# Patient Record
Sex: FEMALE | Race: WHITE | NOT HISPANIC OR LATINO | Employment: OTHER | URBAN - METROPOLITAN AREA
[De-identification: names, ages, dates, MRNs, and addresses within clinical notes are randomized per-mention and may not be internally consistent; named-entity substitution may affect disease eponyms.]

---

## 2017-01-03 ENCOUNTER — ANTI-COAG VISIT (OUTPATIENT)
Dept: CARDIOLOGY | Facility: CLINIC | Age: 82
End: 2017-01-03

## 2017-01-03 ENCOUNTER — LAB VISIT (OUTPATIENT)
Dept: LAB | Facility: HOSPITAL | Age: 82
End: 2017-01-03
Attending: FAMILY MEDICINE
Payer: MEDICARE

## 2017-01-03 DIAGNOSIS — Z79.01 LONG TERM CURRENT USE OF ANTICOAGULANT THERAPY: ICD-10-CM

## 2017-01-03 DIAGNOSIS — I48.91 ATRIAL FIBRILLATION, UNSPECIFIED TYPE: ICD-10-CM

## 2017-01-03 LAB
INR PPP: 1.7
PROTHROMBIN TIME: 17.2 SEC

## 2017-01-03 PROCEDURE — 36415 COLL VENOUS BLD VENIPUNCTURE: CPT | Mod: PO

## 2017-01-03 PROCEDURE — 85610 PROTHROMBIN TIME: CPT | Mod: PO

## 2017-01-17 ENCOUNTER — ANTI-COAG VISIT (OUTPATIENT)
Dept: CARDIOLOGY | Facility: CLINIC | Age: 82
End: 2017-01-17
Payer: MEDICARE

## 2017-01-17 DIAGNOSIS — Z79.01 LONG TERM CURRENT USE OF ANTICOAGULANT THERAPY: Primary | ICD-10-CM

## 2017-01-17 DIAGNOSIS — I48.91 ATRIAL FIBRILLATION, UNSPECIFIED TYPE: ICD-10-CM

## 2017-01-17 LAB
CTP QC/QA: ABNORMAL
INR PPP: 4.1 (ref 2–3)

## 2017-01-17 PROCEDURE — 85610 PROTHROMBIN TIME: CPT | Mod: QW,S$GLB,,

## 2017-01-17 PROCEDURE — 99211 OFF/OP EST MAY X REQ PHY/QHP: CPT | Mod: 25,S$GLB,,

## 2017-01-17 NOTE — MR AVS SNAPSHOT
[facility]  [address] [facility] [address]  [place] [address]  Phone: [phone]                  Miesha Obando   2017 10:30 AM   Anti-coag visit    Description:  Female : 1935   Provider:  Danica Thao PharmD   Department:  [facility]           Diagnoses this Visit        Comments    Long term current use of anticoagulant therapy    -  Primary     Atrial fibrillation, unspecified type                To Do List           Future Appointments        Provider Department Dept Phone    2017 10:30 AM Danica Thao PharmD [facility] [phone]      Goals (5 Years of Data)     None      [facility]     [facility] On Call Nurse Care Line -  Assistance  Registered nurses in the [facility] On Call Center provide clinical advisement, health education, appointment booking, and other advisory services.  Call for this free service at [phone].             Medications           Message regarding Medications     Verify the changes and/or additions to your medication regime listed below are the same as discussed with your clinician today.  If any of these changes or additions are incorrect, please notify your healthcare provider.             Verify that the below list of medications is an accurate representation of the medications you are currently taking.  If none reported, the list may be blank. If incorrect, please contact your healthcare provider. Carry this list with you in case of emergency.           Current Medications     ACCU-CHEK MARLON PLUS TEST STRP Strp USE AS DIRECTED TWICE DAILY    acetaminophen (TYLENOL) 325 MG tablet Take 325 mg by mouth as needed for Pain or Temperature greater than.     albuterol (PROAIR HFA) 90 mcg/actuation inhaler Inhale 2 puffs into the lungs every 4 (four) hours as needed.    albuterol (PROVENTIL) 2.5 mg /3 mL (0.083 %) nebulizer solution Take 3 mLs (2.5 mg total) by nebulization every 6 (six) hours as needed for Wheezing.     ascorbic acid (VITAMIN C) 500 MG tablet Take 500 mg by mouth once daily.      aspirin (ECOTRIN) 81 MG EC tablet Take 81 mg by mouth once daily.    b complex vitamins tablet Take 1 tablet by mouth once daily.      fish oil-omega-3 fatty acids 300-1,000 mg capsule Take 2 g by mouth once daily.    fluticasone-vilanterol (BREO ELLIPTA) 200-25 mcg/dose DsDv diskus inhaler Inhale 1 puff into the lungs once daily.    furosemide (LASIX) 20 MG tablet Take 1 tablet (20 mg total) by mouth daily as needed (Weight gain, Swelling, or shortness of breath).    hydrocodone-acetaminophen 5-325mg (NORCO) 5-325 mg per tablet Take 1 tablet by mouth every 6 (six) hours as needed for Pain.    levothyroxine (SYNTHROID) 100 MCG tablet TAKE 1 TABLET EVERY DAY    losartan (COZAAR) 25 MG tablet TAKE 1 TABLET ONE TIME DAILY    MECOBAL/LEVOMEFOLAT CA/B6 PHOS (METANX ORAL) Take 1 tablet by mouth every morning.    metoprolol succinate (TOPROL-XL) 50 MG 24 hr tablet Take 1 tablet (50 mg total) by mouth once daily.    nitroGLYCERIN (NITROSTAT) 0.4 MG SL tablet Place 1 tablet (0.4 mg total) under the tongue every 5 (five) minutes as needed for Chest pain.    spironolactone (ALDACTONE) 25 MG tablet Take 1 tablet (25 mg total) by mouth once daily.    warfarin (COUMADIN) 5 MG tablet TAKE 1/2 TO 1 TABLET DAILY AS DIRECTED BY COUMADIN CLINIC           Clinical Reference Information           Vital Signs - Last Recorded     LMP                   (LMP Unknown)           Allergies as of 1/17/2017     Amoxicillin-pot Clavula (Bulk)    Sulfa (Sulfonamide Antibiotics)    Adhesive      Immunizations Administered on Date of Encounter - 1/17/2017     None      Orders Placed During Today's Visit      Normal Orders This Visit    ISTAT INR          1/17/2017 10:54 AM - Aby ValdezD      Component Results     Component Value Flag Ref Range Units Status    INR 4.1 (A) 2.0 - 3.0  Final     Acceptable           December 2016 Details    Sun Mon Tue  Wed Thu Fri Sat         1               2               3                 4               5               6               7               8               9               10                 11               12               13               14               15               16               17                 18      2.5 mg         19      5 mg         20      2.5 mg         21      2.5 mg         22      2.5 mg         23      2.5 mg         24      2.5 mg           25      2.5 mg         26      5 mg         27      2.5 mg         28      2.5 mg         29      2.5 mg         30      2.5 mg         31      2.5 mg          Date Details   No additional details              January 2017 Details    Sun Mon Tue Wed Thu Fri Sat     1      2.5 mg         2      5 mg         3   1.7   5 mg   See details      4      2.5 mg         5      2.5 mg         6      5 mg         7      2.5 mg           8      2.5 mg         9      5 mg         10      2.5 mg         11      2.5 mg         12      2.5 mg         13      5 mg         14      2.5 mg           15      2.5 mg         16      5 mg         17   4.1   Hold   See details      18      2.5 mg         19      2.5 mg         20      2.5 mg         21      2.5 mg           22      2.5 mg         23      5 mg         24      2.5 mg         25      2.5 mg         26      2.5 mg         27      2.5 mg         28      2.5 mg           29      2.5 mg         30            31                    Date Details   01/03 Last INR check   INR: 1.7   Coumadin Therapy: 2.0 - 3.0 for INR for all indicators except mechanical heart valves and antiphospholipid syndromes which should use 2.5 - 3.5.       01/17 This INR check   INR: 4.1       Date of next INR:  1/30/2017               How to take your warfarin dose     To take:  2.5 mg Take 0.5 of a 5 mg tablet.    To take:  5 mg Take 1 of the 5 mg tablets.    Hold Do not take your warfarin dose. See the Details table to the right for additional  instructions.                Anticoagulation Summary as of 1/17/2017     Maintenance plan 5 mg (5 mg x 1) on Mon; 2.5 mg (5 mg x 0.5) all other days    Full instructions 1/17: Hold; Otherwise 5 mg on Mon; 2.5 mg all other days    Next INR check 1/30/2017      Anticoagulation Episode Summary     Comments [facility] 3x/wk

## 2017-01-17 NOTE — PROGRESS NOTES
Patient confirms dose and compliance. She does report that she had a cold and was taking OTC medication as well as tessalon for cough. Will hold today and reduce dose slightly. Recheck in 2 weeks.

## 2017-01-23 RX ORDER — WARFARIN SODIUM 5 MG/1
TABLET ORAL
Qty: 90 TABLET | Refills: 3 | Status: SHIPPED | OUTPATIENT
Start: 2017-01-23 | End: 2018-01-03

## 2017-01-24 ENCOUNTER — CLINICAL SUPPORT (OUTPATIENT)
Dept: CARDIOLOGY | Facility: CLINIC | Age: 82
End: 2017-01-24
Payer: MEDICARE

## 2017-01-24 DIAGNOSIS — I45.5 SINUS PAUSE: ICD-10-CM

## 2017-01-24 DIAGNOSIS — R00.1 BRADYCARDIA: ICD-10-CM

## 2017-01-24 DIAGNOSIS — Z95.0 PACEMAKER: ICD-10-CM

## 2017-01-24 PROCEDURE — 93294 REM INTERROG EVL PM/LDLS PM: CPT | Mod: S$GLB,,, | Performed by: INTERNAL MEDICINE

## 2017-01-24 PROCEDURE — 93296 REM INTERROG EVL PM/IDS: CPT | Mod: S$GLB,,, | Performed by: INTERNAL MEDICINE

## 2017-01-26 DIAGNOSIS — I48.91 ATRIAL FIBRILLATION, UNSPECIFIED TYPE: ICD-10-CM

## 2017-01-26 DIAGNOSIS — Z95.0 PACEMAKER: Primary | ICD-10-CM

## 2017-01-26 DIAGNOSIS — I50.32 CHRONIC DIASTOLIC HEART FAILURE: ICD-10-CM

## 2017-01-30 ENCOUNTER — ANTI-COAG VISIT (OUTPATIENT)
Dept: CARDIOLOGY | Facility: CLINIC | Age: 82
End: 2017-01-30
Payer: MEDICARE

## 2017-01-30 DIAGNOSIS — Z79.01 LONG TERM CURRENT USE OF ANTICOAGULANT THERAPY: Primary | ICD-10-CM

## 2017-01-30 DIAGNOSIS — I48.91 ATRIAL FIBRILLATION, UNSPECIFIED TYPE: ICD-10-CM

## 2017-01-30 LAB
CTP QC/QA: YES
INR PPP: 2.3 (ref 2–3)

## 2017-01-30 PROCEDURE — 99211 OFF/OP EST MAY X REQ PHY/QHP: CPT | Mod: 25,S$GLB,,

## 2017-01-30 PROCEDURE — 85610 PROTHROMBIN TIME: CPT | Mod: QW,S$GLB,,

## 2017-01-30 NOTE — MR AVS SNAPSHOT
[facility]  [address] [facility]  [facility] [address]  Phone: [phone]                  Miesha Obando   2017 10:30 AM   Anti-coag visit    Description:  Female : 1935   Provider:  Danica Thao PharmD   Department:  [place] [facility]           Diagnoses this Visit        Comments    Long term current use of anticoagulant therapy    -  Primary     Atrial fibrillation, unspecified type                To Do List           Future Appointments        Provider Department Dept Phone    2017 11:15 AM Danica Thao PharmD [place] [facility] [phone]    2017 9:00 AM PACEMAKER [facility] [phone]    2017 9:40 AM Eddie Gonzales MD [facility] [phone]      Goals (5 Years of Data)     None      [facility]     [facility] On Call Nurse Care Line -  Assistance  Registered nurses in the [facility] On Call Center provide clinical advisement, health education, appointment booking, and other advisory services.  Call for this free service at [phone].             Medications           Message regarding Medications     Verify the changes and/or additions to your medication regime listed below are the same as discussed with your clinician today.  If any of these changes or additions are incorrect, please notify your healthcare provider.             Verify that the below list of medications is an accurate representation of the medications you are currently taking.  If none reported, the list may be blank. If incorrect, please contact your healthcare provider. Carry this list with you in case of emergency.           Current Medications     ACCU-CHEK MARLON PLUS TEST STRP Strp USE AS DIRECTED TWICE DAILY    acetaminophen (TYLENOL) 325 MG tablet Take 325 mg by mouth as needed for Pain or Temperature greater than.     albuterol (PROAIR HFA) 90 mcg/actuation inhaler Inhale 2 puffs into the lungs every 4 (four) hours as needed.    albuterol  (PROVENTIL) 2.5 mg /3 mL (0.083 %) nebulizer solution Take 3 mLs (2.5 mg total) by nebulization every 6 (six) hours as needed for Wheezing.    ascorbic acid (VITAMIN C) 500 MG tablet Take 500 mg by mouth once daily.      aspirin (ECOTRIN) 81 MG EC tablet Take 81 mg by mouth once daily.    b complex vitamins tablet Take 1 tablet by mouth once daily.      fish oil-omega-3 fatty acids 300-1,000 mg capsule Take 2 g by mouth once daily.    fluticasone-vilanterol (BREO ELLIPTA) 200-25 mcg/dose DsDv diskus inhaler Inhale 1 puff into the lungs once daily.    furosemide (LASIX) 20 MG tablet Take 1 tablet (20 mg total) by mouth daily as needed (Weight gain, Swelling, or shortness of breath).    hydrocodone-acetaminophen 5-325mg (NORCO) 5-325 mg per tablet Take 1 tablet by mouth every 6 (six) hours as needed for Pain.    levothyroxine (SYNTHROID) 100 MCG tablet TAKE 1 TABLET EVERY DAY    losartan (COZAAR) 25 MG tablet TAKE 1 TABLET ONE TIME DAILY    MECOBAL/LEVOMEFOLAT CA/B6 PHOS (METANX ORAL) Take 1 tablet by mouth every morning.    metoprolol succinate (TOPROL-XL) 50 MG 24 hr tablet Take 1 tablet (50 mg total) by mouth once daily.    nitroGLYCERIN (NITROSTAT) 0.4 MG SL tablet Place 1 tablet (0.4 mg total) under the tongue every 5 (five) minutes as needed for Chest pain.    spironolactone (ALDACTONE) 25 MG tablet Take 1 tablet (25 mg total) by mouth once daily.    warfarin (COUMADIN) 5 MG tablet TAKE 1/2 TO 1 TABLET DAILY AS DIRECTED BY COUMADIN CLINIC           Clinical Reference Information           Vital Signs - Last Recorded     LMP                   (LMP Unknown)           Allergies as of 1/30/2017     Amoxicillin-pot Clavula (Bulk)    Sulfa (Sulfonamide Antibiotics)    Adhesive      Immunizations Administered on Date of Encounter - 1/30/2017     None      Orders Placed During Today's Visit      Normal Orders This Visit    POCT INR          1/30/2017 11:02 AM - Danica Thao PharmD      Component Results     Component  Value Flag Ref Range Units Status    INR 2.3  2.0 - 3.0  Final     Acceptable Yes    Final      December 2016 Details    Sun Mon Tue Wed Thu Fri Sat         1               2               3                 4               5               6               7               8               9               10                 11               12               13               14               15               16               17                 18               19               20               21               22               23               24                 25               26               27               28               29               30               31      2.5 mg          Date Details   No additional details              January 2017 Details    Sun Mon Tue Wed Thu Fri Sat     1      2.5 mg         2      5 mg         3   1.7   5 mg   See details      4      2.5 mg         5      2.5 mg         6      5 mg         7      2.5 mg           8      2.5 mg         9      5 mg         10      2.5 mg         11      2.5 mg         12      2.5 mg         13      5 mg         14      2.5 mg           15      2.5 mg         16      5 mg         17   4.1   Hold   See details      18      2.5 mg         19      2.5 mg         20      2.5 mg         21      2.5 mg           22      2.5 mg         23      5 mg         24      2.5 mg         25      2.5 mg         26      2.5 mg         27      2.5 mg         28      2.5 mg           29      2.5 mg         30   2.3   5 mg   See details      31      2.5 mg              Date Details   01/03 INR: 1.7   Coumadin Therapy: 2.0 - 3.0 for INR for all indicators except mechanical heart valves and antiphospholipid syndromes which should use 2.5 - 3.5.       01/17 Last INR check   INR: 4.1      01/30 This INR check   INR: 2.3                     How to take your warfarin dose     To take:  2.5 mg Take 0.5 of a 5 mg tablet.    To take:  5 mg Take 1 of the 5 mg tablets.            February 2017 Details    Sun Mon Tue Wed Thu Fri Sat        1      2.5 mg         2      2.5 mg         3      2.5 mg         4      2.5 mg           5      2.5 mg         6      5 mg         7      2.5 mg         8      2.5 mg         9      2.5 mg         10      2.5 mg         11      2.5 mg           12      2.5 mg         13      5 mg         14      2.5 mg         15      2.5 mg         16      2.5 mg         17      2.5 mg         18      2.5 mg           19      2.5 mg         20            21               22               23               24               25                 26               27               28                    Date Details   No additional details    Date of next INR:  2/20/2017         How to take your warfarin dose     To take:  2.5 mg Take 0.5 of a 5 mg tablet.    To take:  5 mg Take 1 of the 5 mg tablets.           Anticoagulation Summary as of 1/30/2017     Maintenance plan 5 mg (5 mg x 1) on Mon; 2.5 mg (5 mg x 0.5) all other days    Full instructions 5 mg on Mon; 2.5 mg all other days    Next INR check 2/20/2017      Anticoagulation Episode Summary     Comments [facility] 3x/wk

## 2017-01-30 NOTE — PROGRESS NOTES
Patient confirms dose and compliance. Patient reports that she found a lump in her breast that is painful, due to her history of breast cancer she is concerned. She will follow up with GYN. No other changes/problems reported. INR is in range and stable, recheck INR in 3 weeks.

## 2017-02-01 ENCOUNTER — TELEPHONE (OUTPATIENT)
Dept: FAMILY MEDICINE | Facility: CLINIC | Age: 82
End: 2017-02-01

## 2017-02-01 NOTE — TELEPHONE ENCOUNTER
"Spoke to patient this afternoon. Patient states that she was concerned about the wording of the mammogram results. She states that she was concerned about the work "thickness" - she is wanting to know if that means the results were not conclusive. Explained to her that the results were normal - no abnormal findings.  Patient is also concerned about some pain that she is having in her breast and would like to know if she should get it checked out. She has a lot of pain in her joints but this is different.    "

## 2017-02-01 NOTE — TELEPHONE ENCOUNTER
----- Message from Deven Nicole sent at 2/1/2017 11:19 AM CST -----  Contact: self  Good morning,     Ms. Obando would like to speak with someone concerning her recent mammogram. She also states she is having breast pain.     Thanks,     Deven Vigil

## 2017-02-02 NOTE — TELEPHONE ENCOUNTER
Spoke to patient and stated to him what Dr. Forte noted. Scheduled patient an appointment to see Susi Garcia NP 2/6/17. Pt verbalized understanding.

## 2017-02-02 NOTE — TELEPHONE ENCOUNTER
"I reviewed the results and do not see the word "thickness".  This is completely normal.  However, if she is having discomfort, should have an appointment to evaluate.  Can see NP  "

## 2017-02-03 ENCOUNTER — PATIENT OUTREACH (OUTPATIENT)
Dept: ADMINISTRATIVE | Facility: HOSPITAL | Age: 82
End: 2017-02-03

## 2017-02-03 NOTE — LETTER
February 13, 2017    Miesha HOU Topher  [address] [id number] [facility]  [address]  [facility] [address]  Phone: [phone] Dear Mrs. Obando:    We have tried to reach you by mychart unsuccessfully.        [facility] is committed to your overall health.  To help you get the most out of each of your visits, we will review your information to make sure you are up to date on all of your recommended tests and/or procedures.       Ms. Susi Garcia has found that you may be due for annual dilated eye exam, diabetic foot exam.     If you have had any of the above done at another facility, please bring the records or information with you so that your record at [facility] will be complete.     If you are currently taking medication, please bring it with you to your appointment for review.     Also, if you have any type of Advanced Directives, please bring them with you to your office visit so we may scan them into your chart.     Miguel Johansen LPN Clinical Care Coordinator   [facility] [address] [facility]   [place] [address]   [phone] (p)   [phone] (f)

## 2017-02-13 ENCOUNTER — OFFICE VISIT (OUTPATIENT)
Dept: FAMILY MEDICINE | Facility: CLINIC | Age: 82
End: 2017-02-13
Payer: MEDICARE

## 2017-02-13 VITALS
WEIGHT: 232.38 LBS | RESPIRATION RATE: 14 BRPM | TEMPERATURE: 98 F | DIASTOLIC BLOOD PRESSURE: 88 MMHG | HEIGHT: 61 IN | HEART RATE: 64 BPM | SYSTOLIC BLOOD PRESSURE: 130 MMHG | BODY MASS INDEX: 43.88 KG/M2

## 2017-02-13 DIAGNOSIS — H61.21 IMPACTED CERUMEN OF RIGHT EAR: Primary | ICD-10-CM

## 2017-02-13 DIAGNOSIS — R19.7 DIARRHEA, UNSPECIFIED TYPE: ICD-10-CM

## 2017-02-13 DIAGNOSIS — J30.9 ALLERGIC RHINITIS, UNSPECIFIED ALLERGIC RHINITIS TRIGGER, UNSPECIFIED RHINITIS SEASONALITY: ICD-10-CM

## 2017-02-13 DIAGNOSIS — R10.32 LLQ ABDOMINAL PAIN: ICD-10-CM

## 2017-02-13 PROCEDURE — 99213 OFFICE O/P EST LOW 20 MIN: CPT | Mod: S$GLB,,, | Performed by: NURSE PRACTITIONER

## 2017-02-13 PROCEDURE — 1159F MED LIST DOCD IN RCRD: CPT | Mod: S$GLB,,, | Performed by: NURSE PRACTITIONER

## 2017-02-13 PROCEDURE — 87046 STOOL CULTR AEROBIC BACT EA: CPT | Mod: 59

## 2017-02-13 PROCEDURE — 87427 SHIGA-LIKE TOXIN AG IA: CPT | Mod: 59

## 2017-02-13 PROCEDURE — 1157F ADVNC CARE PLAN IN RCRD: CPT | Mod: S$GLB,,, | Performed by: NURSE PRACTITIONER

## 2017-02-13 PROCEDURE — 82272 OCCULT BLD FECES 1-3 TESTS: CPT

## 2017-02-13 PROCEDURE — 87425 ROTAVIRUS AG IA: CPT

## 2017-02-13 PROCEDURE — 1160F RVW MEDS BY RX/DR IN RCRD: CPT | Mod: S$GLB,,, | Performed by: NURSE PRACTITIONER

## 2017-02-13 PROCEDURE — 87209 SMEAR COMPLEX STAIN: CPT

## 2017-02-13 PROCEDURE — 3079F DIAST BP 80-89 MM HG: CPT | Mod: S$GLB,,, | Performed by: NURSE PRACTITIONER

## 2017-02-13 PROCEDURE — 3075F SYST BP GE 130 - 139MM HG: CPT | Mod: S$GLB,,, | Performed by: NURSE PRACTITIONER

## 2017-02-13 PROCEDURE — 99999 PR PBB SHADOW E&M-EST. PATIENT-LVL IV: CPT | Mod: PBBFAC,,, | Performed by: NURSE PRACTITIONER

## 2017-02-13 PROCEDURE — 87045 FECES CULTURE AEROBIC BACT: CPT

## 2017-02-13 PROCEDURE — 89055 LEUKOCYTE ASSESSMENT FECAL: CPT

## 2017-02-13 PROCEDURE — 87329 GIARDIA AG IA: CPT

## 2017-02-13 PROCEDURE — 87449 NOS EACH ORGANISM AG IA: CPT

## 2017-02-13 NOTE — PROGRESS NOTES
Subjective:       Patient ID: Miesha Obando is a 81 y.o. female.    Chief Complaint: Diarrhea and Nasal Congestion    Diarrhea    This is a new problem. Episode onset: over a week. The problem occurs 5 to 10 times per day. The patient states that diarrhea awakens her from sleep. Associated symptoms include abdominal pain and increased flatus. Pertinent negatives include no arthralgias, bloating, chills, coughing, fever, headaches, myalgias, sweats, URI (nasal congestion and runny nose), vomiting or weight loss. There are no known risk factors. Treatments tried: imodium. The treatment provided moderate relief.     Review of Systems   Constitutional: Positive for appetite change. Negative for activity change, chills, fever and weight loss.   HENT: Positive for congestion, postnasal drip and rhinorrhea. Negative for ear discharge, ear pain, sinus pressure and sore throat.    Respiratory: Negative for cough, chest tightness, shortness of breath and wheezing.    Gastrointestinal: Positive for abdominal pain, diarrhea and flatus. Negative for bloating and vomiting.   Musculoskeletal: Negative for arthralgias and myalgias.   Neurological: Negative for headaches.       Objective:      Physical Exam   Constitutional: She is oriented to person, place, and time. She appears well-nourished.   HENT:   Head: Normocephalic and atraumatic.   Right Ear: External ear normal.   Left Ear: Hearing, tympanic membrane, external ear and ear canal normal.   Nose: Nose normal.   Mouth/Throat: Oropharynx is clear and moist. No oropharyngeal exudate.   Right ear cerumen impaction   Eyes: Conjunctivae and EOM are normal. Pupils are equal, round, and reactive to light.   Neck: Normal range of motion. Neck supple.   Cardiovascular: Normal rate, regular rhythm, normal heart sounds and intact distal pulses.    Pulmonary/Chest: Effort normal and breath sounds normal. She has no wheezes. She has no rales.   Abdominal: Soft. Bowel sounds are normal.  There is tenderness in the left upper quadrant and left lower quadrant. There is no rigidity, no rebound, no guarding and no CVA tenderness.   Lymphadenopathy:     She has no cervical adenopathy.   Neurological: She is alert and oriented to person, place, and time.   Skin: Skin is warm and dry. No rash noted.   Vitals reviewed.      Assessment:       1. Impacted cerumen of right ear    2. Allergic rhinitis, unspecified allergic rhinitis trigger, unspecified rhinitis seasonality    3. LLQ abdominal pain    4. Diarrhea, unspecified type        Plan:       Miesha was seen today for diarrhea and nasal congestion.    Diagnoses and all orders for this visit:    Impacted cerumen of right ear  -     Ambulatory referral to ENT    Allergic rhinitis, unspecified allergic rhinitis trigger, unspecified rhinitis seasonality  flonase and zyrtec nightly    LLQ abdominal pain  -     CT Abdomen Pelvis W Wo Contrast; Future  -     Creatinine, serum; Future  -     Occult blood x 1, stool; Future  -     WBC, Stool; Future  -     Stool culture; Future  -     Giardia / Cryptosporidum, EIA; Future  -     Stool Exam-Ova,Cysts,Parasites; Future  -     Rotavirus antigen, stool; Future  -     Clostridium difficile EIA; Future    Diarrhea, unspecified type  -     CT Abdomen Pelvis W Wo Contrast; Future  -     Creatinine, serum; Future  -     Occult blood x 1, stool; Future  -     WBC, Stool; Future  -     Stool culture; Future  -     Giardia / Cryptosporidum, EIA; Future  -     Stool Exam-Ova,Cysts,Parasites; Future  -     Rotavirus antigen, stool; Future  -     Clostridium difficile EIA; Future    [place] diet  Small frequent meals  ER for increasing or worsening symptoms

## 2017-02-13 NOTE — MR AVS SNAPSHOT
[facility]  [address]  [employer] [address]  Phone: [phone]  Fax: [phone]                  Miesha Obando   2017 12:00 PM   Office Visit    Description:  Female : 1935   Provider:  Susi Garcia NP   Department:  [facility]           Reason for Visit     Diarrhea     Nasal Congestion           Diagnoses this Visit        Comments    Impacted cerumen of right ear    -  Primary     Allergic rhinitis, unspecified allergic rhinitis trigger, unspecified rhinitis seasonality         LLQ abdominal pain         Diarrhea, unspecified type                To Do List           Future Appointments        Provider Department Dept Phone    2/15/2017 8:15 AM LAB, [place] [facility] [phone]    2/15/2017 9:30 AM [facility] CT1 LIMIT [address] [facility] [phone]    2017 10:00 AM [place], [place] [phone]    2017 11:15 AM Toña An, PharmD [facility] Coumadin [phone]    3/1/2017 8:30 AM AUDIO, [facility] Audiology [phone]      Goals (5 Years of Data)     None      [facility]     [facility] Nurse [facility] Emergency Department Line - 24/7 Assistance  Registered nurses in the [facility] provide clinical advisement, health education, appointment booking, and other advisory services.  Call for this free service at [phone].             Medications           Message regarding Medications     Verify the changes and/or additions to your medication regime listed below are the same as discussed with your clinician today.  If any of these changes or additions are incorrect, please notify your healthcare provider.             Verify that the below list of medications is an accurate representation of the medications you are currently taking.  If none reported, the list may be blank. If incorrect, please contact your healthcare provider. Carry this list with  "you in case of emergency.           Current Medications     ACCU-CHEK MARLON PLUS TEST STRP Strp USE AS DIRECTED TWICE DAILY    acetaminophen (TYLENOL) 325 MG tablet Take 325 mg by mouth as needed for Pain or Temperature greater than.     albuterol (PROAIR HFA) 90 mcg/actuation inhaler Inhale 2 puffs into the lungs every 4 (four) hours as needed.    ascorbic acid (VITAMIN C) 500 MG tablet Take 500 mg by mouth once daily.      aspirin (ECOTRIN) 81 MG EC tablet Take 81 mg by mouth once daily.    b complex vitamins tablet Take 1 tablet by mouth once daily.      fish oil-omega-3 fatty acids 300-1,000 mg capsule Take 2 g by mouth once daily.    fluticasone-vilanterol (BREO ELLIPTA) 200-25 mcg/dose DsDv diskus inhaler Inhale 1 puff into the lungs once daily.    hydrocodone-acetaminophen 5-325mg (NORCO) 5-325 mg per tablet Take 1 tablet by mouth every 6 (six) hours as needed for Pain.    levothyroxine (SYNTHROID) 100 MCG tablet TAKE 1 TABLET EVERY DAY    losartan (COZAAR) 25 MG tablet TAKE 1 TABLET ONE TIME DAILY    MECOBAL/LEVOMEFOLAT CA/B6 PHOS (METANX ORAL) Take 1 tablet by mouth every morning.    metoprolol succinate (TOPROL-XL) 50 MG 24 hr tablet Take 1 tablet (50 mg total) by mouth once daily.    spironolactone (ALDACTONE) 25 MG tablet Take 1 tablet (25 mg total) by mouth once daily.    warfarin (COUMADIN) 5 MG tablet TAKE 1/2 TO 1 TABLET DAILY AS DIRECTED BY COUMADIN CLINIC    furosemide (LASIX) 20 MG tablet Take 1 tablet (20 mg total) by mouth daily as needed (Weight gain, Swelling, or shortness of breath).    nitroGLYCERIN (NITROSTAT) 0.4 MG SL tablet Place 1 tablet (0.4 mg total) under the tongue every 5 (five) minutes as needed for Chest pain.           Clinical Reference Information           Your Vitals Were     BP Pulse Temp Resp Height Weight    130/88 (BP Location: Right arm, Patient Position: Sitting) 64 98 °F (36.7 °C) (Oral) 14 5' 1" (1.549 m) 105.4 kg (232 lb 5.8 oz)    Last Period BMI             (LMP " Unknown) 43.9 kg/m2         Blood Pressure          Most Recent Value    BP  130/88      Allergies as of 2/13/2017     Amoxicillin-pot Clavula (Bulk)    Sulfa (Sulfonamide Antibiotics)    Adhesive      Immunizations Administered on Date of Encounter - 2/13/2017     None      Orders Placed During Today's Visit      Normal Orders This Visit    Ambulatory referral to ENT     Future Labs/Procedures Expected by Expires    Clostridium difficile EIA  2/13/2017 4/14/2018    Creatinine, serum  2/13/2017 4/14/2018    CT Abdomen Pelvis W Wo Contrast  2/13/2017 5/14/2017    Giardia / Cryptosporidum, EIA  2/13/2017 4/14/2018    Occult blood x 1, stool  2/13/2017 2/13/2018    Rotavirus antigen, stool  2/13/2017 2/13/2018    Stool culture  2/13/2017 2/13/2018    Stool Exam-Ova,Cysts,Parasites  2/13/2017 2/13/2018    WBC, Stool  2/13/2017 2/13/2018      Language Assistance Services     ATTENTION: Language assistance services are available, free of charge. Please call [phone].      ATENCIÓN: Si habla español, tiene a granado disposición servicios gratuitos de asistencia lingüística. Llame al [phone].     CHÚ Ý: N?u b?n nói Ti?ng Vi?t, có các d?ch v? h? tr? ngôn ng? mi?n phí earlh cho b?n. G?i s? [phone].         [facility] complies with applicable Federal civil rights laws and does not discriminate on the basis of race, color, national origin, age, disability, or sex.

## 2017-02-15 ENCOUNTER — HOSPITAL ENCOUNTER (OUTPATIENT)
Dept: RADIOLOGY | Facility: HOSPITAL | Age: 82
Discharge: HOME | End: 2017-02-15
Attending: NURSE PRACTITIONER
Payer: MEDICARE

## 2017-02-15 DIAGNOSIS — R19.7 DIARRHEA, UNSPECIFIED TYPE: ICD-10-CM

## 2017-02-15 DIAGNOSIS — R10.32 LLQ ABDOMINAL PAIN: ICD-10-CM

## 2017-02-15 LAB
C DIFF GDH STL QL: NEGATIVE
C DIFF TOX A+B STL QL IA: NEGATIVE
WBC #/AREA STL HPF: NORMAL /[HPF]

## 2017-02-15 PROCEDURE — 25500020 PHARM REV CODE 255: Mod: PO | Performed by: NURSE PRACTITIONER

## 2017-02-15 PROCEDURE — 74178 CT ABD&PLV WO CNTR FLWD CNTR: CPT | Mod: TC,PO

## 2017-02-15 PROCEDURE — 74178 CT ABD&PLV WO CNTR FLWD CNTR: CPT | Mod: 26,,, | Performed by: RADIOLOGY

## 2017-02-15 RX ADMIN — IOHEXOL 30 ML: 350 INJECTION, SOLUTION INTRAVENOUS at 10:02

## 2017-02-15 RX ADMIN — IOHEXOL 100 ML: 350 INJECTION, SOLUTION INTRAVENOUS at 10:02

## 2017-02-16 ENCOUNTER — TELEPHONE (OUTPATIENT)
Dept: FAMILY MEDICINE | Facility: CLINIC | Age: 82
End: 2017-02-16

## 2017-02-16 DIAGNOSIS — K52.9 COLITIS: Primary | ICD-10-CM

## 2017-02-16 LAB
E COLI SXT1 STL QL IA: NEGATIVE
E COLI SXT2 STL QL IA: NEGATIVE
O+P STL TRI STN: NORMAL
OB PNL STL: NEGATIVE
RV AG STL QL IA.RAPID: NEGATIVE

## 2017-02-16 NOTE — TELEPHONE ENCOUNTER
Spoke to patient and stated to her what Susi Garcia NP noted. Pt verbalized understanding. Appointment scheduled with GI. Mailing appointment to patient.

## 2017-02-18 LAB
BACTERIA STL CULT: NORMAL
CRYPTOSP AG STL QL IA: NEGATIVE
G LAMBLIA AG STL QL IA: NEGATIVE

## 2017-02-20 ENCOUNTER — ANTI-COAG VISIT (OUTPATIENT)
Dept: CARDIOLOGY | Facility: CLINIC | Age: 82
End: 2017-02-20
Payer: MEDICARE

## 2017-02-20 ENCOUNTER — OFFICE VISIT (OUTPATIENT)
Dept: FAMILY MEDICINE | Facility: CLINIC | Age: 82
End: 2017-02-20
Payer: MEDICARE

## 2017-02-20 VITALS
SYSTOLIC BLOOD PRESSURE: 133 MMHG | WEIGHT: 231.5 LBS | HEART RATE: 59 BPM | DIASTOLIC BLOOD PRESSURE: 66 MMHG | BODY MASS INDEX: 43.71 KG/M2 | HEIGHT: 61 IN

## 2017-02-20 DIAGNOSIS — Z00.00 ENCOUNTER FOR PREVENTIVE HEALTH EXAMINATION: Primary | ICD-10-CM

## 2017-02-20 DIAGNOSIS — E11.42 TYPE 2 DIABETES MELLITUS WITH DIABETIC POLYNEUROPATHY, WITHOUT LONG-TERM CURRENT USE OF INSULIN: ICD-10-CM

## 2017-02-20 DIAGNOSIS — Z79.01 LONG TERM CURRENT USE OF ANTICOAGULANT THERAPY: ICD-10-CM

## 2017-02-20 DIAGNOSIS — I42.9 CARDIOMYOPATHY, UNSPECIFIED TYPE: ICD-10-CM

## 2017-02-20 DIAGNOSIS — I35.0 NONRHEUMATIC AORTIC VALVE STENOSIS: ICD-10-CM

## 2017-02-20 DIAGNOSIS — I35.0 AORTIC VALVE STENOSIS, UNSPECIFIED ETIOLOGY: ICD-10-CM

## 2017-02-20 DIAGNOSIS — J42 CHRONIC BRONCHITIS, UNSPECIFIED CHRONIC BRONCHITIS TYPE: ICD-10-CM

## 2017-02-20 DIAGNOSIS — I27.9 PULMONARY HEART DISEASE: ICD-10-CM

## 2017-02-20 DIAGNOSIS — Z95.2 S/P TAVR (TRANSCATHETER AORTIC VALVE REPLACEMENT): ICD-10-CM

## 2017-02-20 DIAGNOSIS — K21.9 GASTROESOPHAGEAL REFLUX DISEASE, ESOPHAGITIS PRESENCE NOT SPECIFIED: ICD-10-CM

## 2017-02-20 DIAGNOSIS — I25.10 CORONARY ARTERY DISEASE INVOLVING NATIVE CORONARY ARTERY OF NATIVE HEART WITHOUT ANGINA PECTORIS: ICD-10-CM

## 2017-02-20 DIAGNOSIS — I10 ESSENTIAL HYPERTENSION: ICD-10-CM

## 2017-02-20 DIAGNOSIS — R32 URINARY INCONTINENCE, UNSPECIFIED TYPE: ICD-10-CM

## 2017-02-20 DIAGNOSIS — Z95.0 PACEMAKER: ICD-10-CM

## 2017-02-20 DIAGNOSIS — G47.00 INSOMNIA, UNSPECIFIED TYPE: ICD-10-CM

## 2017-02-20 DIAGNOSIS — I70.209 ATHEROSCLEROSIS OF ARTERIES OF EXTREMITIES: ICD-10-CM

## 2017-02-20 DIAGNOSIS — Z92.29 HX: LONG TERM ANTICOAGULANT USE: Primary | ICD-10-CM

## 2017-02-20 DIAGNOSIS — G47.30 SLEEP APNEA, UNSPECIFIED TYPE: ICD-10-CM

## 2017-02-20 DIAGNOSIS — E66.01 MORBID OBESITY DUE TO EXCESS CALORIES: ICD-10-CM

## 2017-02-20 DIAGNOSIS — M85.80 OSTEOPENIA: ICD-10-CM

## 2017-02-20 DIAGNOSIS — E03.9 HYPOTHYROIDISM, UNSPECIFIED TYPE: ICD-10-CM

## 2017-02-20 DIAGNOSIS — Z85.3 HISTORY OF BREAST CANCER: ICD-10-CM

## 2017-02-20 DIAGNOSIS — Z95.810 AICD (AUTOMATIC CARDIOVERTER/DEFIBRILLATOR) PRESENT: ICD-10-CM

## 2017-02-20 DIAGNOSIS — M51.36 DDD (DEGENERATIVE DISC DISEASE), LUMBAR: ICD-10-CM

## 2017-02-20 DIAGNOSIS — I70.0 AORTIC ATHEROSCLEROSIS: ICD-10-CM

## 2017-02-20 DIAGNOSIS — I50.20 SYSTOLIC CONGESTIVE HEART FAILURE, NYHA CLASS 3: ICD-10-CM

## 2017-02-20 DIAGNOSIS — M17.0 PRIMARY OSTEOARTHRITIS OF BOTH KNEES: ICD-10-CM

## 2017-02-20 DIAGNOSIS — I48.91 ATRIAL FIBRILLATION, UNSPECIFIED TYPE: ICD-10-CM

## 2017-02-20 DIAGNOSIS — I50.40 COMBINED SYSTOLIC AND DIASTOLIC CONGESTIVE HEART FAILURE, NYHA CLASS 3: ICD-10-CM

## 2017-02-20 DIAGNOSIS — I48.0 PAROXYSMAL ATRIAL FIBRILLATION: ICD-10-CM

## 2017-02-20 DIAGNOSIS — Z95.5 S/P CORONARY ARTERY STENT PLACEMENT: ICD-10-CM

## 2017-02-20 LAB — INR PPP: 2.7 (ref 2–3)

## 2017-02-20 PROCEDURE — 3075F SYST BP GE 130 - 139MM HG: CPT | Mod: S$GLB,,, | Performed by: NURSE PRACTITIONER

## 2017-02-20 PROCEDURE — 3078F DIAST BP <80 MM HG: CPT | Mod: S$GLB,,, | Performed by: NURSE PRACTITIONER

## 2017-02-20 PROCEDURE — 85610 PROTHROMBIN TIME: CPT | Mod: QW,S$GLB,, | Performed by: PHARMACIST

## 2017-02-20 PROCEDURE — 99211 OFF/OP EST MAY X REQ PHY/QHP: CPT | Mod: 25,S$GLB,, | Performed by: PHARMACIST

## 2017-02-20 PROCEDURE — 99999 PR PBB SHADOW E&M-EST. PATIENT-LVL IV: CPT | Mod: PBBFAC,,, | Performed by: NURSE PRACTITIONER

## 2017-02-20 PROCEDURE — G0439 PPPS, SUBSEQ VISIT: HCPCS | Mod: S$GLB,,, | Performed by: NURSE PRACTITIONER

## 2017-02-20 PROCEDURE — 99499 UNLISTED E&M SERVICE: CPT | Mod: S$GLB,,, | Performed by: NURSE PRACTITIONER

## 2017-02-20 RX ORDER — TRAZODONE HYDROCHLORIDE 50 MG/1
TABLET ORAL
COMMUNITY
Start: 2017-01-27 | End: 2017-05-24 | Stop reason: SDUPTHER

## 2017-02-20 NOTE — MR AVS SNAPSHOT
[facility]  [address]  [place] [address]  Phone: [phone]  Fax: [phone]                  Miesha Obando   2017 10:00 AM   Office Visit    Description:  Female : 1935   Provider:  AVIS Santos   Department:  [facility]           Reason for Visit     Health Risk Assessment           Diagnoses this Visit        Comments    Type 2 diabetes mellitus with diabetic polyneuropathy, without long-term current use of insulin         Urinary complication         Encounter for preventive health examination                To Do List           Future Appointments        Provider Department Dept Phone    2017 11:15 AM Toña An, PharmD [place] - Coumadin [phone]    3/1/2017 8:30 AM AUDIO, [facility] - Audiology [phone]    3/1/2017 10:00 AM Sheila Galvez NP [place] - [phone]    3/22/2017 3:00 PM Silvestre Olivas MD [facility] Gastroenterology [phone]    3/29/2017 10:40 AM ZOEY España MD [facility] [phone]      Goals (5 Years of Data)     None      Follow-Up and Disposition     Return for Regular visit with Dr. Forte.      [facility]     [facility] Care Line - 24/7 Assistance  Registered nurses in the [facility] provide clinical advisement, health education, appointment booking, and other advisory services.  Call for this free service at [phone].             Medications           Message regarding Medications     Verify the changes and/or additions to your medication regime listed below are the same as discussed with your clinician today.  If any of these changes or additions are incorrect, please notify your healthcare provider.        STOP taking these medications     MECOBAL/LEVOMEFOLAT CA/B6 PHOS (METANX ORAL) Take 1 tablet by mouth every morning.           Verify that the below list of medications is an accurate representation of  the medications you are currently taking.  If none reported, the list may be blank. If incorrect, please contact your healthcare provider. Carry this list with you in case of emergency.           Current Medications     ACCU-CHEK MARLON PLUS TEST STRP Strp USE AS DIRECTED TWICE DAILY    acetaminophen (TYLENOL) 325 MG tablet Take 325 mg by mouth as needed for Pain or Temperature greater than.     albuterol (PROAIR HFA) 90 mcg/actuation inhaler Inhale 2 puffs into the lungs every 4 (four) hours as needed.    ascorbic acid (VITAMIN C) 500 MG tablet Take 500 mg by mouth once daily.      aspirin (ECOTRIN) 81 MG EC tablet Take 81 mg by mouth once daily.    b complex vitamins tablet Take 1 tablet by mouth once daily.      fish oil-omega-3 fatty acids 300-1,000 mg capsule Take 2 g by mouth once daily.    fluticasone-vilanterol (BREO ELLIPTA) 200-25 mcg/dose DsDv diskus inhaler Inhale 1 puff into the lungs once daily.    furosemide (LASIX) 20 MG tablet Take 1 tablet (20 mg total) by mouth daily as needed (Weight gain, Swelling, or shortness of breath).    hydrocodone-acetaminophen 5-325mg (NORCO) 5-325 mg per tablet Take 1 tablet by mouth every 6 (six) hours as needed for Pain.    levothyroxine (SYNTHROID) 100 MCG tablet TAKE 1 TABLET EVERY DAY    losartan (COZAAR) 25 MG tablet TAKE 1 TABLET ONE TIME DAILY    metoprolol succinate (TOPROL-XL) 50 MG 24 hr tablet Take 1 tablet (50 mg total) by mouth once daily.    nitroGLYCERIN (NITROSTAT) 0.4 MG SL tablet Place 1 tablet (0.4 mg total) under the tongue every 5 (five) minutes as needed for Chest pain.    spironolactone (ALDACTONE) 25 MG tablet Take 1 tablet (25 mg total) by mouth once daily.    trazodone (DESYREL) 50 MG tablet     warfarin (COUMADIN) 5 MG tablet TAKE 1/2 TO 1 TABLET DAILY AS DIRECTED BY COUMADIN CLINIC           Clinical Reference Information           Your Vitals Were     BP Pulse Height Weight Last Period BMI    133/66 (BP Location: Right arm, Patient Position:  "Sitting, BP Method: Automatic) 59 5' 1" (1.549 m) 105 kg (231 lb 7.7 oz) (LMP Unknown) 43.74 kg/m2      Blood Pressure          Most Recent Value    BP  133/66      Allergies as of 2/20/2017     Amoxicillin-pot Clavula (Bulk)    Sulfa (Sulfonamide Antibiotics)    Adhesive      Immunizations Administered on Date of Encounter - 2/20/2017     None      Instructions      Counseling and Referral of Other Preventative  (Italic type indicates deductible and co-insurance are waived)    Patient Name: Miesha Obando  Today's Date: 2/20/2017      SERVICE LIMITATIONS RECOMMENDATION    Vaccines    · Pneumococcal (once after 65)    · Influenza (annually)    · Hepatitis B (if medium/high risk)    · Prevnar 13      Hepatitis B medium/high risk factors:       - End-stage renal disease       - Hemophiliacs who received Factor VII or         IX concentrates       - Clients of institutions for the mentally             retarded       - Persons who live in the same house as          a HepB carrier       - Homosexual men       - Illicit injectable drug abusers     Pneumococcal:Done     Influenza:Done   Hepatitis B: N/A     Prevnar 13:Done    Mammogram (biennial age 50-74)  Annually (age 40 or over) N/A    Pap (up to age 70 and after 70 if unknown history or abnormal study last 10 years)   N/A    Colorectal cancer screening (to age 75)    · Fecal occult blood test (annual)  · Flexible sigmoidoscopy (5y)  · Screening colonoscopy (10y)  · Barium enema  N/A    Diabetes self-management training (no USPSTF recommendations)  Requires referral by treating physician for patient with diabetes or renal disease. 10 hours of initial DSMT sessions of no less than 30 minutes each in a continuous 12-month period. 2 hours of follow-up DSMT in subsequent years. Done    Bone mass measurements (age 65 & older, biennial)  Requires diagnosis related to osteoporosis or estrogen deficiency. Biennial benefit unless patient has history of long-term glucocorticoid " Done    Glaucoma screening (no USPSTF recommendation)  Diabetes mellitus, family history   , age 50 or over    American, age 65 or over Done    Medical nutrition therapy for diabetes or renal disease (no recommended schedule)  Requires referral by treating physician for patient with diabetes or renal disease or kidney transplant within the past 3 years.  Can be provided in same year as diabetes self-management training (DSMT), and CMS recommends medical nutrition therapy take place after DSMT. Up to 3 hours for initial year and 2 hours in subsequent years. Done    Cardiovascular screening blood tests (every 5 years)  · Fasting lipid panel  Order as a panel if possible Done    Diabetes screening tests (at least every 3 years, Medicare covers annually or at 6-month intervals for prediabetic patients)  · Fasting blood sugar (FBS) or glucose tolerance test (GTT)  Patient must be diagnosed with one of the following:       - Hypertension       - Dyslipidemia       - Obesity (BMI 30kg/m2)       - Previous elevated impaired FBS or GTT       ... or any two of the following:       - Overweight (BMI 25 but <30)       - Family history of diabetes       - Age 65 or older       - History of gestational diabetes or birth of baby weighing more than 9 pounds Done    Abdominal aortic aneurysm screening (once)  · Sonogram   Limited to patients who meet one of the following criteria:       - Men who are 65-75 years old and have smoked more than 100 cigarette in their lifetime       - Anyone with a family history of abdominal aortic aneurysm       - Anyone recommended for screening by the USPSTF N/A    HIV screening (annually for increased risk patients)  · HIV-1 and HIV-2 by EIA, or BONIFACIO, rapid antibody test or oral mucosa transudate  Patients must be at increased risk for HIV infection per USPSTF guidelines or pregnant. Tests covered annually for patient at increased risk or as requested by the patient.  Pregnant patients may receive up to 3 tests during pregnancy.  Risks discussed, screening is not recommended    Smoking cessation counseling (up to 8 sessions per year)  Patients must be asymptomatic of tobacco-related conditions to receive as a preventative service.  N/A    Subsequent annual wellness visit  At least 12 months since last AWV  Return in one year     The following information is provided to all patients.  This information is to help you find resources for any of the problems found today that may be affecting your health:                Living healthy guide: www.[facility].[place].[facility]      Understanding Diabetes: www.diabetes.org      Eating healthy: www.cdc.gov/healthyweight      CDC home safety checklist: www.cdc.gov/steadi/patient.html      Agency on Aging: www.goea.[place].[facility]      Alcoholics anonymous (AA): www.aa.org      Physical Activity: www.jack.nih.gov/vk6pdpf      Tobacco use: www.quitwithusla.org          Language Assistance Services     ATTENTION: Language assistance services are available, free of charge. Please call [phone].      ATENCIÓN: Si habla español, tiene a granado disposición servicios gratuitos de asistencia lingüística. Llame al [phone].     CHÚ Ý: N?u b?n nói Ti?ng Vi?t, có các d?ch v? h? tr? ngôn ng? mi?n phí dành cho b?n. G?i s? [phone].         [facility] complies with applicable Federal civil rights laws and does not discriminate on the basis of race, color, national origin, age, disability, or sex.

## 2017-02-20 NOTE — PROGRESS NOTES
"INr in range, Pt reports having multiple loose stools, had some test run and found to have diverticulitis.  She is also having pain.increasng probiotics.  She does confirm dose and vitk intake.  No changes.  She was not placed on abx, but will call should this change.  Have recommended she stay hydrated and discussed drinking gatorage or pedialyte during this time of diarrhea.  Maintain and recheck in 1month  "This note will not be shared with the patient."  "

## 2017-02-20 NOTE — MR AVS SNAPSHOT
[place] [facility]  [address]  [place] [address]  Phone: [phone]                  Miesha Obando   2017 11:15 AM   Anti-coag visit    Description:  Female : 1935   Provider:  Toña An, Chintan   Department:  [place] - Coumadin           Diagnoses this Visit        Comments    HX: long term anticoagulant use    -  Primary     Atrial fibrillation, unspecified type                To Do List           Future Appointments        Provider Department Dept Phone    3/1/2017 8:30 AM AUDIO, [place] [facility] Audiology [phone]    3/1/2017 10:00 AM Sheila Galvez NP [facility] [phone]    3/20/2017 11:15 AM Toña An, PharmD [facility] Coumadin [phone]    3/22/2017 3:00 PM Silvestre Olivas MD [facility] Gastroenterology [phone]    3/29/2017 10:40 AM ZOEY España MD [facility] Family Medicine [phone]      Goals (5 Years of Data)     None      [facility]     [facility] Care [facility] -  Assistance  Registered nurses in the [facility] provide clinical advisement, health education, appointment booking, and other advisory services.  Call for this free service at [phone].             Medications           Message regarding Medications     Verify the changes and/or additions to your medication regime listed below are the same as discussed with your clinician today.  If any of these changes or additions are incorrect, please notify your healthcare provider.             Verify that the below list of medications is an accurate representation of the medications you are currently taking.  If none reported, the list may be blank. If incorrect, please contact your healthcare provider. Carry this list with you in case of emergency.           Current Medications     ACCU-CHEK MARLON PLUS TEST STRP Strp USE AS DIRECTED TWICE DAILY    acetaminophen (TYLENOL) 325 MG tablet Take 325 mg by mouth as needed for Pain  or Temperature greater than.     albuterol (PROAIR HFA) 90 mcg/actuation inhaler Inhale 2 puffs into the lungs every 4 (four) hours as needed.    ascorbic acid (VITAMIN C) 500 MG tablet Take 500 mg by mouth once daily.      aspirin (ECOTRIN) 81 MG EC tablet Take 81 mg by mouth once daily.    b complex vitamins tablet Take 1 tablet by mouth once daily.      fish oil-omega-3 fatty acids 300-1,000 mg capsule Take 2 g by mouth once daily.    fluticasone-vilanterol (BREO ELLIPTA) 200-25 mcg/dose DsDv diskus inhaler Inhale 1 puff into the lungs once daily.    furosemide (LASIX) 20 MG tablet Take 1 tablet (20 mg total) by mouth daily as needed (Weight gain, Swelling, or shortness of breath).    hydrocodone-acetaminophen 5-325mg (NORCO) 5-325 mg per tablet Take 1 tablet by mouth every 6 (six) hours as needed for Pain.    levothyroxine (SYNTHROID) 100 MCG tablet TAKE 1 TABLET EVERY DAY    losartan (COZAAR) 25 MG tablet TAKE 1 TABLET ONE TIME DAILY    metoprolol succinate (TOPROL-XL) 50 MG 24 hr tablet Take 1 tablet (50 mg total) by mouth once daily.    nitroGLYCERIN (NITROSTAT) 0.4 MG SL tablet Place 1 tablet (0.4 mg total) under the tongue every 5 (five) minutes as needed for Chest pain.    spironolactone (ALDACTONE) 25 MG tablet Take 1 tablet (25 mg total) by mouth once daily.    trazodone (DESYREL) 50 MG tablet     warfarin (COUMADIN) 5 MG tablet TAKE 1/2 TO 1 TABLET DAILY AS DIRECTED BY COUMADIN CLINIC           Clinical Reference Information           Your Vitals Were     Last Period                   (LMP Unknown)           Allergies as of 2/20/2017     Amoxicillin-pot Clavula (Bulk)    Sulfa (Sulfonamide Antibiotics)    Adhesive      Immunizations Administered on Date of Encounter - 2/20/2017     None      Orders Placed During Today's Visit      Normal Orders This Visit    POCT INR          2/20/2017 11:23 AM - Toña An, PharmD      Component Results     Component    INR    2.7      January 2017 Details    Sun  Mon Tue Wed u Fri Sat     1               2               3               4               5               6               7                 8               9               10               11               12               13               14                 15               16               17               18               19               20               21      2.5 mg           22      2.5 mg         23      5 mg         24      2.5 mg         25      2.5 mg         26      2.5 mg         27      2.5 mg         28      2.5 mg           29      2.5 mg         30   2.3   5 mg   See details      31      2.5 mg              Date Details   01/30 Last INR check   INR: 2.3                 February 2017 Details    Sun Mon Tue Wed Thu Fri Sat        1      2.5 mg         2      2.5 mg         3      2.5 mg         4      2.5 mg           5      2.5 mg         6      5 mg         7      2.5 mg         8      2.5 mg         9      2.5 mg         10      2.5 mg         11      2.5 mg           12      2.5 mg         13      5 mg         14      2.5 mg         15      2.5 mg         16      2.5 mg         17      2.5 mg         18      2.5 mg           19      2.5 mg         20   2.7   5 mg   See details      21      2.5 mg         22      2.5 mg         23      2.5 mg         24      2.5 mg         25      2.5 mg           26      2.5 mg         27      5 mg         28      2.5 mg              Date Details   02/20 This INR check   INR: 2.7                     How to take your warfarin dose     To take:  2.5 mg Take 0.5 of a 5 mg tablet.    To take:  5 mg Take 1 of the 5 mg tablets.           March 2017 Details    Sun Mon Tue Wed u Fri Sat        1      2.5 mg         2      2.5 mg         3      2.5 mg         4      2.5 mg           5      2.5 mg         6      5 mg         7      2.5 mg         8      2.5 mg         9      2.5 mg         10      2.5 mg         11      2.5 mg           12      2.5 mg         13      5  mg         14      2.5 mg         15      2.5 mg         16      2.5 mg         17      2.5 mg         18      2.5 mg           19      2.5 mg         20            21               22               23               24               25                 26               27               28               29               30               31                 Date Details   No additional details    Date of next INR:  3/20/2017         How to take your warfarin dose     To take:  2.5 mg Take 0.5 of a 5 mg tablet.    To take:  5 mg Take 1 of the 5 mg tablets.           Anticoagulation Summary as of 2/20/2017     Maintenance plan 5 mg (5 mg x 1) on Mon; 2.5 mg (5 mg x 0.5) all other days    Full instructions 5 mg on Mon; 2.5 mg all other days    Next INR check 3/20/2017      Anticoagulation Episode Summary     Comments [facility]in [facility] 3x[facility]wk      Patient Findings     Positives Other complaints    Negatives Signs/symptoms of thrombosis, Signs/symptoms of bleeding, Laboratory test error suspected, Change in health, Change in alcohol use, Change in activity, Upcoming invasive procedure, Emergency department visit, Upcoming dental procedure, Missed doses, Extra doses, Change in medications, Change in diet/appetite, Hospital admission, Bruising    Comments Pt reports having multiple loose stools, had some test run and found to have diverticulitis.  She is also having pain.  increasng probiotics      Language Assistance Services     ATTENTION: Language assistance services are available, free of charge. Please call [phone].      ATENCIÓN: Si habla juan f, tiene a granado disposición servicios gratuitos de asistencia lingüística. Llame al [phone].     CHÚ Ý: N?u b?n nói Ti?ng Vi?t, có các d?ch v? h? tr? ngôn ng? mi?n phí dành cho b?n. G?i s? [phone].         [place] [facility] complies with applicable Federal civil rights laws and does not discriminate on the basis of race, color, national origin,  age, disability, or sex.

## 2017-02-20 NOTE — PATIENT INSTRUCTIONS
Counseling and Referral of Other Preventative  (Italic type indicates deductible and co-insurance are waived)    Patient Name: Miesha Obando  Today's Date: 2/20/2017      SERVICE LIMITATIONS RECOMMENDATION    Vaccines    · Pneumococcal (once after 65)    · Influenza (annually)    · Hepatitis B (if medium/high risk)    · Prevnar 13      Hepatitis B medium/high risk factors:       - End-stage renal disease       - Hemophiliacs who received Factor VII or         IX concentrates       - Clients of institutions for the mentally             retarded       - Persons who live in the same house as          a HepB carrier       - Homosexual men       - Illicit injectable drug abusers     Pneumococcal:Done     Influenza:Done   Hepatitis B: N/A     Prevnar 13:Done    Mammogram (biennial age 50-74)  Annually (age 40 or over) N/A    Pap (up to age 70 and after 70 if unknown history or abnormal study last 10 years)   N/A    Colorectal cancer screening (to age 75)    · Fecal occult blood test (annual)  · Flexible sigmoidoscopy (5y)  · Screening colonoscopy (10y)  · Barium enema  N/A    Diabetes self-management training (no USPSTF recommendations)  Requires referral by treating physician for patient with diabetes or renal disease. 10 hours of initial DSMT sessions of no less than 30 minutes each in a continuous 12-month period. 2 hours of follow-up DSMT in subsequent years. Done    Bone mass measurements (age 65 & older, biennial)  Requires diagnosis related to osteoporosis or estrogen deficiency. Biennial benefit unless patient has history of long-term glucocorticoid Done    Glaucoma screening (no USPSTF recommendation)  Diabetes mellitus, family history   , age 50 or over    American, age 65 or over Done    Medical nutrition therapy for diabetes or renal disease (no recommended schedule)  Requires referral by treating physician for patient with diabetes or renal disease or kidney transplant within the  past 3 years.  Can be provided in same year as diabetes self-management training (DSMT), and CMS recommends medical nutrition therapy take place after DSMT. Up to 3 hours for initial year and 2 hours in subsequent years. Done    Cardiovascular screening blood tests (every 5 years)  · Fasting lipid panel  Order as a panel if possible Done    Diabetes screening tests (at least every 3 years, Medicare covers annually or at 6-month intervals for prediabetic patients)  · Fasting blood sugar (FBS) or glucose tolerance test (GTT)  Patient must be diagnosed with one of the following:       - Hypertension       - Dyslipidemia       - Obesity (BMI 30kg/m2)       - Previous elevated impaired FBS or GTT       ... or any two of the following:       - Overweight (BMI 25 but <30)       - Family history of diabetes       - Age 65 or older       - History of gestational diabetes or birth of baby weighing more than 9 pounds Done    Abdominal aortic aneurysm screening (once)  · Sonogram   Limited to patients who meet one of the following criteria:       - Men who are 65-75 years old and have smoked more than 100 cigarette in their lifetime       - Anyone with a family history of abdominal aortic aneurysm       - Anyone recommended for screening by the USPSTF N/A    HIV screening (annually for increased risk patients)  · HIV-1 and HIV-2 by EIA, or BONIFACIO, rapid antibody test or oral mucosa transudate  Patients must be at increased risk for HIV infection per USPSTF guidelines or pregnant. Tests covered annually for patient at increased risk or as requested by the patient. Pregnant patients may receive up to 3 tests during pregnancy.  Risks discussed, screening is not recommended    Smoking cessation counseling (up to 8 sessions per year)  Patients must be asymptomatic of tobacco-related conditions to receive as a preventative service.  N/A    Subsequent annual wellness visit  At least 12 months since last AWV  Return in one year     The  following information is provided to all patients.  This information is to help you find resources for any of the problems found today that may be affecting your health:                Living healthy guide: www.[facility].[place].[facility]      Understanding Diabetes: www.diabetes.org      Eating healthy: www.cdc.gov/healthyweight      CDC home safety checklist: www.cdc.gov/steadi/patient.html      Agency on Aging: www.goea.[place].[facility]      Alcoholics anonymous (AA): www.aa.org      Physical Activity: www.jack.nih.gov/kp9rdhw      Tobacco use: www.quitwithusla.org

## 2017-02-20 NOTE — PROGRESS NOTES
"Miesha Obando presented for a  Medicare AWV and comprehensive Health Risk Assessment today. The following components were reviewed and updated:    · Medical history  · Family History  · Social history  · Allergies and Current Medications  · Health Risk Assessment  · Health Maintenance  · Care Team     ** See Completed Assessments for Annual Wellness Visit within the encounter summary.**  Patient offers no new complaints. She is seen regularly by her primary care physician, Dr. Forte.   She is also followed by Dr. Gonzales, cardiology, Dr. Lombardo, pulmonology, Dr. Wright, podiatry, and Dr. Grimm for orthopedics. She has regular dental and eye exams.  She is living at home, alone. She ambulates with a walker or cane at home. She is not currently exercising and does not always eat a heart healthy, diabetic diet. I've encouraged her to eat a heart healthy, diabetic diet, and exercise for weight loss. She is morbidly obese with a BMI of 43.74.  She is up to date with all immunizations.     The following assessments were completed:  · Living Situation  · CAGE  · Depression Screening  · Timed Get Up and Go  · Whisper Test  · Cognitive Function Screening  · Nutrition Screening  · ADL Screening  · PAQ Screening    Vitals:    02/20/17 1009   BP: 133/66   BP Location: Right arm   Patient Position: Sitting   BP Method: Automatic   Pulse: (!) 59   Weight: 105 kg (231 lb 7.7 oz)   Height: 5' 1" (1.549 m)     Body mass index is 43.74 kg/(m^2).  Physical Exam   Constitutional: She is oriented to person, place, and time. She appears well-developed and well-nourished. No distress.   HENT:   Head: Normocephalic and atraumatic.   Right Ear: External ear normal.   Left Ear: External ear normal.   Nose: Nose normal.   Mouth/Throat: Oropharynx is clear and moist. No oropharyngeal exudate.   Eyes: Conjunctivae and EOM are normal. Pupils are equal, round, and reactive to light. No scleral icterus.   Neck: Normal range of motion. Neck " supple. No thyromegaly present.   Cardiovascular: Normal rate, regular rhythm, normal heart sounds and intact distal pulses.    Pulmonary/Chest: Effort normal and breath sounds normal. No respiratory distress. She has no wheezes. She exhibits no tenderness.   Abdominal: Soft. Bowel sounds are normal. She exhibits no distension and no mass. There is no tenderness. There is no guarding.   Musculoskeletal: Normal range of motion. She exhibits no edema or deformity.   Lymphadenopathy:     She has no cervical adenopathy.   Neurological: She is alert and oriented to person, place, and time.   Skin: Skin is warm and dry. She is not diaphoretic. No erythema.   Psychiatric: She has a normal mood and affect. Her behavior is normal. Judgment and thought content normal.   Nursing note and vitals reviewed.        Diagnoses and health risks identified today and associated recommendations/orders:    1. Encounter for preventive health examination    2. Type 2 diabetes mellitus with diabetic polyneuropathy, without long-term current use of insulin  Stable and controlled. Continue current treatment plan as previously prescribed with your PCP.     3. Urinary complication  Stable and controlled. Continue current treatment plan as previously prescribed with your PCP.     4. Urinary incontinence, unspecified type  Stable and controlled. Continue current treatment plan as previously prescribed with your PCP.     5. Systolic congestive heart failure, NYHA class 3  Stable and controlled. Continue current treatment plan as previously prescribed with Dr. Gonzales.     6. Paroxysmal atrial fibrillation  Stable and controlled. Continue current treatment plan as previously prescribed with Dr. Gonzales.     7. Pacemaker  Stable and controlled. Continue current treatment plan as previously prescribed with Dr. Gonzales.     8. Long term current use of anticoagulant therapy  Stable and controlled. Continue current treatment plan as previously prescribed with  Dr. Gonzales.     9. Essential hypertension  Stable and controlled. Continue current treatment plan as previously prescribed with Dr. Gonzales.     10. Coronary artery disease involving native coronary artery of native heart without angina pectoris  Stable and controlled. Continue current treatment plan as previously prescribed with Dr. Gonzales.     11. S/P coronary artery stent placement - LILLIAM to RCA 08/19/2015  Stable and controlled. Continue current treatment plan as previously prescribed with Dr. Gonzales.     12. 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 Continue current treatment plan as previously prescribed with Dr. Gonzales.     13. Nonrheumatic aortic valve stenosis  Stable and controlled. Continue current treatment plan as previously prescribed with Dr. Gonzales.     14. Aortic valve stenosis, unspecified etiology  Stable and controlled. Continue current treatment plan as previously prescribed with Dr. Gonzales.     15. Pulmonary heart disease  Stable and controlled. Continue current treatment plan as previously prescribed with Dr. Gonzales.     16. Cardiomyopathy, unspecified type  Stable and controlled. Continue current treatment plan as previously prescribed with Dr. Gonzales.     17. AICD (automatic cardioverter/defibrillator) present - Bi-V  Stable and controlled. Continue current treatment plan as previously prescribed with Dr. Gonzales.     18. Combined systolic and diastolic congestive heart failure, NYHA class 3  Stable and controlled. Continue current treatment plan as previously prescribed with Dr. Gonzales.     19. Aortic atherosclerosis  Stable and controlled. Continue current treatment plan as previously prescribed with Dr. Gonzales.     20. Atherosclerosis of arteries of extremities  Stable and controlled. Continue current treatment plan as previously prescribed with Dr. Gonzales.     21. Morbid obesity due to excess calories  Recommended diet and exercise.    22. Sleep  apnea, unspecified type  Stable and controlled. Continue current treatment plan as previously prescribed with your PCP.     23. Insomnia, unspecified type  Stable and controlled. Continue current treatment plan as previously prescribed with your PCP.     24. Hypothyroidism, unspecified type  Stable and controlled. Continue current treatment plan as previously prescribed with your PCP.     25. DDD (degenerative disc disease), lumbar  Stable and controlled. Continue current treatment plan as previously prescribed with Dr. Grimm.     26. Primary osteoarthritis of both knees  Stable and controlled. Continue current treatment plan as previously prescribed with Dr. Grimm.     27. Osteopenia  Stable and controlled. Continue current treatment plan as previously prescribed with your PCP.     28. Chronic bronchitis, unspecified chronic bronchitis type  Stable and controlled. Continue current treatment plan as previously prescribed with Dr. Lombardo.     29. History of breast cancer  Stable and controlled. Continue current treatment plan as previously prescribed with your PCP.     30. Gastroesophageal reflux disease, esophagitis presence not specified  Stable and controlled. Continue current treatment plan as previously prescribed with your PCP.       Provided Miesha with a 5-10 year written screening schedule and personal prevention plan. Recommendations were developed using the USPSTF age appropriate recommendations. Education, counseling, and referrals were provided as needed. After Visit Summary printed and given to patient which includes a list of additional screenings\tests needed.    Return for Regular visit with Dr. Forte.    AVIS Miller

## 2017-02-23 ENCOUNTER — OFFICE VISIT (OUTPATIENT)
Dept: FAMILY MEDICINE | Facility: CLINIC | Age: 82
End: 2017-02-23
Payer: MEDICARE

## 2017-02-23 ENCOUNTER — HOSPITAL ENCOUNTER (OUTPATIENT)
Dept: RADIOLOGY | Facility: HOSPITAL | Age: 82
Discharge: HOME | End: 2017-02-23
Attending: NURSE PRACTITIONER
Payer: MEDICARE

## 2017-02-23 VITALS
HEART RATE: 60 BPM | HEIGHT: 61 IN | DIASTOLIC BLOOD PRESSURE: 70 MMHG | WEIGHT: 231.69 LBS | OXYGEN SATURATION: 95 % | BODY MASS INDEX: 43.74 KG/M2 | SYSTOLIC BLOOD PRESSURE: 144 MMHG

## 2017-02-23 DIAGNOSIS — M25.561 ACUTE PAIN OF RIGHT KNEE: ICD-10-CM

## 2017-02-23 DIAGNOSIS — M25.521 RIGHT ELBOW PAIN: ICD-10-CM
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
## 2017-02-23 DIAGNOSIS — R07.81 RIB PAIN ON LEFT SIDE: Primary | ICD-10-CM

## 2017-02-23 DIAGNOSIS — R07.81 RIB PAIN ON LEFT SIDE: ICD-10-CM

## 2017-02-23 PROCEDURE — 73562 X-RAY EXAM OF KNEE 3: CPT | Mod: 26,RT,, | Performed by: RADIOLOGY

## 2017-02-23 PROCEDURE — 71100 X-RAY EXAM RIBS UNI 2 VIEWS: CPT | Mod: TC,PO,RT,LT

## 2017-02-23 PROCEDURE — 1159F MED LIST DOCD IN RCRD: CPT | Mod: S$GLB,,, | Performed by: NURSE PRACTITIONER

## 2017-02-23 PROCEDURE — 73080 X-RAY EXAM OF ELBOW: CPT | Mod: TC,PO,RT,LT

## 2017-02-23 PROCEDURE — 1125F AMNT PAIN NOTED PAIN PRSNT: CPT | Mod: S$GLB,,, | Performed by: NURSE PRACTITIONER

## 2017-02-23 PROCEDURE — 99999 PR PBB SHADOW E&M-EST. PATIENT-LVL V: CPT | Mod: PBBFAC,,, | Performed by: NURSE PRACTITIONER

## 2017-02-23 PROCEDURE — 73560 X-RAY EXAM OF KNEE 1 OR 2: CPT | Mod: 26,59,LT, | Performed by: RADIOLOGY

## 2017-02-23 PROCEDURE — 1157F ADVNC CARE PLAN IN RCRD: CPT | Mod: S$GLB,,, | Performed by: NURSE PRACTITIONER

## 2017-02-23 PROCEDURE — 1160F RVW MEDS BY RX/DR IN RCRD: CPT | Mod: S$GLB,,, | Performed by: NURSE PRACTITIONER

## 2017-02-23 PROCEDURE — 3077F SYST BP >= 140 MM HG: CPT | Mod: S$GLB,,, | Performed by: NURSE PRACTITIONER

## 2017-02-23 PROCEDURE — 71100 X-RAY EXAM RIBS UNI 2 VIEWS: CPT | Mod: 26,LT,, | Performed by: RADIOLOGY

## 2017-02-23 PROCEDURE — 73560 X-RAY EXAM OF KNEE 1 OR 2: CPT | Mod: TC,59,PO,LT,RT

## 2017-02-23 PROCEDURE — 3078F DIAST BP <80 MM HG: CPT | Mod: S$GLB,,, | Performed by: NURSE PRACTITIONER

## 2017-02-23 PROCEDURE — 99213 OFFICE O/P EST LOW 20 MIN: CPT | Mod: S$GLB,,, | Performed by: NURSE PRACTITIONER

## 2017-02-23 PROCEDURE — 73080 X-RAY EXAM OF ELBOW: CPT | Mod: 26,RT,, | Performed by: RADIOLOGY

## 2017-02-23 RX ORDER — TRAMADOL HYDROCHLORIDE 50 MG/1
50 TABLET ORAL EVERY 8 HOURS PRN
Qty: 30 TABLET | Refills: 0 | Status: SHIPPED | OUTPATIENT
Start: 2017-02-23 | End: 2017-03-05

## 2017-02-23 NOTE — PROGRESS NOTES
Per message from Marychuy INX Pt had fall and is being started on tramadol.  Per note: Patient was stooping over in her bedroom last night and fell and landed on her left side. She is having pain in her left ribs, worse with deep breaths and cough. She is also having right elbow and right knee pain from where she caught herself during the fall. She took 1 Norco that she had left over last night with mild relief, also took tylenol pm. Currently about 9/10 pain.       NO CHANGES NEEDED FOR THE TRAMADOL, BUT PLEASE INQUIRE OF FALL AND ENSURE SHE DID NOT HIT HER HEAD.

## 2017-02-23 NOTE — MR AVS SNAPSHOT
[facility]  [address] [facility] [address]  [facility] [address]  Phone: [phone]  Fax: [phone]                  Miesha Obando   2017 11:00 AM   Office Visit    Description:  Female : 1935   Provider:  Marychuy Orellana NP   Department:  [facility]           Reason for Visit     Fall           Diagnoses this Visit        Comments    Rib pain on left side    -  Primary     Right elbow pain         Acute pain of right knee         Fall, initial encounter                To Do List           Future Appointments        Provider Department Dept Phone    2017 3:15 PM [facility] XR2 [facility] [phone]    3/1/2017 10:00 AM Sheila Galvez NP [facility] [phone]    3/1/2017 10:30 AM AUDIO, [facility] - Audiology [phone]    3/20/2017 11:15 AM Toña An, PharmD [facility] Coumadin [phone]    3/22/2017 3:00 PM Silvestre Olivas MD [facility] Gastroenterology [phone]      Goals (5 Years of Data)     None       These Medications        Disp Refills Start End    tramadol (ULTRAM) 50 mg tablet 30 tablet 0 2017 3/5/2017    Take 1 tablet (50 mg total) by mouth every 8 (eight) hours as needed for Pain. - Oral    Pharmacy: [facility] [address] [place] [address] AT [facility] Ph #: [phone]         [facility]     [facility] Care [facility] -  Assistance  Registered nurses in the [facility] provide clinical advisement, health education, appointment booking, and other advisory services.  Call for this free service at [phone].             Medications           Message regarding Medications     Verify the changes and/or additions to your medication regime listed below are the same as discussed with your clinician today.  If any of these changes or additions are incorrect, please notify your healthcare provider.        START taking these  NEW medications        Refills    tramadol (ULTRAM) 50 mg tablet 0    Sig: Take 1 tablet (50 mg total) by mouth every 8 (eight) hours as needed for Pain.    Class: Print    Route: Oral           Verify that the below list of medications is an accurate representation of the medications you are currently taking.  If none reported, the list may be blank. If incorrect, please contact your healthcare provider. Carry this list with you in case of emergency.           Current Medications     ACCU-CHEK MARLON PLUS TEST STRP Strp USE AS DIRECTED TWICE DAILY    acetaminophen (TYLENOL) 325 MG tablet Take 325 mg by mouth as needed for Pain or Temperature greater than.     albuterol (PROAIR HFA) 90 mcg/actuation inhaler Inhale 2 puffs into the lungs every 4 (four) hours as needed.    ascorbic acid (VITAMIN C) 500 MG tablet Take 500 mg by mouth once daily.      aspirin (ECOTRIN) 81 MG EC tablet Take 81 mg by mouth once daily.    b complex vitamins tablet Take 1 tablet by mouth once daily.      fish oil-omega-3 fatty acids 300-1,000 mg capsule Take 2 g by mouth once daily.    fluticasone-vilanterol (BREO ELLIPTA) 200-25 mcg/dose DsDv diskus inhaler Inhale 1 puff into the lungs once daily.    hydrocodone-acetaminophen 5-325mg (NORCO) 5-325 mg per tablet Take 1 tablet by mouth every 6 (six) hours as needed for Pain.    levothyroxine (SYNTHROID) 100 MCG tablet TAKE 1 TABLET EVERY DAY    losartan (COZAAR) 25 MG tablet TAKE 1 TABLET ONE TIME DAILY    metoprolol succinate (TOPROL-XL) 50 MG 24 hr tablet Take 1 tablet (50 mg total) by mouth once daily.    spironolactone (ALDACTONE) 25 MG tablet Take 1 tablet (25 mg total) by mouth once daily.    trazodone (DESYREL) 50 MG tablet     warfarin (COUMADIN) 5 MG tablet TAKE 1/2 TO 1 TABLET DAILY AS DIRECTED BY COUMADIN CLINIC    furosemide (LASIX) 20 MG tablet Take 1 tablet (20 mg total) by mouth daily as needed (Weight gain, Swelling, or shortness of breath).    nitroGLYCERIN (NITROSTAT) 0.4 MG  "SL tablet Place 1 tablet (0.4 mg total) under the tongue every 5 (five) minutes as needed for Chest pain.    tramadol (ULTRAM) 50 mg tablet Take 1 tablet (50 mg total) by mouth every 8 (eight) hours as needed for Pain.           Clinical Reference Information           Your Vitals Were     BP Pulse Height Weight Last Period SpO2    144/70 60 5' 1" (1.549 m) 105.1 kg (231 lb 11.3 oz) (LMP Unknown) 95%    BMI                43.78 kg/m2          Blood Pressure          Most Recent Value    BP  (!)  144/70      Allergies as of 2/23/2017     Amoxicillin-pot Clavula (Bulk)    Sulfa (Sulfonamide Antibiotics)    Adhesive      Immunizations Administered on Date of Encounter - 2/23/2017     None      Orders Placed During Today's Visit     Future Labs/Procedures Expected by Expires    X-Ray Elbow Complete 3 views Right  2/23/2017 2/24/2018    X-ray Knee Ortho Right  2/23/2017 2/23/2018    X-Ray Ribs 2 View Left  2/23/2017 2/23/2018      Language Assistance Services     ATTENTION: Language assistance services are available, free of charge. Please call [phone].      ATENCIÓN: Si habla español, tiene a granado disposición servicios gratuitos de asistencia lingüística. Llame al [phone].     LA NENA Ý: N?u b?n nói Ti?ng Vi?t, có các d?ch v? h? tr? ngôn ng? mi?n phí dành cho b?n. G?i s? [phone].         [facility] complies with applicable Federal civil rights laws and does not discriminate on the basis of race, color, national origin, age, disability, or sex.        "

## 2017-02-23 NOTE — PROGRESS NOTES
Pt states she did hit her head. They did xray's when she was here but did not scan her head. Pt informed tramadol ok to take with warfarin.

## 2017-02-23 NOTE — PROGRESS NOTES
Subjective:       Patient ID: Miesha Obando is a 81 y.o. female.    Chief Complaint: Fall    HPI Comments: Patient was stooping over in her bedroom last night and fell and landed on her left side. She is having pain in her left ribs, worse with deep breaths and cough. She is also having right elbow and right knee pain from where she caught herself during the fall. She took 1 Norco that she had left over last night with mild relief, also took tylenol pm. Currently about 9/10 pain.    There are no preventive care reminders to display for this patient.    Past Medical History:  Past Medical History:    Anticoagulant long-term use                                   Arthritis                                                     Atrial fibrillation                                           Breast cancer                                   1994            Comment:breast     Cardiomyopathy - EF 35-40%                      5/11/2016     CHF (congestive heart failure)                                Chronic bronchitis                                            COPD (chronic obstructive pulmonary disease)                  Coronary artery disease without angina pectoris 8/26/2015     Depression                                                    Diabetes with neurologic complications                        Diverticulitis                                                Diverticulosis                                                Encounter for blood transfusion                                 Comment:after mastectomy x 20 years ago    Former smoker                                                 GERD (gastroesophageal reflux disease)                        H/O aortic valve replacement                                  Hiatal hernia                                                 History of colonic diverticulitis                             Hypertension                                                  Hypothyroid                                                    Irritable bowel syndrome                                      Obesity                                                       Pacemaker                                       08/2014       Schatzki's ring                                                 Comment:mild    Sleep apnea                                                   Syncope and collapse                                          Trouble in sleeping                                           Type II or unspecified type diabetes mellitus *                 Comment:No medications at present.     Urinary incontinence                                        Past Surgical History:    TOE SURGERY                                     Right               TONSILLECTOMY                                                  APPENDECTOMY                                                   CHOLECYSTECTOMY                                                ADENOIDECTOMY                                                  EYE SURGERY                                                      Comment:bilateral PHACO and IOL    BREAST SURGERY                                   1994            Comment:left side , mastectomy    internal recording device (loop)                                 Comment:Loop, removed    COLONOSCOPY                                      2/2014          Comment:repeat in 10 years for screening    UPPER GASTROINTESTINAL ENDOSCOPY                 2012          BACK SURGERY                                                     Comment:Discectomy, lumbar    HYSTERECTOMY                                                     Comment:ovaries spared    CARDIAC VALVE SURGERY                                            Comment:aortic valve replacement    CARDIAC PACEMAKER PLACEMENT                                    PM                                                           Review of patient's allergies indicates:   -- Amoxicillin-pot clavula (bulk) -- Nausea  And Vomiting   -- Sulfa (sulfonamide antibiotics) -- Hives   -- Adhesive -- Rash    --  Use paper tape  Current Outpatient Prescriptions on File Prior to Visit:  ACCU-CHEK MARLON PLUS TEST STRP Strp, USE AS DIRECTED TWICE DAILY, Disp: 50 strip, Rfl: 6  acetaminophen (TYLENOL) 325 MG tablet, Take 325 mg by mouth as needed for Pain or Temperature greater than. , Disp: , Rfl:   albuterol (PROAIR HFA) 90 mcg/actuation inhaler, Inhale 2 puffs into the lungs every 4 (four) hours as needed., Disp: 18 g, Rfl: 3  ascorbic acid (VITAMIN C) 500 MG tablet, Take 500 mg by mouth once daily.  , Disp: , Rfl:   aspirin (ECOTRIN) 81 MG EC tablet, Take 81 mg by mouth once daily., Disp: , Rfl:   b complex vitamins tablet, Take 1 tablet by mouth once daily.  , Disp: , Rfl:   fish oil-omega-3 fatty acids 300-1,000 mg capsule, Take 2 g by mouth once daily., Disp: , Rfl:   fluticasone-vilanterol (BREO ELLIPTA) 200-25 mcg/dose DsDv diskus inhaler, Inhale 1 puff into the lungs once daily., Disp: 3 each, Rfl: 3  hydrocodone-acetaminophen 5-325mg (NORCO) 5-325 mg per tablet, Take 1 tablet by mouth every 6 (six) hours as needed for Pain., Disp: 22 tablet, Rfl: 0  levothyroxine (SYNTHROID) 100 MCG tablet, TAKE 1 TABLET EVERY DAY, Disp: 90 tablet, Rfl: 2  losartan (COZAAR) 25 MG tablet, TAKE 1 TABLET ONE TIME DAILY, Disp: 90 tablet, Rfl: 1  metoprolol succinate (TOPROL-XL) 50 MG 24 hr tablet, Take 1 tablet (50 mg total) by mouth once daily., Disp: 30 tablet, Rfl: 11  spironolactone (ALDACTONE) 25 MG tablet, Take 1 tablet (25 mg total) by mouth once daily., Disp: 90 tablet, Rfl: 3  trazodone (DESYREL) 50 MG tablet, , Disp: , Rfl:   warfarin (COUMADIN) 5 MG tablet, TAKE 1/2 TO 1 TABLET DAILY AS DIRECTED BY COUMADIN CLINIC, Disp: 90 tablet, Rfl: 3  furosemide (LASIX) 20 MG tablet, Take 1 tablet (20 mg total) by mouth daily as needed (Weight gain, Swelling, or shortness of breath)., Disp: 90 tablet, Rfl: 3  nitroGLYCERIN (NITROSTAT) 0.4 MG SL tablet,  Place 1 tablet (0.4 mg total) under the tongue every 5 (five) minutes as needed for Chest pain., Disp: 25 tablet, Rfl: 5    No current facility-administered medications on file prior to visit.     Social History    Marital status:              Spouse name:                       Years of education:                 Number of children:               Occupational History    None on file    Social History Main Topics    Smoking status: Former Smoker                                                                Packs/day: 1.00      Years: 30.00          Start date: 2/23/1958       Quit date: 1/13/1988    Smokeless status: Never Used                        Alcohol use: Yes           1.8 oz/week       2 Glasses of wine, 1 Shots of liquor per week       Comment: occasional    Drug use: No              Sexual activity: Not on file          Other Topics            Concern    None on file    Social History Narrative    None on file      Review of patient's family history indicates:    Parkinsonism                   Mother                    Emphysema                      Father                    Heart disease                  Brother                   Heart attack                   Brother                   Heart failure                  Brother                   Heart disease                  Brother                   Heart failure                  Brother                   Heart disease                  Brother                   Heart failure                  Brother                   Arrhythmia                     Brother                   Anesthesia problems            Neg Hx                    Clotting disorder              Neg Hx                          Review of Systems   Constitutional: Negative.  Negative for chills and fever.   Eyes: Negative for visual disturbance.   Respiratory: Negative.    Cardiovascular: Positive for chest pain (left rib pain).   Gastrointestinal: Negative for nausea and vomiting.    Musculoskeletal: Positive for arthralgias.   Neurological: Negative.  Negative for dizziness and light-headedness.       Objective:      Physical Exam   Constitutional: She is oriented to person, place, and time. No distress.   HENT:   Head: Normocephalic and atraumatic.   Eyes: Pupils are equal, round, and reactive to light.   Cardiovascular: Normal rate.    Pulmonary/Chest: Effort normal. She has no wheezes. She has no rales. Chest wall is not dull to percussion. She exhibits tenderness and bony tenderness. She exhibits no crepitus.       Abdominal: Soft. Bowel sounds are normal. She exhibits no distension. There is no tenderness.   Musculoskeletal:        Right elbow: She exhibits normal range of motion, no swelling, no effusion and no deformity. Tenderness found. Radial head tenderness noted. No medial epicondyle tenderness noted.        Right knee: She exhibits normal range of motion and no swelling. Tenderness found. Patellar tendon tenderness noted.   Neurological: She is alert and oriented to person, place, and time.   Skin: She is not diaphoretic. No erythema.   Vitals reviewed.      Assessment:       1. Rib pain on left side    2. Right elbow pain    3. Acute pain of right knee    4. Fall, initial encounter        Plan:     Fall precautions discussed, deep breathing discussed. Xrays to rule out fractures, patient requests refill of Norco, advised unable to fill this medication, she has not yet establish with new PCP, will give her tramadol prn pain, discussed medication safety, side effects, and dependency.  1. Rib pain on left side    - X-Ray Ribs 2 View Left; Future  - X-Ray Elbow Complete 3 views Right; Future  - X-ray Knee Ortho Right; Future  - tramadol (ULTRAM) 50 mg tablet; Take 1 tablet (50 mg total) by mouth every 8 (eight) hours as needed for Pain.  Dispense: 30 tablet; Refill: 0    2. Right elbow pain    - X-Ray Ribs 2 View Left; Future  - X-Ray Elbow Complete 3 views Right; Future  - X-ray  Knee Ortho Right; Future  - tramadol (ULTRAM) 50 mg tablet; Take 1 tablet (50 mg total) by mouth every 8 (eight) hours as needed for Pain.  Dispense: 30 tablet; Refill: 0    3. Acute pain of right knee    - X-Ray Ribs 2 View Left; Future  - X-Ray Elbow Complete 3 views Right; Future  - X-ray Knee Ortho Right; Future  - tramadol (ULTRAM) 50 mg tablet; Take 1 tablet (50 mg total) by mouth every 8 (eight) hours as needed for Pain.  Dispense: 30 tablet; Refill: 0    4. Fall, initial encounter    - X-Ray Ribs 2 View Left; Future  - X-Ray Elbow Complete 3 views Right; Future  - X-ray Knee Ortho Right; Future  - tramadol (ULTRAM) 50 mg tablet; Take 1 tablet (50 mg total) by mouth every 8 (eight) hours as needed for Pain.  Dispense: 30 tablet; Refill: 0

## 2017-02-24 ENCOUNTER — TELEPHONE (OUTPATIENT)
Dept: FAMILY MEDICINE | Facility: CLINIC | Age: 82
End: 2017-02-24

## 2017-02-24 RX ORDER — METOPROLOL SUCCINATE 50 MG/1
50 TABLET, EXTENDED RELEASE ORAL DAILY
Qty: 30 TABLET | Refills: 6 | Status: SHIPPED | OUTPATIENT
Start: 2017-02-24 | End: 2017-07-07 | Stop reason: SDUPTHER

## 2017-02-24 NOTE — TELEPHONE ENCOUNTER
----- Message from Mundo Garcia sent at 2/24/2017  9:56 AM CST -----  Contact: same  Patient called in and stated she cannot find her Rx for Metoprolol Succinate 50 mgs.  She does not know where she placed it.  Patient wanted to see if another Rx could be called in for her.      [facility] [employer] [address] [place] [address] AT SEC of [facility] & [address] [address]  Phone: [phone] Fax: [phone]    Patient call back number is [phone]

## 2017-02-24 NOTE — TELEPHONE ENCOUNTER
----- Message from Mundo Garcia sent at 2/24/2017  9:55 AM CST -----  Contact: same  Patient called in to see if her results from her X-ray that was done yesterday PM 2/23/17 were in yet?  Patient call back number is [phone]

## 2017-03-01 ENCOUNTER — CLINICAL SUPPORT (OUTPATIENT)
Dept: AUDIOLOGY | Facility: CLINIC | Age: 82
End: 2017-03-01
Payer: MEDICARE

## 2017-03-01 ENCOUNTER — INITIAL CONSULT (OUTPATIENT)
Dept: OTOLARYNGOLOGY | Facility: CLINIC | Age: 82
End: 2017-03-01
Payer: MEDICARE

## 2017-03-01 VITALS
DIASTOLIC BLOOD PRESSURE: 74 MMHG | HEIGHT: 62 IN | WEIGHT: 231.69 LBS | BODY MASS INDEX: 42.63 KG/M2 | SYSTOLIC BLOOD PRESSURE: 158 MMHG | HEART RATE: 62 BPM

## 2017-03-01 DIAGNOSIS — H90.3 BILATERAL HIGH FREQUENCY SENSORINEURAL HEARING LOSS: ICD-10-CM

## 2017-03-01 DIAGNOSIS — H61.23 BILATERAL IMPACTED CERUMEN: Primary | ICD-10-CM

## 2017-03-01 PROCEDURE — 1160F RVW MEDS BY RX/DR IN RCRD: CPT | Mod: S$GLB,,, | Performed by: NURSE PRACTITIONER

## 2017-03-01 PROCEDURE — 1159F MED LIST DOCD IN RCRD: CPT | Mod: S$GLB,,, | Performed by: NURSE PRACTITIONER

## 2017-03-01 PROCEDURE — 99999 PR PBB SHADOW E&M-EST. PATIENT-LVL III: CPT | Mod: PBBFAC,,, | Performed by: NURSE PRACTITIONER

## 2017-03-01 PROCEDURE — G0268 REMOVAL OF IMPACTED WAX MD: HCPCS | Mod: S$GLB,,, | Performed by: NURSE PRACTITIONER

## 2017-03-01 PROCEDURE — 99213 OFFICE O/P EST LOW 20 MIN: CPT | Mod: 25,S$GLB,, | Performed by: NURSE PRACTITIONER

## 2017-03-01 PROCEDURE — 3078F DIAST BP <80 MM HG: CPT | Mod: S$GLB,,, | Performed by: NURSE PRACTITIONER

## 2017-03-01 PROCEDURE — 3077F SYST BP >= 140 MM HG: CPT | Mod: S$GLB,,, | Performed by: NURSE PRACTITIONER

## 2017-03-01 PROCEDURE — 99499 UNLISTED E&M SERVICE: CPT | Mod: S$GLB,,, | Performed by: NURSE PRACTITIONER

## 2017-03-01 PROCEDURE — 1126F AMNT PAIN NOTED NONE PRSNT: CPT | Mod: S$GLB,,, | Performed by: NURSE PRACTITIONER

## 2017-03-01 PROCEDURE — 1157F ADVNC CARE PLAN IN RCRD: CPT | Mod: S$GLB,,, | Performed by: NURSE PRACTITIONER

## 2017-03-01 NOTE — LETTER
March 1, 2017      Susi Garcia NP  [address] [facility] [address] [place] [address]           [facility]  [address] [facility] [address] [place] [address]  Phone: [phone]  Fax: [phone]          Patient: Miesha Obando   MR Number: [id number]   YOB: 1935   Date of Visit: 3/1/2017       Dear Susi Garcia:    Thank you for referring Miesha Obando to me for evaluation. Attached you will find relevant portions of my assessment and plan of care.    If you have questions, please do not hesitate to call me. I look forward to following Miesha Obando along with you.    Sincerely,    Sheila Galvez NP    Enclosure  CC:  No Recipients    If you would like to receive this communication electronically, please contact [email or web address] or [phone] to request more information on [employer] [facility] access.    For providers and/or their staff who would like to refer a patient to [facility], please contact us through our one-stop-shop provider referral line, [facility] , at [phone].    If you feel you have received this communication in error or would no longer like to receive these types of communications, please e-mail [email or web address]

## 2017-03-01 NOTE — PROGRESS NOTES
Subjective:       Patient ID: Miesha Obando is a 81 y.o. female.    Chief Complaint: Cerumen Impaction and Otalgia    HPI Comments: Patient was recently seen by [employer] nurse and told she has cerumen impactions and needs an audiogram.   Review of Systems   Constitutional: Negative.  Negative for fatigue.   HENT: Positive for hearing loss.    Eyes: Negative.    Respiratory: Negative.    Cardiovascular: Negative.    Gastrointestinal: Negative.    Musculoskeletal: Negative.  Negative for gait problem.   Skin: Negative.  Negative for color change and pallor.   Neurological: Negative.    Hematological: Negative.  Negative for adenopathy.   Psychiatric/Behavioral: Negative.  Negative for agitation.       Objective:      Physical Exam   Constitutional: She is oriented to person, place, and time. Vital signs are normal. She appears well-developed and well-nourished. She is cooperative. She does not appear ill. No distress.   Morbidly obese   HENT:   Head: Normocephalic and atraumatic.   Right Ear: Hearing, external ear and ear canal normal. Tympanic membrane is not erythematous. No middle ear effusion.   Left Ear: Hearing, tympanic membrane, external ear and ear canal normal. Tympanic membrane is not erythematous.  No middle ear effusion.   Nose: Nose normal. No mucosal edema, rhinorrhea or septal deviation. Right sinus exhibits no maxillary sinus tenderness and no frontal sinus tenderness. Left sinus exhibits no maxillary sinus tenderness and no frontal sinus tenderness.   Mouth/Throat: Uvula is midline, oropharynx is clear and moist and mucous membranes are normal. Mucous membranes are not pale, not dry and not cyanotic. No oral lesions. No oropharyngeal exudate, posterior oropharyngeal edema or posterior oropharyngeal erythema.     SEPARATE PROCEDURE IN OFFICE:   Procedure: Removal of impacted cerumen, bilateral   Pre Procedure Diagnosis: Cerumen Impaction   Post Procedure Diagnosis: Cerumen Impaction   Verbal informed  consent in regards to risk of trauma to ear canal, ear drum or hearing, discomfort during procedure and/or inability to remove cerumen impaction in one session or unforeseen events or complications.   No anesthesia.     Procedure in detail:   Ear canal visualized bilateral with appropriate size ear speculum utilizing Operating Head Binocular Otomicroscope   Utilizing the following: Ring curet AU. The impacted cerumen of the ear canals was removed atraumatically. The TM and EAC were then inspected and found to be clear of wax. See description of TMs/EACs in PE above.   Complications: No   Condition: Improved/Good       Eyes: Conjunctivae, EOM and lids are normal. Pupils are equal, round, and reactive to light. Right eye exhibits no discharge. Left eye exhibits no discharge. No scleral icterus.   Neck: Trachea normal and normal range of motion. Neck supple. No tracheal deviation present. No thyroid mass and no thyromegaly present.   Cardiovascular: Normal rate.    Pulmonary/Chest: Effort normal. No stridor.   Musculoskeletal: Normal range of motion.   Lymphadenopathy:        Head (right side): No submental, no submandibular, no tonsillar, no preauricular and no posterior auricular adenopathy present.        Head (left side): No submental, no submandibular, no tonsillar, no preauricular and no posterior auricular adenopathy present.     She has no cervical adenopathy.        Right cervical: No superficial cervical and no posterior cervical adenopathy present.       Left cervical: No superficial cervical and no posterior cervical adenopathy present.   Neurological: She is alert and oriented to person, place, and time. She has normal strength. Coordination and gait normal.   Skin: Skin is warm, dry and intact. No lesion and no rash noted. She is not diaphoretic. No cyanosis. No pallor.   Psychiatric: She has a normal mood and affect. Her speech is normal and behavior is normal. Judgment and thought content normal.  Cognition and memory are normal.   Nursing note and vitals reviewed.      Assessment:     Cerumen impactions removed AU    Symmetric mild-moderate high-frequency SNHL AU  Plan:          Recommend annual audiogram

## 2017-03-03 ENCOUNTER — TELEPHONE (OUTPATIENT)
Dept: FAMILY MEDICINE | Facility: CLINIC | Age: 82
End: 2017-03-03

## 2017-03-03 NOTE — TELEPHONE ENCOUNTER
Pt states that she is  Having pain on her L side in the ribcage area. Pt fell a couple of weeks ago. Pt states she had x-rays and a few days after that she states that it hurts to take a deep breath. Attempted to schedule patient an appointment for today. Unable to do this and even tried in [employer]. Advised pt to go to Urgent Care. Address given.

## 2017-03-03 NOTE — TELEPHONE ENCOUNTER
----- Message from Liz Zacarias sent at 3/3/2017  7:51 AM CST -----  Please call patient in regards to a appt today for pain in her side, patient states she fell two weeks ago & it still hurts, [phone] (home)

## 2017-03-15 ENCOUNTER — PATIENT OUTREACH (OUTPATIENT)
Dept: ADMINISTRATIVE | Facility: HOSPITAL | Age: 82
End: 2017-03-15

## 2017-03-20 ENCOUNTER — ANTI-COAG VISIT (OUTPATIENT)
Dept: CARDIOLOGY | Facility: CLINIC | Age: 82
End: 2017-03-20
Payer: MEDICARE

## 2017-03-20 DIAGNOSIS — I48.0 PAROXYSMAL ATRIAL FIBRILLATION: ICD-10-CM

## 2017-03-20 DIAGNOSIS — Z79.01 LONG TERM CURRENT USE OF ANTICOAGULANT THERAPY: Primary | ICD-10-CM

## 2017-03-20 DIAGNOSIS — I48.91 ATRIAL FIBRILLATION, UNSPECIFIED TYPE: ICD-10-CM

## 2017-03-20 LAB — INR PPP: 2.4 (ref 2–3)

## 2017-03-20 PROCEDURE — 85610 PROTHROMBIN TIME: CPT | Mod: QW,S$GLB,, | Performed by: PHARMACIST

## 2017-03-20 PROCEDURE — 99211 OFF/OP EST MAY X REQ PHY/QHP: CPT | Mod: 25,S$GLB,, | Performed by: PHARMACIST

## 2017-03-20 NOTE — MR AVS SNAPSHOT
[facility]  [address]  [facility] [address]  Phone: [phone]                  Miesha Obando   3/20/2017 11:15 AM   Anti-coag visit    Description:  Female : 1935   Provider:  Toña An, Chintan   Department:  [facility] Coumadin           Diagnoses this Visit        Comments    Long term current use of anticoagulant therapy    -  Primary     Atrial fibrillation, unspecified type         Paroxysmal atrial fibrillation                To Do List           Future Appointments        Provider Department Dept Phone    3/22/2017 3:00 PM Silvestre Olivas MD [facility] Gastroenterology [phone]    3/29/2017 10:40 AM ZOEY España MD [facility] Family Medicine [phone]    2017 9:00 AM PACEMAKER [facility] Cardiology [phone]    2017 9:40 AM Eddie Gonzales MD [facility] Cardiology [phone]    2017 10:15 AM Toña An, PharmD [facility] [phone]      Goals (5 Years of Data)     None      [facility]     [facility] On [facility] Care Line -  Assistance  Registered nurses in the [facility] provide clinical advisement, health education, appointment booking, and other advisory services.  Call for this free service at [phone].             Medications           Message regarding Medications     Verify the changes and/or additions to your medication regime listed below are the same as discussed with your clinician today.  If any of these changes or additions are incorrect, please notify your healthcare provider.             Verify that the below list of medications is an accurate representation of the medications you are currently taking.  If none reported, the list may be blank. If incorrect, please contact your healthcare provider. Carry this list with you in case of emergency.           Current Medications     ACCU-CHEK MARLON PLUS TEST STRP Strp USE AS DIRECTED TWICE DAILY    acetaminophen  (TYLENOL) 325 MG tablet Take 325 mg by mouth as needed for Pain or Temperature greater than.     albuterol (PROAIR HFA) 90 mcg/actuation inhaler Inhale 2 puffs into the lungs every 4 (four) hours as needed.    ascorbic acid (VITAMIN C) 500 MG tablet Take 500 mg by mouth once daily.      aspirin (ECOTRIN) 81 MG EC tablet Take 81 mg by mouth once daily.    b complex vitamins tablet Take 1 tablet by mouth once daily.      fish oil-omega-3 fatty acids 300-1,000 mg capsule Take 2 g by mouth once daily.    fluticasone-vilanterol (BREO ELLIPTA) 200-25 mcg/dose DsDv diskus inhaler Inhale 1 puff into the lungs once daily.    furosemide (LASIX) 20 MG tablet Take 1 tablet (20 mg total) by mouth daily as needed (Weight gain, Swelling, or shortness of breath).    hydrocodone-acetaminophen 5-325mg (NORCO) 5-325 mg per tablet Take 1 tablet by mouth every 6 (six) hours as needed for Pain.    levothyroxine (SYNTHROID) 100 MCG tablet TAKE 1 TABLET EVERY DAY    losartan (COZAAR) 25 MG tablet TAKE 1 TABLET ONE TIME DAILY    metoprolol succinate (TOPROL-XL) 50 MG 24 hr tablet Take 1 tablet (50 mg total) by mouth once daily.    nitroGLYCERIN (NITROSTAT) 0.4 MG SL tablet Place 1 tablet (0.4 mg total) under the tongue every 5 (five) minutes as needed for Chest pain.    spironolactone (ALDACTONE) 25 MG tablet Take 1 tablet (25 mg total) by mouth once daily.    trazodone (DESYREL) 50 MG tablet     warfarin (COUMADIN) 5 MG tablet TAKE 1/2 TO 1 TABLET DAILY AS DIRECTED BY COUMADIN CLINIC           Clinical Reference Information           Your Vitals Were     Last Period                   (LMP Unknown)           Allergies as of 3/20/2017     Amoxicillin-pot Clavula (Bulk)    Sulfa (Sulfonamide Antibiotics)    Adhesive      Immunizations Administered on Date of Encounter - 3/20/2017     None      Orders Placed During Today's Visit      Normal Orders This Visit    POCT INR          3/20/2017 11:50 AM - Toña An, PharmD      Component  Results     Component    INR    2.4      February 2017 Details    Sun Mon Tue Wed Thu Fri Sat        1               2               3               4                 5               6               7               8               9               10               11                 12               13               14               15               16               17               18      2.5 mg           19      2.5 mg         20   2.7   5 mg   See details      21      2.5 mg         22      2.5 mg         23      2.5 mg         24      2.5 mg         25      2.5 mg           26      2.5 mg         27      5 mg         28      2.5 mg              Date Details   02/20 Last INR check   INR: 2.7                 March 2017 Details    Sun Mon Tue Wed Thu Fri Sat        1      2.5 mg         2      2.5 mg         3      2.5 mg         4      2.5 mg           5      2.5 mg         6      5 mg         7      2.5 mg         8      2.5 mg         9      2.5 mg         10      2.5 mg         11      2.5 mg           12      2.5 mg         13      5 mg         14      2.5 mg         15      2.5 mg         16      2.5 mg         17      2.5 mg         18      2.5 mg           19      2.5 mg         20   2.4   5 mg   See details      21      2.5 mg         22      2.5 mg         23      2.5 mg         24      2.5 mg         25      2.5 mg           26      2.5 mg         27      5 mg         28      2.5 mg         29      2.5 mg         30      2.5 mg         31      2.5 mg           Date Details   03/20 This INR check   INR: 2.4                     How to take your warfarin dose     To take:  2.5 mg Take 0.5 of a 5 mg tablet.    To take:  5 mg Take 1 of the 5 mg tablets.           April 2017 Details    Sun Mon Tue Wed Thu Fri Sat           1      2.5 mg           2      2.5 mg         3      5 mg         4      2.5 mg         5      2.5 mg         6      2.5 mg         7      2.5 mg         8      2.5 mg           9      2.5  mg         10      5 mg         11      2.5 mg         12      2.5 mg         13      2.5 mg         14      2.5 mg         15      2.5 mg           16      2.5 mg         17      5 mg         18      2.5 mg         19      2.5 mg         20      2.5 mg         21      2.5 mg         22      2.5 mg           23      2.5 mg         24      5 mg         25      2.5 mg         26      2.5 mg         27            28               29                 30                      Date Details   No additional details    Date of next INR:  4/27/2017         How to take your warfarin dose     To take:  2.5 mg Take 0.5 of a 5 mg tablet.    To take:  5 mg Take 1 of the 5 mg tablets.           Anticoagulation Summary as of 3/20/2017     Maintenance plan 5 mg (5 mg x 1) on Mon; 2.5 mg (5 mg x 0.5) all other days    Full instructions 5 mg on Mon; 2.5 mg all other days    Next INR check 4/27/2017      Anticoagulation Episode Summary     Comments [facility] 3x/wk      Language Assistance Services     ATTENTION: Language assistance services are available, free of charge. Please call [phone].      ATENCIÓN: Si habla español, tiene a granado disposición servicios gratuitos de asistencia lingüística. Llame al [phone].     LA NENA Ý: N?u b?n nói Ti?ng Vi?t, có các d?ch v? h? tr? ngôn ng? mi?n phí dành cho b?n. G?i s? [phone].         [facility] complies with applicable Federal civil rights laws and does not discriminate on the basis of race, color, national origin, age, disability, or sex.

## 2017-03-20 NOTE — PROGRESS NOTES
"INR in range, pt reports she has had difficulty swallowing again.  She will see Deisy this week and will discuss this, feels she may need to have dilatation again.  She will call if this is scheduled.   Will maintain and recheck INR in 5 weeks when pt sees Janet "This note will not be shared with the patient."  "

## 2017-03-22 ENCOUNTER — INITIAL CONSULT (OUTPATIENT)
Dept: GASTROENTEROLOGY | Facility: CLINIC | Age: 82
End: 2017-03-22
Payer: MEDICARE

## 2017-03-22 VITALS — BODY MASS INDEX: 42.6 KG/M2 | WEIGHT: 231.5 LBS | HEIGHT: 62 IN

## 2017-03-22 DIAGNOSIS — K21.9 GASTROESOPHAGEAL REFLUX DISEASE, ESOPHAGITIS PRESENCE NOT SPECIFIED: ICD-10-CM

## 2017-03-22 DIAGNOSIS — K58.9 IRRITABLE BOWEL SYNDROME, UNSPECIFIED TYPE: ICD-10-CM

## 2017-03-22 DIAGNOSIS — K22.70 BARRETT'S ESOPHAGUS WITHOUT DYSPLASIA: ICD-10-CM

## 2017-03-22 DIAGNOSIS — R13.10 DYSPHAGIA, UNSPECIFIED TYPE: Primary | ICD-10-CM

## 2017-03-22 PROCEDURE — 1160F RVW MEDS BY RX/DR IN RCRD: CPT | Mod: S$GLB,,, | Performed by: INTERNAL MEDICINE

## 2017-03-22 PROCEDURE — 99214 OFFICE O/P EST MOD 30 MIN: CPT | Mod: S$GLB,,, | Performed by: INTERNAL MEDICINE

## 2017-03-22 PROCEDURE — 1157F ADVNC CARE PLAN IN RCRD: CPT | Mod: S$GLB,,, | Performed by: INTERNAL MEDICINE

## 2017-03-22 PROCEDURE — 99999 PR PBB SHADOW E&M-EST. PATIENT-LVL II: CPT | Mod: PBBFAC,,, | Performed by: INTERNAL MEDICINE

## 2017-03-22 PROCEDURE — 1159F MED LIST DOCD IN RCRD: CPT | Mod: S$GLB,,, | Performed by: INTERNAL MEDICINE

## 2017-03-22 PROCEDURE — 99499 UNLISTED E&M SERVICE: CPT | Mod: S$GLB,,, | Performed by: INTERNAL MEDICINE

## 2017-03-22 PROCEDURE — 1125F AMNT PAIN NOTED PAIN PRSNT: CPT | Mod: S$GLB,,, | Performed by: INTERNAL MEDICINE

## 2017-03-22 NOTE — LETTER
March 22, 2017      Susi Garcia NP  [address] [place] [address]           [facility]  [address] [place] [address]  Phone: [phone]          Patient: Miesha Obando   MR Number: [id number]   YOB: 1935   Date of Visit: 3/22/2017       Dear Susi Garcia:    Thank you for referring Miesha Obando to me for evaluation. Attached you will find relevant portions of my assessment and plan of care.    If you have questions, please do not hesitate to call me. I look forward to following Miesha Obando along with you.    Sincerely,    Silvestre Olivas MD    Enclosure  CC:  No Recipients    If you would like to receive this communication electronically, please contact [email or web address] or [phone] to request more information on [facility] [facility] access.    For providers and/or their staff who would like to refer a patient to [facility], please contact us through our one-stop-shop provider referral line, [facility] , at [phone].    If you feel you have received this communication in error or would no longer like to receive these types of communications, please e-mail [email or web address]

## 2017-03-22 NOTE — PROGRESS NOTES
Pt presents for evaluation of dysphagia. Pt describes intermittent solid food dysphagia, mainly meats. Positive regurgitation. No abd pain or fever or bleeding. Appetite and weight stable. No fever or jaundice. Pt had dilation of esophagus in 2011 that was beneficial. Prior hx Herrera's noted. Pt takes zantac as needed for reflux symptoms. Pt also describes intermittent increased stool frequency and urgency, no true diarrhea, however this is chronic and stable dating back years. C-scope 2014 unremarkable.    REVIEW OF SYSTEMS:   Constitutional: Negative for fever, appetite change and unexpected weight change.  HENT: Negative for sore throat and trouble swallowing.  Eyes: Negative for visual disturbance.  Respiratory: Negative for chest tightness, shortness of breath and wheezing.  Cardiovascular: Negative for chest pain.  Gastrointestinal:  as per HPI    PHYSICAL EXAMINATION:                                                        GENERAL:  Comfortable, in no acute distress.      SKIN: Non-jaundiced.                             HEENT EXAM:  Nonicteric.  No adenopathy.  Oropharynx is clear.               NECK:  Supple.                                                               LUNGS:  Clear.                                                               CARDIAC:  Regular rate and rhythm.  S1, S2.  No murmur.                      ABDOMEN:  Soft, positive bowel sounds, nontender.  No hepatosplenomegaly or masses.  No rebound or guarding.                                             EXTREMITIES:  No edema.       IMP: (1) Dysphagia, GERD, Hx Herrera's.          (2) IBS.    PLAN: (1) EGD/dilation (at [facility], in view of significant medical co-morbidities)             (2) Daily probiotic.

## 2017-03-23 ENCOUNTER — TELEPHONE (OUTPATIENT)
Dept: GASTROENTEROLOGY | Facility: CLINIC | Age: 82
End: 2017-03-23

## 2017-03-23 ENCOUNTER — PATIENT OUTREACH (OUTPATIENT)
Dept: ADMINISTRATIVE | Facility: HOSPITAL | Age: 82
End: 2017-03-23

## 2017-03-23 NOTE — PROGRESS NOTES
Patient to have an RGD on 4/20, please have patient come in on 4/13 so we can check INR prior to procedure and give holding instructions. Will hold 3 days prior without lovenox.

## 2017-03-23 NOTE — LETTER
March 23, 2017    Miesha Obando  [address]  [place] [address]             [facility]  [address]  [facility] [address]  Phone: [phone] Dear Mrs. Obando:    We have tried to reach you by mychart unsuccessfully.    [facility] is committed to your overall health.  To help you get the most out of each of your visits, we will review your information to make sure you are up to date on all of your recommended tests and/or procedures.       Dr. España        has found that you may be due for:     Hemoglobin A1C     If you have a home blood pressure monitor, please bring it with you to your appointment so that we may test for accuracy.     If you have had any of the above done at another facility, please bring the records or information with you so that your record at [facility] will be complete.      If you are currently taking medication, please bring it with you to your appointment for review.         If you have any questions or concerns, please don't hesitate to call.    Sincerely,    Oliva Desouza  Clinical Care Coordinator  [facility] [facility]  [address]  [place] [address]  Phone: [phone]   Fax: [phone]

## 2017-03-23 NOTE — TELEPHONE ENCOUNTER
Hi, Just wanted to let ya'll know pt will be having an EGD on 4/20 with DR. Olivas. Pt will need to hold coumadin 3 days prior. Thank you.

## 2017-04-10 RX ORDER — LOSARTAN POTASSIUM 25 MG/1
TABLET ORAL
Qty: 90 TABLET | Refills: 1 | Status: SHIPPED | OUTPATIENT
Start: 2017-04-10 | End: 2017-11-13 | Stop reason: SDUPTHER

## 2017-04-13 ENCOUNTER — OFFICE VISIT (OUTPATIENT)
Dept: FAMILY MEDICINE | Facility: CLINIC | Age: 82
End: 2017-04-13
Payer: MEDICARE

## 2017-04-13 ENCOUNTER — ANTI-COAG VISIT (OUTPATIENT)
Dept: CARDIOLOGY | Facility: CLINIC | Age: 82
End: 2017-04-13
Payer: MEDICARE

## 2017-04-13 VITALS
BODY MASS INDEX: 42.84 KG/M2 | HEIGHT: 62 IN | OXYGEN SATURATION: 96 % | DIASTOLIC BLOOD PRESSURE: 82 MMHG | WEIGHT: 232.81 LBS | SYSTOLIC BLOOD PRESSURE: 130 MMHG | HEART RATE: 82 BPM | RESPIRATION RATE: 14 BRPM

## 2017-04-13 DIAGNOSIS — R29.898 WEAKNESS OF BOTH LOWER EXTREMITIES: ICD-10-CM

## 2017-04-13 DIAGNOSIS — E11.42 TYPE 2 DIABETES MELLITUS WITH DIABETIC POLYNEUROPATHY, WITHOUT LONG-TERM CURRENT USE OF INSULIN: Primary | ICD-10-CM

## 2017-04-13 DIAGNOSIS — E03.9 HYPOTHYROIDISM, UNSPECIFIED TYPE: ICD-10-CM

## 2017-04-13 DIAGNOSIS — E66.01 MORBID OBESITY DUE TO EXCESS CALORIES: ICD-10-CM

## 2017-04-13 DIAGNOSIS — I48.0 PAROXYSMAL ATRIAL FIBRILLATION: ICD-10-CM

## 2017-04-13 DIAGNOSIS — I10 ESSENTIAL HYPERTENSION: ICD-10-CM

## 2017-04-13 DIAGNOSIS — Z79.01 LONG TERM CURRENT USE OF ANTICOAGULANT THERAPY: Primary | ICD-10-CM

## 2017-04-13 LAB — INR PPP: 4 (ref 2–3)

## 2017-04-13 PROCEDURE — 99211 OFF/OP EST MAY X REQ PHY/QHP: CPT | Mod: 25,S$GLB,, | Performed by: PHARMACIST

## 2017-04-13 PROCEDURE — 1125F AMNT PAIN NOTED PAIN PRSNT: CPT | Mod: S$GLB,,, | Performed by: FAMILY MEDICINE

## 2017-04-13 PROCEDURE — 99214 OFFICE O/P EST MOD 30 MIN: CPT | Mod: S$GLB,,, | Performed by: FAMILY MEDICINE

## 2017-04-13 PROCEDURE — 99999 PR PBB SHADOW E&M-EST. PATIENT-LVL III: CPT | Mod: PBBFAC,,, | Performed by: FAMILY MEDICINE

## 2017-04-13 PROCEDURE — 1157F ADVNC CARE PLAN IN RCRD: CPT | Mod: S$GLB,,, | Performed by: FAMILY MEDICINE

## 2017-04-13 PROCEDURE — 1159F MED LIST DOCD IN RCRD: CPT | Mod: S$GLB,,, | Performed by: FAMILY MEDICINE

## 2017-04-13 PROCEDURE — 1160F RVW MEDS BY RX/DR IN RCRD: CPT | Mod: S$GLB,,, | Performed by: FAMILY MEDICINE

## 2017-04-13 PROCEDURE — 3075F SYST BP GE 130 - 139MM HG: CPT | Mod: S$GLB,,, | Performed by: FAMILY MEDICINE

## 2017-04-13 PROCEDURE — 3079F DIAST BP 80-89 MM HG: CPT | Mod: S$GLB,,, | Performed by: FAMILY MEDICINE

## 2017-04-13 PROCEDURE — 85610 PROTHROMBIN TIME: CPT | Mod: QW,S$GLB,, | Performed by: PHARMACIST

## 2017-04-13 NOTE — PROGRESS NOTES
Subjective:       Patient ID: Miesha Obando is a 81 y.o. female.    Chief Complaint: Establish Care    HPI Comments: Here to establish care.  Changing pcp.  States doing well overall.  Having some leg weakness over last few months and concerned about falling.    Hypertension   This is a chronic problem. The current episode started more than 1 year ago. The problem is controlled. Pertinent negatives include no chest pain, palpitations or shortness of breath. Past treatments include beta blockers and diuretics. The current treatment provides moderate improvement.   Hyperlipidemia   This is a chronic problem. The current episode started more than 1 year ago. The problem is controlled. Recent lipid tests were reviewed and are normal. Pertinent negatives include no chest pain or shortness of breath.   Diabetes   She presents for her follow-up diabetic visit. She has type 2 diabetes mellitus. Her disease course has been stable. Pertinent negatives for hypoglycemia include no nervousness/anxiousness. Pertinent negatives for diabetes include no chest pain and no fatigue.     Review of Systems   Constitutional: Negative for chills, fatigue and fever.   Respiratory: Negative for cough, chest tightness and shortness of breath.    Cardiovascular: Negative for chest pain, palpitations and leg swelling.   Gastrointestinal: Negative for abdominal distention and abdominal pain.   Endocrine: Negative for cold intolerance and heat intolerance.   Skin: Negative for rash and wound.   Psychiatric/Behavioral: Negative for dysphoric mood and suicidal ideas. The patient is not nervous/anxious.        Objective:      Physical Exam   Constitutional: She appears well-developed and well-nourished.   HENT:   Head: Normocephalic and atraumatic.   Cardiovascular: Normal rate, regular rhythm and normal heart sounds.    Pulmonary/Chest: Effort normal and breath sounds normal.   Psychiatric: She has a normal mood and affect.   Nursing note and  vitals reviewed.      Assessment:       1. Type 2 diabetes mellitus with diabetic polyneuropathy, without long-term current use of insulin    2. Essential hypertension    3. Hypothyroidism, unspecified type    4. Morbid obesity due to excess calories    5. Weakness of both lower extremities        Plan:       Type 2 diabetes mellitus with diabetic polyneuropathy, without long-term current use of insulin  -     CBC auto differential; Future; Expected date: 4/13/17  -     Comprehensive metabolic panel; Future; Expected date: 4/13/17  -     Lipid panel; Future; Expected date: 4/13/17  -     Hemoglobin A1c; Future; Expected date: 4/13/17  -     TSH; Future; Expected date: 4/13/17    Essential hypertension  -     CBC auto differential; Future; Expected date: 4/13/17  -     Comprehensive metabolic panel; Future; Expected date: 4/13/17  -     Lipid panel; Future; Expected date: 4/13/17  -     Hemoglobin A1c; Future; Expected date: 4/13/17  -     TSH; Future; Expected date: 4/13/17    Hypothyroidism, unspecified type  -     CBC auto differential; Future; Expected date: 4/13/17  -     Comprehensive metabolic panel; Future; Expected date: 4/13/17  -     Lipid panel; Future; Expected date: 4/13/17  -     Hemoglobin A1c; Future; Expected date: 4/13/17  -     TSH; Future; Expected date: 4/13/17    Morbid obesity due to excess calories  -     CBC auto differential; Future; Expected date: 4/13/17  -     Comprehensive metabolic panel; Future; Expected date: 4/13/17  -     Lipid panel; Future; Expected date: 4/13/17  -     Hemoglobin A1c; Future; Expected date: 4/13/17  -     TSH; Future; Expected date: 4/13/17    Weakness of both lower extremities  -     Ambulatory Referral to Physical/Occupational Therapy      Update labs and health maintenance.  Set up therapy and monitor.  Will monitor chronic medical issues and continue current plan of care.    Return in about 3 months (around 7/13/2017), or if symptoms worsen or fail to  improve.

## 2017-04-13 NOTE — PROGRESS NOTES
"INr supratherapeutic.  Pt has been having coughing, sneezing, mucous.  She is taking claritin.  Pt reports she has not eaten as much vitk as usual. She reports she is no longer having issues with swallowing and plans to cancel the EGD.  Instructed her anyway today for holding instructions in event she decides to go forward with it.    She will resume her vitk intake.  Recheck in 3 weeks "This note will not be shared with the patient."  "

## 2017-04-13 NOTE — MR AVS SNAPSHOT
[facility]  [address]  [facility] [address]  Phone: [phone]  Fax: [phone]                  Miesha Obando   2017 10:00 AM   Office Visit    Description:  Female : 1935   Provider:  ZOEY España MD   Department:  [facility]           Reason for Visit     Establish Care           Diagnoses this Visit        Comments    Type 2 diabetes mellitus with diabetic polyneuropathy, without long-term current use of insulin    -  Primary     Essential hypertension         Hypothyroidism, unspecified type         Morbid obesity due to excess calories         Weakness of both lower extremities                To Do List           Future Appointments        Provider Department Dept Phone    2017 11:30 AM Toña An, PharmD [facility] [phone]    2017 11:45 AM LAB, [place] [facility] [phone]    2017 9:00 AM PACEMAKER [facility] Cardiology [phone]    2017 9:40 AM Eddie Gonzales MD [facility] Cardiology [phone]    2017 11:20 AM ZOEY España MD [facility] [phone]      Your Future Surgeries/Procedures     2017   Surgery with Silvestre Olivas MD   [facility] (--)    [facility] [address]   [phone]              Goals (5 Years of Data)     None      Follow-Up and Disposition     Return in about 3 months (around 2017), or if symptoms worsen or fail to improve.    Follow-up and Disposition History      [facility]     [facility] Nurse Care Line -  Assistance  Unless otherwise directed by your provider, please contact [facility], our nurse care line that is available for  assistance.     Registered nurses in the [facility] provide: appointment scheduling, clinical advisement, health education, and other advisory services.  Call: [phone] (toll free)                Medications           Message regarding Medications     Verify the changes and/or additions to your medication regime listed below are the same as discussed with your clinician today.  If any of these changes or additions are incorrect, please notify your healthcare provider.        STOP taking these medications     hydrocodone-acetaminophen 5-325mg (NORCO) 5-325 mg per tablet Take 1 tablet by mouth every 6 (six) hours as needed for Pain.           Verify that the below list of medications is an accurate representation of the medications you are currently taking.  If none reported, the list may be blank. If incorrect, please contact your healthcare provider. Carry this list with you in case of emergency.           Current Medications     ACCU-CHEK MARLON PLUS TEST STRP Strp USE AS DIRECTED TWICE DAILY    acetaminophen (TYLENOL) 325 MG tablet Take 325 mg by mouth as needed for Pain or Temperature greater than.     albuterol (PROAIR HFA) 90 mcg/actuation inhaler Inhale 2 puffs into the lungs every 4 (four) hours as needed.    ascorbic acid (VITAMIN C) 500 MG tablet Take 500 mg by mouth once daily.      aspirin (ECOTRIN) 81 MG EC tablet Take 81 mg by mouth once daily.    b complex vitamins tablet Take 1 tablet by mouth once daily.      fish oil-omega-3 fatty acids 300-1,000 mg capsule Take 2 g by mouth once daily.    fluticasone-vilanterol (BREO ELLIPTA) 200-25 mcg/dose DsDv diskus inhaler Inhale 1 puff into the lungs once daily.    furosemide (LASIX) 20 MG tablet Take 1 tablet (20 mg total) by mouth daily as needed (Weight gain, Swelling, or shortness of breath).    levothyroxine (SYNTHROID) 100 MCG tablet TAKE 1 TABLET EVERY DAY    losartan (COZAAR) 25 MG tablet TAKE 1 TABLET ONE TIME DAILY    metoprolol succinate (TOPROL-XL) 50 MG 24 hr tablet Take 1 tablet (50 mg total) by mouth once daily.    nitroGLYCERIN (NITROSTAT) 0.4 MG SL tablet Place 1 tablet (0.4 mg total) under the tongue every 5 (five) minutes as  "needed for Chest pain.    ranitidine (ZANTAC) 75 MG tablet Take 75 mg by mouth 2 (two) times daily. Take 1 tablet by mouth once daily    spironolactone (ALDACTONE) 25 MG tablet Take 1 tablet (25 mg total) by mouth once daily.    trazodone (DESYREL) 50 MG tablet     warfarin (COUMADIN) 5 MG tablet TAKE 1/2 TO 1 TABLET DAILY AS DIRECTED BY COUMADIN CLINIC           Clinical Reference Information           Your Vitals Were     BP Pulse Resp Height Weight Last Period    130/82 (BP Location: Right arm, Patient Position: Sitting, BP Method: Manual) 82 14 5' 2" (1.575 m) 105.6 kg (232 lb 12.9 oz) (LMP Unknown)    SpO2 BMI             96% 42.58 kg/m2         Blood Pressure          Most Recent Value    BP  130/82      Allergies as of 4/13/2017     Amoxicillin-pot Clavula (Bulk)    Sulfa (Sulfonamide Antibiotics)    Adhesive      Immunizations Administered on Date of Encounter - 4/13/2017     None      Orders Placed During Today's Visit      Normal Orders This Visit    Ambulatory Referral to Physical/Occupational Therapy     Future Labs/Procedures Expected by Expires    CBC auto differential  4/13/2017 10/10/2017    Comprehensive metabolic panel  4/13/2017 10/10/2017    Hemoglobin A1c  4/13/2017 10/10/2017    Lipid panel  4/13/2017 10/10/2017    TSH  4/13/2017 10/10/2017      Language Assistance Services     ATTENTION: Language assistance services are available, free of charge. Please call [phone].      ATENCIÓN: Si habla juan f, tiene a granado disposición servicios gratuitos de asistencia lingüística. Llame al [phone].     CHÚ Ý: N?u b?n nói Ti?ng Vi?t, có các d?ch v? h? tr? ngôn ng? mi?n phí dành cho b?n. G?i s? [phone].         [facility] complies with applicable Federal civil rights laws and does not discriminate on the basis of race, color, national origin, age, disability, or sex.        "

## 2017-04-13 NOTE — MR AVS SNAPSHOT
[facility]  [address] [facility] [address] [place] [address]  Phone: [phone]                  Miesha Obando   2017 11:30 AM   Anti-coag visit    Description:  Female : 1935   Provider:  Toña An, Chintan   Department:  [facility]           Diagnoses this Visit        Comments    Long term current use of anticoagulant therapy    -  Primary     Paroxysmal atrial fibrillation                To Do List           Future Appointments        Provider Department Dept Phone    2017 11:45 AM LAB, [place] [facility] [phone]    2017 9:00 AM PACEMAKER [facility] Cardiology [phone]    2017 9:40 AM Eddie Gonzales MD [facility] Cardiology [phone]    2017 2:30 PM Toña An, PharmD [facility] [phone]    2017 11:20 AM ZOEY España MD [facility] Family Medicine [phone]      Your Future Surgeries/Procedures     2017   Surgery with Silvestre Olivas MD   [facility] (--)    [address] [facility] [address]   [phone]              Goals (5 Years of Data)     None      [facility]     [facility] Nurse Care Line - 24/ Assistance  Unless otherwise directed by your provider, please contact [facility], our nurse care line that is available for 24/ assistance.     Registered nurses in the [facility] provide: appointment scheduling, clinical advisement, health education, and other advisory services.  Call: [phone] (toll free)               Medications           Message regarding Medications     Verify the changes and/or additions to your medication regime listed below are the same as discussed with your clinician today.  If any of these changes or additions are incorrect, please notify your healthcare provider.             Verify that the below list of medications is an accurate representation of the medications you are currently  taking.  If none reported, the list may be blank. If incorrect, please contact your healthcare provider. Carry this list with you in case of emergency.           Current Medications     ACCU-CHEK MARLON PLUS TEST STRP Strp USE AS DIRECTED TWICE DAILY    acetaminophen (TYLENOL) 325 MG tablet Take 325 mg by mouth as needed for Pain or Temperature greater than.     albuterol (PROAIR HFA) 90 mcg/actuation inhaler Inhale 2 puffs into the lungs every 4 (four) hours as needed.    ascorbic acid (VITAMIN C) 500 MG tablet Take 500 mg by mouth once daily.      aspirin (ECOTRIN) 81 MG EC tablet Take 81 mg by mouth once daily.    b complex vitamins tablet Take 1 tablet by mouth once daily.      fish oil-omega-3 fatty acids 300-1,000 mg capsule Take 2 g by mouth once daily.    fluticasone-vilanterol (BREO ELLIPTA) 200-25 mcg/dose DsDv diskus inhaler Inhale 1 puff into the lungs once daily.    furosemide (LASIX) 20 MG tablet Take 1 tablet (20 mg total) by mouth daily as needed (Weight gain, Swelling, or shortness of breath).    levothyroxine (SYNTHROID) 100 MCG tablet TAKE 1 TABLET EVERY DAY    losartan (COZAAR) 25 MG tablet TAKE 1 TABLET ONE TIME DAILY    metoprolol succinate (TOPROL-XL) 50 MG 24 hr tablet Take 1 tablet (50 mg total) by mouth once daily.    nitroGLYCERIN (NITROSTAT) 0.4 MG SL tablet Place 1 tablet (0.4 mg total) under the tongue every 5 (five) minutes as needed for Chest pain.    ranitidine (ZANTAC) 75 MG tablet Take 75 mg by mouth 2 (two) times daily. Take 1 tablet by mouth once daily    spironolactone (ALDACTONE) 25 MG tablet Take 1 tablet (25 mg total) by mouth once daily.    trazodone (DESYREL) 50 MG tablet     warfarin (COUMADIN) 5 MG tablet TAKE 1/2 TO 1 TABLET DAILY AS DIRECTED BY COUMADIN CLINIC           Clinical Reference Information           Your Vitals Were     Last Period                   (LMP Unknown)           Allergies as of 4/13/2017     Amoxicillin-pot Clavula (Bulk)    Sulfa (Sulfonamide  Antibiotics)    Adhesive      Immunizations Administered on Date of Encounter - 4/13/2017     None      Orders Placed During Today's Visit      Normal Orders This Visit    POCT INR          4/13/2017 11:31 AM - Toña An, PharmD      Component Results     Component    INR    4.0      March 2017 Details    Sun Mon Tue Wed Thu Fri Sat        1               2               3               4                 5               6               7               8               9               10               11                 12               13               14      2.5 mg         15      2.5 mg         16      2.5 mg         17      2.5 mg         18      2.5 mg           19      2.5 mg         20   2.4   5 mg   See details      21      2.5 mg         22      2.5 mg         23      2.5 mg         24      2.5 mg         25      2.5 mg           26      2.5 mg         27      5 mg         28      2.5 mg         29      2.5 mg         30      2.5 mg         31      2.5 mg           Date Details   03/20 Last INR check   INR: 2.4                 April 2017 Details    Sun Mon Tue Wed Thu Fri Sat           1      2.5 mg           2      2.5 mg         3      5 mg         4      2.5 mg         5      2.5 mg         6      2.5 mg         7      2.5 mg         8      2.5 mg           9      2.5 mg         10      5 mg         11      2.5 mg         12      2.5 mg         13   4.0   Hold   See details      14      2.5 mg         15      2.5 mg           16      2.5 mg         17      Hold         18      Hold         19      Hold         20      5 mg         21      2.5 mg         22      2.5 mg           23      2.5 mg         24      5 mg         25      2.5 mg         26      2.5 mg         27      2.5 mg         28      2.5 mg         29      2.5 mg           30      2.5 mg                Date Details   04/13 This INR check   INR: 4.0                     How to take your warfarin dose     To take:  2.5 mg Take 0.5 of a 5  mg tablet.    To take:  5 mg Take 1 of the 5 mg tablets.    Hold Do not take your warfarin dose. See the Details table to the right for additional instructions.                May 2017 Details    Sun Mon Tue Wed Thu Fri Sat      1      5 mg         2      2.5 mg         3      2.5 mg         4            5               6                 7               8               9               10               11               12               13                 14               15               16               17               18               19               20                 21               22               23               24               25               26               27                 28               29               30               31                   Date Details   No additional details    Date of next INR:  5/4/2017         How to take your warfarin dose     To take:  2.5 mg Take 0.5 of a 5 mg tablet.    To take:  5 mg Take 1 of the 5 mg tablets.           Anticoagulation Summary as of 4/13/2017     Maintenance plan 5 mg (5 mg x 1) on Mon; 2.5 mg (5 mg x 0.5) all other days    Full instructions 4/13: Hold; 4/17: Hold; 4/18: Hold; 4/19: Hold; 4/20: 5 mg; Otherwise 5 mg on Mon; 2.5 mg all other days    Next INR check 5/4/2017      Anticoagulation Episode Summary     Comments [facility] 3x[facility]wk      Patient Findings     Positives Change in medications, Change in diet/appetite    Negatives Signs/symptoms of thrombosis, Signs/symptoms of bleeding, Laboratory test error suspected, Change in health, Change in alcohol use, Change in activity, Upcoming invasive procedure, Emergency department visit, Upcoming dental procedure, Missed doses, Extra doses, Hospital admission, Bruising, Other complaints    Comments Pt has been having coughing, sneezing, mucous.  She is taking claritin.  Pt reports she has not eaten as much vitk as usual.      Language Assistance Services     ATTENTION: Language  assistance services are available, free of charge. Please call [phone].      ATENCIÓN: Si habla juan f, tiene a granado disposición servicios gratuitos de asistencia lingüística. Llame al [phone].     CHÚ Ý: N?u b?n nói Ti?ng Vi?t, có các d?ch v? h? tr? ngôn ng? mi?n phí dành cho b?n. G?i s? [phone].         [facility] complies with applicable Federal civil rights laws and does not discriminate on the basis of race, color, national origin, age, disability, or sex.

## 2017-04-18 ENCOUNTER — TELEPHONE (OUTPATIENT)
Dept: GASTROENTEROLOGY | Facility: CLINIC | Age: 82
End: 2017-04-18

## 2017-04-18 ENCOUNTER — LAB VISIT (OUTPATIENT)
Dept: LAB | Facility: HOSPITAL | Age: 82
End: 2017-04-18
Attending: FAMILY MEDICINE
Payer: MEDICARE

## 2017-04-18 DIAGNOSIS — I10 ESSENTIAL HYPERTENSION: ICD-10-CM

## 2017-04-18 DIAGNOSIS — E03.9 HYPOTHYROIDISM, UNSPECIFIED TYPE: ICD-10-CM

## 2017-04-18 DIAGNOSIS — E11.42 TYPE 2 DIABETES MELLITUS WITH DIABETIC POLYNEUROPATHY, WITHOUT LONG-TERM CURRENT USE OF INSULIN: ICD-10-CM

## 2017-04-18 DIAGNOSIS — E66.01 MORBID OBESITY DUE TO EXCESS CALORIES: ICD-10-CM

## 2017-04-18 LAB
ALBUMIN SERPL BCP-MCNC: 3.5 G/DL
ALP SERPL-CCNC: 80 U/L
ALT SERPL W/O P-5'-P-CCNC: 13 U/L
ANION GAP SERPL CALC-SCNC: 9 MMOL/L
AST SERPL-CCNC: 22 U/L
BASOPHILS # BLD AUTO: 0.02 K/UL
BASOPHILS NFR BLD: 0.3 %
BILIRUB SERPL-MCNC: 0.6 MG/DL
BUN SERPL-MCNC: 27 MG/DL
CALCIUM SERPL-MCNC: 9.8 MG/DL
CHLORIDE SERPL-SCNC: 108 MMOL/L
CHOLEST/HDLC SERPL: 3.5 {RATIO}
CO2 SERPL-SCNC: 24 MMOL/L
CREAT SERPL-MCNC: 1 MG/DL
DIFFERENTIAL METHOD: NORMAL
EOSINOPHIL # BLD AUTO: 0.1 K/UL
EOSINOPHIL NFR BLD: 1.9 %
ERYTHROCYTE [DISTWIDTH] IN BLOOD BY AUTOMATED COUNT: 14.5 %
EST. GFR  (AFRICAN AMERICAN): >60 ML/MIN/1.73 M^2
EST. GFR  (NON AFRICAN AMERICAN): 53.7 ML/MIN/1.73 M^2
GLUCOSE SERPL-MCNC: 110 MG/DL
HCT VFR BLD AUTO: 39.4 %
HDL/CHOLESTEROL RATIO: 28.7 %
HDLC SERPL-MCNC: 167 MG/DL
HDLC SERPL-MCNC: 48 MG/DL
HGB BLD-MCNC: 13.1 G/DL
LDLC SERPL CALC-MCNC: 98 MG/DL
LYMPHOCYTES # BLD AUTO: 1.9 K/UL
LYMPHOCYTES NFR BLD: 27.6 %
MCH RBC QN AUTO: 30.7 PG
MCHC RBC AUTO-ENTMCNC: 33.2 %
MCV RBC AUTO: 92 FL
MONOCYTES # BLD AUTO: 1 K/UL
MONOCYTES NFR BLD: 13.8 %
NEUTROPHILS # BLD AUTO: 3.9 K/UL
NEUTROPHILS NFR BLD: 56.1 %
NONHDLC SERPL-MCNC: 119 MG/DL
PLATELET # BLD AUTO: 228 K/UL
PMV BLD AUTO: 11.1 FL
POTASSIUM SERPL-SCNC: 4.9 MMOL/L
PROT SERPL-MCNC: 7.4 G/DL
RBC # BLD AUTO: 4.27 M/UL
SODIUM SERPL-SCNC: 141 MMOL/L
TRIGL SERPL-MCNC: 105 MG/DL
TSH SERPL DL<=0.005 MIU/L-ACNC: 3.01 UIU/ML
WBC # BLD AUTO: 7.02 K/UL

## 2017-04-18 PROCEDURE — 83036 HEMOGLOBIN GLYCOSYLATED A1C: CPT

## 2017-04-18 PROCEDURE — 84443 ASSAY THYROID STIM HORMONE: CPT

## 2017-04-18 PROCEDURE — 85025 COMPLETE CBC W/AUTO DIFF WBC: CPT

## 2017-04-18 PROCEDURE — 80061 LIPID PANEL: CPT

## 2017-04-18 PROCEDURE — 36415 COLL VENOUS BLD VENIPUNCTURE: CPT | Mod: PO

## 2017-04-18 PROCEDURE — 80053 COMPREHEN METABOLIC PANEL: CPT

## 2017-04-18 NOTE — TELEPHONE ENCOUNTER
----- Message from Sebastián Granda sent at 4/18/2017  1:28 PM CDT -----  Contact: Miesha  Patient is returning phone call from earlier. Please call back , she will be available until f:30 pm [phone] (home)   Thanks!

## 2017-04-18 NOTE — TELEPHONE ENCOUNTER
----- Message from Elma Marcos sent at 4/18/2017  9:24 AM CDT -----  Contact: SELF  Patient would like to cancel her procedure on 4/20/2017-please call patient to advise on this. Thanks!

## 2017-04-19 LAB
ESTIMATED AVG GLUCOSE: 143 MG/DL
HBA1C MFR BLD HPLC: 6.6 %

## 2017-04-25 ENCOUNTER — CLINICAL SUPPORT (OUTPATIENT)
Dept: REHABILITATION | Facility: HOSPITAL | Age: 82
End: 2017-04-25
Attending: FAMILY MEDICINE
Payer: MEDICARE

## 2017-04-25 DIAGNOSIS — R29.898 LEG WEAKNESS, BILATERAL: ICD-10-CM

## 2017-04-25 PROCEDURE — 97163 PT EVAL HIGH COMPLEX 45 MIN: CPT | Mod: PN | Performed by: PHYSICAL THERAPIST

## 2017-04-25 PROCEDURE — G8979 MOBILITY GOAL STATUS: HCPCS | Mod: CK,PN | Performed by: PHYSICAL THERAPIST

## 2017-04-25 PROCEDURE — G8978 MOBILITY CURRENT STATUS: HCPCS | Mod: CL,PN | Performed by: PHYSICAL THERAPIST

## 2017-04-25 NOTE — PLAN OF CARE
OUTPATIENT PHYSICAL THERAPY  PHYSICAL THERAPY EVALUATION    Name: Miesha Obando  Clinic Number: [id number]    Diagnosis:   Encounter Diagnosis   Name Primary?    Leg weakness, bilateral      Physician: ZOEY España MD  Treatment Orders: PT Eval and Treat  PT treatment diagnosis: Impaired functional mobility with gait abnormality  Past Medical History:   Diagnosis Date    Anticoagulant long-term use     Arthritis     Atrial fibrillation     Breast cancer 1994    breast     Cardiomyopathy - EF 35-40% 5/11/2016    CHF (congestive heart failure)     Chronic bronchitis     COPD (chronic obstructive pulmonary disease)     Coronary artery disease without angina pectoris 8/26/2015    Depression     Diabetes with neurologic complications     Diverticulitis     Diverticulosis     Encounter for blood transfusion     after mastectomy x 20 years ago    Former smoker     GERD (gastroesophageal reflux disease)     H/O aortic valve replacement     Hiatal hernia     History of colonic diverticulitis     Hypertension     Hypothyroid     Irritable bowel syndrome     Obesity     Pacemaker 08/2014    Schatzki's ring     mild    Sleep apnea     Syncope and collapse     Trouble in sleeping     Type II or unspecified type diabetes mellitus without mention of complication, not stated as uncontrolled     No medications at present.     Urinary incontinence      Current Outpatient Prescriptions   Medication Sig    ACCU-CHEK MARLON PLUS TEST STRP Strp USE AS DIRECTED TWICE DAILY    acetaminophen (TYLENOL) 325 MG tablet Take 325 mg by mouth as needed for Pain or Temperature greater than.     albuterol (PROAIR HFA) 90 mcg/actuation inhaler Inhale 2 puffs into the lungs every 4 (four) hours as needed.    ascorbic acid (VITAMIN C) 500 MG tablet Take 500 mg by mouth once daily.      aspirin (ECOTRIN) 81 MG EC tablet Take 81 mg by mouth once daily.    b complex vitamins tablet Take 1 tablet by mouth once  daily.      fish oil-omega-3 fatty acids 300-1,000 mg capsule Take 2 g by mouth once daily.    fluticasone-vilanterol (BREO ELLIPTA) 200-25 mcg/dose DsDv diskus inhaler Inhale 1 puff into the lungs once daily.    furosemide (LASIX) 20 MG tablet Take 1 tablet (20 mg total) by mouth daily as needed (Weight gain, Swelling, or shortness of breath).    levothyroxine (SYNTHROID) 100 MCG tablet TAKE 1 TABLET EVERY DAY    losartan (COZAAR) 25 MG tablet TAKE 1 TABLET ONE TIME DAILY    metoprolol succinate (TOPROL-XL) 50 MG 24 hr tablet Take 1 tablet (50 mg total) by mouth once daily.    nitroGLYCERIN (NITROSTAT) 0.4 MG SL tablet Place 1 tablet (0.4 mg total) under the tongue every 5 (five) minutes as needed for Chest pain.    ranitidine (ZANTAC) 75 MG tablet Take 75 mg by mouth 2 (two) times daily. Take 1 tablet by mouth once daily    spironolactone (ALDACTONE) 25 MG tablet Take 1 tablet (25 mg total) by mouth once daily.    trazodone (DESYREL) 50 MG tablet     warfarin (COUMADIN) 5 MG tablet TAKE 1/2 TO 1 TABLET DAILY AS DIRECTED BY COUMADIN CLINIC     No current facility-administered medications for this visit.      Review of patient's allergies indicates:   Allergen Reactions    Amoxicillin-pot clavula (bulk) Nausea And Vomiting    Sulfa (sulfonamide antibiotics) Hives    Adhesive Rash     Use paper tape       History   Precautions: standard, fall risk,  Pace maker  Prior Therapy: none noted  Nutrition:  Overweight  History of Present Illness:   Patient reported having leg weakness for 7 months that came on gradually.Left leg weaker than right leg. Falls noted at home: last one 3 weeks ago(falling forward in the front yard). No head trauma noted. No numbness/tingling noted. No radicular pain.OA of the knees noted with last synvisc injection in December 2016.-  -Has been using walker for he past year due to balance concerns, walking and turning.  -past history:  8 years ago involving lumbar region. Unable to  "specify at this time.  Chief complaint: leg weakness  Social History: live alone in [place] [facility]. No concerns. Has a friend for assistance and alert/life line necklace in case of emergency.  Place of Residence (Steps/Adaptations): none noted.  Functional Deficits Leading to Referral/Nature of Injury: leg weakness, impaired balance and transfer tasks. Gait abnormality  Previous functional status includes: none noted  Exercise routine prior to onset :none noted  DME owned: has rollator walker, RW  Work: retired                 Subjective     Pts goals:  To walk better and more independent with ADL tasks.  Pain:  Low back, left knee 6/10  Onset of pain /Mechanism of Onset:  2017  Chief complaint:  Leg weakness  Radicular symptoms: -  Aggravating factors:  Prolong standing/walking  Easing factors:  Rest, seated tasks    Objective     Mental status :alert  Posture Alignment :slouched posture  Sensation: Light Touch: Intact             Vibration/Proprioception: Intact     ROM:  Sensation: Dermatomes  T12 (suprapubic area) intact  L1 ( below inguinal ligament) intact  L2 (lateral thigh) intact  L3 (medial thigh) intact  L4 (medial calf) intact  L5 (lateral calf) intact  S1: (lateral foot) intact    Reflexes:     R          L  Patellar(L4)  1+ 1+   Achilles(S1/S2) 1+ 1+    MMT:                                           L                             R  L1,2 Psoas       4-/5   3+/5    L3 Quads       4/5   4/5  L4 anterior tibialis      5/5   5/5  L4/L5 Glut medius      3-/5   2+/5  L5 E.H.L       5/5   5/5  S1 F.H.L.       5/5   5/5  S2 hamstrings       4+/5   4-/5    Bridge test: +    Static Balance: further assessment during therapy sessions as patient progresses from AD  Dynamic Balance: same as above    Joint Mobility: NA    Special test:  TU.26" with RW  5-sit-to-stand test: 26.83" with UE support noted    GAIT: Miesha ambulates 50 feet with RW with supervision.     GAIT DEVIATIONS: forward lean, decrease " myranda, decreased step length as well.    Transfers:  Modified independent, increase time with sit-to-stands  Pt/family was provided educational information, including: role of PT, goals for PT, scheduling - pt verbalized understanding. Discussed insurance limitations with pt.     Exercises were reviewed and pt was able to demonstrate them prior to the end of the session. Pt received a written copy of exercises to perform at home.   -supine quad sets  -Seated TE  -seated ball squeezes, CLAM with RTB, LAQ, hip flexion  Pt has no cultural, educational or language barriers to learning provided.    Treatment today also included:   CMS Impairment/Limitation/Restriction for FOTO Lower Leg (w/o Knee) Survey  Status Limitation G-Code CMS Severity Modifier  Intake 28% 72% Current Status CL - At least 60 percent but less than 80 percent  Predicted 41% 59% Goal Status+ CK - At least 40 percent but less than 60 percent  Assessment   This is a 81 y.o. female referred to outpatient physical therapy and presents with a medical diagnosis of   Encounter Diagnosis   Name Primary?    Leg weakness, bilateral     and demonstrates limitations as described in the problem list. Pt will benefit from physical therapy services in order to maximize pain free and/or functional use of bilateral lower extremities, gait. The following goals were discussed with the patient and patient is in agreement with them as to be addressed in the treatment plan.     Problem List: pain, decreased ROM, decreased flexibility, decreased strength, decreased balance and stability, decreased motor control, antalgic gait, inability to participate fully in vocation pursuits and decreased ability to fully participate in recreational/sports related activities.  History  Co-morbidities and personal factors that may impact the plan of care Examination  Body Structures and Functions, activity limitations and participation restrictions that may impact the plan of care  "Clinical Presentation   Decision Making/ Complexity Score   Co-morbidities:   -BMI  -pace-maker  -back pain              Personal Factors:   -none Body Regions:  -lower extremities  Body Systems:     -postural imbalance  -impaired transfer skills  -impaired balance  -gait abnormality  -bilateral LE weakness      Activity limitations:   -walking between rooms  -osmany/doffing clothing  -transfer skills  -getting out of th car    Participation Restrictions:   -home management  -community ambulation       -stable/evolving -FOTO:  CMS Impairment/Limitation/Restriction for FOTO Lower Leg (w/o Knee) Survey  Status Limitation G-Code CMS Severity Modifier  Intake 28% 72% Current Status CL - At least 60 percent but less than 80 percent  Predicted 41% 59% Goal Status+ CK - At least 40 percent but less than 60 percent    -High complexity       Short Term Goals:  4 weeks  1. Pt will initiate HEP addressing lower extremity strength for standing ADL tasks.  2. Pt will present with increased hip abductor MMT by one half grade for increased ease with functional ADLs.  3. Pt will report decreased pain to </= 3/10  4. Pt will improve 5 sit to stands in < 20" for mobility purposes.    Long Term Goals: 8 weeks  1. Patient will demonstrate ambulatory tasks modified independently > 200 ft without breaks for mobility/endurance purposes.  2. Patient will return to home management mod-I with transfer skills and lower extremity strength 4 or > for ADL purposes.  3. Patient will demonstrate TUG < 21" for mobility purposes.  4. Patient will demonstrate 5 sit to  < 18" for mobility/strength purposes.  5. Pt will be independent with HEP and self management of symptoms.       Plan     Certification Period:  4/25/2017 to 06/20/2017    Outpatient physical therapy 1-2 times week for 8 to include: Gait Training, Group Therapy, Manual Therapy, Moist Heat/ Ice, Neuromuscular Re-ed, Patient Education, Self Care, Therapeutic Activites and " Therapeutic Exercise Cont PT for  8 weeks. Pt may be seen by PTA as part of the rehabilitation team.     Abdoul Ren, PT, DPT      I certify the need for these services furnished under this plan of treatment and while under my care.  ____________________________________ Physician/Referring Practitioner   Date of Signature

## 2017-04-27 ENCOUNTER — OFFICE VISIT (OUTPATIENT)
Dept: CARDIOLOGY | Facility: CLINIC | Age: 82
End: 2017-04-27
Payer: MEDICARE

## 2017-04-27 ENCOUNTER — CLINICAL SUPPORT (OUTPATIENT)
Dept: CARDIOLOGY | Facility: CLINIC | Age: 82
End: 2017-04-27
Payer: MEDICARE

## 2017-04-27 VITALS
WEIGHT: 233.44 LBS | HEIGHT: 62 IN | BODY MASS INDEX: 42.96 KG/M2 | DIASTOLIC BLOOD PRESSURE: 54 MMHG | HEART RATE: 67 BPM | SYSTOLIC BLOOD PRESSURE: 141 MMHG

## 2017-04-27 DIAGNOSIS — I48.19 PERSISTENT ATRIAL FIBRILLATION: ICD-10-CM

## 2017-04-27 DIAGNOSIS — Z95.810 AICD (AUTOMATIC CARDIOVERTER/DEFIBRILLATOR) PRESENT: ICD-10-CM

## 2017-04-27 DIAGNOSIS — Z95.2 S/P TAVR (TRANSCATHETER AORTIC VALVE REPLACEMENT): ICD-10-CM

## 2017-04-27 DIAGNOSIS — I25.10 CORONARY ARTERY DISEASE INVOLVING NATIVE CORONARY ARTERY OF NATIVE HEART WITHOUT ANGINA PECTORIS: ICD-10-CM

## 2017-04-27 DIAGNOSIS — Z95.5 S/P CORONARY ARTERY STENT PLACEMENT: ICD-10-CM

## 2017-04-27 DIAGNOSIS — I50.32 CHRONIC DIASTOLIC HEART FAILURE: ICD-10-CM

## 2017-04-27 DIAGNOSIS — I35.0 NONRHEUMATIC AORTIC VALVE STENOSIS: Primary | ICD-10-CM

## 2017-04-27 DIAGNOSIS — I42.9 CARDIOMYOPATHY, UNSPECIFIED TYPE: ICD-10-CM

## 2017-04-27 DIAGNOSIS — I10 ESSENTIAL HYPERTENSION: ICD-10-CM

## 2017-04-27 DIAGNOSIS — Z95.0 PACEMAKER: ICD-10-CM

## 2017-04-27 DIAGNOSIS — I48.91 ATRIAL FIBRILLATION, UNSPECIFIED TYPE: ICD-10-CM

## 2017-04-27 DIAGNOSIS — E66.01 MORBID OBESITY DUE TO EXCESS CALORIES: ICD-10-CM

## 2017-04-27 PROCEDURE — 1159F MED LIST DOCD IN RCRD: CPT | Mod: S$GLB,,, | Performed by: INTERNAL MEDICINE

## 2017-04-27 PROCEDURE — 99999 PR PBB SHADOW E&M-EST. PATIENT-LVL III: CPT | Mod: PBBFAC,,, | Performed by: INTERNAL MEDICINE

## 2017-04-27 PROCEDURE — 1125F AMNT PAIN NOTED PAIN PRSNT: CPT | Mod: S$GLB,,, | Performed by: INTERNAL MEDICINE

## 2017-04-27 PROCEDURE — 3078F DIAST BP <80 MM HG: CPT | Mod: S$GLB,,, | Performed by: INTERNAL MEDICINE

## 2017-04-27 PROCEDURE — 3077F SYST BP >= 140 MM HG: CPT | Mod: S$GLB,,, | Performed by: INTERNAL MEDICINE

## 2017-04-27 PROCEDURE — 93280 PM DEVICE PROGR EVAL DUAL: CPT | Mod: S$GLB,,, | Performed by: INTERNAL MEDICINE

## 2017-04-27 PROCEDURE — 99214 OFFICE O/P EST MOD 30 MIN: CPT | Mod: S$GLB,,, | Performed by: INTERNAL MEDICINE

## 2017-04-27 PROCEDURE — 1160F RVW MEDS BY RX/DR IN RCRD: CPT | Mod: S$GLB,,, | Performed by: INTERNAL MEDICINE

## 2017-04-27 NOTE — PROGRESS NOTES
Subjective:    Patient ID:  Miesha Obando is a 81 y.o. female who presents for follow-up of Coronary Artery Disease; Chest Pain; and Hypertension      HPI Comments: Pt here for f/u. Since last visit she reports no new cardiac issues. She is bothered at present with seasonal allergies.       Review of Systems   Constitution: Negative for weight gain and weight loss.   HENT: Positive for congestion.    Eyes: Negative.    Cardiovascular: Negative for chest pain, claudication, cyanosis, dyspnea on exertion, irregular heartbeat, leg swelling, near-syncope, orthopnea (no PND), palpitations and syncope.   Respiratory: Positive for shortness of breath. Negative for cough, hemoptysis and snoring.    Endocrine: Negative.    Skin: Negative.    Musculoskeletal: Negative for joint pain, muscle cramps, muscle weakness and myalgias.   Gastrointestinal: Negative for diarrhea, hematemesis, nausea and vomiting.   Genitourinary: Negative.    Neurological: Positive for paresthesias. Negative for dizziness, focal weakness, light-headedness, loss of balance, numbness and seizures.   Psychiatric/Behavioral: Negative.         Objective:    Physical Exam   Constitutional: She is oriented to person, place, and time. She appears well-developed and well-nourished.   Eyes: Pupils are equal, round, and reactive to light.   Neck: Normal range of motion. No thyromegaly present.   Cardiovascular: Normal rate, regular rhythm, S1 normal, S2 normal, intact distal pulses and normal pulses.   No extrasystoles are present. PMI is not displaced.  Exam reveals no friction rub.    Murmur heard.   Medium-pitched systolic murmur is present with a grade of 1/6  at the upper right sternal border  Pulmonary/Chest: Effort normal and breath sounds normal. She has no wheezes. She has no rales. She exhibits no tenderness.   Abdominal: Soft. Bowel sounds are normal. She exhibits no distension and no mass. There is no tenderness.   Musculoskeletal: Normal range of  motion. She exhibits no edema.   Neurological: She is alert and oriented to person, place, and time.   Skin: Skin is warm and dry.   Vitals reviewed.      AICD interrogated - normal function  Assessment:       1. Nonrheumatic aortic valve stenosis    2. Essential hypertension    3. Coronary artery disease involving native coronary artery of native heart without angina pectoris    4. Cardiomyopathy, unspecified type    5. Persistent atrial fibrillation    6. Morbid obesity due to excess calories    7. S/P coronary artery stent placement - LILLIAM to RCA 08/19/2015    8. S/P TAVR (transcatheter aortic valve replacement) - 23mm Taylor 09/2015    9. AICD (automatic cardioverter/defibrillator) present - Bi-V         Plan:       Cardiac status stable.  She has gained 10# due to sweets she says.  We discussed diet changes cutting out the sweets and restarting water aerobics  Will continue present Rx  F/u 6 months

## 2017-04-28 ENCOUNTER — CLINICAL SUPPORT (OUTPATIENT)
Dept: REHABILITATION | Facility: HOSPITAL | Age: 82
End: 2017-04-28
Attending: FAMILY MEDICINE
Payer: MEDICARE

## 2017-04-28 DIAGNOSIS — R29.898 LEG WEAKNESS, BILATERAL: ICD-10-CM

## 2017-04-28 PROCEDURE — 97110 THERAPEUTIC EXERCISES: CPT | Mod: PN

## 2017-04-28 NOTE — PROGRESS NOTES
"Name: Miesha Obando  Clinic Number: [id number]  Date of Treatment: 04/28/2017   Diagnosis:   Encounter Diagnosis   Name Primary?    Leg weakness, bilateral        Visit number: 2   Start date: 04/25/17  POC End date: 06/20/17    Time in: 11:09  Time Out: 12:08  Total Treatment Time: 55  Group time: 25    Precautions: pacemaker, hypothyroidism, osteopenia    Subjective:    Miesha reports improvement of symptoms overall however reported having mild B knee pain with completion of HEP. Patient reports their pain to be 4/10 on a 0-10 scale with 0 being no pain and 10 being the worst pain imaginable.    Objective  Pt ambulates into clinic with antalgic gait using SPC.    Miesha received therapeutic exercises to develop strength, endurance, flexibility, posture and core stabilization for 55 minutes (30 min 1:1) including:     Rec bike seat 5, x5 min (with rest breaks every 1.5 minutes)  -supine quad sets 10 x 3" each  -supine PPT 2 x 10 x 5"  -seated ball squeezes 2x10x5 sec  -sitting CLAM with RTB 3 x 10  -LAQ on edge of bed x 10 each  -Seated march for hip flexion x 10 alt  -seated heel/toe raises 2 x 10 alternating    Written Home Exercises Provided: Continuation of current HEP. Pt educated on expectations for therapy, soreness, and goals for improvement.  Pt demo good understanding of the education provided. Miesha demonstrated good return demonstration of activities.     Assessment:     Pt demonstrated overall good tolerance to treatment session however she presents with significant deconditioning and weakness. Required frequent rest breaks on rec bike due to shortness of breath. Weakness of B LEs and core, difficulty with seated hip flexion and supine quad sets. Report of pain across low back in supine, relief following PPT. She will benefit from continued general strength and conditioning. Pain reported to 2/10 at end of session.   Pt will continue to benefit from skilled PT intervention. Medical Necessity is " "demonstrated by:  Pain limits function of effected part for some activities, Unable to participate fully in daily activities and Weakness.    Patient is making good progress towards established goals.    Short Term Goals: 4 weeks  1. Pt will initiate HEP addressing lower extremity strength for standing ADL tasks.  2. Pt will present with increased hip abductor MMT by one half grade for increased ease with functional ADLs.  3. Pt will report decreased pain to </= 3/10  4. Pt will improve 5 sit to stands in < 20" for mobility purposes.     Long Term Goals: 8 weeks  1. Patient will demonstrate ambulatory tasks modified independently > 200 ft without breaks for mobility/endurance purposes.  2. Patient will return to home management mod-I with transfer skills and lower extremity strength 4 or > for ADL purposes.  3. Patient will demonstrate TUG < 21" for mobility purposes.  4. Patient will demonstrate 5 sit to  < 18" for mobility/strength purposes.  5. Pt will be independent with HEP and self management of symptoms.     Plan:  Continue with established Plan of Care towards PT goals.   "

## 2017-05-01 ENCOUNTER — CLINICAL SUPPORT (OUTPATIENT)
Dept: REHABILITATION | Facility: HOSPITAL | Age: 82
End: 2017-05-01
Attending: FAMILY MEDICINE
Payer: MEDICARE

## 2017-05-01 DIAGNOSIS — R29.898 LEG WEAKNESS, BILATERAL: ICD-10-CM

## 2017-05-01 PROCEDURE — 97110 THERAPEUTIC EXERCISES: CPT | Mod: PN

## 2017-05-01 NOTE — PROGRESS NOTES
"Name: Miesha Obando  Clinic Number: [id number]  Date of Treatment: 05/01/2017   Diagnosis:   Encounter Diagnosis   Name Primary?    Leg weakness, bilateral        Visit number: 3  Start date: 04/25/17  POC End date: 06/20/17    Time in: 12:11 (pt arrived late)  Time Out: 1:10  Total Treatment Time: 55  Group time: 0    Precautions: pacemaker, hypothyroidism, osteopenia    Subjective:    Miesha reports mild increase of pain in B knees since previous visit, thinks it may be from the recumbent bike. Has been walking more over the weekend without her cane only, not her walker. States she felt an improvement since her previous visit. Patient reports their pain in B knees to be 6/10 on a 0-10 scale with 0 being no pain and 10 being the worst pain imaginable.    Objective  Pt ambulates into clinic with antalgic gait using SPC.    Miesha received therapeutic exercises to develop strength, endurance, flexibility, posture and core stabilization for 55 minutes including:     Rec bike seat 5, x5 min (with rest breaks every 1.5 minutes) (NP)  -ambulation around clinic 150' x 2 with SPC  -supine quad sets 10 x 3" each  -supine PPT 2 x 10 x 5"  -supine ball squeezes 2x10x5 sec  -S/L CLAM with no resistance 2 x 10 each  -LAQ on edge of bed x 10 each  -Seated march for hip flexion 2 x 10 alt  -seated heel/toe raises 3 x 10 alternating    Will add UBE at next visit for cardiovascular endurance. May also resume rec bike pending pt knee pain.     Written Home Exercises Provided: Continuation of current HEP. Pt educated on expectations for therapy, soreness, and goals for improvement.  Pt demo good understanding of the education provided. Miesha demonstrated good return demonstration of activities.     Assessment:   Miesha tolerated treatment well this date overall. She continues with decreased endurance and strength, and required rest breaks throughout session. Report of mild shortness of breath following ambulation x 150 feet on even " "surface. Pt required increased emphasis on diaphragmatic breathing during exercises to prevent holding breath as well as for improved activation of diaphragm and decreased accessory breathing. She will benefit from continued strength and endurance training. Pt reports having decreased pain in B knees as well as increased energy at end of session.  Pt will continue to benefit from skilled PT intervention. Medical Necessity is demonstrated by:  Pain limits function of effected part for some activities, Unable to participate fully in daily activities and Weakness.    Patient is making good progress towards established goals.    Short Term Goals: 4 weeks  1. Pt will initiate HEP addressing lower extremity strength for standing ADL tasks.  2. Pt will present with increased hip abductor MMT by one half grade for increased ease with functional ADLs.  3. Pt will report decreased pain to </= 3/10  4. Pt will improve 5 sit to stands in < 20" for mobility purposes.     Long Term Goals: 8 weeks  1. Patient will demonstrate ambulatory tasks modified independently > 200 ft without breaks for mobility/endurance purposes.  2. Patient will return to home management mod-I with transfer skills and lower extremity strength 4 or > for ADL purposes.  3. Patient will demonstrate TUG < 21" for mobility purposes.  4. Patient will demonstrate 5 sit to  < 18" for mobility/strength purposes.  5. Pt will be independent with HEP and self management of symptoms.     Plan:  Continue with established Plan of Care towards PT goals.   "

## 2017-05-04 ENCOUNTER — ANTI-COAG VISIT (OUTPATIENT)
Dept: CARDIOLOGY | Facility: CLINIC | Age: 82
End: 2017-05-04
Payer: MEDICARE

## 2017-05-04 DIAGNOSIS — Z79.01 LONG TERM CURRENT USE OF ANTICOAGULANT THERAPY: Primary | ICD-10-CM

## 2017-05-04 DIAGNOSIS — I48.19 PERSISTENT ATRIAL FIBRILLATION: ICD-10-CM

## 2017-05-04 LAB — INR PPP: 2.6 (ref 2–3)

## 2017-05-04 PROCEDURE — 85610 PROTHROMBIN TIME: CPT | Mod: QW,S$GLB,, | Performed by: PHARMACIST

## 2017-05-04 PROCEDURE — 99211 OFF/OP EST MAY X REQ PHY/QHP: CPT | Mod: 25,S$GLB,, | Performed by: PHARMACIST

## 2017-05-04 NOTE — PROGRESS NOTES
"INR in range. Pt confirms dose and reports she did cancel the EGD. She is taking zantac and allergy meds, no other changes.  Will continue and recheck in a month "This note will not be shared with the patient."  "

## 2017-05-04 NOTE — MR AVS SNAPSHOT
[facility]  [address] [facility] [address]  [facility] [address]  Phone: [phone]                  Miesha Obando   2017 2:30 PM   Anti-coag visit    Description:  Female : 1935   Provider:  Toña An, PharmD   Department:  [place] [facility]           Diagnoses this Visit        Comments    Long term current use of anticoagulant therapy    -  Primary     Persistent atrial fibrillation                To Do List           Future Appointments        Provider Department Dept Phone    2017 11:00 AM Hazel Dugan, PTA [place] [facility] [phone]    5/10/2017 11:00 AM Hazel Dugan, PTA [place] [facility] [phone]    5/15/2017 11:00 AM Hazel Dugan, PTA [facility] [phone]    2017 11:00 AM Abdoul Ren, PT [facility] [phone]    2017 11:00 AM Hazel Dugan, PTA [facility] [phone]      Goals (5 Years of Data)     None      [facility]     [facility] -  Assistance  Unless otherwise directed by your provider, please contact [facility], our nurse care line that is available for  assistance.     Registered nurses in the [facility] provide: appointment scheduling, clinical advisement, health education, and other advisory services.  Call: [phone] (toll free)               Medications           Message regarding Medications     Verify the changes and/or additions to your medication regime listed below are the same as discussed with your clinician today.  If any of these changes or additions are incorrect, please notify your healthcare provider.             Verify that the below list of medications is an accurate representation of the medications you are currently taking.  If none reported, the list may be blank. If incorrect, please contact your healthcare provider. Carry this list with you in case of emergency.           Current  Medications     ACCU-CHEK MARLON PLUS TEST STRP Strp USE AS DIRECTED TWICE DAILY    acetaminophen (TYLENOL) 325 MG tablet Take 325 mg by mouth as needed for Pain or Temperature greater than.     albuterol (PROAIR HFA) 90 mcg/actuation inhaler Inhale 2 puffs into the lungs every 4 (four) hours as needed.    ascorbic acid (VITAMIN C) 500 MG tablet Take 500 mg by mouth once daily.      aspirin (ECOTRIN) 81 MG EC tablet Take 81 mg by mouth once daily.    b complex vitamins tablet Take 1 tablet by mouth once daily.      fish oil-omega-3 fatty acids 300-1,000 mg capsule Take 2 g by mouth once daily.    fluticasone-vilanterol (BREO ELLIPTA) 200-25 mcg/dose DsDv diskus inhaler Inhale 1 puff into the lungs once daily.    furosemide (LASIX) 20 MG tablet Take 1 tablet (20 mg total) by mouth daily as needed (Weight gain, Swelling, or shortness of breath).    levothyroxine (SYNTHROID) 100 MCG tablet TAKE 1 TABLET EVERY DAY    losartan (COZAAR) 25 MG tablet TAKE 1 TABLET ONE TIME DAILY    metoprolol succinate (TOPROL-XL) 50 MG 24 hr tablet Take 1 tablet (50 mg total) by mouth once daily.    nitroGLYCERIN (NITROSTAT) 0.4 MG SL tablet Place 1 tablet (0.4 mg total) under the tongue every 5 (five) minutes as needed for Chest pain.    ranitidine (ZANTAC) 75 MG tablet Take 75 mg by mouth 2 (two) times daily. Take 1 tablet by mouth once daily    spironolactone (ALDACTONE) 25 MG tablet Take 1 tablet (25 mg total) by mouth once daily.    trazodone (DESYREL) 50 MG tablet     warfarin (COUMADIN) 5 MG tablet TAKE 1/2 TO 1 TABLET DAILY AS DIRECTED BY COUMADIN CLINIC           Clinical Reference Information           Your Vitals Were     Last Period                   (LMP Unknown)           Allergies as of 5/4/2017     Amoxicillin-pot Clavula (Bulk)    Sulfa (Sulfonamide Antibiotics)    Adhesive      Immunizations Administered on Date of Encounter - 5/4/2017     None      Orders Placed During Today's Visit      Normal Orders This Visit    POCT  INR          5/4/2017  2:56 PM - Toña An, PharmD      Component Results     Component    INR    2.6      April 2017 Details    Sun Mon Tue Wed Thu Fri Sat           1                 2               3               4      2.5 mg         5      2.5 mg         6      2.5 mg         7      2.5 mg         8      2.5 mg           9      2.5 mg         10      5 mg         11      2.5 mg         12      2.5 mg         13   4.0   Hold   See details      14      2.5 mg         15      2.5 mg           16      2.5 mg         17      Hold         18      Hold         19      Hold         20      5 mg         21      2.5 mg         22      2.5 mg           23      2.5 mg         24      5 mg         25      2.5 mg         26      2.5 mg         27      2.5 mg         28      2.5 mg         29      2.5 mg           30      2.5 mg                Date Details   04/13 Last INR check   INR: 4.0                 May 2017 Details    Sun Mon Tue Wed Thu Fri Sat      1      5 mg         2      2.5 mg         3      2.5 mg         4   2.6   2.5 mg   See details      5      2.5 mg         6      2.5 mg           7      2.5 mg         8      5 mg         9      2.5 mg         10      2.5 mg         11      2.5 mg         12      2.5 mg         13      2.5 mg           14      2.5 mg         15      5 mg         16      2.5 mg         17      2.5 mg         18      2.5 mg         19      2.5 mg         20      2.5 mg           21      2.5 mg         22      5 mg         23      2.5 mg         24      2.5 mg         25      2.5 mg         26      2.5 mg         27      2.5 mg           28      2.5 mg         29      5 mg         30      2.5 mg         31      2.5 mg             Date Details   05/04 This INR check   INR: 2.6                     How to take your warfarin dose     To take:  2.5 mg Take 0.5 of a 5 mg tablet.    To take:  5 mg Take 1 of the 5 mg tablets.           June 2017 Details    Sun Mon Tue Wed Thu Fri Sat          1            2               3                 4               5               6               7               8               9               10                 11               12               13               14               15               16               17                 18               19               20               21               22               23               24                 25               26               27               28               29               30                 Date Details   No additional details    Date of next INR:  6/1/2017         How to take your warfarin dose     To take:  2.5 mg Take 0.5 of a 5 mg tablet.           Anticoagulation Summary as of 5/4/2017     Maintenance plan 5 mg (5 mg x 1) on Mon; 2.5 mg (5 mg x 0.5) all other days    Full instructions 5 mg on Mon; 2.5 mg all other days    Next INR check 6/1/2017      Anticoagulation Episode Summary     Comments Coumadin Clinic[facility] 3x/wk      Patient Findings     Negatives Signs/symptoms of thrombosis, Signs/symptoms of bleeding, Laboratory test error suspected, Change in health, Change in alcohol use, Change in activity, Upcoming invasive procedure, Emergency department visit, Upcoming dental procedure, Missed doses, Extra doses, Change in medications, Change in diet/appetite, Hospital admission, Bruising, Other complaints      Language Assistance Services     ATTENTION: Language assistance services are available, free of charge. Please call [phone].      ATENCIÓN: Si habla español, tiene a granado disposición servicios gratuitos de asistencia lingüística. Llame al [phone].     CHÚ Ý: N?u b?n nói Ti?ng Vi?t, có các d?ch v? h? tr? ngôn ng? mi?n phí dành cho b?n. G?i s? [phone].         [facility] complies with applicable Federal civil rights laws and does not discriminate on the basis of race, color, national origin, age, disability, or sex.

## 2017-05-09 ENCOUNTER — CLINICAL SUPPORT (OUTPATIENT)
Dept: REHABILITATION | Facility: HOSPITAL | Age: 82
End: 2017-05-09
Attending: FAMILY MEDICINE
Payer: MEDICARE

## 2017-05-09 DIAGNOSIS — R29.898 LEG WEAKNESS, BILATERAL: Primary | ICD-10-CM

## 2017-05-09 PROCEDURE — 97110 THERAPEUTIC EXERCISES: CPT | Mod: PN

## 2017-05-09 NOTE — PROGRESS NOTES
"Name: Miesha Obando  Clinic Number: [id number]  Date of Treatment: 05/09/2017     Diagnosis:     Visit number: 4  Start date: 04/25/17  POC End date: 06/20/17        Precautions: pacemaker, hypothyroidism, osteopenia    Subjective:    Miesha reports mild increase of pain in B knees since previous visit, thinks it may be from the recumbent bike. Has been walking more over the weekend without her cane only, not her walker. States she felt an improvement since her previous visit. Patient reports their pain in B knees to be 6/10 on a 0-10 scale with 0 being no pain and 10 being the worst pain imaginable.    Objective  Pt ambulates into clinic with antalgic gait using SPC.    Miesha received therapeutic exercises to develop strength, endurance, flexibility, posture and core stabilization for 55 minutes including:   Supine: hip adductor, pirformis stretch , psoas stretch off EOB  Hip ER/IR hooklying   -ankle pumps x 20   -overhead stretch with cane x 10   -supine quad sets 10 x 3" each  -supine PPT x 10 x 5" + bridges x 10     Seated:   Tree hugs with OTB x 10   horz abd w OTB x 10   Trunk rotation with Green rx ball x 20   Fwd wt shift with org s-ball x 10     Standing:   Lateral wt shifts x 2'   Post wt shifts/ hip dissociation x 2'     Written Home Exercises Provided: Continuation of current HEP. Pt educated on expectations for therapy, soreness, and goals for improvement.  Pt demo good understanding of the education provided. Miesha demonstrated good return demonstration of activities.     Assessment:   [place] session well, cont with decreased L quad strength, ,improved activation with QS  Pt will continue to benefit from skilled PT intervention. Medical Necessity is demonstrated by:  Pain limits function of effected part for some activities, Unable to participate fully in daily activities and Weakness.    Patient is making good progress towards established goals.    Short Term Goals: 4 weeks  1. Pt will initiate HEP " "addressing lower extremity strength for standing ADL tasks.  2. Pt will present with increased hip abductor MMT by one half grade for increased ease with functional ADLs.  3. Pt will report decreased pain to </= 3/10  4. Pt will improve 5 sit to stands in < 20" for mobility purposes.     Long Term Goals: 8 weeks  1. Patient will demonstrate ambulatory tasks modified independently > 200 ft without breaks for mobility/endurance purposes.  2. Patient will return to home management mod-I with transfer skills and lower extremity strength 4 or > for ADL purposes.  3. Patient will demonstrate TUG < 21" for mobility purposes.  4. Patient will demonstrate 5 sit to  < 18" for mobility/strength purposes.  5. Pt will be independent with HEP and self management of symptoms.     Plan:  Continue with established Plan of Care towards PT goals.   "

## 2017-05-15 ENCOUNTER — CLINICAL SUPPORT (OUTPATIENT)
Dept: REHABILITATION | Facility: HOSPITAL | Age: 82
End: 2017-05-15
Attending: FAMILY MEDICINE
Payer: MEDICARE

## 2017-05-15 DIAGNOSIS — R29.898 LEG WEAKNESS, BILATERAL: ICD-10-CM

## 2017-05-15 PROCEDURE — 97110 THERAPEUTIC EXERCISES: CPT | Mod: PN

## 2017-05-15 NOTE — PROGRESS NOTES
"Name: Miesha Obando  Clinic Number: [id number]  Date of Treatment: 05/15/2017     Diagnosis:   1. Leg weakness, bilateral         Visit number: 5  Start date: 04/25/17  POC End date: 06/20/17      Precautions: pacemaker, hypothyroidism, osteopenia    Subjective:    Miesha reports continued soreness in B knees. Has not used her walked at all lately. Patient reports their pain in B knees to be 5/10 on a 0-10 scale with 0 being no pain and 10 being the worst pain imaginable.    Objective  Pt arrives 10 min late. Ambulates into clinic with antalgic gait using SPC.    Miesha received therapeutic exercises to develop strength, endurance, flexibility, posture and core stabilization for 55 minutes (25 min 1:1) including:   Supine: hip adductor, pirformis stretch , psoas stretch off EOB  Hip ER/IR hooklying   -ankle pumps x 20   -overhead stretch with cane + PPT for TA contraction x 10   -supine quad sets 10 x 3" each (increased pain)  -supine PPT x 10 x 5" + bridges 3 x 10     Seated:   Tree hugs with OTB 2 x 10   horz abd w OTB 2 x 10   Trunk rotation with Green weighted ball x 20   Fwd wt shift with org s-ball 2 x 10     Standing:   Lateral wt shifts x 2'   Post wt shifts/ hip dissociation x 2'     Written Home Exercises Provided: Continuation of current HEP. Pt educated on expectations for therapy, soreness, and goals for improvement.  Pt demo good understanding of the education provided. Miesha demonstrated good return demonstration of activities.     Assessment:   Miesha tolerated treatment session well this date without increased soreness or pain. She continues with L > R knee pain and B LE weakness. Mild pain with quad sets in supine, relief with rest. Improved postural awareness with standing weight shifts.   Pt will continue to benefit from skilled PT intervention. Medical Necessity is demonstrated by:  Pain limits function of effected part for some activities, Unable to participate fully in daily activities and " "Weakness.    Patient is making good progress towards established goals.    Short Term Goals: 4 weeks  1. Pt will initiate HEP addressing lower extremity strength for standing ADL tasks.  2. Pt will present with increased hip abductor MMT by one half grade for increased ease with functional ADLs.  3. Pt will report decreased pain to </= 3/10  4. Pt will improve 5 sit to stands in < 20" for mobility purposes.     Long Term Goals: 8 weeks  1. Patient will demonstrate ambulatory tasks modified independently > 200 ft without breaks for mobility/endurance purposes.  2. Patient will return to home management mod-I with transfer skills and lower extremity strength 4 or > for ADL purposes.  3. Patient will demonstrate TUG < 21" for mobility purposes.  4. Patient will demonstrate 5 sit to  < 18" for mobility/strength purposes.  5. Pt will be independent with HEP and self management of symptoms.     Plan:  Continue with established Plan of Care towards PT goals. "

## 2017-05-17 ENCOUNTER — CLINICAL SUPPORT (OUTPATIENT)
Dept: REHABILITATION | Facility: HOSPITAL | Age: 82
End: 2017-05-17
Attending: FAMILY MEDICINE
Payer: MEDICARE

## 2017-05-17 DIAGNOSIS — R29.898 LEG WEAKNESS, BILATERAL: ICD-10-CM

## 2017-05-17 PROCEDURE — 97110 THERAPEUTIC EXERCISES: CPT | Mod: PN | Performed by: PHYSICAL THERAPIST

## 2017-05-17 NOTE — PROGRESS NOTES
"Name: Miesha Obando  [facility] Number: [id number]  Date of Treatment: 2017   Diagnosis:   Encounter Diagnosis   Name Primary?    Leg weakness, bilateral    Precautions: pacemaker, hypothyroidism, osteopenia    Time in: 1115  Time Out: 1215  Total Treatment Time: 55  Group Time: 0      Subjective:    Miesha reports improvement of symptoms.  Patient reports their pain to be 4/10 on a 0-10 scale with 0 being no pain and 10 being the worst pain imaginable.    Objective    Patient received individual therapy to increase strength, endurance, ROM, flexibility, posture and core stabilization with 0 patients with activities as follows:     Miesha received therapeutic exercises to develop strength, endurance, ROM, flexibility, posture and core stabilization for 55 minutes including:       Supine: hip adductor, pirformis stretch , psoas stretch off EOB  Hip ER/IR hooklying   -ankle pumps x 20   -overhead stretch with cane + PPT for TA contraction x 10   -supine quad sets 10 x 3" each (increased pain)  -supine PPT x 10 x 5" + bridges 3 x 10      Seated:   -ball squeezes 3/10  -CLAM with GTB 3/10  -marching x 30  -HSC 3/10 with GTB    Tree hugs with OTB 2 x 10 (previous)  horz abd w OTB 2 x 10 (pevious)  Trunk rotation with Green weighted ball x 20 (previous)  Fwd wt shift with org s-ball 2 x 10 (previous)     Standing:   Lateral wt shifts x 2' (previous)  Post wt shifts/ hip dissociation x 2' (previous)  -standing: hip abduction 3/10 each in II-bars    -S/L: CLAM 3/10 GTB each    MMT:    L    R  L1,2 Psoas      4-/5      4-/5    L3 Quads      4/5      4/5  L4 anterior tibialis    5/5      5/5  L4/L5 Glut medius    3-/5      2+/5    TU.46" with RW  5-sit-to-stand test: 15.21" with UE support noted     Written Home Exercises Provided: Continuation  Pt demo good understanding of the education provided. Miesha demonstrated good return demonstration of activities.     Assessment:   -cueing on posture  -hip weakness  TUG: " "19.46" with RW  5-sit-to-stand test: 15.21" with UE support noted    Pt will continue to benefit from skilled PT intervention. Medical Necessity is demonstrated by:  Unable to participate in daily activities and Weakness.    Patient is making good progress towards established goals.    New/Revised goals:   Short Term Goals: 4 weeks  1. Pt will initiate HEP addressing lower extremity strength for standing ADL tasks. Ongoing  2. Pt will present with increased hip abductor MMT by one half grade for increased ease with functional ADLs. Ongoing  3. Pt will report decreased pain to </= 3/10. Not Met  4. Pt will improve 5 sit to stands in < 20" for mobility purposes. Met    Plan:  Continue with established Plan of Care towards PT goals.   "

## 2017-05-22 ENCOUNTER — CLINICAL SUPPORT (OUTPATIENT)
Dept: REHABILITATION | Facility: HOSPITAL | Age: 82
End: 2017-05-22
Attending: FAMILY MEDICINE
Payer: MEDICARE

## 2017-05-22 DIAGNOSIS — R29.898 LEG WEAKNESS, BILATERAL: ICD-10-CM

## 2017-05-22 PROBLEM — Z00.00 ENCOUNTER FOR PREVENTIVE HEALTH EXAMINATION: Status: RESOLVED | Noted: 2017-02-20 | Resolved: 2017-05-22

## 2017-05-22 PROCEDURE — 97110 THERAPEUTIC EXERCISES: CPT | Mod: PN

## 2017-05-22 NOTE — PROGRESS NOTES
"Name: Miesha Obando  [facility] Number: [id number]  Date of Treatment: 05/22/2017   Diagnosis:   Encounter Diagnosis   Name Primary?    Leg weakness, bilateral    Precautions: pacemaker, hypothyroidism, osteopenia    Time in: 11:00  Time Out: 12:02  Total Treatment Time: 55  Group Time: 0      Subjective:    Miesha reports mild increase of pain in B LE's over the weekend, possibly due to the weather. Patient reports their pain to be 4/10 on a 0-10 scale with 0 being no pain and 10 being the worst pain imaginable.    Objective    Patient received individual therapy to increase strength, endurance, ROM, flexibility, posture and core stabilization with 0 patients with activities as follows:     Miesha received therapeutic exercises to develop strength, endurance, ROM, flexibility, posture and core stabilization for 55 minutes including:       Supine: hip adductor, pirformis stretch , psoas stretch off EOB  Hip ER/IR hooklying   -ankle pumps x 20   -overhead stretch with cane + PPT for TA contraction x 10   -supine quad sets 10 x 3" each (increased pain)  -supine PPT x 10 x 5" + bridges 3 x 10   -S/L: CLAM 3/10 GTB each (NP)     Seated:   -ball squeezes 3/10  -CLAM with GTB 3/10  -marching x 20 each  -HSC 3/10 with GTB    Tree hugs with RTB 2 x 10  horz abd w RTB 3 x 10   Trunk rotation with Green weighted ball x 20 (previous)  Fwd wt shift with org s-ball 2 x 10 (previous)     Standing:   Lateral wt shifts x 2'  Post wt shifts/ hip dissociation x 2'   standing: hip abduction 3/10 each in II-bars    Written Home Exercises Provided: Continuation of current written HEP, emphasis on pain free ROM and postural awareness.  Pt demo good understanding of the education provided. Miesha demonstrated good return demonstration of activities.     Assessment:   Miesha tolerated treatment session well this date without onset of increased soreness or pain. She continues with UE and LE weakness, discomfort in L shoulder with tree hugs. Mild " "compensation of L shoulder shrug with UE exercises. She required frequent verbal cues for improved upright posture and TA activation. She continues with hip and core weakness and will benefit from continued treatment.   Pt will continue to benefit from skilled PT intervention. Medical Necessity is demonstrated by:  Unable to participate in daily activities and Weakness.    Patient is making good progress towards established goals.    New/Revised goals:   Short Term Goals: 4 weeks  1. Pt will initiate HEP addressing lower extremity strength for standing ADL tasks. Ongoing  2. Pt will present with increased hip abductor MMT by one half grade for increased ease with functional ADLs. Ongoing  3. Pt will report decreased pain to </= 3/10. Not Met  4. Pt will improve 5 sit to stands in < 20" for mobility purposes. Met    Plan:  Continue with established Plan of Care towards PT goals.   "

## 2017-05-24 RX ORDER — TRAZODONE HYDROCHLORIDE 50 MG/1
50 TABLET ORAL NIGHTLY PRN
Qty: 30 TABLET | Refills: 0 | Status: SHIPPED | OUTPATIENT
Start: 2017-05-24 | End: 2017-10-27 | Stop reason: ALTCHOICE

## 2017-05-29 ENCOUNTER — CLINICAL SUPPORT (OUTPATIENT)
Dept: REHABILITATION | Facility: HOSPITAL | Age: 82
End: 2017-05-29
Attending: FAMILY MEDICINE
Payer: MEDICARE

## 2017-05-29 DIAGNOSIS — R29.898 LEG WEAKNESS, BILATERAL: ICD-10-CM

## 2017-05-29 PROCEDURE — 97110 THERAPEUTIC EXERCISES: CPT | Mod: PN

## 2017-05-29 NOTE — PROGRESS NOTES
"Name: Miesha Obando  [facility] Number: [id number]  Date of Treatment: 05/29/2017   Diagnosis:   Encounter Diagnosis   Name Primary?    Leg weakness, bilateral    Precautions: pacemaker, hypothyroidism, osteopenia    Time in: 11:09 (pt arrived late)  Time Out: 12:07  Total Treatment Time: 55  Group Time: 0      Subjective:    Miesha reports mild increase of pain in B LE's over the weekend, possibly due to the weather. Patient reports their pain to be 5/10 on a 0-10 scale with 0 being no pain and 10 being the worst pain imaginable.    Objective    Patient received individual therapy to increase strength, endurance, ROM, flexibility, posture and core stabilization with 0 patients with activities as follows:     Miesha received therapeutic exercises to develop strength, endurance, ROM, flexibility, posture and core stabilization for 55 minutes including:     Supine: hip adductor, pirformis stretch , psoas stretch off EOB  Hip ER/IR hooklying   -ankle pumps x 20 (previous)  -overhead stretch with cane + PPT for TA contraction x 10   -supine quad sets with towel under knee 10 x 3" each  -supine PPT x 10 x 5" + bridges 3 x 10   -S/L: CLAM 3/10 GTB each (L LE performed in supine due to R hip pain in R SL)     Seated:   -ball squeezes 3/10   -CLAM with GTB 3/10 (NP)  -marching x 20 each  -HSC 3/10 with GTB    Tree hugs with RTB 2 x 10 (NP)  horz abd w RTB 3 x 10   Trunk rotation with Green weighted ball x 20 (previous)  Fwd wt shift with org s-ball 2 x 10 (previous)     Standing: (NP due to increased knee and low back pain)  Lateral wt shifts x 2'  Post wt shifts/ hip dissociation x 2'   standing: hip abduction 3/10 each in II-bars    Written Home Exercises Provided: Continuation of current written HEP, emphasis on pain free ROM and postural awareness.  Pt demo good understanding of the education provided. Miesha demonstrated good return demonstration of activities.     Assessment:   Pt tolerated treatment session well this date " "without increased pain or soreness. Discomfort in R hip during R SL for attempt of L trini, relief with performance of exercise in supine. Pt slowly improving yet continues with overall weakness of core, UE and LE. Frequent verbal cues for upright posture with standing and sitting activity. She will benefit from continued treatment.   Pt will continue to benefit from skilled PT intervention. Medical Necessity is demonstrated by:  Unable to participate in daily activities and Weakness.    Patient is making good progress towards established goals.    New/Revised goals:   Short Term Goals: 4 weeks  1. Pt will initiate HEP addressing lower extremity strength for standing ADL tasks. Ongoing  2. Pt will present with increased hip abductor MMT by one half grade for increased ease with functional ADLs. Ongoing  3. Pt will report decreased pain to </= 3/10. Not Met  4. Pt will improve 5 sit to stands in < 20" for mobility purposes. Met    Plan:  Continue with established Plan of Care towards PT goals.   "

## 2017-06-01 ENCOUNTER — ANTI-COAG VISIT (OUTPATIENT)
Dept: CARDIOLOGY | Facility: CLINIC | Age: 82
End: 2017-06-01
Payer: MEDICARE

## 2017-06-01 DIAGNOSIS — I48.19 PERSISTENT ATRIAL FIBRILLATION: ICD-10-CM

## 2017-06-01 DIAGNOSIS — Z79.01 LONG TERM CURRENT USE OF ANTICOAGULANT THERAPY: Primary | ICD-10-CM

## 2017-06-01 LAB — INR PPP: 3.6 (ref 2–3)

## 2017-06-01 PROCEDURE — 85610 PROTHROMBIN TIME: CPT | Mod: QW,S$GLB,, | Performed by: PHARMACIST

## 2017-06-01 PROCEDURE — 99211 OFF/OP EST MAY X REQ PHY/QHP: CPT | Mod: 25,S$GLB,, | Performed by: PHARMACIST

## 2017-06-01 NOTE — PROGRESS NOTES
"INR above range today.  Pt reports that she has been eating  Less vitk.  She does report drinking some ginger ale, but it has not been a large quantity.  No med changes.  Will hold today & lower, recheck in 3 weeks "This note will not be shared with the patient."  "

## 2017-06-07 ENCOUNTER — CLINICAL SUPPORT (OUTPATIENT)
Dept: REHABILITATION | Facility: HOSPITAL | Age: 82
End: 2017-06-07
Attending: FAMILY MEDICINE
Payer: MEDICARE

## 2017-06-07 DIAGNOSIS — R29.898 LEG WEAKNESS, BILATERAL: ICD-10-CM

## 2017-06-07 PROCEDURE — 97110 THERAPEUTIC EXERCISES: CPT | Mod: PN

## 2017-06-07 NOTE — PROGRESS NOTES
"Name: Miesha Obando  Clinic Number: [id number]  Date of Treatment: 06/07/2017   Diagnosis:   Encounter Diagnosis   Name Primary?    Leg weakness, bilateral    Precautions: pacemaker, hypothyroidism, osteopenia    Time in: 2:01  Time Out: 2:59  Total Treatment Time: 55  Group Time: 0    Visit # 14    Subjective:    Miesha reports attempting to keep up with her home exercises. Mild stiffness in the low back and B knees today. Patient reports their pain to be 5/10 on a 0-10 scale with 0 being no pain and 10 being the worst pain imaginable.    Objective    Patient received individual therapy to increase strength, endurance, ROM, flexibility, posture and core stabilization with 0 patients with activities as follows:     Miesha received therapeutic exercises to develop strength, endurance, ROM, flexibility, posture and core stabilization for 55 minutes including:     Supine: hip adductor, pirformis stretch , psoas stretch off EOB  -Hip ER/IR hooklying with PPT x 2 min  -ankle pumps x 20 (previous)  -overhead stretch with cane + PPT for TA contraction x 15   -supine quad sets with towel under knee 10 x 3" each (NP)  -supine bridges + PPT 3 x 10   -S/L: CLAM 3/10 GTB each (NP)     Seated:   -ball squeezes 3/10   -CLAM with GTB 3/10  -marching x 20 each  -HSC 3/10 with GTB  -lumbar stretch with red s-ball rolls 10x5"    Tree hugs with RTB 2 x 10 (NP)  horz abd w GTB 2 x 10   Trunk rotation with Green weighted ball x 20 (previous)     Standing:   Lateral wt shifts x 2'  Post wt shifts/ hip dissociation x 2'   standing: hip abduction 3/10 each (R knee pain in WB)    Written Home Exercises Provided: Continuation of current written HEP, emphasis on pain free ROM and postural awareness.  Pt demo good understanding of the education provided. Miesha demonstrated good return demonstration of activities.     Assessment:   Miesha demonstrated overall good tolerance to treatment this date. She continues with mild onset of R knee pain " "with WB on R LE during standing activities. Core and LE strength continue with weakness yet making slow gains. Frequent verbal cues for upright posture with standing and sitting activity. She will benefit from continued treatment as well as compliant attendance to gym 3x/week.  Pt will continue to benefit from skilled PT intervention. Medical Necessity is demonstrated by:  Unable to participate in daily activities and Weakness.    Patient is making good progress towards established goals.    New/Revised goals:   Short Term Goals: 4 weeks  1. Pt will initiate HEP addressing lower extremity strength for standing ADL tasks. Ongoing  2. Pt will present with increased hip abductor MMT by one half grade for increased ease with functional ADLs. Ongoing  3. Pt will report decreased pain to </= 3/10. Not Met  4. Pt will improve 5 sit to stands in < 20" for mobility purposes. Met    Plan:  Continue with established Plan of Care towards PT goals. Reduce frequency of PT to 1x/week with increased attendance to gym to 3x/week per pt request.  "

## 2017-06-15 ENCOUNTER — CLINICAL SUPPORT (OUTPATIENT)
Dept: REHABILITATION | Facility: HOSPITAL | Age: 82
End: 2017-06-15
Attending: FAMILY MEDICINE
Payer: MEDICARE

## 2017-06-15 DIAGNOSIS — R29.898 LEG WEAKNESS, BILATERAL: ICD-10-CM

## 2017-06-15 PROCEDURE — 97110 THERAPEUTIC EXERCISES: CPT | Mod: PN

## 2017-06-15 NOTE — PROGRESS NOTES
"Name: Miesha Obando  [facility] Number: [id number]  Date of Treatment: 06/15/2017   Diagnosis:   Encounter Diagnosis   Name Primary?    Leg weakness, bilateral    Precautions: pacemaker, hypothyroidism, osteopenia    Time in: 10:10 (patient arrived late)  Time Out: 11:02  Total Treatment Time: 50  1:1 Time: 30    Visit # 15    Subjective:    Miesha reports continued soreness and pain in the knees and low back. Patient reports their pain to be 5/10 on a 0-10 scale with 0 being no pain and 10 being the worst pain imaginable. Pt also feels somewhat short of breath today due to her allergies but left her inhaler at home.     Objective    Patient received individual therapy to increase strength, endurance, ROM, flexibility, posture and core stabilization with 0 patients with activities as follows:     Miesha received therapeutic exercises to develop strength, endurance, ROM, flexibility, posture and core stabilization for 55 minutes including:     Supine: hip adductor, pirformis stretch , psoas stretch off EOB  -Hip ER/IR hooklying with PPT x 2 min  -ankle pumps x 20 (previous)  -overhead stretch with cane + PPT for TA contraction x 15   -supine quad sets with towel under knee 10 x 3" each (NP)  -supine bridges + PPT 3 x 10   -S/L: CLAM 3/10 GTB each (NP)     Seated:   -ball squeezes 3/10   -CLAM with GTB 3/10 (NP)  -marching x 20 each  -HSC 3/10 with GTB  -lumbar stretch with red s-ball rolls 2x10x5"  -seated heel/toe raises 2x10    Tree hugs with RTB 2 x 10 (NP)  horz abd w GTB 2 x 10   Trunk rotation with Green weighted ball x 20 (previous)     Standing:   Lateral wt shifts x 2'  Post wt shifts/ hip dissociation x 2'   standing: hip abduction 2/10 each     Written Home Exercises Provided: Continuation of current written HEP, emphasis on pain free ROM and postural awareness.  Pt demo good understanding of the education provided. Miesha demonstrated good return demonstration of activities.     Assessment:   Pt continues with " "overall good tolerance to treatment without onset of soreness or pain. She continues with LE weakness and mild limitations from B knee ( R > L ) pain. She is making slow progress of core and LE strength and stability, especially with standing activities. Frequent verbal cues for upright posture with standing and sitting activity as well as for TA activation. She will benefit from continued treatment as well as compliant attendance to gym 3x/week.  Pt will continue to benefit from skilled PT intervention. Medical Necessity is demonstrated by:  Unable to participate in daily activities and Weakness.    Patient is making good progress towards established goals.    New/Revised goals:   Short Term Goals: 4 weeks  1. Pt will initiate HEP addressing lower extremity strength for standing ADL tasks. Ongoing  2. Pt will present with increased hip abductor MMT by one half grade for increased ease with functional ADLs. Ongoing  3. Pt will report decreased pain to </= 3/10. Not Met  4. Pt will improve 5 sit to stands in < 20" for mobility purposes. Met    Plan:  Continue with established Plan of Care towards PT goals. Reduce frequency of PT to 1x/week with increased attendance to gym to 3x/week per pt request.  "

## 2017-06-23 ENCOUNTER — ANTI-COAG VISIT (OUTPATIENT)
Dept: CARDIOLOGY | Facility: CLINIC | Age: 82
End: 2017-06-23
Payer: MEDICARE

## 2017-06-23 DIAGNOSIS — I48.19 PERSISTENT ATRIAL FIBRILLATION: ICD-10-CM

## 2017-06-23 DIAGNOSIS — Z79.01 LONG TERM CURRENT USE OF ANTICOAGULANT THERAPY: Primary | ICD-10-CM

## 2017-06-23 LAB — INR PPP: 2.2 (ref 2–3)

## 2017-06-23 PROCEDURE — 99211 OFF/OP EST MAY X REQ PHY/QHP: CPT | Mod: 25,S$GLB,, | Performed by: PHARMACIST

## 2017-06-23 PROCEDURE — 85610 PROTHROMBIN TIME: CPT | Mod: QW,S$GLB,, | Performed by: PHARMACIST

## 2017-06-23 NOTE — PROGRESS NOTES
"INR in range, pt confirms dose and reports she is eating her vitk.  She have amoxil x 3 days for DDS appt last week.  No other changes.  Will continue and recheck in a month "This note will not be shared with the patient."  "

## 2017-06-29 ENCOUNTER — CLINICAL SUPPORT (OUTPATIENT)
Dept: REHABILITATION | Facility: HOSPITAL | Age: 82
End: 2017-06-29
Attending: FAMILY MEDICINE
Payer: MEDICARE

## 2017-06-29 DIAGNOSIS — R29.898 LEG WEAKNESS, BILATERAL: ICD-10-CM

## 2017-06-29 PROCEDURE — G8980 MOBILITY D/C STATUS: HCPCS | Mod: CL,PN | Performed by: PHYSICAL THERAPIST

## 2017-06-29 PROCEDURE — G8979 MOBILITY GOAL STATUS: HCPCS | Mod: CK,PN | Performed by: PHYSICAL THERAPIST

## 2017-06-29 PROCEDURE — 97110 THERAPEUTIC EXERCISES: CPT | Mod: PN | Performed by: PHYSICAL THERAPIST

## 2017-06-29 NOTE — PLAN OF CARE
"REHAB SERVICES OUTPATIENT DISCHARGE SUMMARY  Physical Therapy      Name:  Miesha Obando  Date:  2017  Date of Evaluation:  2017  Physician:     Eddie España MD      Total # Of Visits:  11  Cancelled:  1  No Shows:  0  Diagnosis:  Leg weakness, bilateral    Physical/Functional Status:  At time of discharge, patient was able to ambulate with SPC modified independently.    S- Patient reported doing well with no new s/s. Has noticed improvement and will be going back to the gym in the near future([employer]). Patient to continue with HEP.    O-   Patient received individual therapy to increase strength, endurance, ROM, flexibility, posture and core stabilization with 0 patients with activities as follows:   -supine PPT with bridge  4/10   -S/L: CLAM 3/10 GTB each     Seated: (previous)   -ball squeezes 3/10 (previous)  -CLAM with GTB 3/10 (previous)  -marching x 30 (previous)  -HSC 3/10 with GTB (previous)     Standing:   Lateral wt shifts x 2' (previous)  Post wt shifts/ hip dissociation x 2' (previous)  -standing: hip abduction 3/10 each in II-bars (previous)     MMT:                                                    L                                                                R  L1,2 Psoas                                         4-/5                                                            4-/5    L3 Quads                                         4+/5                                                           4+/5  L4 anterior tibialis                     5/5                                                              5/5  L4/L5 Glut medius                    3-/5                                                               3-/5      TU.62" with SPC  5-sit-to-stand test: 19.25" with use of LUE support noted, 22.44" without use of UE support      Written Home Exercises Provided: Continuation  Pt demo good understanding of the education provided. Miesha demonstrated good return demonstration " "of activities.      Assessment:   -hip weakness    TU.62" with SPC  5-sit-to-stand test: 19.25" with use of LUE support noted, 22.44" without use of UE support     CMS Impairment/Limitation/Restriction for FOTO Lower Leg (w/o Knee) Survey  Status Limitation G-Code CMS Severity Modifier  Intake 28% 72%  Predicted 41% 59% Goal Status+ CK - At least 40 percent but less than 60 percent  5/15/2017 50% 50%  2017 37% 63% Current Status CL - At least 60 percent but less than 80 percent  D/C Status CL **only report if this is discharge survey    The patient is to be discharged from our Therapy service for the following reason(s):  Patient has completed the physician's prescription and Patient has reached the maximum rehab potential for the present time.    Degree of Goal Achievement:    Short Term Goals:  4 weeks  1. Pt will initiate HEP addressing lower extremity strength for standing ADL tasks. Ongoing  2. Pt will present with increased hip abductor MMT by one half grade for increased ease with functional ADLs. Met for hip flexion, knee, exception with glut medius: ongoing  3. Pt will report decreased pain to </= 3/10 Met  4. Pt will improve 5 sit to stands in < 20" for mobility purposes. Met     Long Term Goals: 8 weeks  1. Patient will demonstrate ambulatory tasks modified independently > 200 ft without breaks for mobility/endurance purposes. Met  2. Patient will return to home management mod-I with transfer skills and lower extremity strength 4 or > for ADL purposes. P-Met  3. Patient will demonstrate TUG < 21" for mobility purposes. Met  4. Patient will demonstrate 5 sit to  < 18" for mobility/strength purposes.Ongoing  5. Pt will be independent with HEP and self management of symptoms. Ongoing     Patient Education:  Patient was given instruction with demonstration on HEP. Patient verbalized understanding.    Discharge Plan:  Home Program:  To follow up with MD as needed.    Thank you for " consult.

## 2017-07-07 RX ORDER — METOPROLOL SUCCINATE 50 MG/1
TABLET, EXTENDED RELEASE ORAL
Qty: 90 TABLET | Refills: 3 | Status: SHIPPED | OUTPATIENT
Start: 2017-07-07 | End: 2018-07-09 | Stop reason: SDUPTHER

## 2017-07-14 ENCOUNTER — OFFICE VISIT (OUTPATIENT)
Dept: FAMILY MEDICINE | Facility: CLINIC | Age: 82
End: 2017-07-14
Payer: MEDICARE

## 2017-07-14 VITALS
HEART RATE: 88 BPM | OXYGEN SATURATION: 94 % | HEIGHT: 62 IN | RESPIRATION RATE: 16 BRPM | SYSTOLIC BLOOD PRESSURE: 132 MMHG | WEIGHT: 233.44 LBS | TEMPERATURE: 98 F | BODY MASS INDEX: 42.96 KG/M2 | DIASTOLIC BLOOD PRESSURE: 76 MMHG

## 2017-07-14 DIAGNOSIS — I50.20 SYSTOLIC CONGESTIVE HEART FAILURE, NYHA CLASS 3: ICD-10-CM

## 2017-07-14 DIAGNOSIS — J30.89 CHRONIC ALLERGIC RHINITIS DUE TO OTHER ALLERGIC TRIGGER: ICD-10-CM

## 2017-07-14 DIAGNOSIS — I10 ESSENTIAL HYPERTENSION: Primary | ICD-10-CM

## 2017-07-14 DIAGNOSIS — E11.42 TYPE 2 DIABETES MELLITUS WITH DIABETIC POLYNEUROPATHY, WITHOUT LONG-TERM CURRENT USE OF INSULIN: ICD-10-CM

## 2017-07-14 DIAGNOSIS — R06.7 SNEEZING: ICD-10-CM

## 2017-07-14 PROCEDURE — 99213 OFFICE O/P EST LOW 20 MIN: CPT | Mod: S$GLB,,, | Performed by: NURSE PRACTITIONER

## 2017-07-14 PROCEDURE — 1159F MED LIST DOCD IN RCRD: CPT | Mod: S$GLB,,, | Performed by: NURSE PRACTITIONER

## 2017-07-14 PROCEDURE — 99999 PR PBB SHADOW E&M-EST. PATIENT-LVL V: CPT | Mod: PBBFAC,,, | Performed by: NURSE PRACTITIONER

## 2017-07-14 RX ORDER — FUROSEMIDE 20 MG/1
20 TABLET ORAL DAILY PRN
Qty: 90 TABLET | Refills: 1 | Status: ON HOLD | OUTPATIENT
Start: 2017-07-14 | End: 2018-01-05 | Stop reason: HOSPADM

## 2017-07-14 NOTE — PROGRESS NOTES
"Subjective:       Patient ID: Miesha Obando is a 81 y.o. female.    Chief Complaint: Follow-up (3 month f/u) and Sinus Problem  She was last seen in primary care by Dr. España on 04/13/2017. This is her first time seeing me in the clinic.  HPI She is here for her 3 month follow up. She is also complaining that her nose "runs like a faucet and she sneezes frequently and headaches". She states she has taken some OTC medication recommended by pharmacy department but concerned about cost.  Vitals:    07/14/17 1121   BP: 132/76   Pulse: 88   Resp: 16   Temp: 97.8 °F (36.6 °C)     Review of Systems    She states lately she has taken lasix about 3 times in the last month. She is requesting a refill to be sent to [facility].  States some sharp pain to her left upper chest a few times a week but not having any pain at this time.  Objective:      Physical Exam   Constitutional: She is oriented to person, place, and time. Vital signs are normal. She appears well-developed and well-nourished.   HENT:   Head: Normocephalic and atraumatic.   Right Ear: Hearing, tympanic membrane, external ear and ear canal normal.   Left Ear: Hearing, tympanic membrane, external ear and ear canal normal.   Nose: Nose normal.   Mouth/Throat: Uvula is midline, oropharynx is clear and moist and mucous membranes are normal.   Eyes: Lids are normal.   Neck: Normal range of motion. Neck supple.   Cardiovascular: Normal rate and regular rhythm.    Pulmonary/Chest: Effort normal and breath sounds normal.   Musculoskeletal:        Feet:    Neurological: She is alert and oriented to person, place, and time.   Skin: Skin is warm, dry and intact.   Psychiatric: She has a normal mood and affect. Her speech is normal and behavior is normal. Judgment and thought content normal. Cognition and memory are normal.   Vitals reviewed.      Assessment & Plan:       Essential hypertension  -     furosemide (LASIX) 20 MG tablet; Take 1 tablet (20 mg total) by mouth daily " as needed (Weight gain, Swelling, or shortness of breath).  Dispense: 90 tablet; Refill: 1    Type 2 diabetes mellitus with diabetic polyneuropathy, without long-term current use of insulin  -     Hemoglobin A1c; Future; Expected date: 07/14/2017    Chronic allergic rhinitis due to other allergic trigger    Sneezing    Systolic congestive heart failure, NYHA class 3  -     furosemide (LASIX) 20 MG tablet; Take 1 tablet (20 mg total) by mouth daily as needed (Weight gain, Swelling, or shortness of breath).  Dispense: 90 tablet; Refill: 1    OTC Claritin without D to help with allergy problems and is less expensive in generic.    Follow up with cardiology she last saw him in 04/27/2017. But she states sometimes feels a little sharp pain in left upper chest. I explained to her that with her medical history she should see cardiology when she notices that these pains are not just a one time occurrence but they are re-occurring.  Instructed to go to ED if chest pain returns or if any shortness of breath and she verbalized understanding      Return in about 3 months (around 10/14/2017), or if symptoms worsen or fail to improve.

## 2017-07-24 NOTE — PROGRESS NOTES
Pt called and resched appt to lab on Wednesday when she has appt c Dr Gonzales; need pt/inr order to link to lab appt

## 2017-07-26 ENCOUNTER — OFFICE VISIT (OUTPATIENT)
Dept: CARDIOLOGY | Facility: CLINIC | Age: 82
End: 2017-07-26
Payer: MEDICARE

## 2017-07-26 ENCOUNTER — LAB VISIT (OUTPATIENT)
Dept: LAB | Facility: HOSPITAL | Age: 82
End: 2017-07-26
Attending: INTERNAL MEDICINE
Payer: MEDICARE

## 2017-07-26 ENCOUNTER — ANTI-COAG VISIT (OUTPATIENT)
Dept: CARDIOLOGY | Facility: CLINIC | Age: 82
End: 2017-07-26

## 2017-07-26 VITALS
BODY MASS INDEX: 42.72 KG/M2 | SYSTOLIC BLOOD PRESSURE: 125 MMHG | DIASTOLIC BLOOD PRESSURE: 66 MMHG | HEART RATE: 80 BPM | HEIGHT: 62 IN | WEIGHT: 232.13 LBS

## 2017-07-26 DIAGNOSIS — I27.9 PULMONARY HEART DISEASE: ICD-10-CM

## 2017-07-26 DIAGNOSIS — I35.0 NONRHEUMATIC AORTIC VALVE STENOSIS: ICD-10-CM

## 2017-07-26 DIAGNOSIS — I25.10 CORONARY ARTERY DISEASE INVOLVING NATIVE CORONARY ARTERY OF NATIVE HEART WITHOUT ANGINA PECTORIS: ICD-10-CM

## 2017-07-26 DIAGNOSIS — I48.19 PERSISTENT ATRIAL FIBRILLATION: ICD-10-CM

## 2017-07-26 DIAGNOSIS — Z95.2 S/P TAVR (TRANSCATHETER AORTIC VALVE REPLACEMENT): ICD-10-CM

## 2017-07-26 DIAGNOSIS — I10 ESSENTIAL HYPERTENSION: ICD-10-CM

## 2017-07-26 DIAGNOSIS — Z95.5 S/P CORONARY ARTERY STENT PLACEMENT: ICD-10-CM

## 2017-07-26 DIAGNOSIS — Z79.01 LONG TERM CURRENT USE OF ANTICOAGULANT THERAPY: Primary | ICD-10-CM

## 2017-07-26 DIAGNOSIS — I50.40 COMBINED SYSTOLIC AND DIASTOLIC CONGESTIVE HEART FAILURE, NYHA CLASS 3: ICD-10-CM

## 2017-07-26 DIAGNOSIS — I42.9 CARDIOMYOPATHY, UNSPECIFIED TYPE: ICD-10-CM

## 2017-07-26 DIAGNOSIS — Z95.810 AICD (AUTOMATIC CARDIOVERTER/DEFIBRILLATOR) PRESENT: ICD-10-CM

## 2017-07-26 DIAGNOSIS — Z79.01 LONG TERM CURRENT USE OF ANTICOAGULANT THERAPY: ICD-10-CM

## 2017-07-26 LAB
INR PPP: 2
PROTHROMBIN TIME: 19.8 SEC

## 2017-07-26 PROCEDURE — 99999 PR PBB SHADOW E&M-EST. PATIENT-LVL III: CPT | Mod: PBBFAC,,, | Performed by: INTERNAL MEDICINE

## 2017-07-26 PROCEDURE — 1126F AMNT PAIN NOTED NONE PRSNT: CPT | Mod: S$GLB,,, | Performed by: INTERNAL MEDICINE

## 2017-07-26 PROCEDURE — 99214 OFFICE O/P EST MOD 30 MIN: CPT | Mod: S$GLB,,, | Performed by: INTERNAL MEDICINE

## 2017-07-26 PROCEDURE — 85610 PROTHROMBIN TIME: CPT | Mod: PO

## 2017-07-26 PROCEDURE — 36415 COLL VENOUS BLD VENIPUNCTURE: CPT | Mod: PO

## 2017-07-26 PROCEDURE — 1159F MED LIST DOCD IN RCRD: CPT | Mod: S$GLB,,, | Performed by: INTERNAL MEDICINE

## 2017-07-26 RX ORDER — SPIRONOLACTONE 25 MG/1
25 TABLET ORAL DAILY
COMMUNITY
End: 2017-09-15 | Stop reason: SDUPTHER

## 2017-07-26 NOTE — PROGRESS NOTES
Subjective:    Patient ID:  Miesha Obando is a 81 y.o. female who presents for follow-up of Shortness of Breath (follow up-sob); Chest Pain; Dizziness; Chest Congestion; Cough; Atrial Fibrillation; and Coronary Artery Disease      Pt with multiple issues and complaints but mostly sinus issues. Saw NP and mentioned occasional transient sharp 1-2 second pains and was told she should see us. The occasional sharp pains present since device implant and reproducible with device manipulation. She feels her dyspnea may be a bit worse since last visit in April. Her weight is up 9# and she rarely uses the furosemide.        Review of Systems   Constitution: Negative for weight gain and weight loss.   HENT: Positive for congestion and headaches.    Eyes: Negative.    Cardiovascular: Positive for dyspnea on exertion. Negative for chest pain, claudication, cyanosis, irregular heartbeat, leg swelling, near-syncope, orthopnea (no PND), palpitations and syncope.   Respiratory: Positive for shortness of breath. Negative for cough, hemoptysis and snoring.    Endocrine: Negative.    Skin: Negative.    Musculoskeletal: Negative for joint pain, muscle cramps, muscle weakness and myalgias.   Gastrointestinal: Negative for diarrhea, hematemesis, nausea and vomiting.   Genitourinary: Negative.    Neurological: Positive for light-headedness. Negative for focal weakness, loss of balance, numbness, paresthesias and seizures.   Psychiatric/Behavioral: Negative.         Objective:    Physical Exam   Constitutional: She is oriented to person, place, and time. She appears well-developed and well-nourished.   Eyes: Pupils are equal, round, and reactive to light.   Neck: Normal range of motion. No thyromegaly present.   Cardiovascular: Normal rate, S1 normal, S2 normal, intact distal pulses and normal pulses.  An irregularly irregular rhythm present.  No extrasystoles are present. PMI is not displaced.  Exam reveals no friction rub.    Murmur  heard.   Medium-pitched systolic murmur is present with a grade of 1/6  at the upper right sternal border  Pulmonary/Chest: Effort normal and breath sounds normal. She has no wheezes. She has no rales. She exhibits no tenderness.   Abdominal: Soft. Bowel sounds are normal. She exhibits no distension and no mass. There is no tenderness.   Musculoskeletal: Normal range of motion. She exhibits no edema.   Neurological: She is alert and oriented to person, place, and time.   Skin: Skin is warm and dry.   Vitals reviewed.        Assessment:       1. Coronary artery disease involving native coronary artery of native heart without angina pectoris    2. Essential hypertension    3. Nonrheumatic aortic valve stenosis    4. Persistent atrial fibrillation    5. Pulmonary heart disease    6. Combined systolic and diastolic congestive heart failure, NYHA class 3    7. Cardiomyopathy - EF 30-35%    8. S/P coronary artery stent placement - LILLIAM to RCA 08/19/2015    9. 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 Long term current use of anticoagulant therapy    11. AICD (automatic cardioverter/defibrillator) present - Bi-V         Plan:     We discussed her symptoms  I recommended she start taking the furosemide at least every other day as she appears to be retaining some fluid  Continue other meds  She will discuss home coumadin monitoring with coumadin clinic as their move to [facility] facility will not be easy for her to do  She has a recall in system

## 2017-08-07 ENCOUNTER — CLINICAL SUPPORT (OUTPATIENT)
Dept: CARDIOLOGY | Facility: CLINIC | Age: 82
End: 2017-08-07
Payer: MEDICARE

## 2017-08-07 DIAGNOSIS — I48.91 ATRIAL FIBRILLATION, UNSPECIFIED TYPE: ICD-10-CM

## 2017-08-07 DIAGNOSIS — Z95.0 PACEMAKER: ICD-10-CM

## 2017-08-07 DIAGNOSIS — I50.32 CHRONIC DIASTOLIC HEART FAILURE: ICD-10-CM

## 2017-08-07 PROCEDURE — 93294 REM INTERROG EVL PM/LDLS PM: CPT | Mod: S$GLB,,, | Performed by: INTERNAL MEDICINE

## 2017-08-07 PROCEDURE — 93296 REM INTERROG EVL PM/IDS: CPT | Mod: S$GLB,,, | Performed by: INTERNAL MEDICINE

## 2017-08-14 DIAGNOSIS — I50.32 CHRONIC DIASTOLIC HEART FAILURE: ICD-10-CM

## 2017-08-14 DIAGNOSIS — I48.19 PERSISTENT ATRIAL FIBRILLATION: ICD-10-CM

## 2017-08-14 DIAGNOSIS — Z95.0 PACEMAKER: Primary | ICD-10-CM

## 2017-08-29 ENCOUNTER — TELEPHONE (OUTPATIENT)
Dept: FAMILY MEDICINE | Facility: CLINIC | Age: 82
End: 2017-08-29

## 2017-08-29 ENCOUNTER — NURSE TRIAGE (OUTPATIENT)
Dept: ADMINISTRATIVE | Facility: CLINIC | Age: 82
End: 2017-08-29

## 2017-08-29 NOTE — TELEPHONE ENCOUNTER
Spoke with patient, and encouraged to visit ER if symptoms had not improved. Pt stated she felt a little better and would consider if it continued or got worse.

## 2017-08-29 NOTE — TELEPHONE ENCOUNTER
Reason for Disposition   MODERATE vomiting (e.g., 3 - 5 times/day) and age > 60    Protocols used: ST VOMITING-A-OH    Miesha is calling to report that she has been vomiting since last night.  States has vomited x 3.  Is drinking some water.  States feels like nausea is improving.  After vomiting had throat pain which has also improved.  Per protocol advised ER. Miesha states she does not feel that is necessary at this point.  Please contact Miehsa to advise.

## 2017-08-31 ENCOUNTER — CLINICAL SUPPORT (OUTPATIENT)
Dept: AUDIOLOGY | Facility: CLINIC | Age: 82
End: 2017-08-31
Payer: MEDICARE

## 2017-08-31 ENCOUNTER — OFFICE VISIT (OUTPATIENT)
Dept: OTOLARYNGOLOGY | Facility: CLINIC | Age: 82
End: 2017-08-31
Payer: MEDICARE

## 2017-08-31 VITALS
DIASTOLIC BLOOD PRESSURE: 73 MMHG | HEART RATE: 58 BPM | SYSTOLIC BLOOD PRESSURE: 135 MMHG | WEIGHT: 233 LBS | HEIGHT: 62 IN | BODY MASS INDEX: 42.88 KG/M2

## 2017-08-31 DIAGNOSIS — H90.3 BILATERAL HIGH FREQUENCY SENSORINEURAL HEARING LOSS: ICD-10-CM

## 2017-08-31 DIAGNOSIS — H92.03 REFERRED OTALGIA, BILATERAL: ICD-10-CM

## 2017-08-31 DIAGNOSIS — R42 DIZZY: Primary | ICD-10-CM

## 2017-08-31 DIAGNOSIS — E66.01 MORBID OBESITY, UNSPECIFIED OBESITY TYPE: ICD-10-CM

## 2017-08-31 DIAGNOSIS — G43.109 MIGRAINOUS VERTIGO: Primary | ICD-10-CM

## 2017-08-31 PROCEDURE — 3075F SYST BP GE 130 - 139MM HG: CPT | Mod: S$GLB,,, | Performed by: NURSE PRACTITIONER

## 2017-08-31 PROCEDURE — 3008F BODY MASS INDEX DOCD: CPT | Mod: S$GLB,,, | Performed by: NURSE PRACTITIONER

## 2017-08-31 PROCEDURE — 1125F AMNT PAIN NOTED PAIN PRSNT: CPT | Mod: S$GLB,,, | Performed by: NURSE PRACTITIONER

## 2017-08-31 PROCEDURE — 99499 UNLISTED E&M SERVICE: CPT | Mod: S$GLB,,, | Performed by: NURSE PRACTITIONER

## 2017-08-31 PROCEDURE — 3078F DIAST BP <80 MM HG: CPT | Mod: S$GLB,,, | Performed by: NURSE PRACTITIONER

## 2017-08-31 PROCEDURE — 99999 PR PBB SHADOW E&M-EST. PATIENT-LVL IV: CPT | Mod: PBBFAC,,, | Performed by: NURSE PRACTITIONER

## 2017-08-31 PROCEDURE — 1159F MED LIST DOCD IN RCRD: CPT | Mod: S$GLB,,, | Performed by: NURSE PRACTITIONER

## 2017-08-31 PROCEDURE — 99214 OFFICE O/P EST MOD 30 MIN: CPT | Mod: S$GLB,,, | Performed by: NURSE PRACTITIONER

## 2017-08-31 NOTE — LETTER
August 31, 2017      Susi Garcia NP  [address] [facility] [address] [place] [address]           [facility]  [address] [facility] [address] [place] [address]  Phone: [phone]  Fax: [phone]          Patient: Miesha Obando   MR Number: [id number]   YOB: 1935   Date of Visit: 8/31/2017       Dear Susi Garcia:    Thank you for referring Miesha Obando to me for evaluation. Attached you will find relevant portions of my assessment and plan of care.    If you have questions, please do not hesitate to call me. I look forward to following Miesha Obando along with you.    Sincerely,    Sheila Galvez NP    Enclosure  CC:  No Recipients    If you would like to receive this communication electronically, please contact [email or web address] or [phone] to request more information on [employer] access.    For providers and/or their staff who would like to refer a patient to [facility], please contact us through our one-stop-shop provider referral line, [facility], at [phone].    If you feel you have received this communication in error or would no longer like to receive these types of communications, please e-mail [email or web address]

## 2017-08-31 NOTE — PATIENT INSTRUCTIONS
See either Dr. Scott or Dr. Palma for headaches. Dizziness will likely go away once the headaches are addressed.     You have no middle ear effusion. Discussed possible etiologies for referred otalgia including but not limited to: dental pathology, TMJ, cervical spine, heck/neck neoplasms, myofascial pain syndrome/headaches, neuralgias, shingles, GERD.

## 2017-08-31 NOTE — PROGRESS NOTES
"Subjective:       Patient ID: Miesha Obando is a 81 y.o. female.    Chief Complaint: Otalgia (bilat ear pain x 2 days, worse in left ear today.); Dizziness; and Diplopia    Patient reports bilateral otalgia first AD then AU X 2-3 days. No otorrhea. No recent URTI.   She also reports headaches, double vision, and vertigo for well over a year. She states she mentioned it to Dr. Forte a year ago and he recommended she have her eyes checked. She saw an eye doctor and was reportedly told eyes were fine, no problems. She continues to have frequent headaches with diplopia and vertigo-like sensation. She has not seen a neurologist for the headaches. She states vertigo is only slight, not severe, constant and not positional, associated with headaches. She denies aural symptoms such as unilateral fullness/pressure, unilateral tinnitus, or fluctuant hearing.       Otalgia    Associated symptoms include hearing loss.   Dizziness:    Associated symptoms: hearing loss and ear pain.  Review of Systems   Constitutional: Negative.  Negative for fatigue.   HENT: Positive for ear pain and hearing loss.    Eyes: Negative.    Respiratory: Negative.    Cardiovascular: Negative.    Gastrointestinal: Negative.    Musculoskeletal: Negative.  Negative for gait problem.   Skin: Negative.  Negative for color change and pallor.   Neurological: Positive for dizziness.   Hematological: Negative.  Negative for adenopathy.   Psychiatric/Behavioral: Negative.  Negative for agitation.       Objective:      Physical Exam   Constitutional: She is oriented to person, place, and time. Vital signs are normal. She appears well-developed and well-nourished. She is cooperative. She does not appear ill. No distress.   Morbidly obese   HENT:   Head: Normocephalic and atraumatic.   Right Ear: Hearing, external ear and ear canal normal. Tympanic membrane is not erythematous. Tympanic membrane mobility is normal (type "A" tympanogram). No middle ear effusion. " "  Left Ear: Hearing, tympanic membrane, external ear and ear canal normal. Tympanic membrane is not erythematous. Tympanic membrane mobility is normal (type "A" tympanogram).  No middle ear effusion.   Nose: Nose normal. No mucosal edema, rhinorrhea or septal deviation.   Mouth/Throat: Uvula is midline, oropharynx is clear and moist and mucous membranes are normal. Mucous membranes are not pale, not dry and not cyanotic. No oral lesions. No oropharyngeal exudate, posterior oropharyngeal edema or posterior oropharyngeal erythema.   Eyes: Conjunctivae, EOM and lids are normal. Pupils are equal, round, and reactive to light. Right eye exhibits no discharge. Left eye exhibits no discharge. No scleral icterus.   Neck: Trachea normal and normal range of motion. Neck supple. No tracheal deviation present. No thyroid mass and no thyromegaly present.   Cardiovascular: Normal rate.    Pulmonary/Chest: Effort normal. No stridor.   Musculoskeletal: Normal range of motion.   Lymphadenopathy:        Head (right side): No submental, no submandibular, no tonsillar, no preauricular and no posterior auricular adenopathy present.        Head (left side): No submental, no submandibular, no tonsillar, no preauricular and no posterior auricular adenopathy present.     She has no cervical adenopathy.        Right cervical: No superficial cervical and no posterior cervical adenopathy present.       Left cervical: No superficial cervical and no posterior cervical adenopathy present.   Neurological: She is alert and oriented to person, place, and time. She has normal strength. Coordination and gait normal.   Skin: Skin is warm, dry and intact. No lesion and no rash noted. She is not diaphoretic. No cyanosis. No pallor.   Psychiatric: She has a normal mood and affect. Her speech is normal and behavior is normal. Judgment and thought content normal. Cognition and memory are normal.   Nursing note and vitals reviewed.    Negative Ean-Hallpike " AU  Assessment:     Referred otalgia, not otogenic, most likely musculoskeletal etiology    Headache-related dizziness, not likely vestibular  Plan:     Reassured no middle ear fluid; therefore no OM. No OE. Otalgia is referred. Most likely musculoskeletal etiology.      See neurologist for evaluation of headache-related dizziness.

## 2017-09-11 NOTE — PROGRESS NOTES
A Ean Hallpike Maneuver was performed to determine the presence of BPPV.  No nystagmus was visualized AU.  The patient was referred by our ENT nurse practitioner to Neurology due to dizziness.  The patient has had a hearing evaluation performed here on 3/1/17.  It was recommended that the pt return after 3/1/18 for her annual audiological evaluation.

## 2017-09-15 RX ORDER — SPIRONOLACTONE 25 MG/1
TABLET ORAL
Qty: 90 TABLET | Refills: 3 | Status: SHIPPED | OUTPATIENT
Start: 2017-09-15 | End: 2018-09-05 | Stop reason: SDUPTHER

## 2017-09-18 ENCOUNTER — OFFICE VISIT (OUTPATIENT)
Dept: PRIMARY CARE CLINIC | Facility: CLINIC | Age: 82
End: 2017-09-18
Payer: MEDICARE

## 2017-09-18 VITALS
HEART RATE: 72 BPM | HEIGHT: 62 IN | SYSTOLIC BLOOD PRESSURE: 124 MMHG | BODY MASS INDEX: 43.16 KG/M2 | WEIGHT: 234.56 LBS | TEMPERATURE: 98 F | DIASTOLIC BLOOD PRESSURE: 70 MMHG

## 2017-09-18 DIAGNOSIS — T23.242A: Primary | ICD-10-CM

## 2017-09-18 PROCEDURE — 3074F SYST BP LT 130 MM HG: CPT | Mod: S$GLB,,, | Performed by: NURSE PRACTITIONER

## 2017-09-18 PROCEDURE — 1159F MED LIST DOCD IN RCRD: CPT | Mod: S$GLB,,, | Performed by: NURSE PRACTITIONER

## 2017-09-18 PROCEDURE — 3008F BODY MASS INDEX DOCD: CPT | Mod: S$GLB,,, | Performed by: NURSE PRACTITIONER

## 2017-09-18 PROCEDURE — 99213 OFFICE O/P EST LOW 20 MIN: CPT | Mod: S$GLB,,, | Performed by: NURSE PRACTITIONER

## 2017-09-18 PROCEDURE — 1125F AMNT PAIN NOTED PAIN PRSNT: CPT | Mod: S$GLB,,, | Performed by: NURSE PRACTITIONER

## 2017-09-18 PROCEDURE — 3078F DIAST BP <80 MM HG: CPT | Mod: S$GLB,,, | Performed by: NURSE PRACTITIONER

## 2017-09-18 PROCEDURE — 99999 PR PBB SHADOW E&M-EST. PATIENT-LVL V: CPT | Mod: PBBFAC,,, | Performed by: NURSE PRACTITIONER

## 2017-09-18 RX ORDER — SILVER SULFADIAZINE 10 G/1000G
CREAM TOPICAL DAILY
Qty: 20 G | Refills: 0 | Status: SHIPPED | OUTPATIENT
Start: 2017-09-18 | End: 2017-10-27 | Stop reason: ALTCHOICE

## 2017-09-18 RX ORDER — CEPHALEXIN 500 MG/1
500 CAPSULE ORAL 4 TIMES DAILY
Qty: 21 CAPSULE | Refills: 0 | Status: SHIPPED | OUTPATIENT
Start: 2017-09-18 | End: 2017-10-27 | Stop reason: ALTCHOICE

## 2017-09-18 NOTE — PROGRESS NOTES
Miesha Obando is a 81 y.o. female patient of KEON España MD who presents to the clinic today for   Chief Complaint   Patient presents with    Burn     left hand, thumb   .    HPI    Pt, who is well known to me, reports a new problem to me:  Burn to the left thumb from hot honey .    These symptoms began 3 days ago and status is worsening, now red and more swollen.     Pt denies the following symptoms:  Inability to move the finger    Aggravating factors include touching the area .    Relieving factors include ice .    OTC Medications tried are neosporin.    Prescription medications taken for symptoms are none.    Pertinent medical history:  H/o mastectomy with left breast and lymph node removal, DM    ROS    Constitutional:  No fever    Skin:  See chief complaint/HPI.    HEENT:  No complaints.    Heart/Lungs:  No complaints    GI/:  No sxs.    MS:  No new problems in bones, joints or muscles.      PAST MEDICAL HISTORY:  Past Medical History:   Diagnosis Date    Anticoagulant long-term use     Arthritis     Atrial fibrillation     Breast cancer 1994    breast     Cardiomyopathy - EF 35-40% 5/11/2016    CHF (congestive heart failure)     Chronic bronchitis     COPD (chronic obstructive pulmonary disease)     Coronary artery disease without angina pectoris 8/26/2015    Depression     Diabetes with neurologic complications     Diverticulitis     Diverticulosis     Encounter for blood transfusion     after mastectomy x 20 years ago    Former smoker     GERD (gastroesophageal reflux disease)     H/O aortic valve replacement     Hiatal hernia     History of colonic diverticulitis     Hypertension     Hypothyroid     Irritable bowel syndrome     Obesity     Pacemaker 08/2014    Schatzki's ring     mild    Sleep apnea     Syncope and collapse     Trouble in sleeping     Type II or unspecified type diabetes mellitus without mention of complication, not stated as uncontrolled     No  medications at present.     Urinary incontinence        PAST SURGICAL HISTORY:  Past Surgical History:   Procedure Laterality Date    ADENOIDECTOMY      APPENDECTOMY      BACK SURGERY      Discectomy, lumbar    BREAST SURGERY  1994    left side , mastectomy    CARDIAC PACEMAKER PLACEMENT      CARDIAC VALVE SURGERY      aortic valve replacement    CHOLECYSTECTOMY      COLONOSCOPY  2/2014    repeat in 10 years for screening    EYE SURGERY      bilateral PHACO and IOL    HYSTERECTOMY      ovaries spared    internal recording device (loop)      Loop, removed    PM      TOE SURGERY Right     TONSILLECTOMY      UPPER GASTROINTESTINAL ENDOSCOPY  2012       SOCIAL HISTORY:  Social History     Social History    Marital status:      Spouse name: N/A    Number of children: N/A    Years of education: N/A     Occupational History    Not on file.     Social History Main Topics    Smoking status: Former Smoker     Packs/day: 1.00     Years: 30.00     Start date: 2/23/1958     Quit date: 1/13/1988    Smokeless tobacco: Never Used    Alcohol use 1.8 oz/week     2 Glasses of wine, 1 Shots of liquor per week      Comment: occasional    Drug use: No    Sexual activity: Not on file     Other Topics Concern    Not on file     Social History Narrative    No narrative on file       FAMILY HISTORY:  Family History   Problem Relation Age of Onset    Parkinsonism Mother     Emphysema Father     Heart disease Brother     Heart attack Brother     Heart failure Brother     Heart disease Brother     Heart failure Brother     Heart disease Brother     Heart failure Brother     Arrhythmia Brother     Anesthesia problems Neg Hx     Clotting disorder Neg Hx        ALLERGIES AND MEDICATIONS: updated and reviewed.  Review of patient's allergies indicates:   Allergen Reactions    Amoxicillin-pot clavula (bulk) Nausea And Vomiting    Sulfa (sulfonamide antibiotics) Hives    Adhesive Rash     Use paper tape      Current Outpatient Prescriptions   Medication Sig Dispense Refill    ACCU-CHEK MARLON PLUS TEST STRP Strp USE AS DIRECTED TWICE DAILY 50 strip 6    acetaminophen (TYLENOL) 325 MG tablet Take 325 mg by mouth as needed for Pain or Temperature greater than.       albuterol (PROAIR HFA) 90 mcg/actuation inhaler Inhale 2 puffs into the lungs every 4 (four) hours as needed. 18 g 3    ascorbic acid (VITAMIN C) 500 MG tablet Take 500 mg by mouth once daily.        aspirin (ECOTRIN) 81 MG EC tablet Take 81 mg by mouth once daily.      b complex vitamins tablet Take 1 tablet by mouth once daily.        fish oil-omega-3 fatty acids 300-1,000 mg capsule Take 2 g by mouth once daily.      fluticasone-vilanterol (BREO ELLIPTA) 200-25 mcg/dose DsDv diskus inhaler Inhale 1 puff into the lungs once daily. 3 each 3    furosemide (LASIX) 20 MG tablet Take 1 tablet (20 mg total) by mouth daily as needed (Weight gain, Swelling, or shortness of breath). 90 tablet 1    levothyroxine (SYNTHROID) 100 MCG tablet TAKE 1 TABLET EVERY DAY 90 tablet 2    losartan (COZAAR) 25 MG tablet TAKE 1 TABLET ONE TIME DAILY 90 tablet 1    metoprolol succinate (TOPROL-XL) 50 MG 24 hr tablet TAKE 1 TABLET EVERY DAY (DISCONTINUE METOPROLOL ER 25MG) 90 tablet 3    ranitidine (ZANTAC) 75 MG tablet Take 75 mg by mouth 2 (two) times daily. Take 1 tablet by mouth once daily      spironolactone (ALDACTONE) 25 MG tablet TAKE 1 TABLET ONE TIME DAILY 90 tablet 3    trazodone (DESYREL) 50 MG tablet Take 1 tablet (50 mg total) by mouth nightly as needed for Insomnia. 30 tablet 0    warfarin (COUMADIN) 5 MG tablet TAKE 1/2 TO 1 TABLET DAILY AS DIRECTED BY COUMADIN CLINIC 90 tablet 3    cephALEXin (KEFLEX) 500 MG capsule Take 1 capsule (500 mg total) by mouth 4 (four) times daily. 21 capsule 0    nitroGLYCERIN (NITROSTAT) 0.4 MG SL tablet Place 1 tablet (0.4 mg total) under the tongue every 5 (five) minutes as needed for Chest pain. 25 tablet 5     silver sulfADIAZINE 1% (SILVADENE) 1 % cream Apply topically once daily. 20 g 0     No current facility-administered medications for this visit.          PHYSICAL EXAM    Alert, coop 81 y.o. female patient in no acute distress, is not ill-appearing.    Vitals:    09/18/17 1446   BP: 124/70   Pulse: 72   Temp: 97.8 °F (36.6 °C)     VS reviewed.  VS stable.  CC, nursing note, medications & PMH all reviewed today.    Head:  Normocephalic, atraumatic.    EENT:  Ext nose/ears normal.               Eyes with normal lids, not injected.     Resp:  Respirations even, unlabored    Heart:  Regular rate.  CV:  Cap RF brisk    MS:  Ambulates normally.           She is able to flex the thumb.    NEURO:  Alert and oriented x 4.  Responds appropriately during interaction.                  Sensation to light touch intact over the digit .    Skin:  Warm, dry, color good.            A/an erythematous burn over the IP of the dorsum of the thumb with a large blister and extending over the nail.            Small erythematous area with small blister over the web between the thumb and index finger.            The wounds seem warm to touch.    Psych:  Responds appropriately throughout the visit.               Relaxed.  Well-groomed    Second degree burn of finger with thumb of left hand  -     silver sulfADIAZINE 1% (SILVADENE) 1 % cream; Apply topically once daily.  Dispense: 20 g; Refill: 0  -     cephALEXin (KEFLEX) 500 MG capsule; Take 1 capsule (500 mg total) by mouth 4 (four) times daily.  Dispense: 21 capsule; Refill: 0  -     Ambulatory referral to Wound Clinic      Pt today presents with burn on the left thumb over the IP joint and small burn on the adjacent hand from hot honey. Both with intact vesicles.  Good movement of the IP joint    This is a new problem to me.  No work up is planned.        Wound care referral r/t joint involvement  Pt advised to perform comfort measures recommended on patient instruction sheet  .    Explained exam findings, diagnosis and treatment plan to patient.  Questions answered and patient states understanding.

## 2017-09-18 NOTE — PATIENT INSTRUCTIONS
Burn Emergencies  Electricity, chemicals, steam, very hot water, and fire can all cause serious burns. The care you receive for burns will depend on the type and severity of your injury. Many burns can be treated in an outpatient setting. If a burn needs inpatient treatment, it should be done in a burn center. Very severe burns need treatment in a burn center.   Types of burns  You may be most familiar with the traditional burn classification of first, second, and third-degree burns.  · First-degree burns are usually mild. They affect only the outer layer of skin (epidermis). The skin is likely to be red, and there may be some pain and swelling. Most sunburns are first-degree burns.  · Second-degree burns injure the second layer (dermis) of skin. The skin will be intensely red and may develop blisters. These burns are often very painful.  · Third-degree burns involve all the layers of skin. Fat, nerves, muscles, and even bone may be burned. The skin may be charred black or appear very white. Pain is likely to be severe. If nerves are damaged, no pain may be felt at all.    A newer classification uses designations based on the depth of the burn and the need for surgical intervention.  · Superficial burns involve the outer layer of skin (epidermis). They are painful and red, but do not blister  · Superficial partial-thickness involves the outer layer and second layer (dermis) of skin. These burns usually blister within 24 hours that are painful, red and weeping.  · Deep partial-thickness burns extend into the deeper dermis and damage hair follicles and glandular tissue. The burn is painful on pressure and the blister is wet or waxy and mottled or patchy in color.  · Full-thickness burns extend through all layers of the skin and often to the underlying tissue. Blisters do not develop but skin can vary from waxy white to gray to charred and black.  · Fourth degree burns are deep and can be life threatening, extending  through skin into underlying tissues, muscle or bone.  When to go to the emergency department  For a second- or third-degree burn, burns all the way around an arm or leg, burns caused by electricity or chemicals, burns involving the eyes, mouth, or airway, or any burn that covers large parts of the body, go to the emergency department or call 911 right away.  What to expect in the emergency department  Some activities that will happen include:  · Medicine will be given to relieve pain.  · The burn is cooled with moist cloths.  · A chemical burn will be flushed with water and may be injected with medicine.  · The burn is washed and any damaged tissue is removed. An antibiotic ointment may be applied and the burn covered with a dressing.  · Fluids are given through a vein by intravenous access (IV) in the arm or other extremity not affected by burns.  · If smoke was inhaled, the airways may be burned. If so, a tube may be placed in the windpipe (trachea) and connected to a ventilator to help breathing. A chest X-ray may be done to look for damage to the lungs from inhaling smoke. Blood and urine tests may be done to check the bodys levels of oxygen and carbon dioxide.  Follow-up  Some burns can be cared for at home. Burns easily become infected, so careful cleaning and dressing changes are important. Very deep burns often require long-term medical treatment. For these burns, treatment is done in a burn center. Depending on the severity of the burn, skin grafts may be needed to replace damaged tissue.  Date Last Reviewed: 4/1/2017  © 7613-6907 [employer]. [address] [place] [address], [place] [address]. All rights reserved. This information is not intended as a substitute for professional medical care. Always follow your healthcare professional's instructions.    If you get fever, go to the ER.  Start the antibiotic.  Call the wound center.    If you have any questions, please call.  You can reach us at  [phone] Monday through Friday (except holidays) 12 nooon to 6 p.m.    Thank you for using the [facility]!    MADHU Gaming, CNP, FNP-BC  [facility]  [facility]

## 2017-09-20 PROBLEM — T23.212A BURN OF SECOND DEGREE OF LEFT THUMB (NAIL), INITIAL ENCOUNTER: Status: ACTIVE | Noted: 2017-09-20

## 2017-09-21 DIAGNOSIS — E11.42 TYPE 2 DIABETES MELLITUS WITH DIABETIC POLYNEUROPATHY, WITHOUT LONG-TERM CURRENT USE OF INSULIN: Primary | ICD-10-CM

## 2017-09-21 RX ORDER — LEVOTHYROXINE SODIUM 100 UG/1
TABLET ORAL
Qty: 90 TABLET | Refills: 1 | Status: CANCELLED | OUTPATIENT
Start: 2017-09-21

## 2017-09-21 RX ORDER — LEVOTHYROXINE SODIUM 100 UG/1
100 TABLET ORAL DAILY
Qty: 90 TABLET | Refills: 0 | Status: SHIPPED | OUTPATIENT
Start: 2017-09-21 | End: 2017-09-26 | Stop reason: SDUPTHER

## 2017-09-21 NOTE — PROGRESS NOTES
Refill Authorization Note     is requesting a refill authorization.    Brief assessment and rational for refill: APPROVE: prr  Name of medication: levothyroxine (SYNTHROID) 100 MCG tablet  How patient will take medication: t1t po daily   Amount/Quantity of medication ordered: 90  Medication reconciliation completed: No        Refills Authorized: Yes  If authorized number of refills: 1        Medication Therapy Plan: Last tsh 4/2017.  Approve for 6 mo.    Comments:   Lab Results   Component Value Date    TSH 3.014 04/18/2017

## 2017-09-21 NOTE — PROGRESS NOTES
Refill Authorization Note     is requesting a refill authorization.    Brief assessment and rational for refill: APPROVE: prr  Name of medication: LEVOTHYROXINE SODIUM 100 MCG Tablet  How patient will take medication: t1t po daily   Amount/Quantity of medication ordered: 90   Medication reconciliation completed: No        Refills Authorized: Yes  If authorized number of refills: 1     Medication-related problems identified:  Medication Therapy Plan: Last tsh 4/2017.  Approve for 6 mo   Comments:   Lab Results   Component Value Date    TSH 3.014 04/18/2017

## 2017-09-22 ENCOUNTER — TELEPHONE (OUTPATIENT)
Dept: FAMILY MEDICINE | Facility: CLINIC | Age: 82
End: 2017-09-22

## 2017-09-22 NOTE — TELEPHONE ENCOUNTER
----- Message from Zaki Le sent at 9/22/2017 12:53 PM CDT -----  Contact: self  [phone]  Patient states she is returning the nurse phone call. Thanks!

## 2017-09-25 ENCOUNTER — TELEPHONE (OUTPATIENT)
Dept: FAMILY MEDICINE | Facility: CLINIC | Age: 82
End: 2017-09-25

## 2017-09-25 NOTE — TELEPHONE ENCOUNTER
----- Message from Octavia De La Cruz sent at 9/25/2017 11:49 AM CDT -----  Returning your call.  Please call [phone].

## 2017-09-25 NOTE — TELEPHONE ENCOUNTER
----- Message from Alice Aldridgean sent at 9/25/2017 11:28 AM CDT -----  Contact: self   Patient want to speak with a nurse regarding a refill for rx levothyroxine (SYNTHROID) 100 MCG tablet, and also question regarding bloodwork  please fax to [phone] any questions please call [phone] (home)      [facility] - [place], [place] - [address]  [address]  [facility] [address]  Phone: [phone] Fax: [phone]

## 2017-09-26 RX ORDER — LEVOTHYROXINE SODIUM 100 UG/1
100 TABLET ORAL DAILY
Qty: 90 TABLET | Refills: 0 | Status: SHIPPED | OUTPATIENT
Start: 2017-09-26 | End: 2017-11-13 | Stop reason: SDUPTHER

## 2017-09-28 ENCOUNTER — TELEPHONE (OUTPATIENT)
Dept: NEUROLOGY | Facility: CLINIC | Age: 82
End: 2017-09-28

## 2017-09-28 NOTE — TELEPHONE ENCOUNTER
----- Message from Latasha Louis sent at 9/28/2017 10:34 AM CDT -----  Contact: pt  Pt was advised by Sheila Galvez to come see Dr Palma /having headaches and dizzy spells would like to be seen sooner that 11/8 if possible...[phone] (home)

## 2017-09-28 NOTE — TELEPHONE ENCOUNTER
Returned call and spoke with patient. Appointment rescheduled for 10/27. Directions given. Patient verbalized understanding.

## 2017-10-04 ENCOUNTER — ANTI-COAG VISIT (OUTPATIENT)
Dept: CARDIOLOGY | Facility: CLINIC | Age: 82
End: 2017-10-04

## 2017-10-04 DIAGNOSIS — Z79.01 LONG TERM CURRENT USE OF ANTICOAGULANT THERAPY: ICD-10-CM

## 2017-10-04 DIAGNOSIS — I48.19 PERSISTENT ATRIAL FIBRILLATION: ICD-10-CM

## 2017-10-04 LAB — INR PPP: 2.6 (ref 2–3)

## 2017-10-14 ENCOUNTER — NURSE TRIAGE (OUTPATIENT)
Dept: ADMINISTRATIVE | Facility: CLINIC | Age: 82
End: 2017-10-14

## 2017-10-14 NOTE — TELEPHONE ENCOUNTER
Reason for Disposition   Chest pain  (Exception: MILD central chest pain, present only when coughing)    Protocols used: ST COUGH - ACUTE [id number]-J-NU    Pt calling with concerns of shortness of breath, productive cough and previous history of pneumonia.  Care advice given.

## 2017-10-17 ENCOUNTER — TELEPHONE (OUTPATIENT)
Dept: FAMILY MEDICINE | Facility: CLINIC | Age: 82
End: 2017-10-17

## 2017-10-17 NOTE — TELEPHONE ENCOUNTER
----- Message from Vicenta Jay sent at 10/17/2017  8:59 AM CDT -----  Contact: Estela with [facility] [employer]  Estela is requesting last clinical notes for patient diabetic shoes, fax to [phone] or contact her at [phone] with any questions.     Thank you

## 2017-10-18 ENCOUNTER — OFFICE VISIT (OUTPATIENT)
Dept: URGENT CARE | Facility: CLINIC | Age: 82
End: 2017-10-18
Payer: MEDICARE

## 2017-10-18 VITALS
SYSTOLIC BLOOD PRESSURE: 123 MMHG | RESPIRATION RATE: 16 BRPM | HEART RATE: 64 BPM | DIASTOLIC BLOOD PRESSURE: 64 MMHG | OXYGEN SATURATION: 94 % | TEMPERATURE: 98 F | WEIGHT: 237 LBS | BODY MASS INDEX: 43.61 KG/M2 | HEIGHT: 62 IN

## 2017-10-18 DIAGNOSIS — J06.9 UPPER RESPIRATORY TRACT INFECTION, UNSPECIFIED TYPE: Primary | ICD-10-CM

## 2017-10-18 PROCEDURE — 96372 THER/PROPH/DIAG INJ SC/IM: CPT | Mod: S$GLB,,, | Performed by: EMERGENCY MEDICINE

## 2017-10-18 PROCEDURE — 99203 OFFICE O/P NEW LOW 30 MIN: CPT | Mod: 25,S$GLB,, | Performed by: EMERGENCY MEDICINE

## 2017-10-18 RX ORDER — AZITHROMYCIN 500 MG/1
500 TABLET, FILM COATED ORAL DAILY
Qty: 5 TABLET | Refills: 0 | Status: SHIPPED | OUTPATIENT
Start: 2017-10-18 | End: 2017-10-23

## 2017-10-18 RX ORDER — BETAMETHASONE SODIUM PHOSPHATE AND BETAMETHASONE ACETATE 3; 3 MG/ML; MG/ML
9 INJECTION, SUSPENSION INTRA-ARTICULAR; INTRALESIONAL; INTRAMUSCULAR; SOFT TISSUE
Status: COMPLETED | OUTPATIENT
Start: 2017-10-18 | End: 2017-10-18

## 2017-10-18 RX ORDER — CODEINE PHOSPHATE AND GUAIFENESIN 10; 100 MG/5ML; MG/5ML
5 SOLUTION ORAL EVERY 4 HOURS PRN
Qty: 118 ML | Refills: 1 | Status: SHIPPED | OUTPATIENT
Start: 2017-10-18 | End: 2017-10-28

## 2017-10-18 RX ADMIN — BETAMETHASONE SODIUM PHOSPHATE AND BETAMETHASONE ACETATE 9 MG: 3; 3 INJECTION, SUSPENSION INTRA-ARTICULAR; INTRALESIONAL; INTRAMUSCULAR; SOFT TISSUE at 03:10

## 2017-10-18 NOTE — PATIENT INSTRUCTIONS
Understanding Sinus Problems    You dont often think about your sinuses until theres a problem. One day you realize you cant smell dinner cooking. Or you find you often have headaches or problems breathing through your nose.  Symptoms of sinus problems  Sinus problems can cause uncomfortable symptoms. Your nose may run constantly. You might have trouble sleeping at night. You may even lose your sense of smell. Other symptoms can include:  · Nasal congestion  · Fullness in ears  · Green, yellow, or bloody drainage from the nose  · Trouble tasting food  · Frequent headaches  · Facial pain  · Cough  When sinuses are blocked  If something blocks the passages in the nose or sinuses, mucus cant drain. Mucus-filled sinuses often become infected.  · Colds cause the lining of the nose and sinuses to swell and make extra mucus. A buildup of mucus can lead to a more serious infection.  · Allergies irritate turbinates and other tissues. This causes swelling, which can cause a blockage. Over time, this irritation can also lead to sacs of swollen tissue (polyps).  · Polyps may form in both the sinuses and nose. Polyps can grow large enough to clog nasal passages and block drainage.  · A crooked (deviated) septum may block nasal passages. This is often the result of an injury.  Date Last Reviewed: 11/1/2016  © 3239-1024 [employer]. [address] [place] [address], [place] [address]. All rights reserved. This information is not intended as a substitute for professional medical care. Always follow your healthcare professional's instructions.

## 2017-10-18 NOTE — PROGRESS NOTES
"Subjective:       Patient ID: Miesha Obando is a 81 y.o. female.    Vitals:  height is 5' 2" (1.575 m) and weight is 107.5 kg (237 lb). Her oral temperature is 97.9 °F (36.6 °C). Her blood pressure is 123/64 and her pulse is 64. Her respiration is 16 and oxygen saturation is 94% (abnormal).     Chief Complaint: Sore Throat    Taking inhaler and breathing treatments, and allegra.      Sore Throat    This is a new problem. The current episode started in the past 7 days. The problem has been unchanged. Associated symptoms include congestion (head), coughing, ear pain (both), headaches and a hoarse voice. She has tried acetaminophen for the symptoms.     Review of Systems   Constitution: Negative for chills, fever, malaise/fatigue and night sweats.   HENT: Positive for congestion (head), ear pain (both), hoarse voice and sore throat.    Cardiovascular: Negative for chest pain and dyspnea on exertion.   Respiratory: Positive for cough.    Musculoskeletal: Positive for myalgias.   Neurological: Positive for headaches.       Objective:      Physical Exam   Constitutional: She is oriented to person, place, and time. She appears well-developed and well-nourished. She is cooperative.  Non-toxic appearance. She does not appear ill. No distress.   HENT:   Head: Normocephalic and atraumatic.   Right Ear: Hearing, tympanic membrane, external ear and ear canal normal.   Left Ear: Hearing, tympanic membrane, external ear and ear canal normal.   Nose: Mucosal edema present. No rhinorrhea or nasal deformity. No epistaxis. Right sinus exhibits no maxillary sinus tenderness and no frontal sinus tenderness. Left sinus exhibits no maxillary sinus tenderness and no frontal sinus tenderness.   Mouth/Throat: Uvula is midline and mucous membranes are normal. No trismus in the jaw. Normal dentition. No uvula swelling. Posterior oropharyngeal erythema present.   Eyes: Conjunctivae and lids are normal. Right eye exhibits no discharge. Left " eye exhibits no discharge. No scleral icterus.   Sclera clear bilat   Neck: Trachea normal, normal range of motion, full passive range of motion without pain and phonation normal. Neck supple.   Cardiovascular: Normal rate, regular rhythm, normal heart sounds, intact distal pulses and normal pulses.    Pulmonary/Chest: Effort normal and breath sounds normal. No respiratory distress.   Abdominal: Soft. Normal appearance. She exhibits no distension, no pulsatile midline mass and no mass. There is no tenderness.   Musculoskeletal: Normal range of motion. She exhibits no edema or deformity.   Neurological: She is alert and oriented to person, place, and time. She exhibits normal muscle tone. Coordination normal.   Skin: Skin is warm, dry and intact. She is not diaphoretic. No pallor.   Psychiatric: She has a normal mood and affect. Her speech is normal and behavior is normal. Judgment and thought content normal. Cognition and memory are normal.   Nursing note and vitals reviewed.      Assessment:       1. Upper respiratory tract infection, unspecified type        Plan:         Upper respiratory tract infection, unspecified type  -     betamethasone acetate-betamethasone sodium phosphate injection 9 mg; Inject 1.5 mLs (9 mg total) into the muscle one time.  -     azithromycin (ZITHROMAX) 500 MG tablet; Take 1 tablet (500 mg total) by mouth once daily.  Dispense: 5 tablet; Refill: 0

## 2017-10-21 ENCOUNTER — TELEPHONE (OUTPATIENT)
Dept: URGENT CARE | Facility: CLINIC | Age: 82
End: 2017-10-21

## 2017-10-26 ENCOUNTER — TELEPHONE (OUTPATIENT)
Dept: FAMILY MEDICINE | Facility: CLINIC | Age: 82
End: 2017-10-26

## 2017-10-26 NOTE — TELEPHONE ENCOUNTER
Patient states she was recently sick and is asking if she can get the flu shot. Advised patient she can get flu shot if she has not had fever within 24 hours, patient denies having fever.

## 2017-10-26 NOTE — TELEPHONE ENCOUNTER
----- Message from Vicenta Pandya sent at 10/26/2017  9:12 AM CDT -----  Patient requesting to speak with nurse concerning scheduling flu shot/has question/please call back at [phone] to advise.

## 2017-10-27 ENCOUNTER — OFFICE VISIT (OUTPATIENT)
Dept: NEUROLOGY | Facility: CLINIC | Age: 82
End: 2017-10-27
Payer: MEDICARE

## 2017-10-27 VITALS
WEIGHT: 234.38 LBS | BODY MASS INDEX: 43.13 KG/M2 | HEIGHT: 62 IN | DIASTOLIC BLOOD PRESSURE: 71 MMHG | HEART RATE: 60 BPM | RESPIRATION RATE: 20 BRPM | SYSTOLIC BLOOD PRESSURE: 151 MMHG

## 2017-10-27 DIAGNOSIS — I10 ESSENTIAL HYPERTENSION: ICD-10-CM

## 2017-10-27 DIAGNOSIS — G44.86 CERVICOGENIC HEADACHE: ICD-10-CM

## 2017-10-27 DIAGNOSIS — I25.10 CORONARY ARTERY DISEASE INVOLVING NATIVE CORONARY ARTERY OF NATIVE HEART WITHOUT ANGINA PECTORIS: ICD-10-CM

## 2017-10-27 DIAGNOSIS — Z95.0 PACEMAKER: ICD-10-CM

## 2017-10-27 DIAGNOSIS — M79.18 MYOFASCIAL PAIN SYNDROME, CERVICAL: ICD-10-CM

## 2017-10-27 DIAGNOSIS — M54.2 CERVICALGIA: Primary | ICD-10-CM

## 2017-10-27 DIAGNOSIS — E11.42 TYPE 2 DIABETES MELLITUS WITH DIABETIC POLYNEUROPATHY, WITHOUT LONG-TERM CURRENT USE OF INSULIN: ICD-10-CM

## 2017-10-27 DIAGNOSIS — I48.19 PERSISTENT ATRIAL FIBRILLATION: ICD-10-CM

## 2017-10-27 PROCEDURE — 99499 UNLISTED E&M SERVICE: CPT | Mod: S$GLB,,, | Performed by: PSYCHIATRY & NEUROLOGY

## 2017-10-27 PROCEDURE — 99204 OFFICE O/P NEW MOD 45 MIN: CPT | Mod: S$GLB,,, | Performed by: PSYCHIATRY & NEUROLOGY

## 2017-10-27 PROCEDURE — 99999 PR PBB SHADOW E&M-EST. PATIENT-LVL III: CPT | Mod: PBBFAC,,, | Performed by: PSYCHIATRY & NEUROLOGY

## 2017-10-27 RX ORDER — DIFLUNISAL 500 MG/1
500 TABLET, FILM COATED ORAL 2 TIMES DAILY PRN
Qty: 20 TABLET | Refills: 0 | Status: SHIPPED | OUTPATIENT
Start: 2017-10-27 | End: 2017-12-20 | Stop reason: SDUPTHER

## 2017-10-27 RX ORDER — DULOXETIN HYDROCHLORIDE 20 MG/1
40 CAPSULE, DELAYED RELEASE ORAL DAILY
Qty: 60 CAPSULE | Refills: 2 | Status: SHIPPED | OUTPATIENT
Start: 2017-10-27 | End: 2017-12-20 | Stop reason: SDUPTHER

## 2017-10-27 NOTE — LETTER
October 27, 2017      Sheila Galvez, NP  [address] [facility] [address] [place] [address]           [facility]  [address] [facility] [address] [place] [address]  Phone: [phone]  Fax: [phone]          Patient: Miesha Obando   MR Number: [id number]   YOB: 1935   Date of Visit: 10/27/2017       Dear Sheila Galvez:    Thank you for referring Miesha Obando to me for evaluation. Attached you will find relevant portions of my assessment and plan of care.    If you have questions, please do not hesitate to call me. I look forward to following Miesha Obando along with you.    Sincerely,    Regine Palma MD    Enclosure  CC:  No Recipients    If you would like to receive this communication electronically, please contact [email or web address] or [phone] to request more information on [employer] access.    For providers and/or their staff who would like to refer a patient to [facility], please contact us through our one-stop-shop provider referral line, [facility], at [phone].    If you feel you have received this communication in error or would no longer like to receive these types of communications, please e-mail [email or web address]

## 2017-10-27 NOTE — PROGRESS NOTES
"Date of service: 10/27/2017  Referring provider: Sheila Galvez    Subjective:      Chief complaint: No chief complaint on file.       Patient ID: Miesha Obando is a 82 y.o. lady with coronary artery disease, atrial fibrillation on anticoagulation, hypertension, cardiomyopathy (EF 30-35%), pacemaker, COPD, diabetes, who is presenting for the evaluation of headaches and dizziness and is a new patient to me.     History of Present Illness    ORIGINAL HEADACHE HISTORY - 10/27/17   Age at onset and course over time: about a couple of years (2015), she thinks she "lives" with a headache, sometimes more severe than others   Location: neck, occiput, vertex   Quality: pressure, sharp   Severity: current 6, range is 4-10  Duration: constant, with escalations for 30-40 min   Frequency: daily   Alleviated by: heat   Exacerbated by: stress, change weather; turning head   Associated with: scalp allodynia; blurred vision, double vision, dizziness, vertigo, anger  Sleep habits: wakes up and can't get back to sleeo   Caffeine intake: 1-2     Dizziness: feeling of off balance while walking, can also feel off balance while sitting (not spinning, more like a boat swaying); happens 2-3 times a week; lasts 15 min. Sometimes associated with double vision but sometimes double vision by itself. Side by side, lasts seconds to minutes. Will pass if she keeps blinking. Associated with word finding difficulty. No slurred speech. No visual aura. No paralysis.     Current acute treatment:  -- tylenol or tylenol PM- ineffective   (ASA)  (warfarin)    Current prevention:  -- metoprolol XL 50mg daily  (losartan)  (trazodone - does not help her sleep)  (melatonin)    Previously tried/failed acute treatment:  -- naproxen    Previously tried/failed preventative treatment:  -- lisinopril     Review of patient's allergies indicates:   Allergen Reactions    Amoxicillin-pot clavula (bulk) Nausea And Vomiting    Sulfa (sulfonamide antibiotics) Hives    " Adhesive Rash     Use paper tape     Current Outpatient Prescriptions   Medication Sig Dispense Refill    ACCU-CHEK MARLON PLUS TEST STRP Strp USE AS DIRECTED TWICE DAILY 50 strip 6    acetaminophen (TYLENOL) 325 MG tablet Take 325 mg by mouth as needed for Pain or Temperature greater than.       albuterol (PROAIR HFA) 90 mcg/actuation inhaler Inhale 2 puffs into the lungs every 4 (four) hours as needed. 18 g 3    ascorbic acid (VITAMIN C) 500 MG tablet Take 500 mg by mouth once daily.        aspirin (ECOTRIN) 81 MG EC tablet Take 81 mg by mouth once daily.      b complex vitamins tablet Take 1 tablet by mouth once daily.        fish oil-omega-3 fatty acids 300-1,000 mg capsule Take 2 g by mouth once daily.      fluticasone-vilanterol (BREO ELLIPTA) 200-25 mcg/dose DsDv diskus inhaler Inhale 1 puff into the lungs once daily. 3 each 3    furosemide (LASIX) 20 MG tablet Take 1 tablet (20 mg total) by mouth daily as needed (Weight gain, Swelling, or shortness of breath). 90 tablet 1    guaifenesin-codeine 100-10 mg/5 ml (CHERATUSSIN AC)  mg/5 mL syrup Take 5 mLs by mouth every 4 (four) hours as needed for Cough. 118 mL 1    levothyroxine (SYNTHROID) 100 MCG tablet Take 1 tablet (100 mcg total) by mouth once daily. 90 tablet 0    losartan (COZAAR) 25 MG tablet TAKE 1 TABLET ONE TIME DAILY 90 tablet 1    metoprolol succinate (TOPROL-XL) 50 MG 24 hr tablet TAKE 1 TABLET EVERY DAY (DISCONTINUE METOPROLOL ER 25MG) 90 tablet 3    ranitidine (ZANTAC) 75 MG tablet Take 75 mg by mouth 2 (two) times daily. Take 1 tablet by mouth once daily      spironolactone (ALDACTONE) 25 MG tablet TAKE 1 TABLET ONE TIME DAILY 90 tablet 3    warfarin (COUMADIN) 5 MG tablet TAKE 1/2 TO 1 TABLET DAILY AS DIRECTED BY COUMADIN CLINIC 90 tablet 3    diflunisal (DOLOBID) 500 mg Tab Take 1 tablet (500 mg total) by mouth 2 (two) times daily as needed (pain). 20 tablet 0    DULoxetine (CYMBALTA) 20 MG capsule Take 2 capsules (40  mg total) by mouth once daily. 1 cap 60 capsule 2    nitroGLYCERIN (NITROSTAT) 0.4 MG SL tablet Place 1 tablet (0.4 mg total) under the tongue every 5 (five) minutes as needed for Chest pain. 25 tablet 5     No current facility-administered medications for this visit.        Past Medical History  Past Medical History:   Diagnosis Date    Anticoagulant long-term use     Arthritis     Atrial fibrillation     Breast cancer 1994    breast , left    Cardiomyopathy - EF 35-40% 5/11/2016    CHF (congestive heart failure)     Chronic bronchitis     COPD (chronic obstructive pulmonary disease)     Coronary artery disease without angina pectoris 8/26/2015    Depression     Diabetes with neurologic complications     Diverticulitis     Diverticulosis     Encounter for blood transfusion     after mastectomy x 20 years ago    Former smoker     GERD (gastroesophageal reflux disease)     H/O aortic valve replacement     Hiatal hernia     History of colonic diverticulitis     Hypertension     Hypothyroid     Irritable bowel syndrome     Obesity     Pacemaker 08/2014    Schatzki's ring     mild    Sleep apnea     Syncope and collapse     Trouble in sleeping     Type II or unspecified type diabetes mellitus without mention of complication, not stated as uncontrolled     No medications at present.     Urinary incontinence        Past Surgical History  Past Surgical History:   Procedure Laterality Date    ADENOIDECTOMY      APPENDECTOMY      BACK SURGERY      Discectomy, lumbar    BREAST SURGERY  1994    left side , mastectomy    CARDIAC PACEMAKER PLACEMENT      CARDIAC VALVE SURGERY      aortic valve replacement    CHOLECYSTECTOMY      COLONOSCOPY  2/2014    repeat in 10 years for screening    EYE SURGERY      bilateral PHACO and IOL    HYSTERECTOMY      ovaries spared    internal recording device (loop)      Loop, removed    PM      TOE SURGERY Right     TONSILLECTOMY      UPPER  GASTROINTESTINAL ENDOSCOPY  2012       Family History  Family History   Problem Relation Age of Onset    Parkinsonism Mother     Emphysema Father     Heart disease Brother     Heart attack Brother     Heart failure Brother     Heart disease Brother     Heart failure Brother     Heart disease Brother     Heart failure Brother     Arrhythmia Brother     Anesthesia problems Neg Hx     Clotting disorder Neg Hx        Social History  Social History     Social History    Marital status:      Spouse name: N/A    Number of children: N/A    Years of education: N/A     Occupational History    Not on file.     Social History Main Topics    Smoking status: Former Smoker     Packs/day: 1.00     Years: 30.00     Start date: 2/23/1958     Quit date: 1/13/1988    Smokeless tobacco: Never Used    Alcohol use 1.8 oz/week     2 Glasses of wine, 1 Shots of liquor per week      Comment: occasional    Drug use: No    Sexual activity: Not on file     Other Topics Concern    Not on file     Social History Narrative    No narrative on file        Review of Systems  Constitutional: + weight loss, no change to appetite   Eyes: no change to vision, + double vision; no redness, no tearing  Ears, nose, mouth, throat: no hearing loss, no tinnitus, no rhinorrhea, no difficulty swallowing   Cardiovascular: no palpitations + chest pain   Respiratory: no shortness of breath, + cough  + wheezing   Gastrointestinal: no diarrhea, no constipation  Gu: incontinence   Musculoskeletal: no joint pain  Skin: + rashes  Neurologic: + numbness, + weakness, change to speech, loss of coordination   Endocrine: no heat or + cold intolerance + fatigue   Heme: no night sweats, no easy bruising   Psych: no depression     Objective:        Vitals:    10/27/17 1508   BP: (!) 151/71   Pulse: 60   Resp: 20     Body mass index is 42.87 kg/m².    Constitutional: appears in no acute distress, well-developed, well-nourished     Eyes: normal  conjunctiva, pupils miotic, round, equal and reactive to light     Ears, nose, mouth, throat: external appearance of ears and nose normal, hearing intact to finger rub     Cardiovascular: regular rate and rhythm, no murmurs appreciated, no carotid bruits     Respiratory: unlabored respirations, + inspiratory wheezing bilaterally     Gastrointestinal: no abdominal masses, no tenderness, no visible hernia    Musculoskeletal: normal tone in all four extremities. No atrophy. No abnormal movements. Strength in all muscles groups of the upper and lower extremities is 5/5. Gait is wide based, slow, and with assistance of cane. Digits and nails normal.      Spine:   CERVICAL SPINE:  ROM: reduced lateral rotation bilaterally but pain and dizziness; normal extension and flexion   MUSCLE SPASM: + upper cervical paraspinals, facet tenderness bilaterally   FACET LOADING: + bilateral   SPURLING: no  FRED / BALWINDER tender: no     Psychiatric: normal judgment and insight. Oriented to person, place, and time.     Neurologic:   Cortical functions: recent and remote memory intact, normal attention span and concentration, speech fluent, adequate fund of knowledge   Cranial nerves: visual fields full, PERRLA, EOMI, facial sensation intact in V1-V3, symmetric facial strength, hearing intact to finger rub, palate elevates symmetrically, shoulder shrug 5/5, tongue protrudes midline   Reflexes: 1+ in the upper and lower extremities, no Babinski, no Vargas  Sensation: intact to light touch; reduced to TEMP in the hands to mid-forearm and feet to knee bilaterally. Reduced PPC and vibration at the toes.   Coordination: normal finger to noise    Data Review:     I have personally reviewed the referring provider's notes, labs, & imaging made available to me today.      RADIOLOGY STUDIES:  I have personally reviewed the pertinent images performed.   Imaging Results    None         No results found. However, due to the size of the patient record, not  all encounters were searched. Please check Results Review for a complete set of results.    Lab Results   Component Value Date    BNP 3220 (H) 03/09/2016     04/18/2017    K 4.9 04/18/2017    MG 2.3 05/31/2016     04/18/2017    CO2 24 04/18/2017    BUN 27 (H) 04/18/2017    CREATININE 1.0 04/18/2017     04/18/2017    HGBA1C 6.6 (H) 04/18/2017    AST 22 04/18/2017    AST 28 03/09/2016    ALT 13 04/18/2017    ALBUMIN 3.5 04/18/2017    PROT 7.4 04/18/2017    BILITOT 0.6 04/18/2017    CHOL 167 04/18/2017    HDL 48 04/18/2017    LDLCALC 98.0 04/18/2017    TRIG 105 04/18/2017       Lab Results   Component Value Date    WBC 7.02 04/18/2017    HGB 13.1 04/18/2017    HCT 39.4 04/18/2017    MCV 92 04/18/2017     04/18/2017       Lab Results   Component Value Date    TSH 3.014 04/18/2017           Assessment & Plan:       Problem List Items Addressed This Visit        Neuro    Cervicogenic headache    Relevant Medications    DULoxetine (CYMBALTA) 20 MG capsule    Other Relevant Orders    X-Ray Cervical Spine 5 View With Flex And Ext       Cardiac/Vascular    Essential hypertension    Pacemaker    Coronary artery disease without angina pectoris    Persistent atrial fibrillation       Endocrine    Type 2 diabetes mellitus with diabetic polyneuropathy, without long-term current use of insulin       Orthopedic    Cervicalgia - Primary    Relevant Medications    DULoxetine (CYMBALTA) 20 MG capsule    Other Relevant Orders    X-Ray Cervical Spine 5 View With Flex And Ext    Myofascial pain syndrome, cervical    Relevant Medications    DULoxetine (CYMBALTA) 20 MG capsule              Ms. Obando presents for the evaluation of headaches related to neck pain as well as double vision and dizziness.    Her headaches, I believe are cervicogenic secondary to presumed cervical facet arthropathy as she is tender in the facets with only a fair degree of muscle spasm. This is further supported by neck pain, headache,  and dizziness when rotating the head. I will get a cervical XER to confirm. She has very many medical problems, and is on aspirin and warfarin for atrial fibrillation, CAD, and cardiomyopathy thus offering cervical medial branch blocks and RFA would be tricky here. I would prefer to start with a conservative approach of SNRI (cymbalta) since it has a musculoskeletal indication as well as a non acetylated salicylate as an NSAID to not aggravate her GERD and not raise her bleeding risk with concomitant ASA and warfarin.    Her off balance sensation while walking I believe is related to sensory ataxia from diabetic polyneuropathy as she does have evidence of proprioceptive involvement.     Her off balance sensation (rocking) while sitting I believe is more related to her neck rather than migraine, as it is really too short duration to be a migraine accompanied symptom and is not always associated with her headache. I think this is most likely related to spasm of muscles important for head proprioception or irritation of the third occipital nerve.     Her multiple medical comorbities (HTN, heart, anticoag, Dm) will dramatically limit the treatments options I can offer to her.     TESTING:  -- get X-ray of your neck     PREVENTION (use daily regardless of headache):  -- start duloxetine (cymbalta) 20mg daily in the morning, if you tolerate this, then after one week go up to 2 tablets in the morning   -- stop trazodone since not helping     ACUTE TREATMENT:  -- start diflunisal (dolobid) 500mg tablet twice a day as needed for pain (anti-inflammatory that is safe for you to take with coumadin) - giving you small supply to test if you like it, if you do, please call me for larger refill       Return in about 6 weeks (around 12/8/2017).    Regine Palma MD

## 2017-10-27 NOTE — PATIENT INSTRUCTIONS
TESTING:  -- get X-ray of your neck     PREVENTION (use daily regardless of headache):  -- start duloxetine (cymbalta) 20mg daily in the morning, if you tolerate this, then after one week go up to 2 tablets in the morning   -- stop trazodone since not helping     ACUTE TREATMENT:  -- start diflunisal (dolobid) 500mg tablet twice a day as needed for pain (anti-inflammatory that is safe for you to take with coumadin) - giving you small supply to test if you like it, if you do, please call me for larger refill   -- use the [employer] coupon for this

## 2017-10-30 ENCOUNTER — HOSPITAL ENCOUNTER (OUTPATIENT)
Dept: RADIOLOGY | Facility: HOSPITAL | Age: 82
Discharge: HOME | End: 2017-10-30
Attending: PSYCHIATRY & NEUROLOGY
Payer: MEDICARE

## 2017-10-30 DIAGNOSIS — G44.86 CERVICOGENIC HEADACHE: ICD-10-CM

## 2017-10-30 DIAGNOSIS — M54.2 CERVICALGIA: ICD-10-CM

## 2017-10-30 PROCEDURE — 72052 X-RAY EXAM NECK SPINE 6/>VWS: CPT | Mod: 26,,, | Performed by: RADIOLOGY

## 2017-10-30 PROCEDURE — 72052 X-RAY EXAM NECK SPINE 6/>VWS: CPT | Mod: TC,PO

## 2017-11-03 ENCOUNTER — OFFICE VISIT (OUTPATIENT)
Dept: PRIMARY CARE CLINIC | Facility: CLINIC | Age: 82
End: 2017-11-03
Payer: MEDICARE

## 2017-11-03 VITALS
HEIGHT: 62 IN | DIASTOLIC BLOOD PRESSURE: 70 MMHG | WEIGHT: 234.13 LBS | TEMPERATURE: 98 F | HEART RATE: 76 BPM | SYSTOLIC BLOOD PRESSURE: 110 MMHG | OXYGEN SATURATION: 95 % | BODY MASS INDEX: 43.08 KG/M2

## 2017-11-03 DIAGNOSIS — R05.9 COUGH: Primary | ICD-10-CM

## 2017-11-03 DIAGNOSIS — R06.2 WHEEZING: ICD-10-CM

## 2017-11-03 PROCEDURE — 99999 PR PBB SHADOW E&M-EST. PATIENT-LVL V: CPT | Mod: PBBFAC,,, | Performed by: NURSE PRACTITIONER

## 2017-11-03 PROCEDURE — 99213 OFFICE O/P EST LOW 20 MIN: CPT | Mod: S$GLB,,, | Performed by: NURSE PRACTITIONER

## 2017-11-03 RX ORDER — PREDNISONE 10 MG/1
TABLET ORAL
Qty: 12 TABLET | Refills: 0 | Status: SHIPPED | OUTPATIENT
Start: 2017-11-03 | End: 2017-12-20 | Stop reason: ALTCHOICE

## 2017-11-03 RX ORDER — TIZANIDINE 4 MG/1
TABLET ORAL
Refills: 1 | COMMUNITY
Start: 2017-10-18 | End: 2017-12-20 | Stop reason: ALTCHOICE

## 2017-11-03 NOTE — PATIENT INSTRUCTIONS
The cough seems to be due to leftover inflammation.    Take the prednisone, use your treatments.  Continue the Advair.    Recheck if not better.    If you have any questions, please call.  You can reach us at [phone] Monday through Friday (except holidays) 12 nooon to 6 p.m.    Thank you for using the [facility]!    MADHU Gaming, CNP, FNP-BC  [facility]  [facility]

## 2017-11-03 NOTE — PROGRESS NOTES
Miesha Obando is a 82 y.o. female patient of KEON España MD who presents to the clinic today for   Chief Complaint   Patient presents with    Cough    URI   .    HPI    Pt, who is known to me, reports a new problem to me:  Cough and chest congestion.  Seen already for this and got steroid injection, 5 day AB and cough medicine .    These symptoms began 3+ weeks ago and status is continuing.  Starts with RN when the weather changes.  Using a nasal spray daily.     Symptoms are + acute exac of chronic bronchitis.    Pt denies the following symptoms:  fever    Aggravating factors include bending over, lying down .    Relieving factors include nothing .    OTC Medications tried are nasal spray.    Prescription medications taken for symptoms are see above.    Pertinent medical history:  H/o chronic bronchitis.  Has had a change in medications--started Cymbalta and Dolobid recently for her joint/muscle aches.    Smoking status:  nonsmoker    ROS    Constitutional:   No  fever, no fatigue, no change in appetite.    Head:   No headache  Ears:   No pain.  Eyes:  No sxs  Nose:   No sinus pain, + congestion, + runny nose, + post nasal drip.  Throat:  No ST pain, no exudate, no difficulty swallowing    Heart:  No palpitations, chest pain.    Lungs:  + some mild difficulty breathing, + coughing, clear sputum production, some wheezing.    GI/:  No sxs    MS:  No new bone, joint or muscle problems.    Skin:  No rashes, itching.      PAST MEDICAL HISTORY:  Past Medical History:   Diagnosis Date    Anticoagulant long-term use     Arthritis     Atrial fibrillation     Breast cancer 1994    breast , left    Cardiomyopathy - EF 35-40% 5/11/2016    CHF (congestive heart failure)     Chronic bronchitis     COPD (chronic obstructive pulmonary disease)     Coronary artery disease without angina pectoris 8/26/2015    Depression     Diabetes with neurologic complications     Diverticulitis     Diverticulosis      Encounter for blood transfusion     after mastectomy x 20 years ago    Former smoker     GERD (gastroesophageal reflux disease)     H/O aortic valve replacement     Hiatal hernia     History of colonic diverticulitis     Hypertension     Hypothyroid     Irritable bowel syndrome     Obesity     Pacemaker 08/2014    Schatzki's ring     mild    Sleep apnea     Syncope and collapse     Trouble in sleeping     Type II or unspecified type diabetes mellitus without mention of complication, not stated as uncontrolled     No medications at present.     Urinary incontinence        PAST SURGICAL HISTORY:  Past Surgical History:   Procedure Laterality Date    ADENOIDECTOMY      APPENDECTOMY      BACK SURGERY      Discectomy, lumbar    BREAST SURGERY  1994    left side , mastectomy    CARDIAC PACEMAKER PLACEMENT      CARDIAC VALVE SURGERY      aortic valve replacement    CHOLECYSTECTOMY      COLONOSCOPY  2/2014    repeat in 10 years for screening    EYE SURGERY      bilateral PHACO and IOL    HYSTERECTOMY      ovaries spared    internal recording device (loop)      Loop, removed    PM      TOE SURGERY Right     TONSILLECTOMY      UPPER GASTROINTESTINAL ENDOSCOPY  2012       SOCIAL HISTORY:  Social History     Social History    Marital status:      Spouse name: N/A    Number of children: N/A    Years of education: N/A     Occupational History    Not on file.     Social History Main Topics    Smoking status: Former Smoker     Packs/day: 1.00     Years: 30.00     Start date: 2/23/1958     Quit date: 1/13/1988    Smokeless tobacco: Never Used    Alcohol use 1.8 oz/week     2 Glasses of wine, 1 Shots of liquor per week      Comment: occasional    Drug use: No    Sexual activity: Not on file     Other Topics Concern    Not on file     Social History Narrative    No narrative on file       FAMILY HISTORY:  Family History   Problem Relation Age of Onset    Parkinsonism Mother      Emphysema Father     Heart disease Brother     Heart attack Brother     Heart failure Brother     Heart disease Brother     Heart failure Brother     Heart disease Brother     Heart failure Brother     Arrhythmia Brother     Anesthesia problems Neg Hx     Clotting disorder Neg Hx        ALLERGIES AND MEDICATIONS: updated and reviewed.  Review of patient's allergies indicates:   Allergen Reactions    Amoxicillin-pot clavula (bulk) Nausea And Vomiting    Sulfa (sulfonamide antibiotics) Hives    Adhesive Rash     Use paper tape     Current Outpatient Prescriptions   Medication Sig Dispense Refill    ACCU-CHEK MARLON PLUS TEST STRP Strp USE AS DIRECTED TWICE DAILY 50 strip 6    acetaminophen (TYLENOL) 325 MG tablet Take 325 mg by mouth as needed for Pain or Temperature greater than.       albuterol (PROAIR HFA) 90 mcg/actuation inhaler Inhale 2 puffs into the lungs every 4 (four) hours as needed. 18 g 3    ascorbic acid (VITAMIN C) 500 MG tablet Take 500 mg by mouth once daily.        aspirin (ECOTRIN) 81 MG EC tablet Take 81 mg by mouth once daily.      b complex vitamins tablet Take 1 tablet by mouth once daily.        diflunisal (DOLOBID) 500 mg Tab Take 1 tablet (500 mg total) by mouth 2 (two) times daily as needed (pain). 20 tablet 0    DULoxetine (CYMBALTA) 20 MG capsule Take 2 capsules (40 mg total) by mouth once daily. 1 cap 60 capsule 2    fish oil-omega-3 fatty acids 300-1,000 mg capsule Take 2 g by mouth once daily.      furosemide (LASIX) 20 MG tablet Take 1 tablet (20 mg total) by mouth daily as needed (Weight gain, Swelling, or shortness of breath). 90 tablet 1    levothyroxine (SYNTHROID) 100 MCG tablet Take 1 tablet (100 mcg total) by mouth once daily. 90 tablet 0    losartan (COZAAR) 25 MG tablet TAKE 1 TABLET ONE TIME DAILY 90 tablet 1    metoprolol succinate (TOPROL-XL) 50 MG 24 hr tablet TAKE 1 TABLET EVERY DAY (DISCONTINUE METOPROLOL ER 25MG) 90 tablet 3    nitroGLYCERIN  (NITROSTAT) 0.4 MG SL tablet Place 1 tablet (0.4 mg total) under the tongue every 5 (five) minutes as needed for Chest pain. 25 tablet 5    ranitidine (ZANTAC) 75 MG tablet Take 75 mg by mouth nightly. Take 1 tablet by mouth once daily       spironolactone (ALDACTONE) 25 MG tablet TAKE 1 TABLET ONE TIME DAILY 90 tablet 3    VIRTUSSIN AC  mg/5 mL syrup TK 5MLS PO Q 4 HOURS PRN FOR COUGH  1    warfarin (COUMADIN) 5 MG tablet TAKE 1/2 TO 1 TABLET DAILY AS DIRECTED BY COUMADIN CLINIC 90 tablet 3    fluticasone-vilanterol (BREO ELLIPTA) 200-25 mcg/dose DsDv diskus inhaler Inhale 1 puff into the lungs once daily. 3 each 3     No current facility-administered medications for this visit.              PHYSICAL EXAM    Alert, coop 82 y.o. female patient in no acute distress.    Vitals:    11/03/17 1151   BP: 110/70   Pulse: 76   Temp: 98.2 °F (36.8 °C)     VS reviewed.  VS stabel.  CC, nursing note, medications & PMH all reviewed today.    Head:  Normocephalic, atraumatic.    EENT:  EACs patent.  TMs no erythema, dull LR, no effusions, no TM perforation.                              Eye lids normal, no discharge present.       Sinus tenderness to palp--none.               Nares--mild edema, no d/c present.    Pharynx not injected.                Tonsils not erythematous , not enlarged, no exudate present.    No anterior, no posterior cervical lymph nodes palpable.    No submental, submandibular or supraclavicular lymph nodes palp.             Resp:  Respirations even, mildly labored   Lungs CTA bilat.  Upper ant chest wheezing with coughing but rest of lung fields w/o wheezing.  No crackles.  Moves air to bases bilat.    Heart:  RRR, no MRG.    MS:  Ambulates with an unsteadiness.  Uses a cane, walks slowly.             NEURO:  Alert and oriented x 4.  Responds appropriately during interaction.    Skin:  Warm, dry, color good.    Cough  -     predniSONE (DELTASONE) 10 MG tablet; 3 tabs daily for 2 days, 2 tabs  daily for 2 days and 1 tab daily for 2 days.  Dispense: 12 tablet; Refill: 0    Wheezing  -     predniSONE (DELTASONE) 10 MG tablet; 3 tabs daily for 2 days, 2 tabs daily for 2 days and 1 tab daily for 2 days.  Dispense: 12 tablet; Refill: 0      Pt today presents with a cough that is continuing after treatment with a zpack, steroid shot and cough syrup.  She has h/o chronic bronchitis and is using Advair daily.  She uses her ProAir at times but has not been using her nebs.  Her exam is normal with the exception of audible wheezing while coughing..    This is a new problem to me.  No work up is planned.        Pt advised to perform comfort measures recommended on patient instruction sheet .    If not better in 5-7 days, the patient is advised to call us.  If worse or concerns, the patient is advised to call us.  Explained exam findings, diagnosis and treatment plan to patient.  Questions answered and patient states understanding.

## 2017-11-03 NOTE — Clinical Note
KEON España MD,  I saw your patient today in the [facility].  If you have any questions, please do not hesitate to contact me.  Thank you!  [employer]

## 2017-11-06 ENCOUNTER — TELEPHONE (OUTPATIENT)
Dept: NEUROLOGY | Facility: CLINIC | Age: 82
End: 2017-11-06

## 2017-11-06 NOTE — TELEPHONE ENCOUNTER
"----- Message from Fern Chavez NP sent at 11/3/2017 12:15 PM CDT -----  Regarding: blurred vision  Dr. Palma,  Mrs. Obando is feeling a LOT better on the cymbalta and dolobid but is concerned about itching on the forearms without any rash a couple of times and some blurring of the vision intermittently, more at night when her eyes are tired.  Has some stomach "issues" as well.  No blood in stools or black stools.  Urine looks orange at times.  Is increasing her fluids.  Please contact her with your recommendations.  She has a f/u appt scheduled with your.  Janel Chavez, APRN, CNP, FNP-BC  [facility]  [facility]    "

## 2017-11-06 NOTE — TELEPHONE ENCOUNTER
----- Message from Regine Palma MD sent at 11/1/2017 10:17 AM CDT -----  Please let patient know I reviewed her cervical xray results which show arthritis and disc degeneration at several levels which account for her neck pain, nothing that needs to be urgently addressed.

## 2017-11-06 NOTE — TELEPHONE ENCOUNTER
Returned call and spoke with patient. Informed patient per Dr. Palma's note below. Patient verbalized understanding.

## 2017-11-14 RX ORDER — LEVOTHYROXINE SODIUM 100 UG/1
TABLET ORAL
Qty: 90 TABLET | Refills: 1 | Status: SHIPPED | OUTPATIENT
Start: 2017-11-14 | End: 2018-09-26 | Stop reason: SDUPTHER

## 2017-11-14 RX ORDER — LOSARTAN POTASSIUM 25 MG/1
TABLET ORAL
Qty: 90 TABLET | Refills: 1 | Status: SHIPPED | OUTPATIENT
Start: 2017-11-14 | End: 2018-03-19 | Stop reason: SDUPTHER

## 2017-11-14 NOTE — PROGRESS NOTES
Refill Authorization Note     is requesting a refill authorization. Based on the information below, this refill is approved.    Brief assessment and rationale for refill: APPROVE: prr  Amount/Quantity of medication ordered: 90d         Refills Authorized: Yes  If authorized number of refills: 1        Medication Therapy Plan: Last TSH WNL.  Approve 6 mo   Name and strength of medication: LEVOTHYROXINE SODIUM 100 MCG Tablet  How patient will take medication: t1t po daily   Medication reconciliation completed: No  Comments:   Lab Results   Component Value Date    TSH 3.014 04/18/2017

## 2017-11-15 ENCOUNTER — LAB VISIT (OUTPATIENT)
Dept: LAB | Facility: HOSPITAL | Age: 82
End: 2017-11-15
Attending: FAMILY MEDICINE
Payer: MEDICARE

## 2017-11-15 ENCOUNTER — CLINICAL SUPPORT (OUTPATIENT)
Dept: CARDIOLOGY | Facility: CLINIC | Age: 82
End: 2017-11-15
Payer: MEDICARE

## 2017-11-15 DIAGNOSIS — E11.42 TYPE 2 DIABETES MELLITUS WITH DIABETIC POLYNEUROPATHY, WITHOUT LONG-TERM CURRENT USE OF INSULIN: ICD-10-CM

## 2017-11-15 DIAGNOSIS — Z95.0 PACEMAKER: ICD-10-CM

## 2017-11-15 DIAGNOSIS — I48.19 PERSISTENT ATRIAL FIBRILLATION: ICD-10-CM

## 2017-11-15 DIAGNOSIS — I50.32 CHRONIC DIASTOLIC HEART FAILURE: ICD-10-CM

## 2017-11-15 LAB
ESTIMATED AVG GLUCOSE: 131 MG/DL
HBA1C MFR BLD HPLC: 6.2 %

## 2017-11-15 PROCEDURE — 83036 HEMOGLOBIN GLYCOSYLATED A1C: CPT

## 2017-11-15 PROCEDURE — 93280 PM DEVICE PROGR EVAL DUAL: CPT | Mod: S$GLB,,, | Performed by: INTERNAL MEDICINE

## 2017-11-15 PROCEDURE — 36415 COLL VENOUS BLD VENIPUNCTURE: CPT | Mod: PO

## 2017-12-20 ENCOUNTER — OFFICE VISIT (OUTPATIENT)
Dept: NEUROLOGY | Facility: CLINIC | Age: 82
End: 2017-12-20
Payer: MEDICARE

## 2017-12-20 VITALS
RESPIRATION RATE: 20 BRPM | SYSTOLIC BLOOD PRESSURE: 134 MMHG | WEIGHT: 233.31 LBS | BODY MASS INDEX: 42.93 KG/M2 | DIASTOLIC BLOOD PRESSURE: 75 MMHG | HEIGHT: 62 IN | HEART RATE: 80 BPM

## 2017-12-20 DIAGNOSIS — G44.86 CERVICOGENIC HEADACHE: ICD-10-CM

## 2017-12-20 DIAGNOSIS — M54.2 CERVICALGIA: ICD-10-CM

## 2017-12-20 DIAGNOSIS — M79.18 MYOFASCIAL PAIN SYNDROME, CERVICAL: ICD-10-CM

## 2017-12-20 DIAGNOSIS — Z79.01 LONG TERM CURRENT USE OF ANTICOAGULANT THERAPY: ICD-10-CM

## 2017-12-20 DIAGNOSIS — E11.42 TYPE 2 DIABETES MELLITUS WITH DIABETIC POLYNEUROPATHY, WITHOUT LONG-TERM CURRENT USE OF INSULIN: ICD-10-CM

## 2017-12-20 PROCEDURE — 99214 OFFICE O/P EST MOD 30 MIN: CPT | Mod: S$GLB,,, | Performed by: PSYCHIATRY & NEUROLOGY

## 2017-12-20 PROCEDURE — 99499 UNLISTED E&M SERVICE: CPT | Mod: S$GLB,,, | Performed by: PSYCHIATRY & NEUROLOGY

## 2017-12-20 PROCEDURE — 99999 PR PBB SHADOW E&M-EST. PATIENT-LVL III: CPT | Mod: PBBFAC,,, | Performed by: PSYCHIATRY & NEUROLOGY

## 2017-12-20 RX ORDER — ISOPROPYL ALCOHOL 0.75 G/1
SWAB TOPICAL
COMMUNITY
Start: 2017-11-14 | End: 2018-01-15 | Stop reason: SDUPTHER

## 2017-12-20 RX ORDER — DULOXETIN HYDROCHLORIDE 20 MG/1
40 CAPSULE, DELAYED RELEASE ORAL DAILY
Qty: 180 CAPSULE | Refills: 2 | Status: SHIPPED | OUTPATIENT
Start: 2017-12-20 | End: 2018-01-18

## 2017-12-20 RX ORDER — DIFLUNISAL 500 MG/1
500 TABLET, FILM COATED ORAL 2 TIMES DAILY PRN
Qty: 180 TABLET | Refills: 2 | Status: SHIPPED | OUTPATIENT
Start: 2017-12-20 | End: 2018-03-16

## 2017-12-20 NOTE — PROGRESS NOTES
"Date of service: 12/20/2017  Referring provider: No ref. provider found    Subjective:      Chief complaint: Headache; Back Pain; and Shoulder Pain       Patient ID: Miesha Obando is a 82 y.o. lady with cervicogenic headaches, dizziness, coronary artery disease, atrial fibrillation on anticoagulation, hypertension, cardiomyopathy (EF 30-35%), pacemaker, COPD, diabetes, who is presenting for the follow up of headaches and dizziness.     History of Present Illness    INTERVAL HISTORY - 12/20/17     Seen 2 months ago at which point I recommended duloxetine for prevention and dolobid for acute relief. She reports that both of these medications helped a great deal when she was taking them. She ran out of the Dolobid, and she thinks she ran out of duloxetine as well.     Today she reports she is about the same the headache is in the forehead and back of had. Her current pain score is 8, with a range from 2 to 10.     ORIGINAL HEADACHE HISTORY - 10/27/17   Age at onset and course over time: about a couple of years (2015), she thinks she "lives" with a headache, sometimes more severe than others   Location: neck, occiput, vertex   Quality: pressure, sharp   Severity: current 6, range is 4-10  Duration: constant, with escalations for 30-40 min   Frequency: daily   Alleviated by: heat   Exacerbated by: stress, change weather; turning head   Associated with: scalp allodynia; blurred vision, double vision, dizziness, vertigo, anger  Sleep habits: wakes up and can't get back to sleeo   Caffeine intake: 1-2     Dizziness: feeling of off balance while walking, can also feel off balance while sitting (not spinning, more like a boat swaying); happens 2-3 times a week; lasts 15 min. Sometimes associated with double vision but sometimes double vision by itself. Side by side, lasts seconds to minutes. Will pass if she keeps blinking. Associated with word finding difficulty. No slurred speech. No visual aura. No paralysis.     Current " acute treatment:  -- dolobid 500mg BID- worked well, but ran out   (ASA)  (warfarin)    Current prevention:  -- duloxetine 20mg daily - doesn't remember the dose and thinks she ran out   -- metoprolol XL 50mg daily  (losartan)  (trazodone - does not help her sleep)  (melatonin)    Previously tried/failed acute treatment:  -- tylenol or tylenol PM- ineffective   -- naproxen    Previously tried/failed preventative treatment:  -- lisinopril     Review of patient's allergies indicates:   Allergen Reactions    Amoxicillin-pot clavula (bulk) Nausea And Vomiting    Sulfa (sulfonamide antibiotics) Hives    Adhesive Rash     Use paper tape     Current Outpatient Prescriptions   Medication Sig Dispense Refill    ACCU-CHEK MARLON PLUS TEST STRP Strp USE AS DIRECTED TWICE DAILY 50 strip 6    ACCU-CHEK FASTCLIX Misc       acetaminophen (TYLENOL) 325 MG tablet Take 325 mg by mouth as needed for Pain or Temperature greater than.       albuterol (PROAIR HFA) 90 mcg/actuation inhaler Inhale 2 puffs into the lungs every 4 (four) hours as needed. 18 g 3    ascorbic acid (VITAMIN C) 500 MG tablet Take 500 mg by mouth once daily.        aspirin (ECOTRIN) 81 MG EC tablet Take 81 mg by mouth once daily.      b complex vitamins tablet Take 1 tablet by mouth once daily.        BD ALCOHOL SWABS PadM       diflunisal (DOLOBID) 500 mg Tab Take 1 tablet (500 mg total) by mouth 2 (two) times daily as needed (pain). 180 tablet 2    DULoxetine (CYMBALTA) 20 MG capsule Take 2 capsules (40 mg total) by mouth once daily. 1 cap 180 capsule 2    fish oil-omega-3 fatty acids 300-1,000 mg capsule Take 2 g by mouth once daily.      fluticasone-vilanterol (BREO ELLIPTA) 200-25 mcg/dose DsDv diskus inhaler Inhale 1 puff into the lungs once daily. 3 each 3    furosemide (LASIX) 20 MG tablet Take 1 tablet (20 mg total) by mouth daily as needed (Weight gain, Swelling, or shortness of breath). 90 tablet 1    levothyroxine (SYNTHROID) 100 MCG  tablet TAKE 1 TABLET ONE TIME DAILY 90 tablet 1    losartan (COZAAR) 25 MG tablet TAKE 1 TABLET EVERY DAY 90 tablet 1    metoprolol succinate (TOPROL-XL) 50 MG 24 hr tablet TAKE 1 TABLET EVERY DAY (DISCONTINUE METOPROLOL ER 25MG) 90 tablet 3    spironolactone (ALDACTONE) 25 MG tablet TAKE 1 TABLET ONE TIME DAILY 90 tablet 3    warfarin (COUMADIN) 5 MG tablet TAKE 1/2 TO 1 TABLET DAILY AS DIRECTED BY COUMADIN CLINIC 90 tablet 3    nitroGLYCERIN (NITROSTAT) 0.4 MG SL tablet Place 1 tablet (0.4 mg total) under the tongue every 5 (five) minutes as needed for Chest pain. 25 tablet 5     No current facility-administered medications for this visit.        Past Medical History  Past Medical History:   Diagnosis Date    Anticoagulant long-term use     Arthritis     Atrial fibrillation     Breast cancer 1994    breast , left    Cardiomyopathy - EF 35-40% 5/11/2016    CHF (congestive heart failure)     Chronic bronchitis     COPD (chronic obstructive pulmonary disease)     Coronary artery disease without angina pectoris 8/26/2015    Depression     Diabetes with neurologic complications     Diverticulitis     Diverticulosis     Encounter for blood transfusion     after mastectomy x 20 years ago    Former smoker     GERD (gastroesophageal reflux disease)     H/O aortic valve replacement     Hiatal hernia     History of colonic diverticulitis     Hypertension     Hypothyroid     Irritable bowel syndrome     Obesity     Pacemaker 08/2014    Schatzki's ring     mild    Sleep apnea     Syncope and collapse     Trouble in sleeping     Type II or unspecified type diabetes mellitus without mention of complication, not stated as uncontrolled     No medications at present.     Urinary incontinence        Past Surgical History  Past Surgical History:   Procedure Laterality Date    ADENOIDECTOMY      APPENDECTOMY      BACK SURGERY      Discectomy, lumbar    BREAST SURGERY  1994    left side ,  mastectomy    CARDIAC PACEMAKER PLACEMENT      CARDIAC VALVE SURGERY      aortic valve replacement    CHOLECYSTECTOMY      COLONOSCOPY  2/2014    repeat in 10 years for screening    EYE SURGERY      bilateral PHACO and IOL    HYSTERECTOMY      ovaries spared    internal recording device (loop)      Loop, removed    PM      TOE SURGERY Right     TONSILLECTOMY      UPPER GASTROINTESTINAL ENDOSCOPY  2012       Family History  Family History   Problem Relation Age of Onset    Parkinsonism Mother     Emphysema Father     Heart disease Brother     Heart attack Brother     Heart failure Brother     Heart disease Brother     Heart failure Brother     Heart disease Brother     Heart failure Brother     Arrhythmia Brother     Anesthesia problems Neg Hx     Clotting disorder Neg Hx        Social History  Social History     Social History    Marital status:      Spouse name: N/A    Number of children: N/A    Years of education: N/A     Occupational History    Not on file.     Social History Main Topics    Smoking status: Former Smoker     Packs/day: 1.00     Years: 30.00     Start date: 2/23/1958     Quit date: 1/13/1988    Smokeless tobacco: Never Used    Alcohol use 1.8 oz/week     2 Glasses of wine, 1 Shots of liquor per week      Comment: occasional    Drug use: No    Sexual activity: Not on file     Other Topics Concern    Not on file     Social History Narrative    No narrative on file        Review of Systems  Constitutional: + weight loss, no change to appetite   Eyes: no change to vision, + double vision; no redness, no tearing  Ears, nose, mouth, throat: no hearing loss, no tinnitus, no rhinorrhea, no difficulty swallowing   Cardiovascular: no palpitations + chest pain   Respiratory: no shortness of breath, + cough  + wheezing   Gastrointestinal: no diarrhea, no constipation  Gu: incontinence   Musculoskeletal: no joint pain  Skin: + rashes  Neurologic: + numbness, + weakness,  change to speech, loss of coordination   Endocrine: no heat or + cold intolerance + fatigue   Heme: no night sweats, no easy bruising   Psych: no depression     Objective:        Vitals:    12/20/17 1310   BP: 134/75   Pulse: 80   Resp: 20     Body mass index is 42.67 kg/m².    General: Well developed, well nourished.  No acute distress.  HEENT: Atraumatic, normocephalic.  Neck: Trachea midline  Cardiovascular: Vitals reviewed. Normal peripheral perfusion.   Pulmonary: No increased work of breathing.  Abdomen/GI: No guarding  Musculoskeletal: normal tone in all four extremities. No atrophy. No abnormal movements. Gait is wide based, slow, and with assistance of cane.     Spine:   CERVICAL SPINE:  ROM: reduced lateral rotation bilaterally, normal extension and flexion   MUSCLE SPASM: + upper cervical paraspinals, facet tenderness bilaterally   FACET LOADING: + left  SPURLING: no  FRED / BALWINDER tender: no     Neurological exam:  Mental status: Awake and alert. Oriented to situation.  Speech fluent and appropriate. Recent and remote memory appear to be intact.  Fund of knowledge normal.  Cranial nerves: Pupils equal round, extraocular movements intact, facial strength intact bilaterally, hearing grossly intact bilaterally.  Sensation: intact to light touch; reduced to TEMP in the hands to mid-forearm and feet to knee bilaterally. Reduced PPC and vibration at the toes.     Data Review:     I have personally reviewed the referring provider's notes, labs, & imaging made available to me today.      10/30/17 X-ray cervical spine:  Moderate DDD at C6-7 level; multi-level NF stenosis, most at C3-4 level, no instability on flexion or extension     Lab Results   Component Value Date    BNP 3220 (H) 03/09/2016     04/18/2017    K 4.9 04/18/2017    MG 2.3 05/31/2016     04/18/2017    CO2 24 04/18/2017    BUN 27 (H) 04/18/2017    CREATININE 1.0 04/18/2017     04/18/2017    HGBA1C 6.2 (H) 11/15/2017    AST 22  04/18/2017    AST 28 03/09/2016    ALT 13 04/18/2017    ALBUMIN 3.5 04/18/2017    PROT 7.4 04/18/2017    BILITOT 0.6 04/18/2017    CHOL 167 04/18/2017    HDL 48 04/18/2017    LDLCALC 98.0 04/18/2017    TRIG 105 04/18/2017       Lab Results   Component Value Date    WBC 7.02 04/18/2017    HGB 13.1 04/18/2017    HCT 39.4 04/18/2017    MCV 92 04/18/2017     04/18/2017       Lab Results   Component Value Date    TSH 3.014 04/18/2017           Assessment & Plan:       Problem List Items Addressed This Visit        Neuro    Cervicogenic headache    Overview     Her headaches, I believe are cervicogenic secondary to presumed cervical facet arthropathy as she is tender in the facets with only a fair degree of muscle spasm. This is further supported by neck pain, headache, and dizziness when rotating the head. Mild facet arthropathy evident on cervical Xr. She has very many medical problems, and is on aspirin and warfarin for atrial fibrillation, CAD, and cardiomyopathy thus offering cervical medial branch blocks and RFA would be tricky here.    I would prefer to continue with a conservative approach of SNRI (cymbalta) since it has a musculoskeletal indication as well as a non acetylated salicylate as an NSAID to not aggravate her GERD and not raise her bleeding risk with concomitant ASA and warfarin.    Dolobid has worked well, but is Tier 4 on her list. Unfortunately salsalate and CMT are not covered at all. She would like to continue dolobid.          Relevant Medications    diflunisal (DOLOBID) 500 mg Tab    DULoxetine (CYMBALTA) 20 MG capsule       Hematology    Long term current use of anticoagulant therapy    Overview     Her multiple medical comorbities (HTN, heart, anticoag, Dm) will dramatically limit the treatments options I can offer to her.             Endocrine    Type 2 diabetes mellitus with diabetic polyneuropathy, without long-term current use of insulin    Overview     Her off balance sensation  while walking I believe is related to sensory ataxia from diabetic polyneuropathy as she does have evidence of proprioceptive involvement. Diabetes is stable.             Orthopedic    Cervicalgia    Relevant Medications    diflunisal (DOLOBID) 500 mg Tab    DULoxetine (CYMBALTA) 20 MG capsule    Myofascial pain syndrome, cervical    Overview     Her off balance sensation (rocking) while sitting I believe is more related to her neck rather than migraine, as it is really too short duration to be a migraine accompanied symptom and is not always associated with her headache. I think this is most likely related to spasm of muscles important for head proprioception or irritation of the third occipital nerve.     Improved on dolobid and cymbalta. Raise cymbalta.          Relevant Medications    diflunisal (DOLOBID) 500 mg Tab    DULoxetine (CYMBALTA) 20 MG capsule            TESTING:  -- none     PREVENTION (use daily regardless of headache):  -- RAISE duloxetine (cymbalta) from 20mg daily in the morning to 40mg daily in the morning    ACUTE TREATMENT:  -- resume using diflunisal (dolobid) 500mg tablet twice a day as needed for pain (anti-inflammatory that is safe for you to take with       Return in about 3 months (around 3/20/2018).    Regine Palma MD

## 2017-12-20 NOTE — PATIENT INSTRUCTIONS
TESTING:  -- none     PREVENTION (use daily regardless of headache):  -- RAISE duloxetine (cymbalta) from 20mg daily in the morning to 40mg daily in the morning    ACUTE TREATMENT:  -- resume using diflunisal (dolobid) 500mg tablet twice a day as needed for pain (anti-inflammatory that is safe for you to take with

## 2017-12-28 ENCOUNTER — ANTI-COAG VISIT (OUTPATIENT)
Dept: CARDIOLOGY | Facility: CLINIC | Age: 82
End: 2017-12-28
Payer: MEDICARE

## 2017-12-28 DIAGNOSIS — I48.19 PERSISTENT ATRIAL FIBRILLATION: ICD-10-CM

## 2017-12-28 DIAGNOSIS — Z79.01 LONG TERM CURRENT USE OF ANTICOAGULANT THERAPY: ICD-10-CM

## 2017-12-28 LAB — INR PPP: 2.9 (ref 2–3)

## 2017-12-28 PROCEDURE — G0250 MD INR TEST REVIE INTER MGMT: HCPCS | Mod: S$GLB,,, | Performed by: FAMILY MEDICINE

## 2018-01-03 ENCOUNTER — PATIENT OUTREACH (OUTPATIENT)
Dept: ADMINISTRATIVE | Facility: HOSPITAL | Age: 83
End: 2018-01-03

## 2018-01-03 PROBLEM — J44.1 ACUTE EXACERBATION OF CHRONIC OBSTRUCTIVE PULMONARY DISEASE (COPD): Status: ACTIVE | Noted: 2018-01-03

## 2018-01-03 PROBLEM — J44.1 COPD EXACERBATION: Status: ACTIVE | Noted: 2018-01-03

## 2018-01-03 PROBLEM — J96.01 ACUTE HYPOXEMIC RESPIRATORY FAILURE: Status: ACTIVE | Noted: 2018-01-03

## 2018-01-08 NOTE — PROGRESS NOTES
PT IN [facility] LAST WEEK, D/C 1/5 WITH MEDROL AND NAVEEN CASTILLO TO HOLD COUMADIN TODAY AND CHECK INR TOMORROW.  Please try to reach pt again tomorrow so she knows to check level

## 2018-01-10 ENCOUNTER — ANTI-COAG VISIT (OUTPATIENT)
Dept: CARDIOLOGY | Facility: CLINIC | Age: 83
End: 2018-01-10

## 2018-01-10 DIAGNOSIS — I48.19 PERSISTENT ATRIAL FIBRILLATION: ICD-10-CM

## 2018-01-10 DIAGNOSIS — Z79.01 LONG TERM CURRENT USE OF ANTICOAGULANT THERAPY: ICD-10-CM

## 2018-01-10 LAB — INR PPP: 2.1 (ref 2–3)

## 2018-01-10 NOTE — PROGRESS NOTES
Spoke with pt states she received her messages late last night she states she will check her level today.

## 2018-01-10 NOTE — PROGRESS NOTES
pt informed of dosing; she states she held on monday per clinic instructions; she states she has been taking 2.5mg everyday, but was in the hospital last thursday

## 2018-01-11 ENCOUNTER — PATIENT OUTREACH (OUTPATIENT)
Dept: ADMINISTRATIVE | Facility: HOSPITAL | Age: 83
End: 2018-01-11

## 2018-01-11 NOTE — PROGRESS NOTES
Portal outreach un-read by patient.  Outreach mailed today  Health Maintenance Due   Topic Date Due    DEXA SCAN  08/26/2017    Foot Exam  11/16/2017

## 2018-01-11 NOTE — LETTER
January 11, 2018    Miesha Obando  [address] [place] [id number]             [facility] [address] [place] [address]  Phone: [phone] Dear Ms. Obando:    We have tried to reach you by [facility] email unsuccessfully.      [facility] is committed to your overall health.  To help you get the most out of each of your visits, we will review your information to make sure you are up to date on all of your recommended tests and/or procedures.       Dr. España       has found that you may be due for:     Diabetic Foot Exam   Osteoporosis screening (DEXA SCAN)     If you have had any of the above done at another facility, please bring the records or information with you so that your record at [facility] will be complete.      If you are currently taking medication , please bring it with you to your appointment for review.     We appreciate the opportunity to provide you with excellent medical care.     Please call the number listed below if you have any questions.       If you have any questions or concerns, please don't hesitate to call.    Sincerely,  Oliva Deo  Clinical Care Coordinator  [facility] [address] [place] [address]  Phone: [phone]   Fax: [phone]

## 2018-01-15 ENCOUNTER — TELEPHONE (OUTPATIENT)
Dept: CARDIOLOGY | Facility: CLINIC | Age: 83
End: 2018-01-15

## 2018-01-15 ENCOUNTER — ANTI-COAG VISIT (OUTPATIENT)
Dept: CARDIOLOGY | Facility: CLINIC | Age: 83
End: 2018-01-15

## 2018-01-15 DIAGNOSIS — I48.19 PERSISTENT ATRIAL FIBRILLATION: ICD-10-CM

## 2018-01-15 DIAGNOSIS — Z79.01 LONG TERM CURRENT USE OF ANTICOAGULANT THERAPY: ICD-10-CM

## 2018-01-15 DIAGNOSIS — E11.9 DIABETES TYPE 2, CONTROLLED: ICD-10-CM

## 2018-01-15 LAB — INR PPP: 3 (ref 2–3)

## 2018-01-15 RX ORDER — ISOPROPYL ALCOHOL 0.75 G/1
SWAB TOPICAL
Qty: 200 EACH | Refills: 11 | Status: SHIPPED | OUTPATIENT
Start: 2018-01-15

## 2018-01-15 RX ORDER — BLOOD SUGAR DIAGNOSTIC
STRIP MISCELLANEOUS
Qty: 200 STRIP | Refills: 11 | Status: SHIPPED | OUTPATIENT
Start: 2018-01-15 | End: 2020-06-24 | Stop reason: SDUPTHER

## 2018-01-15 NOTE — TELEPHONE ENCOUNTER
----- Message from Sebastián Granda sent at 1/15/2018  1:08 PM CST -----  Contact: Brandy[facility].   Wants to report patient had an episode of chest pains this morning. Ambulance did come out. Call [phone] thanks!

## 2018-01-18 ENCOUNTER — OFFICE VISIT (OUTPATIENT)
Dept: FAMILY MEDICINE | Facility: CLINIC | Age: 83
End: 2018-01-18
Payer: MEDICARE

## 2018-01-18 ENCOUNTER — OFFICE VISIT (OUTPATIENT)
Dept: CARDIOLOGY | Facility: CLINIC | Age: 83
End: 2018-01-18
Payer: MEDICARE

## 2018-01-18 VITALS
SYSTOLIC BLOOD PRESSURE: 115 MMHG | HEART RATE: 62 BPM | BODY MASS INDEX: 43.82 KG/M2 | DIASTOLIC BLOOD PRESSURE: 78 MMHG | HEIGHT: 62 IN | WEIGHT: 238.13 LBS

## 2018-01-18 VITALS
HEIGHT: 62 IN | HEART RATE: 89 BPM | DIASTOLIC BLOOD PRESSURE: 78 MMHG | BODY MASS INDEX: 43.82 KG/M2 | SYSTOLIC BLOOD PRESSURE: 115 MMHG | TEMPERATURE: 98 F | WEIGHT: 238.13 LBS

## 2018-01-18 DIAGNOSIS — I70.0 AORTIC ATHEROSCLEROSIS: ICD-10-CM

## 2018-01-18 DIAGNOSIS — Z95.5 S/P CORONARY ARTERY STENT PLACEMENT: ICD-10-CM

## 2018-01-18 DIAGNOSIS — Z78.0 POSTMENOPAUSAL: ICD-10-CM

## 2018-01-18 DIAGNOSIS — I42.0 DILATED CARDIOMYOPATHY: ICD-10-CM

## 2018-01-18 DIAGNOSIS — Z95.2 S/P TAVR (TRANSCATHETER AORTIC VALVE REPLACEMENT): ICD-10-CM

## 2018-01-18 DIAGNOSIS — I48.20 CHRONIC ATRIAL FIBRILLATION: ICD-10-CM

## 2018-01-18 DIAGNOSIS — I10 ESSENTIAL HYPERTENSION: ICD-10-CM

## 2018-01-18 DIAGNOSIS — J44.1 ACUTE EXACERBATION OF CHRONIC OBSTRUCTIVE PULMONARY DISEASE (COPD): ICD-10-CM

## 2018-01-18 DIAGNOSIS — E66.01 MORBID OBESITY DUE TO EXCESS CALORIES: ICD-10-CM

## 2018-01-18 DIAGNOSIS — Z79.01 LONG TERM CURRENT USE OF ANTICOAGULANT THERAPY: ICD-10-CM

## 2018-01-18 DIAGNOSIS — E11.42 TYPE 2 DIABETES MELLITUS WITH DIABETIC POLYNEUROPATHY, WITHOUT LONG-TERM CURRENT USE OF INSULIN: Primary | ICD-10-CM

## 2018-01-18 DIAGNOSIS — I25.10 CORONARY ARTERY DISEASE INVOLVING NATIVE CORONARY ARTERY OF NATIVE HEART WITHOUT ANGINA PECTORIS: ICD-10-CM

## 2018-01-18 DIAGNOSIS — Z95.810 AICD (AUTOMATIC CARDIOVERTER/DEFIBRILLATOR) PRESENT: ICD-10-CM

## 2018-01-18 DIAGNOSIS — J42 CHRONIC BRONCHITIS, UNSPECIFIED CHRONIC BRONCHITIS TYPE: ICD-10-CM

## 2018-01-18 PROBLEM — T23.212A BURN OF SECOND DEGREE OF LEFT THUMB (NAIL), INITIAL ENCOUNTER: Status: RESOLVED | Noted: 2017-09-20 | Resolved: 2018-01-18

## 2018-01-18 PROBLEM — J96.01 ACUTE HYPOXEMIC RESPIRATORY FAILURE: Status: RESOLVED | Noted: 2018-01-03 | Resolved: 2018-01-18

## 2018-01-18 PROCEDURE — 99999 PR PBB SHADOW E&M-EST. PATIENT-LVL III: CPT | Mod: PBBFAC,,, | Performed by: FAMILY MEDICINE

## 2018-01-18 PROCEDURE — 99499 UNLISTED E&M SERVICE: CPT | Mod: S$GLB,,, | Performed by: INTERNAL MEDICINE

## 2018-01-18 PROCEDURE — 99214 OFFICE O/P EST MOD 30 MIN: CPT | Mod: S$GLB,,, | Performed by: FAMILY MEDICINE

## 2018-01-18 PROCEDURE — 99214 OFFICE O/P EST MOD 30 MIN: CPT | Mod: S$GLB,,, | Performed by: INTERNAL MEDICINE

## 2018-01-18 PROCEDURE — 99999 PR PBB SHADOW E&M-EST. PATIENT-LVL III: CPT | Mod: PBBFAC,,, | Performed by: INTERNAL MEDICINE

## 2018-01-18 PROCEDURE — 99499 UNLISTED E&M SERVICE: CPT | Mod: S$GLB,,, | Performed by: FAMILY MEDICINE

## 2018-01-18 RX ORDER — FUROSEMIDE 20 MG/1
20 TABLET ORAL
COMMUNITY
End: 2018-08-15 | Stop reason: SDUPTHER

## 2018-01-18 NOTE — PROGRESS NOTES
Subjective:    Patient ID:  Miesha Obando is a 82 y.o. female who presents for follow-up of Hypertension (hosp dc - sob); Hyperlipidemia; Shortness of Breath; COPD; and Congestive Heart Failure      Pt has been fighting sinus and upper respiratory problems for about 2 months. She was in the hospital 2 weeks ago and was treated for bronchitis and COPD exacerbation and given a round of antibiotic and steroids. She had a repeat Echo which was unchanged from previous. Today she has continued congestion with cough with yellow sputum production and reports chills and suspects a fever. Her weight has been stable and by our scale considering wearing a winter coat appears not significantly changed from July.         Review of Systems   Constitution: Negative for weight gain and weight loss.   HENT: Negative.    Eyes: Negative.    Cardiovascular: Positive for chest pain (with cough). Negative for claudication, cyanosis, dyspnea on exertion, irregular heartbeat, leg swelling, near-syncope, orthopnea (no PND), palpitations and syncope.   Respiratory: Positive for cough, shortness of breath and sputum production. Negative for hemoptysis and snoring.    Endocrine: Negative.    Skin: Negative.    Musculoskeletal: Negative for joint pain, muscle cramps, muscle weakness and myalgias.   Gastrointestinal: Negative for diarrhea, hematemesis, nausea and vomiting.   Genitourinary: Negative.    Neurological: Negative for dizziness, focal weakness, light-headedness, loss of balance, numbness, paresthesias and seizures.   Psychiatric/Behavioral: Negative.         Objective:    Physical Exam   Constitutional: She is oriented to person, place, and time. She appears well-developed and well-nourished.   Eyes: Pupils are equal, round, and reactive to light.   Neck: Normal range of motion. No thyromegaly present.   Cardiovascular: Normal rate, regular rhythm, S1 normal, S2 normal, intact distal pulses and normal pulses.   No extrasystoles are  present. PMI is not displaced.  Exam reveals no friction rub.    Murmur heard.   Medium-pitched harsh systolic murmur is present with a grade of 1/6  at the upper right sternal border  Pulmonary/Chest: Effort normal and breath sounds normal. She has no wheezes. She has no rales. She exhibits no tenderness.   Abdominal: Soft. Bowel sounds are normal. She exhibits no distension and no mass. There is no tenderness.   Musculoskeletal: Normal range of motion. She exhibits no edema.   Neurological: She is alert and oriented to person, place, and time.   Skin: Skin is warm and dry.   Vitals reviewed.      Test(s) Reviewed  I have reviewed the following in detail:  [] Stress test   [] Angiography   [x] Echocardiogram   [] Labs   [] Other:         Assessment:       1. Dilated cardiomyopathy    2. Coronary artery disease involving native coronary artery of native heart without angina pectoris    3. S/P coronary artery stent placement - LILLIAM to RCA 08/19/2015    4. 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 Chronic atrial fibrillation    6. AICD (automatic cardioverter/defibrillator) present - Bi-V    7. Acute exacerbation of chronic obstructive pulmonary disease (COPD)    8. Chronic bronchitis, unspecified chronic bronchitis type    9. Long term current use of anticoagulant therapy         Plan:       Pt by exam does not appear to be in decompensated HF.   Not significantly fluid overloaded.  Echo from 01/03 not significantly changed  Appears to all be upper respiratory and c/w continued bronchitis  Will not make any changed in meds at present  She has appointment today in primary care and In future with pulmonary.

## 2018-01-18 NOTE — PROGRESS NOTES
Subjective:       Patient ID: Miesha Obando is a 82 y.o. female.    Chief Complaint: Follow-up and Medication Management    Here for f/u chronic medical issues. States doing well overall.      Hypertension   This is a chronic problem. The current episode started more than 1 year ago. The problem is controlled. Pertinent negatives include no chest pain, palpitations or shortness of breath.   Hyperlipidemia   This is a chronic problem. The current episode started more than 1 year ago. Pertinent negatives include no chest pain or shortness of breath.   Diabetes   She presents for her follow-up diabetic visit. She has type 2 diabetes mellitus. Her disease course has been improving. Pertinent negatives for hypoglycemia include no nervousness/anxiousness. Pertinent negatives for diabetes include no chest pain and no fatigue.     Review of Systems   Constitutional: Negative for chills, fatigue and fever.   Respiratory: Negative for cough, chest tightness and shortness of breath.    Cardiovascular: Negative for chest pain, palpitations and leg swelling.   Gastrointestinal: Negative for abdominal distention and abdominal pain.   Endocrine: Negative for cold intolerance and heat intolerance.   Skin: Negative for rash.   Psychiatric/Behavioral: Negative for dysphoric mood. The patient is not nervous/anxious.        Objective:      Physical Exam   Constitutional: She appears well-developed and well-nourished.   HENT:   Head: Normocephalic and atraumatic.   Cardiovascular: Normal rate, regular rhythm and normal heart sounds.    Pulmonary/Chest: Effort normal and breath sounds normal.   Abdominal: Soft. Bowel sounds are normal.   Psychiatric: She has a normal mood and affect.   Nursing note and vitals reviewed.      Assessment:       1. Type 2 diabetes mellitus with diabetic polyneuropathy, without long-term current use of insulin    2. Essential hypertension    3. Aortic atherosclerosis    4. Dilated cardiomyopathy    5.  Morbid obesity due to excess calories    6. Postmenopausal        Plan:       Type 2 diabetes mellitus with diabetic polyneuropathy, without long-term current use of insulin    Essential hypertension    Aortic atherosclerosis    Dilated cardiomyopathy    Morbid obesity due to excess calories    Postmenopausal  -     DXA Bone Density Spine And Hip; Future; Expected date: 01/18/2018      hga1c at goal  Saw cardiology today with good check up.  F/u with pulm as planned.  Will monitor chronic medical issues and continue current plan of care.      Follow-up in about 4 months (around 5/18/2018), or if symptoms worsen or fail to improve.

## 2018-01-19 ENCOUNTER — TELEPHONE (OUTPATIENT)
Dept: ADMINISTRATIVE | Facility: HOSPITAL | Age: 83
End: 2018-01-19

## 2018-01-22 ENCOUNTER — TELEPHONE (OUTPATIENT)
Dept: NEUROLOGY | Facility: CLINIC | Age: 83
End: 2018-01-22

## 2018-01-22 ENCOUNTER — TELEPHONE (OUTPATIENT)
Dept: FAMILY MEDICINE | Facility: CLINIC | Age: 83
End: 2018-01-22

## 2018-01-22 ENCOUNTER — ANTI-COAG VISIT (OUTPATIENT)
Dept: CARDIOLOGY | Facility: CLINIC | Age: 83
End: 2018-01-22

## 2018-01-22 DIAGNOSIS — J40 BRONCHITIS: ICD-10-CM

## 2018-01-22 DIAGNOSIS — I48.20 CHRONIC ATRIAL FIBRILLATION: ICD-10-CM

## 2018-01-22 DIAGNOSIS — Z79.01 LONG TERM CURRENT USE OF ANTICOAGULANT THERAPY: ICD-10-CM

## 2018-01-22 LAB — INR PPP: 2.6 (ref 2–3)

## 2018-01-22 RX ORDER — ALBUTEROL SULFATE 0.83 MG/ML
2.5 SOLUTION RESPIRATORY (INHALATION) EVERY 6 HOURS PRN
Qty: 90 ML | Refills: 0 | Status: SHIPPED | OUTPATIENT
Start: 2018-01-22 | End: 2019-01-22

## 2018-01-22 NOTE — TELEPHONE ENCOUNTER
Nurse reported a 3 pound weight gain, states she takes 20 mg demadex and lasix PRN, nurse wants to know if you want the patient to take lasix and for how long.

## 2018-01-22 NOTE — TELEPHONE ENCOUNTER
----- Message from Mundo Garcia sent at 1/22/2018  2:45 PM CST -----  Contact: same  Patient called in and requested a message be sent over regarding 2 medications that are too expensive for her and interferes with patients coumadin.      diflunisal (DOLOBID) 500 mg Tab and the other was an antidepressant (did not have name).    Patient would like to see if a replacement can be sent over for these 2 medications.    Patient call back number is [phone]

## 2018-01-22 NOTE — TELEPHONE ENCOUNTER
----- Message from Estelle Lazcano sent at 1/22/2018 11:24 AM CST -----  Contact: Chapis with [facility]   Chapis with [facility] want to add Heart failure to plan of care and wants patient to notified of 2 pounds weight gain  over night or 5 pounds over a week     Chapis with [facility] needs Dr gordon on this   [phone]

## 2018-01-22 NOTE — TELEPHONE ENCOUNTER
Spoke with patient in regards to shoulder pain, states it is arthritis. Will call Dr Palma's office who saw her for this to get alternative medication as the medication Rx is too expensive.

## 2018-01-22 NOTE — TELEPHONE ENCOUNTER
Home health wants to add heart failure to patients POC with directions to report weight gain. Are you agreeable to this.

## 2018-01-22 NOTE — TELEPHONE ENCOUNTER
----- Message from Selam Isaacs sent at 1/22/2018 11:57 AM CST -----  Contact: Nancie jacobson/ [facility]  Nancie jacobson/ [facility] calling about the patient reporting a pain to left shoulder that is not relieved by Tylenol. The pain is 10 to 10. She also had a weight gain of 3 pounds from yesterday. Please advise.  Call back .  Thanks!

## 2018-01-23 ENCOUNTER — TELEPHONE (OUTPATIENT)
Dept: CARDIOLOGY | Facility: CLINIC | Age: 83
End: 2018-01-23

## 2018-01-23 NOTE — TELEPHONE ENCOUNTER
----- Message from Liz Baires sent at 1/23/2018 11:35 AM CST -----  Contact: Nancie Alvarado  nurse called regarding patient 3lb pound weight gain between Mon-Wed and order verification. Please contact Nancie [phone]

## 2018-01-23 NOTE — TELEPHONE ENCOUNTER
Spoke with Dr. Gonzales RBVO DR. Gonzales take an extra dose of lasix today. Dr. Gonzales/MIKAL Rae  Advised [facility]

## 2018-01-29 ENCOUNTER — ANTI-COAG VISIT (OUTPATIENT)
Dept: CARDIOLOGY | Facility: CLINIC | Age: 83
End: 2018-01-29
Payer: MEDICARE

## 2018-01-29 ENCOUNTER — TELEPHONE (OUTPATIENT)
Dept: GASTROENTEROLOGY | Facility: CLINIC | Age: 83
End: 2018-01-29

## 2018-01-29 DIAGNOSIS — Z79.01 LONG TERM CURRENT USE OF ANTICOAGULANT THERAPY: ICD-10-CM

## 2018-01-29 DIAGNOSIS — I48.20 CHRONIC ATRIAL FIBRILLATION: ICD-10-CM

## 2018-01-29 LAB — INR PPP: 2.3 (ref 2–3)

## 2018-01-29 PROCEDURE — G0250 MD INR TEST REVIE INTER MGMT: HCPCS | Mod: S$GLB,,, | Performed by: FAMILY MEDICINE

## 2018-01-29 NOTE — PROGRESS NOTES
Per message today    Pt has been scheduled for an EGD on 2/8 at [facility]. Pt will need to hold coumadin 4 days prior. Please advise.      Thank you,  Beatriz

## 2018-01-29 NOTE — TELEPHONE ENCOUNTER
----- Message from Liz Baires sent at 1/29/2018 10:01 AM CST -----  Contact: Danielle Seiber [facility]  nurse called regarding the patient has increased reflux. When she swallows, she can feel the food get stuck and wanted to schedule an appt as soon as possible. Please contact [phone]

## 2018-01-29 NOTE — TELEPHONE ENCOUNTER
"Returned call and spoke with patient. Informed her per Dr. Palma, "There are no other options which will not carry the same risks with coumadin other than Tylenol. Dolobid (the expensive one) is the safest option that I could think for her, besides Tyelnol while being on coumadin. If her doctor has a problem with these medications, then I suggest we trial cervical medial branch blocks which may potentially eliminate need for medications." Patient would like to proceed with a Cervical medial branch block. Please advise on date.   "

## 2018-01-29 NOTE — TELEPHONE ENCOUNTER
Hi,    Pt has been scheduled for an EGD on 2/8 at [facility]. Pt will need to hold coumadin 4 days prior. Please advise.     Thank you,  Beatriz

## 2018-01-30 NOTE — TELEPHONE ENCOUNTER
Called and spoke with patient. Appointment scheduled. Directions given. Patient verbalized understanding.

## 2018-02-08 PROBLEM — R13.10 DYSPHAGIA: Status: ACTIVE | Noted: 2018-02-08

## 2018-02-09 ENCOUNTER — TELEPHONE (OUTPATIENT)
Dept: GASTROENTEROLOGY | Facility: CLINIC | Age: 83
End: 2018-02-09

## 2018-02-09 NOTE — TELEPHONE ENCOUNTER
S/w pt she is complaining of slight stomach discomfort since the procedure yesterday. I explained to that is not uncommon after having an EGD that is should subside within the next few days. I recommended she take Gaviscon or mylanta to help coat her stomach. I also advised pt if symptoms get worse or persist to go to ER. Pt verbalized understanding.

## 2018-02-09 NOTE — TELEPHONE ENCOUNTER
----- Message from Maia Marie sent at 2/9/2018 10:32 AM CST -----  Contact: Patient  Miesha, patient [phone], calling to speak to the office regarding pain since procedure yesterday. Abdominal and back areas. Please advise. Thanks.

## 2018-02-16 ENCOUNTER — ANTI-COAG VISIT (OUTPATIENT)
Dept: CARDIOLOGY | Facility: CLINIC | Age: 83
End: 2018-02-16

## 2018-02-16 DIAGNOSIS — I48.20 CHRONIC ATRIAL FIBRILLATION: ICD-10-CM

## 2018-02-16 DIAGNOSIS — Z79.01 LONG TERM CURRENT USE OF ANTICOAGULANT THERAPY: ICD-10-CM

## 2018-02-16 LAB — INR PPP: 2.7 (ref 2–3)

## 2018-02-19 ENCOUNTER — CLINICAL SUPPORT (OUTPATIENT)
Dept: CARDIOLOGY | Facility: CLINIC | Age: 83
End: 2018-02-19
Attending: INTERNAL MEDICINE
Payer: MEDICARE

## 2018-02-19 DIAGNOSIS — Z95.0 PACEMAKER: Primary | ICD-10-CM

## 2018-02-19 DIAGNOSIS — I50.40 COMBINED SYSTOLIC AND DIASTOLIC CONGESTIVE HEART FAILURE, NYHA CLASS 3, UNSPECIFIED CONGESTIVE HEART FAILURE CHRONICITY: ICD-10-CM

## 2018-02-19 DIAGNOSIS — Z95.0 PACEMAKER: ICD-10-CM

## 2018-02-19 PROCEDURE — 93294 REM INTERROG EVL PM/LDLS PM: CPT | Mod: S$GLB,,, | Performed by: INTERNAL MEDICINE

## 2018-02-19 PROCEDURE — 93296 REM INTERROG EVL PM/IDS: CPT | Mod: S$GLB,,, | Performed by: INTERNAL MEDICINE

## 2018-02-20 ENCOUNTER — TELEPHONE (OUTPATIENT)
Dept: GASTROENTEROLOGY | Facility: CLINIC | Age: 83
End: 2018-02-20

## 2018-02-20 NOTE — TELEPHONE ENCOUNTER
----- Message from Selam Isaacs sent at 2/20/2018 12:27 PM CST -----  Contact: patient  Type:  Test Results    Who Called:  patient  Name of Test (Lab/Mammo/Etc):  Endoscopy  Date of Test: 2/8/18  Ordering Provider: Dr Olivas  Where the test was performed: [facility]  Best Call Back Number:   Additional Information:  Call to pod, no answer

## 2018-02-20 NOTE — TELEPHONE ENCOUNTER
Reviewed EGD results with pt. She is aware.  Dr. Olivas will be mailing her a result letter. Pt verbalized understanding of all.

## 2018-02-22 ENCOUNTER — OFFICE VISIT (OUTPATIENT)
Dept: NEUROLOGY | Facility: CLINIC | Age: 83
End: 2018-02-22
Payer: MEDICARE

## 2018-02-22 VITALS
BODY MASS INDEX: 44.53 KG/M2 | RESPIRATION RATE: 16 BRPM | WEIGHT: 235.88 LBS | DIASTOLIC BLOOD PRESSURE: 85 MMHG | HEIGHT: 61 IN | HEART RATE: 97 BPM | SYSTOLIC BLOOD PRESSURE: 139 MMHG

## 2018-02-22 DIAGNOSIS — G44.86 CERVICOGENIC HEADACHE: Primary | ICD-10-CM

## 2018-02-22 DIAGNOSIS — Z79.01 LONG TERM CURRENT USE OF ANTICOAGULANT THERAPY: ICD-10-CM

## 2018-02-22 DIAGNOSIS — M47.812 SPONDYLOSIS OF CERVICAL REGION WITHOUT MYELOPATHY OR RADICULOPATHY: ICD-10-CM

## 2018-02-22 PROCEDURE — 99214 OFFICE O/P EST MOD 30 MIN: CPT | Mod: S$GLB,,, | Performed by: PSYCHIATRY & NEUROLOGY

## 2018-02-22 PROCEDURE — 99999 PR PBB SHADOW E&M-EST. PATIENT-LVL IV: CPT | Mod: PBBFAC,,, | Performed by: PSYCHIATRY & NEUROLOGY

## 2018-02-22 PROCEDURE — 1159F MED LIST DOCD IN RCRD: CPT | Mod: S$GLB,,, | Performed by: PSYCHIATRY & NEUROLOGY

## 2018-02-22 PROCEDURE — 3008F BODY MASS INDEX DOCD: CPT | Mod: S$GLB,,, | Performed by: PSYCHIATRY & NEUROLOGY

## 2018-02-22 PROCEDURE — 1125F AMNT PAIN NOTED PAIN PRSNT: CPT | Mod: S$GLB,,, | Performed by: PSYCHIATRY & NEUROLOGY

## 2018-02-22 PROCEDURE — 99499 UNLISTED E&M SERVICE: CPT | Mod: S$GLB,,, | Performed by: PSYCHIATRY & NEUROLOGY

## 2018-02-22 NOTE — PROGRESS NOTES
"Date of service: 2/22/2018  Referring provider: No ref. provider found    Subjective:      Chief complaint: Neck Pain       Patient ID: Miesha Obando is a 82 y.o. lady with cervicogenic headaches, dizziness, coronary artery disease, atrial fibrillation on anticoagulation, hypertension, cardiomyopathy (EF 30-35%), pacemaker, COPD, diabetes, who is presenting for the follow up of headaches and dizziness.     History of Present Illness    INTERVAL HISTORY - 2/22/18     Today she reports her headaches are largely unchanged. She is having a hard time sleeping due to the pain. The pain starts in left neck and radiates to the posterior head. Her current pain score is 7 with a range from 2 to 10.     She sent back the duloxetine and dolobid as they were cost prohibitive so she is just using tylenol at this time.     INTERVAL HISTORY - 12/20/17     Seen 2 months ago at which point I recommended duloxetine for prevention and dolobid for acute relief. She reports that both of these medications helped a great deal when she was taking them. She ran out of the Dolobid, and she thinks she ran out of duloxetine as well.     Today she reports she is about the same the headache is in the forehead and back of had. Her current pain score is 8, with a range from 2 to 10.     ORIGINAL HEADACHE HISTORY - 10/27/17   Age at onset and course over time: about a couple of years (2015), she thinks she "lives" with a headache, sometimes more severe than others   Location: neck, occiput, vertex   Quality: pressure, sharp   Severity: current 6, range is 4-10  Duration: constant, with escalations for 30-40 min   Frequency: daily   Alleviated by: heat   Exacerbated by: stress, change weather; turning head   Associated with: scalp allodynia; blurred vision, double vision, dizziness, vertigo, anger  Sleep habits: wakes up and can't get back to sleeo   Caffeine intake: 1-2     Dizziness: feeling of off balance while walking, can also feel off balance " while sitting (not spinning, more like a boat swaying); happens 2-3 times a week; lasts 15 min. Sometimes associated with double vision but sometimes double vision by itself. Side by side, lasts seconds to minutes. Will pass if she keeps blinking. Associated with word finding difficulty. No slurred speech. No visual aura. No paralysis.     Current acute treatment:  -- tylenol   (ASA)  (warfarin)    Current prevention:  -- metoprolol XL 50mg daily  (losartan)  (trazodone - does not help her sleep)  (melatonin)    Previously tried/failed acute treatment:  -- dolobid 500mg BID- worked well, but ran out   -- tylenol or tylenol PM- ineffective   -- naproxen    Previously tried/failed preventative treatment:  -- duloxetine 20mg daily - stopped taking   -- lisinopril     Review of patient's allergies indicates:   Allergen Reactions    Amoxicillin-pot clavula (bulk) Nausea And Vomiting    Sulfa (sulfonamide antibiotics) Hives    Adhesive Rash     Use paper tape     Current Outpatient Prescriptions   Medication Sig Dispense Refill    ACCU-CHEK FASTCLIX Misc TEST TWICE DAILY 200 each 11    ACCU-CHEK SMARTVIEW TEST STRIP Strp TEST  TWICE DAILY 200 strip 11    acetaminophen (TYLENOL) 325 MG tablet Take 325 mg by mouth as needed for Pain or Temperature greater than.       albuterol (PROVENTIL) 2.5 mg /3 mL (0.083 %) nebulizer solution Take 3 mLs (2.5 mg total) by nebulization every 6 (six) hours as needed for Wheezing. Rescue 90 mL 0    ascorbic acid (VITAMIN C) 500 MG tablet Take 500 mg by mouth once daily.        aspirin (ECOTRIN) 81 MG EC tablet Take 81 mg by mouth once daily.      b complex vitamins tablet Take 1 tablet by mouth once daily.        BD ALCOHOL SWABS PadM USE TWICE DAILY 200 each 11    diflunisal (DOLOBID) 500 mg Tab Take 1 tablet (500 mg total) by mouth 2 (two) times daily as needed (pain). 180 tablet 2    fish oil-omega-3 fatty acids 300-1,000 mg capsule Take 2 g by mouth once daily.       fluticasone-vilanterol (BREO ELLIPTA) 200-25 mcg/dose DsDv diskus inhaler Inhale 1 puff into the lungs once daily. 3 each 3    furosemide (LASIX) 20 MG tablet Take 20 mg by mouth as needed.      levothyroxine (SYNTHROID) 100 MCG tablet TAKE 1 TABLET ONE TIME DAILY 90 tablet 1    losartan (COZAAR) 25 MG tablet TAKE 1 TABLET EVERY DAY 90 tablet 1    metoprolol succinate (TOPROL-XL) 50 MG 24 hr tablet TAKE 1 TABLET EVERY DAY (DISCONTINUE METOPROLOL ER 25MG) 90 tablet 3    nitroGLYCERIN (NITROSTAT) 0.4 MG SL tablet Place 1 tablet (0.4 mg total) under the tongue every 5 (five) minutes as needed for Chest pain. 20 tablet 0    ranitidine (ZANTAC) 300 MG tablet Take 300 mg by mouth every evening.      spironolactone (ALDACTONE) 25 MG tablet TAKE 1 TABLET ONE TIME DAILY 90 tablet 3    torsemide (DEMADEX) 20 MG Tab Take 1 tablet (20 mg total) by mouth once daily. 30 tablet 0    warfarin (COUMADIN) 2.5 MG tablet Take 2.5 mg by mouth once daily.      pantoprazole (PROTONIX) 20 MG tablet Take 1 tablet (20 mg total) by mouth 2 (two) times daily before meals. 28 tablet 0     No current facility-administered medications for this visit.        Past Medical History  Past Medical History:   Diagnosis Date    Anticoagulant long-term use     Arthritis     Atrial fibrillation     Breast cancer 1994    breast , left    Cardiomyopathy - EF 35-40% 5/11/2016    CHF (congestive heart failure)     Chronic bronchitis     COPD (chronic obstructive pulmonary disease)     Coronary artery disease without angina pectoris 8/26/2015    Depression     Diabetes with neurologic complications     Diverticulitis     Diverticulosis     Encounter for blood transfusion     after mastectomy x 20 years ago    Former smoker     GERD (gastroesophageal reflux disease)     H/O aortic valve replacement     Hiatal hernia     History of colonic diverticulitis     Hypertension     Hypothyroid     Irritable bowel syndrome     Obesity      Pacemaker 08/2014    Schatzki's ring     mild    Sleep apnea     Syncope and collapse     Trouble in sleeping     Type II or unspecified type diabetes mellitus without mention of complication, not stated as uncontrolled     No medications at present.     Urinary incontinence        Past Surgical History  Past Surgical History:   Procedure Laterality Date    ADENOIDECTOMY      APPENDECTOMY      BACK SURGERY      Discectomy, lumbar    BREAST SURGERY  1994    left side , mastectomy    CARDIAC PACEMAKER PLACEMENT      CARDIAC VALVE SURGERY      aortic valve replacement    CHOLECYSTECTOMY      COLONOSCOPY  2/2014    repeat in 10 years for screening    EYE SURGERY      bilateral PHACO and IOL    HYSTERECTOMY      ovaries spared    internal recording device (loop)      Loop, removed    PM      TOE SURGERY Right     TONSILLECTOMY      UPPER GASTROINTESTINAL ENDOSCOPY  2012       Family History  Family History   Problem Relation Age of Onset    Parkinsonism Mother     Emphysema Father     Heart disease Brother     Heart attack Brother     Heart failure Brother     Heart disease Brother     Heart failure Brother     Heart disease Brother     Heart failure Brother     Arrhythmia Brother     Anesthesia problems Neg Hx     Clotting disorder Neg Hx        Social History  Social History     Social History    Marital status:      Spouse name: N/A    Number of children: N/A    Years of education: N/A     Occupational History    Not on file.     Social History Main Topics    Smoking status: Former Smoker     Packs/day: 1.00     Years: 30.00     Start date: 2/23/1958     Quit date: 1/13/1988    Smokeless tobacco: Never Used    Alcohol use 1.8 oz/week     2 Glasses of wine, 1 Shots of liquor per week      Comment: occasional    Drug use: No    Sexual activity: Not on file     Other Topics Concern    Not on file     Social History Narrative    No narrative on file        Review of  Systems  Constitutional: + weight loss, no change to appetite   Eyes: no change to vision, + double vision; no redness, no tearing  Ears, nose, mouth, throat: no hearing loss, + tinnitus, no rhinorrhea, no difficulty swallowing   Cardiovascular: no palpitations + chest pain   Respiratory: no shortness of breath, + cough   Gastrointestinal: no diarrhea, no constipation  Gu: incontinence   Musculoskeletal: no joint pain  Skin: no rashes  Neurologic: no  numbness, no weakness, change to speech, loss of coordination   Endocrine: no heat or + cold intolerance  Heme: no night sweats, no easy bruising   Psych: + depression     Objective:        Vitals:    02/22/18 1508   BP: 139/85   Pulse: 97   Resp: 16     Body mass index is 44.57 kg/m².    General: Well developed, well nourished.  No acute distress.  HEENT: Atraumatic, normocephalic.  Neck: Trachea midline  Cardiovascular: Vitals reviewed. Normal peripheral perfusion.   Pulmonary: No increased work of breathing.  Abdomen/GI: No guarding  Musculoskeletal: normal tone in all four extremities. No atrophy. No abnormal movements. Gait is wide based, slow, and with assistance of cane.     Spine:   CERVICAL SPINE:  ROM: reduced lateral rotation bilaterally, normal extension and flexion   MUSCLE SPASM: + upper cervical paraspinals, facet tenderness bilaterally   FACET LOADING: + left  SPURLING: no  FRED / BALWINDER tender: no     Neurological exam:  Mental status: Awake and alert. Oriented to situation.  Speech fluent and appropriate. Recent and remote memory appear to be intact.  Fund of knowledge normal.  Cranial nerves: Pupils equal round, extraocular movements intact, facial strength intact bilaterally, hearing grossly intact bilaterally.  Sensation: intact to light touch; reduced to TEMP in the hands to mid-forearm and feet to knee bilaterally. Reduced PPC and vibration at the toes.     Data Review:     I have personally reviewed the referring provider's notes, labs, & imaging  made available to me today.      10/30/17 X-ray cervical spine:  Moderate DDD at C6-7 level; multi-level NF stenosis, most at C3-4 level, no instability on flexion or extension     Lab Results   Component Value Date    BNP 3220 (H) 03/09/2016     01/31/2018    K 5.5 (H) 01/31/2018    MG 2.0 01/05/2018     01/31/2018    CO2 29 01/31/2018    BUN 39 (H) 01/31/2018    CREATININE 1.63 (H) 01/31/2018     (H) 01/31/2018    HGBA1C 6.6 (H) 01/03/2018    AST 28 01/31/2018    AST 28 03/09/2016    ALT 28 01/31/2018    ALBUMIN 4.1 01/31/2018    PROT 7.8 01/31/2018    BILITOT 0.4 01/31/2018    CHOL 167 04/18/2017    HDL 48 04/18/2017    LDLCALC 98.0 04/18/2017    TRIG 105 04/18/2017       Lab Results   Component Value Date    WBC 8.18 01/31/2018    HGB 13.4 01/31/2018    HCT 41.3 01/31/2018    MCV 96 01/31/2018     01/31/2018       Lab Results   Component Value Date    TSH 3.014 04/18/2017           Assessment & Plan:       Problem List Items Addressed This Visit        Neuro    Cervicogenic headache - Primary    Overview     Her headaches, I believe are cervicogenic secondary to presumed cervical facet arthropathy as she is tender in the facets with only a fair degree of muscle spasm. This is further supported by neck pain, headache, and dizziness when rotating the head. Mild facet arthropathy evident on cervical Xr. She has very many medical problems, and is on aspirin and warfarin for atrial fibrillation, CAD, and cardiomyopathy thus offering cervical medial branch blocks and RFA would be tricky here but we have exhausteed medical options - cymbalta not helpful and safe NSAIDs (from a bleeding standpoint) are cost prohibitive to her or not covered on her insurance.    Given persistent facet loading on left we will proceed with diagnostic CMBB if patient can hold coumadin for 5 days. Will clarify with Dr Lombardo if patient can receive moderate sedation         Spondylosis of cervical region without  myelopathy or radiculopathy    Overview     Left facet loading, reproduced headache. Proceed with diagnostic CMBB            Hematology    Long term current use of anticoagulant therapy    Overview     Her multiple medical comorbities (HTN, heart, anticoag, Dm) will dramatically limit the treatments options I can offer to her thus we are proceeding with injections                  TESTING:  -- none     PREVENTION (use daily regardless of headache):  -- schedule diagnostic cervical medial branch block of the left C2-3-4-5 and third occipital nerve in the surgery center. Patient will need to hold warfarin for 5 days before. Plan for 3/22 tentatively.   -- if >50% relief then will pursue RFA of same nervs    ACUTE TREATMENT:  -- tylenol       No Follow-up on file. after procedure     Regine Palma MD

## 2018-02-23 ENCOUNTER — ANTI-COAG VISIT (OUTPATIENT)
Dept: CARDIOLOGY | Facility: CLINIC | Age: 83
End: 2018-02-23

## 2018-02-23 DIAGNOSIS — I48.20 CHRONIC ATRIAL FIBRILLATION: ICD-10-CM

## 2018-02-23 DIAGNOSIS — Z79.01 LONG TERM CURRENT USE OF ANTICOAGULANT THERAPY: ICD-10-CM

## 2018-02-23 DIAGNOSIS — M47.812 FACET ARTHROPATHY, CERVICAL: Primary | ICD-10-CM

## 2018-02-23 LAB — INR PPP: 2.7 (ref 2–3)

## 2018-02-28 ENCOUNTER — TELEPHONE (OUTPATIENT)
Dept: FAMILY MEDICINE | Facility: CLINIC | Age: 83
End: 2018-02-28

## 2018-02-28 NOTE — TELEPHONE ENCOUNTER
----- Message from Mundo Garcia sent at 2/27/2018  4:02 PM CST -----  Contact: Nancie/[facility]  Unsuccessful call placed to office.  Nancie called in regarding the attached patient.  Nancie stated patient has gained 2 lbs since yesterday 2/26/18.  Nancie's call back number [phone]

## 2018-02-28 NOTE — TELEPHONE ENCOUNTER
----- Message from Lanette Hedrick sent at 2/28/2018  1:11 PM CST -----  [facility]/Nancie [phone] is calling concerning patient having a two pound weight gain from Monday toTuesday. As of today she has gained an additional two pounds. Please call if any questions.

## 2018-03-09 ENCOUNTER — ANTI-COAG VISIT (OUTPATIENT)
Dept: CARDIOLOGY | Facility: CLINIC | Age: 83
End: 2018-03-09

## 2018-03-09 DIAGNOSIS — Z79.01 LONG TERM CURRENT USE OF ANTICOAGULANT THERAPY: ICD-10-CM

## 2018-03-09 DIAGNOSIS — I48.20 CHRONIC ATRIAL FIBRILLATION: ICD-10-CM

## 2018-03-09 LAB — INR PPP: 1.8 (ref 2–3)

## 2018-03-09 NOTE — PROGRESS NOTES
lvm C dosing pt was asked to return call to clinic to voice understanding.   My chart message sent

## 2018-03-14 ENCOUNTER — TELEPHONE (OUTPATIENT)
Dept: CARDIOLOGY | Facility: CLINIC | Age: 83
End: 2018-03-14

## 2018-03-14 NOTE — TELEPHONE ENCOUNTER
----- Message from Octavia De La Cruz sent at 3/14/2018 11:20 AM CDT -----  Patient states that she need to speak to you about fluid/weight gain and breathing issues.  Please call patient at [phone]

## 2018-03-14 NOTE — TELEPHONE ENCOUNTER
Spoke with patient has gained 5 pounds in the past few days.  Breathing is labored- took extra fluid pill today. Patient coughing as well.

## 2018-03-16 ENCOUNTER — HOSPITAL ENCOUNTER (OUTPATIENT)
Dept: RADIOLOGY | Facility: HOSPITAL | Age: 83
Discharge: HOME | End: 2018-03-16
Attending: NURSE PRACTITIONER
Payer: MEDICARE

## 2018-03-16 ENCOUNTER — OFFICE VISIT (OUTPATIENT)
Dept: FAMILY MEDICINE | Facility: CLINIC | Age: 83
End: 2018-03-16
Payer: MEDICARE

## 2018-03-16 VITALS
OXYGEN SATURATION: 97 % | WEIGHT: 241.38 LBS | HEART RATE: 73 BPM | HEIGHT: 61 IN | TEMPERATURE: 98 F | BODY MASS INDEX: 45.57 KG/M2 | DIASTOLIC BLOOD PRESSURE: 69 MMHG | SYSTOLIC BLOOD PRESSURE: 128 MMHG

## 2018-03-16 DIAGNOSIS — Z79.01 LONG TERM CURRENT USE OF ANTICOAGULANT THERAPY: ICD-10-CM

## 2018-03-16 DIAGNOSIS — E66.01 MORBID OBESITY DUE TO EXCESS CALORIES: ICD-10-CM

## 2018-03-16 DIAGNOSIS — I10 ESSENTIAL HYPERTENSION: ICD-10-CM

## 2018-03-16 DIAGNOSIS — I42.0 DILATED CARDIOMYOPATHY: Primary | ICD-10-CM

## 2018-03-16 DIAGNOSIS — J30.1 ACUTE SEASONAL ALLERGIC RHINITIS DUE TO POLLEN: ICD-10-CM

## 2018-03-16 DIAGNOSIS — J44.9 CHRONIC OBSTRUCTIVE PULMONARY DISEASE, UNSPECIFIED COPD TYPE: ICD-10-CM

## 2018-03-16 DIAGNOSIS — N18.4 CKD (CHRONIC KIDNEY DISEASE), STAGE IV: ICD-10-CM

## 2018-03-16 DIAGNOSIS — I42.0 DILATED CARDIOMYOPATHY: ICD-10-CM

## 2018-03-16 DIAGNOSIS — I48.20 CHRONIC ATRIAL FIBRILLATION: ICD-10-CM

## 2018-03-16 DIAGNOSIS — I25.10 CORONARY ARTERY DISEASE INVOLVING NATIVE CORONARY ARTERY OF NATIVE HEART WITHOUT ANGINA PECTORIS: ICD-10-CM

## 2018-03-16 PROCEDURE — 71046 X-RAY EXAM CHEST 2 VIEWS: CPT | Mod: 26,,, | Performed by: RADIOLOGY

## 2018-03-16 PROCEDURE — 99214 OFFICE O/P EST MOD 30 MIN: CPT | Mod: S$GLB,,, | Performed by: NURSE PRACTITIONER

## 2018-03-16 PROCEDURE — 99999 PR PBB SHADOW E&M-EST. PATIENT-LVL V: CPT | Mod: PBBFAC,,, | Performed by: NURSE PRACTITIONER

## 2018-03-16 PROCEDURE — 3078F DIAST BP <80 MM HG: CPT | Mod: CPTII,S$GLB,, | Performed by: NURSE PRACTITIONER

## 2018-03-16 PROCEDURE — 71046 X-RAY EXAM CHEST 2 VIEWS: CPT | Mod: TC,FY,PO

## 2018-03-16 PROCEDURE — 3074F SYST BP LT 130 MM HG: CPT | Mod: CPTII,S$GLB,, | Performed by: NURSE PRACTITIONER

## 2018-03-16 PROCEDURE — 99499 UNLISTED E&M SERVICE: CPT | Mod: S$GLB,,, | Performed by: NURSE PRACTITIONER

## 2018-03-16 NOTE — PROGRESS NOTES
Patient, Miesha Oabndo (MRN #[id number]), presented with a recent Estimated Glumerular Filtration Rate (EGFR) between 15 and 29 consistent with the definition of chronic kidney disease stage 4 (ICD10 - N18.4).    eGFR if non    Date Value Ref Range Status   01/31/2018 30 (A) >60 mL/min/1.73 m^2 Final     Comment:     Calculation used to obtain the estimated glomerular filtration  rate (eGFR) is the CKD-EPI equation.          The patient's chronic kidney disease stage 4 was monitored, evaluated, addressed and/or treated. This addendum to the medical record is made on 03/16/2018.

## 2018-03-16 NOTE — PATIENT INSTRUCTIONS
Discharge Instructions for Cardiomyopathy  Cardiomyopathy means that your heart is not working as it normally should. This condition can make it more difficult to do things that may have been easy for you in the past. But with proper treatment and some lifestyle changes, you and your healthcare provider can help your heart do its job.  Home care  Work hard to remove the salt from your diet. Here are tips:  · Limit canned, dried, packaged, and fast foods.  · Dont add salt to your food at the table.  · Season foods with herbs instead of salt when you cook.  · When you eat out, ask that the  not add any salt to your dish.  · Don't eat fried or greasy foods.  · Be careful of bottled beverages. They can contain a lot of salt.  Also check the labels of over-the-counter medicines and supplements. They may be high in sodium. Ask your pharmacist or provider if you need help finding a low-salt product.  Be as active as you can. Ask your healthcare provider how to get started:  · Simple activities such as walking or gardening can help.  · Find activities you enjoy and make them a priority.  · Cardiac rehabilitation programs can help you reach your activity goals. You exercise while staff closely watches the stress on your heart. These programs may be covered by insurance.  Other tips for home care:  · Limit how much fluid you have each day. Your healthcare provider will tell you how much is safe.  · Break the smoking habit. Enroll in a stop-smoking program to improve your chances of success. Join smoking cessation support groups or ask your healthcare provider about nicotine replacement products.  · Take your medicines exactly as directed. Dont skip doses. Dont stop taking your medicines without talking to your healthcare provider first.  · Some over-the-counter medicines and herbal supplements can increase your heart rate or blood pressure. This can put extra stress on your heart. Check with your pharmacist to see if  products are heart-safe and won't interact with other medicines you take.  · Visit your healthcare provider regularly. Mention any problems with your treatment plan. Together you can find a plan that works for you.  · Weigh yourself at the same time each day. The best time is in the morning after you wake up and after urinating. Wear the same clothing each time. Keep a written record of your daily weight.  · Limit how much alcohol you drink. Too much alcohol isn't good for the heart. Healthcare providers advise no more than 1 drink per day for women and 2 drinks per day for men.  Follow-up care  Make a follow-up appointment as directed by our staff.     When to call your healthcare provider  Call your healthcare provider right away if you have any of the following:  · You gain more than 2 pounds in 1 day, more than 5 pounds in 1 week, or whatever weight gain you were told to report by your healthcare provider  · New or increased chest pain that doesn't get better with medicine  · New or increased shortness of breath or coughing  · Weakness in the muscles of your face, arms, or legs  · Trouble speaking  · Rapid pulse or pounding heartbeat  · Fainting, or feeling dizzy or lightheaded  · New or increased swelling in your hands, feet, or ankles   Date Last Reviewed: 2/1/2017  © 3044-3652 [employer]. [address] [place] [address], [place] [id number]. All rights reserved. This information is not intended as a substitute for professional medical care. Always follow your healthcare professional's instructions.        Coping with Heart Failure  Its normal to feel sad or down at times when youre living with heart failure. Some medicines can also affect your mood. Following your treatment plan may seem difficult at times. If you feel overwhelmed, just focus on one day at a time. Dont be afraid to ask others for help when you need it.    Ways to feel better  Try not to withdraw from family and friends, even if you are  finding it hard to talk to them. They can still be a good source of support. To feel better, you can also:  · Spend time doing things you enjoy. This may include participating in a favorite hobby, meditating, praying, or spending time with people you care about. Find activities that make you happy and make those a priority.  · Share what you learn about heart failure with the people in your life. Invite family members along when you visit your healthcare provider. This will help you feel supported as well as help you discuss the care plan you've agreed upon with your doctor.  · Think about joining a support group for people with heart failure. It may be easier to talk to people who know firsthand what youre going through. They can offer advice and share stories. You may want to ask loved ones to join you for a meeting.  Asking for help  Having heart failure doesnt mean that you have to feel bad all the time. Consider talking to your healthcare provider or a therapist if:  · You feel worthless or helpless, or are thinking about suicide. These are warning signs of depression. Treatment can help you feel better. When depression is under control, your overall health may also improve.  · You feel anxious about what will happen to your loved ones if your health gets worse. Taking care of legal arrangements, such as a living will and durable power of , can help you feel more secure about the future.  · Social support helps alleviate stress and helps your stick with your healthy lifestyle changes. Without social support, you may end up back in the hospital.  Date Last Reviewed: 3/20/2016  © 1710-0339 [employer]. [address] [place] [address], [place] [address]. All rights reserved. This information is not intended as a substitute for professional medical care. Always follow your healthcare professional's instructions.      Low-Salt Diet  This diet removes foods that are high in salt. It also limits the  amount of salt you use when cooking. It is most often used for people with high blood pressure, edema (fluid retention), and kidney, liver, or heart disease.  Table salt contains the mineral sodium. Your body needs sodium to work normally. But too much sodium can make your health problems worse. Your healthcare provider is recommending a low-salt (also called low-sodium) diet for you. Your total daily allowance of salt is 1,500 to 2,300 milligrams (mg). It is less than 1 teaspoon of table salt. This means you can have only about 500 to 700 mg of sodium at each meal. People with certain health problems should limit salt intake to the lower end of the recommended range.    When you cook, dont add much salt. If you can cook without using salt, even better. Dont add salt to your food at the table.  When shopping, read food labels. Salt is often called sodium on the label. Choose foods that are salt-free, low salt, or very low salt. Note that foods with reduced salt may not lower your salt intake enough.    Beans, potatoes, and pasta  Ok: Dry beans, split peas, lentils, potatoes, rice, macaroni, pasta, spaghetti without added salt  Avoid: Potato chips, tortilla chips, and similar products  Breads and cereals  Ok: Low-sodium breads, rolls, cereals, and cakes; low-salt crackers, matzo crackers  Avoid: Salted crackers, pretzels, popcorn, Slovak toast, pancakes, muffins  Dairy  Ok: Milk, chocolate milk, hot chocolate mix, low-salt cheeses, and yogurt  Avoid: Processed cheese and cheese spreads; Roquefort, Camembert, and cottage cheese; buttermilk, instant breakfast drink  Desserts  Ok: Ice cream, frozen yogurt, juice bars, gelatin, cookies and pies, sugar, honey, jelly, hard candy  Avoid: Most pies, cakes and cookies prepared or processed with salt; instant pudding  Drinks  Ok: Tea, coffee, fizzy (carbonated) drinks, juices  Avoid: Flavored coffees, electrolyte replacement drinks, sports drinks  Meats  Ok: All fresh meat,  fish, poultry, low-salt tuna, eggs, egg substitute  Avoid: Smoked, pickled, brine-cured, or salted meats and fish. This includes juarez, chipped beef, corned beef, hot dogs, deli meats, ham, kosher meats, salt pork, sausage, canned tuna, salted codfish, smoked salmon, herring, sardines, or anchovies.  Seasonings and spices  Ok: Most seasonings are okay. Good substitutes for salt include: fresh herb blends, hot sauce, lemon, garlic, soto, vinegar, dry mustard, parsley, cilantro, horseradish, tomato paste, regular margarine, mayonnaise, unsalted butter, cream cheese, vegetable oil, cream, low-salt salad dressing and gravy.  Avoid: Regular ketchup, relishes, pickles, soy sauce, teriyaki sauce, Worcestershire sauce, BBQ sauce, tartar sauce, meat tenderizer, chili sauce, regular gravy, regular salad dressing, salted butter  Soups  Ok: Low-salt soups and broths made with allowed foods  Avoid: Bouillon cubes, soups with smoked or salted meats, regular soup and broth  Vegetables  Ok: Most vegetables are okay; also low-salt tomato and vegetable juices  Avoid: Sauerkraut and other brine-soaked vegetables; pickles and other pickled vegetables; tomato juice, olives  Date Last Reviewed: 8/1/2016 © 2000-2017 [employer]. [address] [place] [address]. All rights reserved. This information is not intended as a substitute for professional medical care. Always follow your healthcare professional's instructions.

## 2018-03-16 NOTE — PROGRESS NOTES
Subjective:       Patient ID: Miesha Obando is a 82 y.o. female.    Chief Complaint: Shortness of Breath; Insomnia; and Nasal Congestion    HPI   Ms. Obando is a new patient to me. She presents today for shortness of breath and weight gain x 5 days. Reported 5lb weight gain over the past few days, notified cardiology, took a PRN lasix yesterday, lost 5 lbs overnight, symptoms improving. Reports more frequent exacerbations. Last EF 35%, followed by Dr. Anderson, due for 3 month follow up in April.     COPD: compliant with Breo, nebulizer as needed    Allergic rhinits: does not take allergy pill. +congestion, sneezing, runny nose  Insomnia: trouble staying asleep.   Vitals:    03/16/18 1309   BP: 128/69   Pulse: 73   Temp: 98.2 °F (36.8 °C)     Review of Systems   Constitutional: Positive for fatigue. Negative for diaphoresis and fever.   HENT: Positive for congestion, rhinorrhea and sneezing. Negative for facial swelling and trouble swallowing.    Eyes: Negative for discharge and redness.   Respiratory: Positive for cough and shortness of breath.    Cardiovascular: Positive for leg swelling. Negative for chest pain and palpitations.   Gastrointestinal: Negative for diarrhea and vomiting.   Genitourinary: Negative for difficulty urinating and enuresis.   Skin: Negative for rash and wound.   Neurological: Negative for facial asymmetry and speech difficulty.   Psychiatric/Behavioral: Negative for confusion. The patient is not nervous/anxious.        Past Medical History:   Diagnosis Date    Anticoagulant long-term use     Arthritis     Atrial fibrillation     Breast cancer 1994    breast , left    Cardiomyopathy - EF 35-40% 5/11/2016    CHF (congestive heart failure)     Chronic bronchitis     COPD (chronic obstructive pulmonary disease)     Coronary artery disease without angina pectoris 8/26/2015    Depression     Diabetes with neurologic complications     Diverticulitis     Diverticulosis     Encounter  for blood transfusion     after mastectomy x 20 years ago    Former smoker     GERD (gastroesophageal reflux disease)     H/O aortic valve replacement     Hiatal hernia     History of colonic diverticulitis     Hypertension     Hypothyroid     Irritable bowel syndrome     Obesity     Pacemaker 08/2014    Schatzki's ring     mild    Sleep apnea     Syncope and collapse     Trouble in sleeping     Type II or unspecified type diabetes mellitus without mention of complication, not stated as uncontrolled     No medications at present.     Urinary incontinence      Objective:      Physical Exam   Constitutional: She is oriented to person, place, and time. She does not have a sickly appearance. No distress.   HENT:   Head: Normocephalic.   Right Ear: Hearing normal.   Left Ear: Hearing normal.   Nose: Nose normal.   Eyes: Conjunctivae and lids are normal.   Neck: No JVD present. No tracheal deviation present.   Cardiovascular: Normal rate, S1 normal and S2 normal.  An irregular rhythm present.   Murmur heard.  +1 pitting edema jinny LE   Pulmonary/Chest: Effort normal. She has rhonchi. She exhibits no tenderness.   Abdominal: Normal appearance. She exhibits no distension.   Musculoskeletal: Normal range of motion. She exhibits no edema or deformity.   Neurological: She is alert and oriented to person, place, and time.   Skin: She is not diaphoretic. No pallor.   Psychiatric: She has a normal mood and affect. Her speech is normal and behavior is normal. Judgment and thought content normal. Cognition and memory are normal.   Nursing note and vitals reviewed.      Assessment:       1. Dilated cardiomyopathy    2. Chronic obstructive pulmonary disease, unspecified COPD type    3. Acute seasonal allergic rhinitis due to pollen    4. Essential hypertension    5. Coronary artery disease involving native coronary artery of native heart without angina pectoris    6. Chronic atrial fibrillation    7. Long term current  use of anticoagulant therapy    8. Morbid obesity due to excess calories        Plan:       Dilated cardiomyopathy  -     X-Ray Chest PA And Lateral; Future; Expected date: 03/16/2018  -     Comprehensive metabolic panel; Future; Expected date: 03/16/2018  - Take another Lasix tomorrow AM  - Continue checking weight; go to ED if worsening symptoms, weight gain >2 lbs per day over weekend  - Schedule appointment with Dr Anderson next week    CKD IV   -     Comprehensive metabolic panel; Future; Expected date: 03/16/2018   Will notify patient if labs unstable/if she needs to hold lasix tomorrow    Chronic obstructive pulmonary disease, unspecified COPD type   Compliant with Breo    Acute seasonal allergic rhinitis due to pollen   Start antihistamine daily    Essential hypertension   stable    Coronary artery disease involving native coronary artery of native heart without angina pectoris    Chronic atrial fibrillation    Long term current use of anticoagulant therapy    Morbid obesity due to excess calories        No acute distress, patient symptoms improving, advised lasix again tomorrow  Xray and labs today  Follow up with cardiology next week   Safety Precautions given: go to ED or call 911 should symptoms worsen or new symptom arise

## 2018-03-19 ENCOUNTER — TELEPHONE (OUTPATIENT)
Dept: NEUROLOGY | Facility: CLINIC | Age: 83
End: 2018-03-19

## 2018-03-19 ENCOUNTER — ANTI-COAG VISIT (OUTPATIENT)
Dept: CARDIOLOGY | Facility: CLINIC | Age: 83
End: 2018-03-19

## 2018-03-19 DIAGNOSIS — I48.20 CHRONIC ATRIAL FIBRILLATION: ICD-10-CM

## 2018-03-19 DIAGNOSIS — Z79.01 LONG TERM CURRENT USE OF ANTICOAGULANT THERAPY: ICD-10-CM

## 2018-03-19 LAB — INR PPP: 1.9 (ref 2–3)

## 2018-03-19 RX ORDER — LOSARTAN POTASSIUM 25 MG/1
TABLET ORAL
Qty: 90 TABLET | Refills: 1 | Status: SHIPPED | OUTPATIENT
Start: 2018-03-19 | End: 2018-09-04 | Stop reason: SDUPTHER

## 2018-03-19 NOTE — TELEPHONE ENCOUNTER
Tried to call the patient. Voicemail has not been set up. Tried to reach patient under contact, Watson Obando, left voicemail. If patient has not stopped Coumadin, patient will need to reschedule.     Yes, patient will need to stop Coumadin for 5 days.

## 2018-03-19 NOTE — TELEPHONE ENCOUNTER
----- Message from Toña An PharmD sent at 3/19/2018  3:30 PM CDT -----  Hi, needing to follow up on this pt.  She is scheduled for medical branch block on thur 3/22.  We don't always hold warfarin for this procedure, but wanted to see what you normally recommend, thank you  Toña An, Pharm D  [facility]

## 2018-03-19 NOTE — PROGRESS NOTES
Pt states she is having medial branch block on the 22nd and needs holding instructions. She states the  Told her her office would call with holding instructions and she has never herd anything since. She was due to test her level today she states she held her coumadin last night

## 2018-03-20 NOTE — TELEPHONE ENCOUNTER
Called and spoke with patient. I asked the patient if she had stopped her Coumadin. Patient said she had stopped it Sunday and her Coumadin level is currently 1.9. Advised patient that she was suppose to wait to hear from our office before stopping her Coumadin due to her needing clearance from her Cardiologist before stopping. Patient stated she spoke with the Coumadin clinic about holding instructions.  Patient is set to have her procedure on 03/22 as planned.

## 2018-03-21 DIAGNOSIS — M50.30 DDD (DEGENERATIVE DISC DISEASE), CERVICAL: Primary | ICD-10-CM

## 2018-03-21 NOTE — TELEPHONE ENCOUNTER
Spoke with patient, updated on xray and labs stable, denies weight gain. Scheduled to see Dr Anderson Friday --keep appointment, continue daily weights.

## 2018-03-21 NOTE — TELEPHONE ENCOUNTER
----- Message from Liz Zacarias sent at 3/21/2018 11:58 AM CDT -----  Please call patient in regards to x-ray & blood test results, [phone] (home)

## 2018-03-22 ENCOUNTER — HOSPITAL ENCOUNTER (OUTPATIENT)
Facility: HOSPITAL | Age: 83
Discharge: HOME | End: 2018-03-22
Attending: PSYCHIATRY & NEUROLOGY | Admitting: PSYCHIATRY & NEUROLOGY
Payer: MEDICARE

## 2018-03-22 ENCOUNTER — ANESTHESIA (OUTPATIENT)
Dept: SURGERY | Facility: HOSPITAL | Age: 83
End: 2018-03-22

## 2018-03-22 ENCOUNTER — HOSPITAL ENCOUNTER (OUTPATIENT)
Dept: RADIOLOGY | Facility: HOSPITAL | Age: 83
Discharge: HOME | End: 2018-03-22
Attending: PSYCHIATRY & NEUROLOGY | Admitting: PSYCHIATRY & NEUROLOGY
Payer: MEDICARE

## 2018-03-22 VITALS
OXYGEN SATURATION: 96 % | HEART RATE: 91 BPM | BODY MASS INDEX: 43.43 KG/M2 | TEMPERATURE: 97 F | WEIGHT: 230 LBS | RESPIRATION RATE: 10 BRPM | HEIGHT: 61 IN | SYSTOLIC BLOOD PRESSURE: 179 MMHG | DIASTOLIC BLOOD PRESSURE: 77 MMHG

## 2018-03-22 DIAGNOSIS — M47.812 FACET ARTHROPATHY, CERVICAL: ICD-10-CM

## 2018-03-22 DIAGNOSIS — M50.30 DDD (DEGENERATIVE DISC DISEASE), CERVICAL: ICD-10-CM

## 2018-03-22 PROCEDURE — 64450 NJX AA&/STRD OTHER PN/BRANCH: CPT | Mod: 51,LT,, | Performed by: PSYCHIATRY & NEUROLOGY

## 2018-03-22 PROCEDURE — 25000003 PHARM REV CODE 250: Mod: PO | Performed by: PSYCHIATRY & NEUROLOGY

## 2018-03-22 PROCEDURE — 76000 FLUOROSCOPY <1 HR PHYS/QHP: CPT | Mod: TC,PO

## 2018-03-22 PROCEDURE — 64491 INJ PARAVERT F JNT C/T 2 LEV: CPT | Mod: PO | Performed by: PSYCHIATRY & NEUROLOGY

## 2018-03-22 PROCEDURE — 64490 INJ PARAVERT F JNT C/T 1 LEV: CPT | Mod: PO | Performed by: PSYCHIATRY & NEUROLOGY

## 2018-03-22 PROCEDURE — 64492 INJ PARAVERT F JNT C/T 3 LEV: CPT | Mod: LT,,, | Performed by: PSYCHIATRY & NEUROLOGY

## 2018-03-22 PROCEDURE — 64492 INJ PARAVERT F JNT C/T 3 LEV: CPT | Mod: PO | Performed by: PSYCHIATRY & NEUROLOGY

## 2018-03-22 PROCEDURE — 64490 INJ PARAVERT F JNT C/T 1 LEV: CPT | Mod: LT,,, | Performed by: PSYCHIATRY & NEUROLOGY

## 2018-03-22 PROCEDURE — 64491 INJ PARAVERT F JNT C/T 2 LEV: CPT | Mod: LT,,, | Performed by: PSYCHIATRY & NEUROLOGY

## 2018-03-22 PROCEDURE — 63600175 PHARM REV CODE 636 W HCPCS: Mod: PO | Performed by: PSYCHIATRY & NEUROLOGY

## 2018-03-22 PROCEDURE — 64450 NJX AA&/STRD OTHER PN/BRANCH: CPT | Mod: PO | Performed by: PSYCHIATRY & NEUROLOGY

## 2018-03-22 RX ORDER — BUPIVACAINE HYDROCHLORIDE 2.5 MG/ML
INJECTION, SOLUTION EPIDURAL; INFILTRATION; INTRACAUDAL
Status: DISCONTINUED | OUTPATIENT
Start: 2018-03-22 | End: 2018-03-22 | Stop reason: HOSPADM

## 2018-03-22 RX ORDER — SODIUM CHLORIDE, SODIUM LACTATE, POTASSIUM CHLORIDE, CALCIUM CHLORIDE 600; 310; 30; 20 MG/100ML; MG/100ML; MG/100ML; MG/100ML
INJECTION, SOLUTION INTRAVENOUS CONTINUOUS
Status: DISCONTINUED | OUTPATIENT
Start: 2018-03-22 | End: 2018-03-22 | Stop reason: HOSPADM

## 2018-03-22 RX ORDER — MIDAZOLAM HYDROCHLORIDE 1 MG/ML
INJECTION INTRAMUSCULAR; INTRAVENOUS
Status: DISCONTINUED | OUTPATIENT
Start: 2018-03-22 | End: 2018-03-22 | Stop reason: HOSPADM

## 2018-03-22 RX ORDER — DEXAMETHASONE SODIUM PHOSPHATE 4 MG/ML
INJECTION, SOLUTION INTRA-ARTICULAR; INTRALESIONAL; INTRAMUSCULAR; INTRAVENOUS; SOFT TISSUE
Status: DISCONTINUED | OUTPATIENT
Start: 2018-03-22 | End: 2018-03-22 | Stop reason: HOSPADM

## 2018-03-22 RX ORDER — LIDOCAINE HYDROCHLORIDE 10 MG/ML
INJECTION INFILTRATION; PERINEURAL
Status: DISCONTINUED | OUTPATIENT
Start: 2018-03-22 | End: 2018-03-22 | Stop reason: HOSPADM

## 2018-03-22 RX ADMIN — SODIUM CHLORIDE, SODIUM LACTATE, POTASSIUM CHLORIDE, CALCIUM CHLORIDE: 600; 310; 30; 20 INJECTION, SOLUTION INTRAVENOUS at 08:03

## 2018-03-22 NOTE — OR NURSING
Dr. Cast evaluated patient this AM. States patient is ASA 3 and OK to proceed with RN sedation with minimal medication.

## 2018-03-22 NOTE — DISCHARGE SUMMARY
[facility]  Discharge Note  Short Stay    Admit Date: 3/22/2018    Discharge Date: 3/22/2018    Attending Physician: Regine Palma MD     Discharge Provider: Regine Palma    Diagnoses:  Active Hospital Problems    Diagnosis  POA    Facet arthropathy, cervical [M46.92]  Yes      Resolved Hospital Problems    Diagnosis Date Resolved POA   No resolved problems to display.       Discharged Condition: good    Final Diagnoses: Facet arthropathy, cervical [M46.92]    Disposition: Home or Self Care    Hospital Course: no complications, uneventful    Outcome of Hospitalization, Treatment, Procedure, or Surgery:  Patient was admitted for outpatient procedure. The patient underwent procedure without complications and are discharged home    Follow up/Patient Instructions:  Follow up as scheduled/Patient has received instructions and follow up date    Medications:  Continue previous medications      Discharge Procedure Orders  Activity as tolerated     Notify your health care provider if you experience any of the following:  temperature >100.4     Notify your health care provider if you experience any of the following:  persistent nausea and vomiting or diarrhea     Notify your health care provider if you experience any of the following:  severe uncontrolled pain     Notify your health care provider if you experience any of the following:  redness, tenderness, or signs of infection (pain, swelling, redness, odor or green/yellow discharge around incision site)     Notify your health care provider if you experience any of the following:  difficulty breathing or increased cough     Notify your health care provider if you experience any of the following:  severe persistent headache     Notify your health care provider if you experience any of the following:  worsening rash     Notify your health care provider if you experience any of the following:  persistent dizziness, light-headedness, or visual disturbances      Notify your health care provider if you experience any of the following:  increased confusion or weakness     No dressing needed           Discharge Procedure Orders (must include Diet, Follow-up, Activity):    Discharge Procedure Orders (must include Diet, Follow-up, Activity)  Activity as tolerated     Notify your health care provider if you experience any of the following:  temperature >100.4     Notify your health care provider if you experience any of the following:  persistent nausea and vomiting or diarrhea     Notify your health care provider if you experience any of the following:  severe uncontrolled pain     Notify your health care provider if you experience any of the following:  redness, tenderness, or signs of infection (pain, swelling, redness, odor or green/yellow discharge around incision site)     Notify your health care provider if you experience any of the following:  difficulty breathing or increased cough     Notify your health care provider if you experience any of the following:  severe persistent headache     Notify your health care provider if you experience any of the following:  worsening rash     Notify your health care provider if you experience any of the following:  persistent dizziness, light-headedness, or visual disturbances     Notify your health care provider if you experience any of the following:  increased confusion or weakness     No dressing needed

## 2018-03-22 NOTE — OR NURSING
Discussed patient's medical history with Dr. Palma and KHLOE Patterson RN. Jacqueline to discuss with anesthesia for ASA status to determine eligibility for RN sedation.

## 2018-03-22 NOTE — OP NOTE
PROCEDURE DATE: 3/22/2018    PROCEDURE:  Cervical medial branch block of the C2, 3, 4, 5 medial branch nerves and third occipital nerve on the LEFT utilizing fluoroscopy    DIAGNOSIS:  Cervical Facet Arthropathy, Cervical spondylosis - LEFT    PHYSICIAN: Regine Palma MD    MEDICATIONS INJECTED:  0.25% bupivacaine with dexamethasone 4mg - 1 ml at each level.    LOCAL ANESTHETIC USED:   1% lidocaine, 1ml at each level.    ANESTHESIA: midazolam 1mg IV     ESTIMATED BLOOD LOSS:  < 5cc     COMPLICATIONS:  None     TECHNIQUE : A time-out was taken to identify patient and procedure side prior to starting the procedure.  The patient was positioned right lateral decubitus with the neck in a neutral position.The patient was prepped and draped in the usual sterile fashion using ChloraPrep and sterile towels.  The image intensifier was positioned to obtain a lateral view of the facet pillars on the left side. The midpoints of the target articular pillars where marked. Local anesthetic was infiltrated superficially here at these marked locations. Then, 4 separate straight 25 gauge 3.5 inch spinal needles were inserted superficially and guided to rest in the midpoint of the articular pillar corresponding to the medial branch nerve being blocked using intermittent lateral fluoroscopy. Once in the target position, Omnipaque contrast dye was then injected under live digital subtraction angiography to assess there was no vascular uptake. The above noted medication was then injected at each nerve location.      The patient tolerated the procedure well.     The patient was monitored after the procedure. The patient will be contacted on the next business day to determine extent of relief. The patient was given post procedure and discharge instructions to follow at home. The patient was discharged in a stable condition

## 2018-03-22 NOTE — DISCHARGE INSTRUCTIONS
Home care instructions  Apply ice pack to the injection site for 20 minutes periods for the first 24 hrs for soreness/discomfort at injection site DO NOT USE HEAT FOR 24 HOURS  Keep site clean and dry for 24 hours, remove bandaid when desired  Do not drive until tomorrow  Take care when walking after a cervical injection.  Return to normal activities.  Make take 2 weeks to feel the full effects   Resume home medication as prescribed today  Resume Aspirin, Plavix, or Coumadin the day after the procedure unless otherwise instructed.    SEE IMMEDIATE MEDICAL HELP FOR:  Severe increase in your usual pain or appearance of new pain  Prolonged or increasing weakness or numbness in the legs or arms  Drainage, redness, active bleeding, or increased swelling at the injection site  Temperature over 100.0 degrees F.  Headache that increases when your head is upright and decreases when you lie flat    CALL 911 OR GO DIRECTLY TO EMERGENCY DEPARTMENT FOR:  Shortness of breath, chest pain, or problems breathing

## 2018-03-22 NOTE — INTERVAL H&P NOTE
The patient has been examined and the H&P has been reviewed:    I concur with the findings and no changes have occurred since H&P was written. Patient has held warfarin for 5 days. Approved for midazolam and fentanyl if needed for sedation by her pulmonologist.     Anesthesia/Surgery risks, benefits and alternative options discussed and understood by patient/family.          There are no hospital problems to display for this patient.

## 2018-03-23 ENCOUNTER — TELEPHONE (OUTPATIENT)
Dept: NEUROLOGY | Facility: CLINIC | Age: 83
End: 2018-03-23

## 2018-03-23 NOTE — TELEPHONE ENCOUNTER
Received call from phone room. Patient stated her relief was at 95% and last almost the full 8 hours. Informed patient we would plan for RFA at her next office visit on 04/12. Patient verbalized understanding.

## 2018-03-23 NOTE — TELEPHONE ENCOUNTER
----- Message from Regine Palma MD sent at 3/22/2018  8:04 AM CDT -----  Please call patient Friday to determine percentage of relief of her typical headache/neck pain during diagnostic period of left cervical medial branch block.     She is already scheduled for office follow up 4/12, would likely plan for just 1 diagnostic block then RFA if it was successful.

## 2018-03-26 ENCOUNTER — ANESTHESIA EVENT (OUTPATIENT)
Dept: SURGERY | Facility: HOSPITAL | Age: 83
End: 2018-03-26

## 2018-04-05 ENCOUNTER — ANTI-COAG VISIT (OUTPATIENT)
Dept: CARDIOLOGY | Facility: CLINIC | Age: 83
End: 2018-04-05
Payer: MEDICARE

## 2018-04-05 DIAGNOSIS — I48.20 CHRONIC ATRIAL FIBRILLATION: ICD-10-CM

## 2018-04-05 DIAGNOSIS — Z79.01 LONG TERM CURRENT USE OF ANTICOAGULANT THERAPY: ICD-10-CM

## 2018-04-05 LAB — INR PPP: 3 (ref 2–3)

## 2018-04-05 PROCEDURE — G0250 MD INR TEST REVIE INTER MGMT: HCPCS | Mod: S$GLB,,, | Performed by: FAMILY MEDICINE

## 2018-04-16 ENCOUNTER — TELEPHONE (OUTPATIENT)
Dept: NEUROLOGY | Facility: CLINIC | Age: 83
End: 2018-04-16

## 2018-04-18 ENCOUNTER — OFFICE VISIT (OUTPATIENT)
Dept: NEUROLOGY | Facility: CLINIC | Age: 83
End: 2018-04-18
Payer: MEDICARE

## 2018-04-18 VITALS
RESPIRATION RATE: 16 BRPM | HEIGHT: 61 IN | HEART RATE: 77 BPM | DIASTOLIC BLOOD PRESSURE: 80 MMHG | WEIGHT: 230 LBS | SYSTOLIC BLOOD PRESSURE: 138 MMHG | BODY MASS INDEX: 43.43 KG/M2

## 2018-04-18 DIAGNOSIS — G44.86 CERVICOGENIC HEADACHE: ICD-10-CM

## 2018-04-18 DIAGNOSIS — M47.812 SPONDYLOSIS OF CERVICAL REGION WITHOUT MYELOPATHY OR RADICULOPATHY: ICD-10-CM

## 2018-04-18 PROCEDURE — 99999 PR PBB SHADOW E&M-EST. PATIENT-LVL IV: CPT | Mod: PBBFAC,,, | Performed by: PSYCHIATRY & NEUROLOGY

## 2018-04-18 PROCEDURE — 3079F DIAST BP 80-89 MM HG: CPT | Mod: CPTII,S$GLB,, | Performed by: PSYCHIATRY & NEUROLOGY

## 2018-04-18 PROCEDURE — 99499 UNLISTED E&M SERVICE: CPT | Mod: S$GLB,,, | Performed by: PSYCHIATRY & NEUROLOGY

## 2018-04-18 PROCEDURE — 99214 OFFICE O/P EST MOD 30 MIN: CPT | Mod: S$GLB,,, | Performed by: PSYCHIATRY & NEUROLOGY

## 2018-04-18 PROCEDURE — 3075F SYST BP GE 130 - 139MM HG: CPT | Mod: CPTII,S$GLB,, | Performed by: PSYCHIATRY & NEUROLOGY

## 2018-04-18 NOTE — PATIENT INSTRUCTIONS
TESTING:  -- none     PREVENTION (use daily regardless of headache):  -- schedule a radiofrequency ablation of the left C2-3-4-5 medial branch nerve and third occipital nerve in the surgery center with anesthesiologist present for sedation   -- HOLD warfarin for 5 days before procedure   -- stop Aspirin and Fish oil 7 days before     ACUTE TREATMENT:  -- tylenol

## 2018-04-18 NOTE — PROGRESS NOTES
"Date of service: 4/18/2018  Referring provider: No ref. provider found    Subjective:      Chief complaint: Headache       Patient ID: Miesha Obando is a 82 y.o. lady with cervicogenic headaches, dizziness, coronary artery disease, atrial fibrillation on anticoagulation, hypertension, cardiomyopathy (EF 30-35%), pacemaker, COPD, diabetes, who is presenting for the follow up of headaches after cervical MBB.     History of Present Illness    INTERVAL HISTORY - 4/18/18     Status post cervical medial branch block of the left C2-3-4-5 and third occipital nerve on 3/22/18 with 95% relief of her neck and head pain x 8 hours. Her pain has returned to baseline. She is using her CPAP nightly and finds she is sleeping much better.     Her current pain score is 5 with a range of 2 to 10. Pain remians at the neck and top of head. No new health concerns.     INTERVAL HISTORY - 2/22/18     Today she reports her headaches are largely unchanged. She is having a hard time sleeping due to the pain. The pain starts in left neck and radiates to the posterior head. Her current pain score is 7 with a range from 2 to 10.     She sent back the duloxetine and dolobid as they were cost prohibitive so she is just using tylenol at this time.     INTERVAL HISTORY - 12/20/17     Seen 2 months ago at which point I recommended duloxetine for prevention and dolobid for acute relief. She reports that both of these medications helped a great deal when she was taking them. She ran out of the Dolobid, and she thinks she ran out of duloxetine as well.     Today she reports she is about the same the headache is in the forehead and back of had. Her current pain score is 8, with a range from 2 to 10.     ORIGINAL HEADACHE HISTORY - 10/27/17   Age at onset and course over time: about a couple of years (2015), she thinks she "lives" with a headache, sometimes more severe than others   Location: neck, occiput, vertex   Quality: pressure, sharp   Severity: " current 6, range is 4-10  Duration: constant, with escalations for 30-40 min   Frequency: daily   Alleviated by: heat   Exacerbated by: stress, change weather; turning head   Associated with: scalp allodynia; blurred vision, double vision, dizziness, vertigo, anger  Sleep habits: wakes up and can't get back to sleeo   Caffeine intake: 1-2     Dizziness: feeling of off balance while walking, can also feel off balance while sitting (not spinning, more like a boat swaying); happens 2-3 times a week; lasts 15 min. Sometimes associated with double vision but sometimes double vision by itself. Side by side, lasts seconds to minutes. Will pass if she keeps blinking. Associated with word finding difficulty. No slurred speech. No visual aura. No paralysis.     Current acute treatment:  -- tylenol   (ASA)  (warfarin)    Current prevention:  -- metoprolol XL 50mg daily  (losartan)  (trazodone - does not help her sleep)  (melatonin)    Previously tried/failed acute treatment:  -- dolobid 500mg BID- worked well, but ran out   -- tylenol or tylenol PM- ineffective   -- naproxen    Previously tried/failed preventative treatment:  -- duloxetine 20mg daily - stopped taking   -- lisinopril   -- cervical medial branch block of the left C2-3-4-5 and third occipital nerve on 3/22/18 with 95% relief of her neck and head pain x 8 hours.    Review of patient's allergies indicates:   Allergen Reactions    Amoxicillin-pot clavula (bulk) Nausea And Vomiting    Sulfa (sulfonamide antibiotics) Hives    Adhesive Rash     Use paper tape     Current Outpatient Prescriptions   Medication Sig Dispense Refill    ACCU-CHEK FASTCLIX Misc TEST TWICE DAILY 200 each 11    ACCU-CHEK SMARTVIEW TEST STRIP Strp TEST  TWICE DAILY 200 strip 11    acetaminophen (TYLENOL) 325 MG tablet Take 325 mg by mouth as needed for Pain or Temperature greater than.       albuterol (PROVENTIL) 2.5 mg /3 mL (0.083 %) nebulizer solution Take 3 mLs (2.5 mg total) by  nebulization every 6 (six) hours as needed for Wheezing. Rescue 90 mL 0    ascorbic acid (VITAMIN C) 500 MG tablet Take 500 mg by mouth once daily.        aspirin (ECOTRIN) 81 MG EC tablet Take 81 mg by mouth once daily.      b complex vitamins tablet Take 1 tablet by mouth once daily.        BD ALCOHOL SWABS PadM USE TWICE DAILY 200 each 11    diphenhydramine-acetaminophen (TYLENOL PM)  mg Tab Take 1 tablet by mouth nightly as needed.      fish oil-omega-3 fatty acids 300-1,000 mg capsule Take 2 g by mouth once daily.      fluticasone-vilanterol (BREO ELLIPTA) 200-25 mcg/dose DsDv diskus inhaler Inhale 1 puff into the lungs once daily. 3 each 3    furosemide (LASIX) 20 MG tablet Take 20 mg by mouth as needed.      levothyroxine (SYNTHROID) 100 MCG tablet TAKE 1 TABLET ONE TIME DAILY 90 tablet 1    losartan (COZAAR) 25 MG tablet TAKE 1 TABLET EVERY DAY 90 tablet 1    metoprolol succinate (TOPROL-XL) 50 MG 24 hr tablet TAKE 1 TABLET EVERY DAY (DISCONTINUE METOPROLOL ER 25MG) 90 tablet 3    nitroGLYCERIN (NITROSTAT) 0.4 MG SL tablet Place 1 tablet (0.4 mg total) under the tongue every 5 (five) minutes as needed for Chest pain. 20 tablet 0    ranitidine (ZANTAC) 300 MG tablet Take 300 mg by mouth every evening.      spironolactone (ALDACTONE) 25 MG tablet TAKE 1 TABLET ONE TIME DAILY 90 tablet 3    torsemide (DEMADEX) 20 MG Tab Take 1 tablet (20 mg total) by mouth once daily. 30 tablet 0    warfarin (COUMADIN) 2.5 MG tablet Take 2.5 mg by mouth once daily.      pantoprazole (PROTONIX) 20 MG tablet Take 1 tablet (20 mg total) by mouth 2 (two) times daily before meals. 28 tablet 0     No current facility-administered medications for this visit.        Past Medical History  Past Medical History:   Diagnosis Date    Anticoagulant long-term use     Arthritis     Atrial fibrillation     Breast cancer 1994    breast , left    Cardiomyopathy - EF 35-40% 5/11/2016    CHF (congestive heart failure)      Chronic bronchitis     COPD (chronic obstructive pulmonary disease)     Coronary artery disease without angina pectoris 8/26/2015    Depression     Diabetes with neurologic complications     Diverticulitis     Diverticulosis     Encounter for blood transfusion     after mastectomy x 20 years ago    GERD (gastroesophageal reflux disease)     H/O aortic valve replacement     Hiatal hernia     History of colonic diverticulitis     Hypertension     Hypothyroid     Insomnia     Irritable bowel syndrome     Obesity     Pacemaker 08/2014    Schatzki's ring     mild    Sleep apnea     non compliant with CPAP    Syncope and collapse     Type II or unspecified type diabetes mellitus without mention of complication, not stated as uncontrolled     No medications at present.     Urinary incontinence        Past Surgical History  Past Surgical History:   Procedure Laterality Date    ADENOIDECTOMY      APPENDECTOMY      BACK SURGERY      Discectomy, lumbar    BREAST SURGERY  1994    left side , mastectomy    CARDIAC PACEMAKER PLACEMENT      CARDIAC VALVE SURGERY      aortic valve replacement    CHOLECYSTECTOMY      COLONOSCOPY  2/2014    repeat in 10 years for screening    CORONARY STENT PLACEMENT      x2    EYE SURGERY      bilateral PHACO and IOL    HYSTERECTOMY      ovaries spared    LOOP RECORDER      TOE SURGERY Right     TONSILLECTOMY      UPPER GASTROINTESTINAL ENDOSCOPY  2012       Family History  Family History   Problem Relation Age of Onset    Parkinsonism Mother     Emphysema Father     Heart disease Brother     Heart attack Brother     Heart failure Brother     Heart disease Brother     Heart failure Brother     Heart disease Brother     Heart failure Brother     Arrhythmia Brother     Anesthesia problems Neg Hx     Clotting disorder Neg Hx        Social History  Social History     Social History    Marital status:      Spouse name: N/A    Number of  children: N/A    Years of education: N/A     Occupational History    Not on file.     Social History Main Topics    Smoking status: Former Smoker     Packs/day: 1.00     Years: 30.00     Start date: 2/23/1958     Quit date: 1/13/1988    Smokeless tobacco: Never Used    Alcohol use 1.8 oz/week     2 Glasses of wine, 1 Shots of liquor per week      Comment: occasional    Drug use: No    Sexual activity: Not on file     Other Topics Concern    Not on file     Social History Narrative    No narrative on file        Review of Systems  Constitutional: no weight loss, no change to appetite   Eyes: no change to vision, + double vision; no redness, no tearing  Ears, nose, mouth, throat: no hearing loss, no tinnitus, no rhinorrhea, no difficulty swallowing   Cardiovascular: no palpitations + chest pain   Respiratory: no shortness of breath, + cough +wheezing   Gastrointestinal: no diarrhea, no constipation + nausea  Gu: incontinence   Musculoskeletal: no joint pain  Skin: no rashes  Neurologic: + numbness, + weakness, change to speech, loss of coordination   Endocrine: no heat or cold intolerance  Heme: no night sweats, + easy bruising   Psych: no depression     Objective:        Vitals:    04/18/18 1617   BP: 138/80   Pulse: 77   Resp: 16     Body mass index is 43.46 kg/m².    General: Well developed, well nourished.  No acute distress.  HEENT: Atraumatic, normocephalic.  Neck: Trachea midline  Cardiovascular: Vitals reviewed. Normal peripheral perfusion.   Pulmonary: No increased work of breathing.  Abdomen/GI: No guarding  Musculoskeletal: normal tone in all four extremities. No atrophy. No abnormal movements. Gait is wide based, slow, and with assistance of cane.     Spine:   CERVICAL SPINE:  ROM: reduced lateral rotation bilaterally, normal extension and flexion   MUSCLE SPASM: + upper cervical paraspinals, facet tenderness bilaterally   FACET LOADING: + left  SPURLING: no  FRED / BALWINDER tender: no     Neurological  exam:  Mental status: Awake and alert. Oriented to situation.  Speech fluent and appropriate. Recent and remote memory appear to be intact.  Fund of knowledge normal.  Cranial nerves: Pupils equal round, extraocular movements intact, facial strength intact bilaterally, hearing grossly intact bilaterally.  Sensation: intact to light touch; reduced to TEMP in the hands to mid-forearm and feet to knee bilaterally. Reduced PPC and vibration at the toes.     Data Review:     I have personally reviewed the referring provider's notes, labs, & imaging made available to me today.      10/30/17 X-ray cervical spine:  Moderate DDD at C6-7 level; multi-level NF stenosis, most at C3-4 level, no instability on flexion or extension     Lab Results   Component Value Date    BNP 3220 (H) 03/09/2016     03/16/2018    K 5.0 03/16/2018    MG 2.0 01/05/2018     03/16/2018    CO2 24 03/16/2018    BUN 31 (H) 03/16/2018    CREATININE 1.0 03/16/2018     03/16/2018    HGBA1C 6.6 (H) 01/03/2018    AST 19 03/16/2018    AST 28 03/09/2016    ALT 13 03/16/2018    ALBUMIN 3.5 03/16/2018    PROT 7.6 03/16/2018    BILITOT 0.6 03/16/2018    CHOL 167 04/18/2017    HDL 48 04/18/2017    LDLCALC 98.0 04/18/2017    TRIG 105 04/18/2017       Lab Results   Component Value Date    WBC 8.18 01/31/2018    HGB 13.4 01/31/2018    HCT 41.3 01/31/2018    MCV 96 01/31/2018     01/31/2018       Lab Results   Component Value Date    TSH 3.014 04/18/2017           Assessment & Plan:       Problem List Items Addressed This Visit        Neuro    Cervicogenic headache    Overview     Her headaches, I believe are cervicogenic secondary to presumed cervical facet arthropathy as she is tender in the facets with only a fair degree of muscle spasm. This is further supported by neck pain, headache, and dizziness when rotating the head. Mild facet arthropathy evident on cervical Xr. She has very many medical problems, and is on aspirin and warfarin for  atrial fibrillation, CAD, and cardiomyopathy thus offering cervical medial branch blocks and RFA would be tricky here but we have exhausteed medical options - cymbalta not helpful and safe NSAIDs (from a bleeding standpoint) are cost prohibitive to her or not covered on her insurance.    She is status post single diagnostic cervical medial branch block of the left C2-3-4-5 and third occipital nerve on 3/22/18 with 95% relief of her neck and head pain x 8 hours. Excellent candidate for RFA of the same nerves. I am pursuing RFA after single diagnostic block only to minimize her time off anticoagulation as she is on both ASA and coumadin, for her safety.          Spondylosis of cervical region without myelopathy or radiculopathy    Overview     Left facet loading, reproduced headache.     She is status post single diagnostic cervical medial branch block of the left C2-3-4-5 and third occipital nerve on 3/22/18 with 95% relief of her neck and head pain x 8 hours. Excellent candidate for RFA of the same nerves. I am pursuing RFA after single diagnostic block only to minimize her time off anticoagulation as she is on both ASA and coumadin, for her safety.     MAC sedation for Rfa given ASA 4                  TESTING:  -- none     PREVENTION (use daily regardless of headache):  -- schedule a radiofrequency ablation of the left C2-3-4-5 medial branch nerve and third occipital nerve in the surgery center with anesthesiologist present for MAC sedation - patient is ASA 4   -- HOLD warfarin for 5 days before procedure   -- stop Aspirin and Fish oil 7 days before     ACUTE TREATMENT:  -- tylenol       No Follow-up on file. 4 weeks after RFA     Regine Palma MD

## 2018-04-19 ENCOUNTER — TELEPHONE (OUTPATIENT)
Dept: NEUROLOGY | Facility: CLINIC | Age: 83
End: 2018-04-19

## 2018-04-19 ENCOUNTER — ANTI-COAG VISIT (OUTPATIENT)
Dept: CARDIOLOGY | Facility: CLINIC | Age: 83
End: 2018-04-19

## 2018-04-19 DIAGNOSIS — I48.20 CHRONIC ATRIAL FIBRILLATION: ICD-10-CM

## 2018-04-19 DIAGNOSIS — Z79.01 LONG TERM CURRENT USE OF ANTICOAGULANT THERAPY: ICD-10-CM

## 2018-04-19 LAB — INR PPP: 2.3 (ref 2–3)

## 2018-04-19 NOTE — TELEPHONE ENCOUNTER
Patient has a tentative date for a Radiofrequency Thermocoagulation of medial branches on 5/24. Patient will need to stop her Warfarin 5 days prior. Please advise.

## 2018-04-19 NOTE — TELEPHONE ENCOUNTER
Patient ok to hold warfarin 5 days prior to procedure. We will advise patient of holding instructions closer to procedure date.

## 2018-04-23 DIAGNOSIS — M47.812 FACET ARTHROPATHY, CERVICAL: Primary | ICD-10-CM

## 2018-04-23 RX ORDER — SODIUM CHLORIDE, SODIUM LACTATE, POTASSIUM CHLORIDE, CALCIUM CHLORIDE 600; 310; 30; 20 MG/100ML; MG/100ML; MG/100ML; MG/100ML
INJECTION, SOLUTION INTRAVENOUS CONTINUOUS
Status: CANCELLED | OUTPATIENT
Start: 2018-05-24

## 2018-05-03 ENCOUNTER — ANTI-COAG VISIT (OUTPATIENT)
Dept: CARDIOLOGY | Facility: CLINIC | Age: 83
End: 2018-05-03

## 2018-05-03 DIAGNOSIS — I48.20 CHRONIC ATRIAL FIBRILLATION: ICD-10-CM

## 2018-05-03 DIAGNOSIS — Z79.01 LONG TERM CURRENT USE OF ANTICOAGULANT THERAPY: ICD-10-CM

## 2018-05-03 LAB — INR PPP: 3.2 (ref 2–3)

## 2018-05-03 NOTE — PROGRESS NOTES
Pt reports taking the generic brand of Claritin and a  spray for her nose due to sinus's bothering her. States she is eating less salad than normal

## 2018-05-14 LAB — INR PPP: 2.5 (ref 2–3)

## 2018-05-15 ENCOUNTER — ANTI-COAG VISIT (OUTPATIENT)
Dept: CARDIOLOGY | Facility: CLINIC | Age: 83
End: 2018-05-15

## 2018-05-15 DIAGNOSIS — Z79.01 LONG TERM CURRENT USE OF ANTICOAGULANT THERAPY: ICD-10-CM

## 2018-05-15 DIAGNOSIS — I48.20 CHRONIC ATRIAL FIBRILLATION: ICD-10-CM

## 2018-05-15 NOTE — PROGRESS NOTES
pt informed of dosing, holding and next inr chk date; pt expressed understanding   I will START or STAY ON the medications listed below when I get home from the hospital:  None I will START or STAY ON the medications listed below when I get home from the hospital:    Flagyl 500 mg oral tablet  -- 1 tab(s) by mouth 3 times a day MDD:3  -- Do not drink alcoholic beverages when taking this medication.  Finish all this medication unless otherwise directed by prescriber.  May discolor urine or feces.    -- Indication: For abx    Percocet 5/325 oral tablet  -- 1 tab(s) by mouth every 6 hours MDD:4  -- Caution federal law prohibits the transfer of this drug to any person other  than the person for whom it was prescribed.  May cause drowsiness.  Alcohol may intensify this effect.  Use care when operating dangerous machinery.  This prescription cannot be refilled.  This product contains acetaminophen.  Do not use  with any other product containing acetaminophen to prevent possible liver damage.  Using more of this medication than prescribed may cause serious breathing problems.    -- Indication: For pain    Ceftin 250 mg oral tablet  -- 1 tab(s) by mouth every 12 hours MDD:2  -- Finish all this medication unless otherwise directed by prescriber.  Medication should be taken with plenty of water.  Take with food or milk.    -- Indication: For abx

## 2018-05-22 DIAGNOSIS — Z95.0 PACEMAKER: Primary | ICD-10-CM

## 2018-05-22 DIAGNOSIS — R00.1 BRADYCARDIA: ICD-10-CM

## 2018-05-23 ENCOUNTER — TELEPHONE (OUTPATIENT)
Dept: NEUROLOGY | Facility: CLINIC | Age: 83
End: 2018-05-23

## 2018-05-23 NOTE — TELEPHONE ENCOUNTER
----- Message from Vanessa Guzman sent at 5/23/2018  9:01 AM CDT -----  Contact: Pt  Name of Who is Calling: Miesha      What is the request in detail: Pt is calling states she's just wanting to let the doctor know that she's been feeling sick. Pt has been having mucus in system. Pt would like to speak with a nurse.      Can the clinic reply by [facility]: no      What Number to Call Back if not in [facility]: [phone] (home)

## 2018-05-23 NOTE — TELEPHONE ENCOUNTER
Returned call and spoke with patient. She said she has been coughing up mucus and has an upset stomach. She would like to reschedule her procedure for tomorrow. Procedure rescheduled to 6/21. Patient verbalized understanding.

## 2018-06-19 ENCOUNTER — TELEPHONE (OUTPATIENT)
Dept: NEUROLOGY | Facility: CLINIC | Age: 83
End: 2018-06-19

## 2018-06-19 DIAGNOSIS — M50.30 DDD (DEGENERATIVE DISC DISEASE), CERVICAL: Primary | ICD-10-CM

## 2018-06-19 NOTE — TELEPHONE ENCOUNTER
Called and spoke with patient. Informed her that I was canceling her appointment and would reschedule it on Friday. The appointment for tomorrow was a follow up post procedure. Her procedure is for 6/21. Informed patient that the pre-op nurses will call her to tell her a time to be here Thursday. Patient verbalized understanding.

## 2018-06-20 ENCOUNTER — TELEPHONE (OUTPATIENT)
Dept: SURGERY | Facility: HOSPITAL | Age: 83
End: 2018-06-20

## 2018-06-20 ENCOUNTER — ANESTHESIA EVENT (OUTPATIENT)
Dept: SURGERY | Facility: HOSPITAL | Age: 83
End: 2018-06-20

## 2018-06-21 ENCOUNTER — ANESTHESIA (OUTPATIENT)
Dept: SURGERY | Facility: HOSPITAL | Age: 83
End: 2018-06-21

## 2018-06-21 ENCOUNTER — HOSPITAL ENCOUNTER (OUTPATIENT)
Dept: RADIOLOGY | Facility: HOSPITAL | Age: 83
Discharge: HOME | End: 2018-06-21
Attending: PSYCHIATRY & NEUROLOGY
Payer: MEDICARE

## 2018-06-21 DIAGNOSIS — M50.30 DDD (DEGENERATIVE DISC DISEASE), CERVICAL: ICD-10-CM

## 2018-06-26 LAB — INR PPP: 2.3 (ref 2–3)

## 2018-06-27 ENCOUNTER — ANTI-COAG VISIT (OUTPATIENT)
Dept: CARDIOLOGY | Facility: CLINIC | Age: 83
End: 2018-06-27

## 2018-06-27 DIAGNOSIS — I48.20 CHRONIC ATRIAL FIBRILLATION: ICD-10-CM

## 2018-06-27 DIAGNOSIS — Z79.01 LONG TERM CURRENT USE OF ANTICOAGULANT THERAPY: ICD-10-CM

## 2018-07-05 ENCOUNTER — TELEPHONE (OUTPATIENT)
Dept: ADMINISTRATIVE | Facility: CLINIC | Age: 83
End: 2018-07-05

## 2018-07-09 ENCOUNTER — TELEPHONE (OUTPATIENT)
Dept: CARDIOLOGY | Facility: CLINIC | Age: 83
End: 2018-07-09

## 2018-07-09 ENCOUNTER — NURSE TRIAGE (OUTPATIENT)
Dept: ADMINISTRATIVE | Facility: CLINIC | Age: 83
End: 2018-07-09

## 2018-07-09 RX ORDER — METOPROLOL SUCCINATE 50 MG/1
TABLET, EXTENDED RELEASE ORAL
Qty: 90 TABLET | Refills: 3 | Status: SHIPPED | OUTPATIENT
Start: 2018-07-09 | End: 2019-06-13

## 2018-07-09 NOTE — TELEPHONE ENCOUNTER
Reason for Disposition   Intermittent chest pains persist > 3 days    Protocols used: ST CHEST PAIN-A-[facility]    Miesha is calling to schedule an appointment with Cardiology due to intermittent chest pain.  Transferred to [facility].  Miesha reports that she has intermittent chest pain.  States no chest pain at this time.  Miesha states she is not sure if this is due to reflux or her heart so just wanted to schedule an appointment.  Protocol states see in office today.  Miesha requested Dr. Gonzales who is unavailable.  Miesha reports has also seen Dr. Dorantes in the past.  Please contact Miesha to advise.  She can be reached at [phone].

## 2018-07-09 NOTE — TELEPHONE ENCOUNTER
Spoke with the patient she stated she is not having any chest pain at this time. She stated she has been having a headache from her allergies. She was advised to follow up with her PCP she stated she had an appointment but canceled and will call back. She was offered to see another provider in our clinic she refused and stated she will wait to see Dr Gonzales when he returns. She has been scheduled and notified of her appointment date and time.

## 2018-07-11 ENCOUNTER — TELEPHONE (OUTPATIENT)
Dept: NEUROLOGY | Facility: CLINIC | Age: 83
End: 2018-07-11

## 2018-07-11 NOTE — TELEPHONE ENCOUNTER
----- Message from Dayana Turner sent at 7/11/2018 10:59 AM CDT -----  Contact: Patient  Patient would like to leave another number to her message sent earlier.  Call Back#[phone]  Thanks

## 2018-07-11 NOTE — TELEPHONE ENCOUNTER
----- Message from Bayron Jay sent at 7/11/2018 10:42 AM CDT -----  Contact: Patient  Type: Needs Medical Advice    Who Called:  Patient  Symptoms (please be specific):    How long has patient had these symptoms:    Pharmacy name and phone #:    Best Call Back Number: [phone]  Additional Information: requesting a call back regarding scheduling procedure

## 2018-07-13 NOTE — TELEPHONE ENCOUNTER
Patient is scheduled for a RFA with Dr. Palma on 8/9. Patient will need to stop aspirin and fish oil 7 days before (8/3) and Warfarin on (8/5). Please advise.

## 2018-07-13 NOTE — TELEPHONE ENCOUNTER
Called and spoke with patient. Procedure scheduled for 8/9. Informed patient of which medications to stop and not to stop them until she is further directed from the [facility]. Patient verbalized understanding.

## 2018-07-18 ENCOUNTER — OFFICE VISIT (OUTPATIENT)
Dept: PRIMARY CARE CLINIC | Facility: CLINIC | Age: 83
End: 2018-07-18
Payer: MEDICARE

## 2018-07-18 VITALS
TEMPERATURE: 98 F | DIASTOLIC BLOOD PRESSURE: 64 MMHG | OXYGEN SATURATION: 92 % | HEIGHT: 61 IN | SYSTOLIC BLOOD PRESSURE: 124 MMHG | WEIGHT: 244.38 LBS | BODY MASS INDEX: 46.14 KG/M2 | HEART RATE: 64 BPM

## 2018-07-18 DIAGNOSIS — J34.89 RHINORRHEA: ICD-10-CM

## 2018-07-18 DIAGNOSIS — J01.00 ACUTE NON-RECURRENT MAXILLARY SINUSITIS: Primary | ICD-10-CM

## 2018-07-18 PROCEDURE — 99999 PR PBB SHADOW E&M-EST. PATIENT-LVL V: CPT | Mod: PBBFAC,,, | Performed by: NURSE PRACTITIONER

## 2018-07-18 PROCEDURE — 3078F DIAST BP <80 MM HG: CPT | Mod: CPTII,S$GLB,, | Performed by: NURSE PRACTITIONER

## 2018-07-18 PROCEDURE — 3074F SYST BP LT 130 MM HG: CPT | Mod: CPTII,S$GLB,, | Performed by: NURSE PRACTITIONER

## 2018-07-18 PROCEDURE — 99214 OFFICE O/P EST MOD 30 MIN: CPT | Mod: S$GLB,,, | Performed by: NURSE PRACTITIONER

## 2018-07-18 RX ORDER — AZELASTINE 1 MG/ML
1 SPRAY, METERED NASAL 2 TIMES DAILY
Qty: 30 ML | Refills: 0 | Status: SHIPPED | OUTPATIENT
Start: 2018-07-18 | End: 2020-01-16 | Stop reason: SDUPTHER

## 2018-07-18 RX ORDER — CEFUROXIME AXETIL 500 MG/1
500 TABLET ORAL 2 TIMES DAILY
Qty: 14 TABLET | Refills: 0 | Status: SHIPPED | OUTPATIENT
Start: 2018-07-18 | End: 2018-07-25

## 2018-07-18 NOTE — PATIENT INSTRUCTIONS
Acute Bacterial Rhinosinusitis (ABRS)    Acute bacterial rhinosinusitis (ABRS) is an infection of your nasal cavity and sinuses. Its caused by bacteria. Acute means that youve had symptoms for less than 12 weeks.  Understanding your sinuses  The nasal cavity is the large air-filled space behind your nose. The sinuses are a group of spaces formed by the bones of your face. They connect with your nasal cavity. ABRS causes the tissue lining these spaces to become inflamed. Mucus may not drain normally. This leads to facial pain and other symptoms.  What causes ABRS?  ABRS most often follows an upper respiratory infection caused by a virus. Bacteria then infect the lining of your nasal cavity and sinuses. But you can also get ABRS if you have:  · Nasal allergies  · Long-term nasal swelling and congestion not caused by allergies  · Blockage in the nose  Symptoms of ABRS  The symptoms of ABRS may be different for each person, and can include:  · Nasal congestion  · Runny nose  · Fluid draining from the nose down the throat (postnasal drip)  · Headache  · Cough  · Pain in the sinuses  · Thick, colored fluid from the nose (mucus)  · Fever  Diagnosing ABRS  ABRS may be diagnosed if youve had an upper respiratory infection like a cold and cough for longer than 10 to 14 days. Your health care provider will ask about your symptoms and your medical history. The provider will check your vital signs, including your temperature. Youll have a physical exam. The health care provider will check your ears, nose, and throat. You likely wont need any tests. If ABRS comes back, you may have a culture or other tests.  Treatment for ABRS  Treatment may include:  · Antibiotic medicine. This is for symptoms that last for at least 10 to 14 days.  · Nasal corticosteroid medicine. Drops or spray used in the nose can lessen swelling and congestion.  · Over-the-counter pain medicine. This is to lessen sinus pain and pressure.  · Nasal  decongestant medicine. Spray or drops may help to lessen congestion. Do not use them for more than a few days.  · Salt wash (saline irrigation). This can help to loosen mucus.  Possible complications of ABRS  ABRS may come back or become long-term (chronic).  In rare cases, ABRS may cause complications such as:   · Inflamed tissue around the brain and spinal cord (meningitis)  · Inflamed tissue around the eyes (orbital cellulitis)  · Inflamed bones around the sinuses (osteitis)  These problems may need to be treated in a hospital with intravenous (IV) antibiotic medicine or surgery.  When to call the health care provider  Call your health care provider if you have any of the following:  · Symptoms that dont get better, or get worse  · Symptoms that dont get better after 3 to 5 days on antibiotics  · Trouble seeing  · Swelling around your eyes  · Confusion or trouble staying awake   Date Last Reviewed: 3/3/2015  © 6470-4864 [employer]. [address] [place] [address]. All rights reserved. This information is not intended as a substitute for professional medical care. Always follow your healthcare professional's instructions.    If you are not better in 5 days, if worse or you have concerns or questions, please do not hesitate to call.  You can reach us at [phone] Monday through Thursday (except holidays) 10 a.m. to 6 p.m. or call Dr. España/HOMERO Le    Thank you for using the [facility]!    [employer], CNP, FNP-BC [facility]      To rate your experience with [employer], click on the link below:      [email or web address]

## 2018-07-18 NOTE — Clinical Note
KEON España MD,  I saw your patient today in the [facility].  If you have any questions, please do not hesitate to contact me.  Thank you!  [employer]

## 2018-07-18 NOTE — PROGRESS NOTES
Miesha Obando is a 82 y.o. female patient of KEON España MD who presents to the clinic today for   Chief Complaint   Patient presents with    Sinusitis   .    HPI    Pt, who is known to me, reports a new problem to me: left max sinus pain that goes to the left eye, left ear and has a ST, sneezing.  Blowing the nose and coughing--mucus-looking d/c .    These symptoms began 3 days  ago and status is worsening.     Symptoms are + acute.  Runny nose all year long.  Takes loratidine for allergies    Pt denies the following symptoms:  fever    Aggravating factors include bending the head down causes dizziness .    Relieving factors include none .    OTC Medications tried are flonase.    Prescription medications taken for symptoms are none.    Pertinent medical history:  H/o asthma and allergies.    Smoking status:  nonsmoker    ROS    Constitutional:   No  fever.    Head:   + left sided headache occip to headache, starting at the neck.  Gets a lot of right-sided headache  Ears:   left pain.  Eyes:  Left behind the eye pain  Nose:   + left max sinus pain, + congestion, + runny nose, + post nasal drip.  Throat:  Left sided ST pain,.    Heart:  No palpitations, chest pain.    Lungs:  + difficulty breathing, + coughing, yellow colored sputum production.              Symptoms are community acquired.    GI/:  No sxs    MS:  No new bone, joint or muscle problems.    Skin:  No rashes, itching.      PAST MEDICAL HISTORY:  Past Medical History:   Diagnosis Date    Anticoagulant long-term use     Arthritis     Atrial fibrillation     Breast cancer 1994    breast , left    Cardiomyopathy - EF 35-40% 5/11/2016    CHF (congestive heart failure)     Chronic bronchitis     COPD (chronic obstructive pulmonary disease)     Coronary artery disease without angina pectoris 8/26/2015    Depression     Diabetes with neurologic complications     Diverticulitis     Diverticulosis     Encounter for blood transfusion      after mastectomy x 20 years ago    GERD (gastroesophageal reflux disease)     H/O aortic valve replacement     Hiatal hernia     History of colonic diverticulitis     Hypertension     Hypothyroid     Insomnia     Irritable bowel syndrome     Obesity     Pacemaker 08/2014    Schatzki's ring     mild    Sleep apnea     non compliant with CPAP    Syncope and collapse     Type II or unspecified type diabetes mellitus without mention of complication, not stated as uncontrolled     No medications at present.     Urinary incontinence        PAST SURGICAL HISTORY:  Past Surgical History:   Procedure Laterality Date    ADENOIDECTOMY      APPENDECTOMY      BACK SURGERY      Discectomy, lumbar    BREAST SURGERY  1994    left side , mastectomy    CARDIAC PACEMAKER PLACEMENT      CARDIAC VALVE SURGERY      aortic valve replacement    CHOLECYSTECTOMY      COLONOSCOPY  2/2014    repeat in 10 years for screening    CORONARY STENT PLACEMENT      x2    EYE SURGERY      bilateral PHACO and IOL    HYSTERECTOMY      ovaries spared    LOOP RECORDER      TOE SURGERY Right     TONSILLECTOMY      UPPER GASTROINTESTINAL ENDOSCOPY  2012       SOCIAL HISTORY:  Social History     Social History    Marital status:      Spouse name: N/A    Number of children: N/A    Years of education: N/A     Occupational History    Not on file.     Social History Main Topics    Smoking status: Former Smoker     Packs/day: 1.00     Years: 30.00     Start date: 2/23/1958     Quit date: 1/13/1988    Smokeless tobacco: Never Used    Alcohol use 1.8 oz/week     2 Glasses of wine, 1 Shots of liquor per week      Comment: occasional    Drug use: No    Sexual activity: Not on file     Other Topics Concern    Not on file     Social History Narrative    No narrative on file       FAMILY HISTORY:  Family History   Problem Relation Age of Onset    Parkinsonism Mother     Emphysema Father     Heart disease Brother      Heart attack Brother     Heart failure Brother     Heart disease Brother     Heart failure Brother     Heart disease Brother     Heart failure Brother     Arrhythmia Brother     Anesthesia problems Neg Hx     Clotting disorder Neg Hx        ALLERGIES AND MEDICATIONS: updated and reviewed.  Review of patient's allergies indicates:   Allergen Reactions    Sulfa (sulfonamide antibiotics) Hives    Adhesive Rash     Use paper tape    Amoxicillin-pot clavula (bulk) Nausea And Vomiting     Current Outpatient Prescriptions   Medication Sig Dispense Refill    ACCU-CHEK FASTCLIX Misc TEST TWICE DAILY 200 each 11    ACCU-CHEK SMARTVIEW TEST STRIP Strp TEST  TWICE DAILY 200 strip 11    acetaminophen (TYLENOL) 325 MG tablet Take 325 mg by mouth as needed for Pain or Temperature greater than.       albuterol (PROVENTIL) 2.5 mg /3 mL (0.083 %) nebulizer solution Take 3 mLs (2.5 mg total) by nebulization every 6 (six) hours as needed for Wheezing. Rescue 90 mL 0    ascorbic acid (VITAMIN C) 500 MG tablet Take 500 mg by mouth once daily.        aspirin (ECOTRIN) 81 MG EC tablet Take 81 mg by mouth once daily.      b complex vitamins tablet Take 1 tablet by mouth once daily.        BD ALCOHOL SWABS PadM USE TWICE DAILY 200 each 11    diphenhydramine-acetaminophen (TYLENOL PM)  mg Tab Take 1 tablet by mouth nightly as needed.      fish oil-omega-3 fatty acids 300-1,000 mg capsule Take 2 g by mouth once daily.      fluticasone-vilanterol (BREO ELLIPTA) 200-25 mcg/dose DsDv diskus inhaler Inhale 1 puff into the lungs once daily. 3 each 3    furosemide (LASIX) 20 MG tablet Take 20 mg by mouth as needed.      levothyroxine (SYNTHROID) 100 MCG tablet TAKE 1 TABLET ONE TIME DAILY 90 tablet 1    losartan (COZAAR) 25 MG tablet TAKE 1 TABLET EVERY DAY 90 tablet 1    metoprolol succinate (TOPROL-XL) 50 MG 24 hr tablet TAKE 1 TABLET EVERY DAY (DISCONTINUE METOPROLOL ER 25MG) 90 tablet 3    pantoprazole (PROTONIX)  20 MG tablet Take 1 tablet (20 mg total) by mouth 2 (two) times daily before meals. 28 tablet 0    ranitidine (ZANTAC) 300 MG tablet Take 300 mg by mouth every evening.      spironolactone (ALDACTONE) 25 MG tablet TAKE 1 TABLET ONE TIME DAILY 90 tablet 3    torsemide (DEMADEX) 20 MG Tab Take 1 tablet (20 mg total) by mouth once daily. 30 tablet 0    warfarin (COUMADIN) 2.5 MG tablet Take 2.5 mg by mouth once daily.      nitroGLYCERIN (NITROSTAT) 0.4 MG SL tablet Place 1 tablet (0.4 mg total) under the tongue every 5 (five) minutes as needed for Chest pain. 20 tablet 0     No current facility-administered medications for this visit.              PHYSICAL EXAM    Alert, coop 82 y.o. female patient in no acute distress.    Vitals:    07/18/18 1359   BP: 124/64   Pulse: 64   Temp: 98.4 °F (36.9 °C)     VS reviewed.  VS stable.  CC, nursing note, medications & PMH all reviewed today.    Head:  Normocephalic, atraumatic.    EENT:  EACs patent.  TMs no erythema, left with diffuse LR, no effusions, no TM perforation.                              Eye lids normal, no discharge present.       Sinus tenderness to palp--left max sinus and L>R frontal sinus.               Nares--left with edema, no d/c present.    Pharynx not injected.                Tonsils not erythematous , not enlarged, no exudate present.    No anterior, no posterior cervical lymph nodes palpable.    No submental, submandibular or supraclavicular lymph nodes palp.             Resp:  Respirations even, unlabored   Lungs CTA bilat.  No wheezing.  No crackles.  Moves air to bases bilat.    Heart:  RRR    MS:  Ambulates with a walker            NEURO:  Alert and oriented x 4.  Responds appropriately during interaction.    Skin:  Warm, dry, color good.    Acute non-recurrent maxillary sinusitis  -     cefUROXime (CEFTIN) 500 MG tablet; Take 1 tablet (500 mg total) by mouth 2 (two) times daily. for 7 days  Dispense: 14 tablet; Refill: 0    Rhinorrhea  -      azelastine (ASTELIN) 137 mcg (0.1 %) nasal spray; 1 spray (137 mcg total) by Nasal route 2 (two) times daily.  Dispense: 30 mL; Refill: 0      Pt today presents with report of chronich rhinitis/rhinorrhea.    For the past 3 days she's had left max sinus pain and left ear pain, left throat pain, cough with small amount of yellow sputum.  On exam these sinus areas are tender and the left TM is distended but not erythematous.  Pharynx is not injected.    This is a new problem to me.  No work up is planned.        Pt advised to perform comfort measures recommended on patient instruction sheet .    If not better in 3-5 days, the patient is advised to call us.  If worse or concerns, the patient is advised to call us.  Explained exam findings, diagnosis and treatment plan to patient.  Questions answered and patient states understanding.

## 2018-07-18 NOTE — PROGRESS NOTES
Miesha Obando is a 82 y.o. female patient of KEON España MD who presents to the clinic today for   Chief Complaint   Patient presents with    Sinusitis   .    HPI    Pt, who is *** known to me, reports a *** problem to me: *** .    These symptoms began ***  ago and status is ***.     Symptoms are *** acute  *** exac of chronic ***.    Pt denies the following symptoms:  ***    Aggravating factors include *** .    Relieving factors include *** .    OTC Medications tried are ***.    Prescription medications taken for symptoms are ***.    Pertinent medical history:  ***.    Smoking status:  ***    ROS    Constitutional:   ***  fever, *** fatigue, *** in appetite.    Head:   *** headache  Ears:   *** pain.  Eyes:  No sxs  Nose:   *** sinus pain, *** congestion, *** runny nose, *** post nasal drip.  Throat:  *** ST pain, *** exudate, *** difficulty swallowing ***.    Heart:  No palpitations, chest pain.    Lungs:  *** difficulty breathing, *** coughing, *** sputum production, *** wheezing.              Symptoms are *** acquired.    GI/:  No sxs    MS:  No new bone, joint or muscle problems.    Skin:  No rashes, itching.      PAST MEDICAL HISTORY:  Past Medical History:   Diagnosis Date    Anticoagulant long-term use     Arthritis     Atrial fibrillation     Breast cancer 1994    breast , left    Cardiomyopathy - EF 35-40% 5/11/2016    CHF (congestive heart failure)     Chronic bronchitis     COPD (chronic obstructive pulmonary disease)     Coronary artery disease without angina pectoris 8/26/2015    Depression     Diabetes with neurologic complications     Diverticulitis     Diverticulosis     Encounter for blood transfusion     after mastectomy x 20 years ago    GERD (gastroesophageal reflux disease)     H/O aortic valve replacement     Hiatal hernia     History of colonic diverticulitis     Hypertension     Hypothyroid     Insomnia     Irritable bowel syndrome     Obesity      Pacemaker 08/2014    Schatzki's ring     mild    Sleep apnea     non compliant with CPAP    Syncope and collapse     Type II or unspecified type diabetes mellitus without mention of complication, not stated as uncontrolled     No medications at present.     Urinary incontinence        PAST SURGICAL HISTORY:  Past Surgical History:   Procedure Laterality Date    ADENOIDECTOMY      APPENDECTOMY      BACK SURGERY      Discectomy, lumbar    BREAST SURGERY  1994    left side , mastectomy    CARDIAC PACEMAKER PLACEMENT      CARDIAC VALVE SURGERY      aortic valve replacement    CHOLECYSTECTOMY      COLONOSCOPY  2/2014    repeat in 10 years for screening    CORONARY STENT PLACEMENT      x2    EYE SURGERY      bilateral PHACO and IOL    HYSTERECTOMY      ovaries spared    LOOP RECORDER      TOE SURGERY Right     TONSILLECTOMY      UPPER GASTROINTESTINAL ENDOSCOPY  2012       SOCIAL HISTORY:  Social History     Social History    Marital status:      Spouse name: N/A    Number of children: N/A    Years of education: N/A     Occupational History    Not on file.     Social History Main Topics    Smoking status: Former Smoker     Packs/day: 1.00     Years: 30.00     Start date: 2/23/1958     Quit date: 1/13/1988    Smokeless tobacco: Never Used    Alcohol use 1.8 oz/week     2 Glasses of wine, 1 Shots of liquor per week      Comment: occasional    Drug use: No    Sexual activity: Not on file     Other Topics Concern    Not on file     Social History Narrative    No narrative on file       FAMILY HISTORY:  Family History   Problem Relation Age of Onset    Parkinsonism Mother     Emphysema Father     Heart disease Brother     Heart attack Brother     Heart failure Brother     Heart disease Brother     Heart failure Brother     Heart disease Brother     Heart failure Brother     Arrhythmia Brother     Anesthesia problems Neg Hx     Clotting disorder Neg Hx        ALLERGIES AND  MEDICATIONS: updated and reviewed.  Review of patient's allergies indicates:   Allergen Reactions    Sulfa (sulfonamide antibiotics) Hives    Adhesive Rash     Use paper tape    Amoxicillin-pot clavula (bulk) Nausea And Vomiting     Current Outpatient Prescriptions   Medication Sig Dispense Refill    ACCU-CHEK FASTCLIX Misc TEST TWICE DAILY 200 each 11    ACCU-CHEK SMARTVIEW TEST STRIP Strp TEST  TWICE DAILY 200 strip 11    acetaminophen (TYLENOL) 325 MG tablet Take 325 mg by mouth as needed for Pain or Temperature greater than.       albuterol (PROVENTIL) 2.5 mg /3 mL (0.083 %) nebulizer solution Take 3 mLs (2.5 mg total) by nebulization every 6 (six) hours as needed for Wheezing. Rescue 90 mL 0    ascorbic acid (VITAMIN C) 500 MG tablet Take 500 mg by mouth once daily.        aspirin (ECOTRIN) 81 MG EC tablet Take 81 mg by mouth once daily.      b complex vitamins tablet Take 1 tablet by mouth once daily.        BD ALCOHOL SWABS PadM USE TWICE DAILY 200 each 11    diphenhydramine-acetaminophen (TYLENOL PM)  mg Tab Take 1 tablet by mouth nightly as needed.      fish oil-omega-3 fatty acids 300-1,000 mg capsule Take 2 g by mouth once daily.      fluticasone-vilanterol (BREO ELLIPTA) 200-25 mcg/dose DsDv diskus inhaler Inhale 1 puff into the lungs once daily. 3 each 3    furosemide (LASIX) 20 MG tablet Take 20 mg by mouth as needed.      levothyroxine (SYNTHROID) 100 MCG tablet TAKE 1 TABLET ONE TIME DAILY 90 tablet 1    losartan (COZAAR) 25 MG tablet TAKE 1 TABLET EVERY DAY 90 tablet 1    metoprolol succinate (TOPROL-XL) 50 MG 24 hr tablet TAKE 1 TABLET EVERY DAY (DISCONTINUE METOPROLOL ER 25MG) 90 tablet 3    pantoprazole (PROTONIX) 20 MG tablet Take 1 tablet (20 mg total) by mouth 2 (two) times daily before meals. 28 tablet 0    ranitidine (ZANTAC) 300 MG tablet Take 300 mg by mouth every evening.      spironolactone (ALDACTONE) 25 MG tablet TAKE 1 TABLET ONE TIME DAILY 90 tablet 3     torsemide (DEMADEX) 20 MG Tab Take 1 tablet (20 mg total) by mouth once daily. 30 tablet 0    warfarin (COUMADIN) 2.5 MG tablet Take 2.5 mg by mouth once daily.      nitroGLYCERIN (NITROSTAT) 0.4 MG SL tablet Place 1 tablet (0.4 mg total) under the tongue every 5 (five) minutes as needed for Chest pain. 20 tablet 0     No current facility-administered medications for this visit.              PHYSICAL EXAM    Alert, coop 82 y.o. female patient in *** distress ***.    Vitals:    07/18/18 1359   BP: 124/64   Pulse: 64   Temp: 98.4 °F (36.9 °C)     VS reviewed.  VS ***.  CC, nursing note, medications & PMH all reviewed today.    Head:  Normocephalic, atraumatic.    EENT:  EACs ***.  TMs *** erythema, *** LR, *** effusions (suppurative; nonsupurative), *** TM perforation.                              Eye lids normal, no discharge present.       Sinus tenderness to palp--***.               Nares--*** edema, *** d/c present.    Pharynx *** injected.                Tonsils *** absent, *** erythematous , *** enlarged, *** exudate present.    *** anterior, *** posterior cervical lymph nodes palpable.    *** submental, submandibular or supraclavicular lymph nodes palp.             Resp:  Respirations ***   Lungs *** bilat.  *** wheezing.  *** crackles.  *** air to bases bilat.    Heart:  RRR, no MRG.              *** pedal edema.    MS:  Ambulates ***.             NEURO:  Alert and oriented x 4.  Responds appropriately during interaction.    Skin:  Warm, dry, color good.

## 2018-07-19 ENCOUNTER — TELEPHONE (OUTPATIENT)
Dept: NEUROLOGY | Facility: CLINIC | Age: 83
End: 2018-07-19

## 2018-07-19 DIAGNOSIS — M54.12 CERVICAL RADICULOPATHY: Primary | ICD-10-CM

## 2018-07-19 RX ORDER — SODIUM CHLORIDE, SODIUM LACTATE, POTASSIUM CHLORIDE, CALCIUM CHLORIDE 600; 310; 30; 20 MG/100ML; MG/100ML; MG/100ML; MG/100ML
INJECTION, SOLUTION INTRAVENOUS CONTINUOUS
Status: CANCELLED | OUTPATIENT
Start: 2018-08-09

## 2018-07-19 NOTE — TELEPHONE ENCOUNTER
Patient ok to hold warfarin 5 days prior to procedure. We will advise patient of holding instructions. Thank you

## 2018-07-24 ENCOUNTER — TELEPHONE (OUTPATIENT)
Dept: PRIMARY CARE CLINIC | Facility: CLINIC | Age: 83
End: 2018-07-24

## 2018-07-24 DIAGNOSIS — J34.89 SINUS PAIN: Primary | ICD-10-CM

## 2018-07-24 NOTE — TELEPHONE ENCOUNTER
----- Message from Lanette Hedrick sent at 7/24/2018  3:14 PM CDT -----  Type: Needs Medical Advice    Who Called:  Patient  Best Call Back Number: [phone]  Additional Information: Patient was seen by office on 7/18/18 but states she is having the same pain in her face and in her cheek. She is asking if office can place an xray to get her sinus to see what is going on. Please call to advise.

## 2018-07-24 NOTE — TELEPHONE ENCOUNTER
"Pt states "I am requesting sinus xrays because I am having the facial pain on left side still since OV and coughing more." States "med has made her feel worse- feel tired all the time and restless." Pt also states she uses a cpap at night and thinks that may be giving her sinus trouble too and states she will be cleaning it out tonight with vinegar to see if that helps. Please advise. Pt requesting to come for xrays tomorrow morning.   "

## 2018-07-25 ENCOUNTER — HOSPITAL ENCOUNTER (OUTPATIENT)
Dept: RADIOLOGY | Facility: HOSPITAL | Age: 83
Discharge: HOME | End: 2018-07-25
Attending: NURSE PRACTITIONER
Payer: MEDICARE

## 2018-07-25 ENCOUNTER — ANTI-COAG VISIT (OUTPATIENT)
Dept: CARDIOLOGY | Facility: CLINIC | Age: 83
End: 2018-07-25

## 2018-07-25 DIAGNOSIS — I48.20 CHRONIC ATRIAL FIBRILLATION: ICD-10-CM

## 2018-07-25 DIAGNOSIS — J34.89 SINUS PAIN: ICD-10-CM

## 2018-07-25 DIAGNOSIS — Z79.01 LONG TERM CURRENT USE OF ANTICOAGULANT THERAPY: ICD-10-CM

## 2018-07-25 LAB — INR PPP: 2 (ref 2–3)

## 2018-07-25 PROCEDURE — 70210 X-RAY EXAM OF SINUSES: CPT | Mod: 26,,, | Performed by: RADIOLOGY

## 2018-07-25 PROCEDURE — 70210 X-RAY EXAM OF SINUSES: CPT | Mod: TC,FY,PO

## 2018-07-26 ENCOUNTER — OFFICE VISIT (OUTPATIENT)
Dept: CARDIOLOGY | Facility: CLINIC | Age: 83
End: 2018-07-26
Payer: MEDICARE

## 2018-07-26 VITALS
HEIGHT: 62 IN | DIASTOLIC BLOOD PRESSURE: 68 MMHG | HEART RATE: 74 BPM | WEIGHT: 241.19 LBS | SYSTOLIC BLOOD PRESSURE: 130 MMHG | BODY MASS INDEX: 44.38 KG/M2

## 2018-07-26 DIAGNOSIS — I42.0 DILATED CARDIOMYOPATHY: Primary | ICD-10-CM

## 2018-07-26 DIAGNOSIS — I50.40 COMBINED SYSTOLIC AND DIASTOLIC CONGESTIVE HEART FAILURE, NYHA CLASS 3, UNSPECIFIED CONGESTIVE HEART FAILURE CHRONICITY: ICD-10-CM

## 2018-07-26 DIAGNOSIS — Z95.2 S/P TAVR (TRANSCATHETER AORTIC VALVE REPLACEMENT): ICD-10-CM

## 2018-07-26 DIAGNOSIS — Z95.810 AICD (AUTOMATIC CARDIOVERTER/DEFIBRILLATOR) PRESENT: ICD-10-CM

## 2018-07-26 DIAGNOSIS — I48.20 CHRONIC ATRIAL FIBRILLATION: ICD-10-CM

## 2018-07-26 DIAGNOSIS — G47.30 SLEEP APNEA, UNSPECIFIED TYPE: ICD-10-CM

## 2018-07-26 DIAGNOSIS — J42 CHRONIC BRONCHITIS, UNSPECIFIED CHRONIC BRONCHITIS TYPE: ICD-10-CM

## 2018-07-26 DIAGNOSIS — I25.10 CORONARY ARTERY DISEASE INVOLVING NATIVE CORONARY ARTERY OF NATIVE HEART WITHOUT ANGINA PECTORIS: ICD-10-CM

## 2018-07-26 DIAGNOSIS — Z95.5 S/P CORONARY ARTERY STENT PLACEMENT: ICD-10-CM

## 2018-07-26 PROCEDURE — 99214 OFFICE O/P EST MOD 30 MIN: CPT | Mod: S$GLB,,, | Performed by: INTERNAL MEDICINE

## 2018-07-26 PROCEDURE — 99999 PR PBB SHADOW E&M-EST. PATIENT-LVL III: CPT | Mod: PBBFAC,,, | Performed by: INTERNAL MEDICINE

## 2018-07-26 PROCEDURE — 3075F SYST BP GE 130 - 139MM HG: CPT | Mod: CPTII,S$GLB,, | Performed by: INTERNAL MEDICINE

## 2018-07-26 PROCEDURE — 3078F DIAST BP <80 MM HG: CPT | Mod: CPTII,S$GLB,, | Performed by: INTERNAL MEDICINE

## 2018-07-26 NOTE — PROGRESS NOTES
Subjective:    Patient ID:  Miesha Obando is a 82 y.o. female who presents for follow-up of Hypertension (sob with dizzy spells- follow up); Hyperlipidemia; Congestive Heart Failure; Shortness of Breath; and COPD      Pt here for f/u. Since last visit she feels she is a bit more SOB. She worries it may be the heart but may be the heat and humidity or her sinuses.         Review of Systems   Constitution: Negative for weight gain and weight loss.   HENT: Negative.    Eyes: Negative.    Cardiovascular: Positive for dyspnea on exertion. Negative for chest pain, claudication, cyanosis, irregular heartbeat, leg swelling, near-syncope, orthopnea (no PND), palpitations and syncope.   Respiratory: Positive for shortness of breath. Negative for cough, hemoptysis and snoring.    Endocrine: Negative.    Skin: Negative.    Musculoskeletal: Negative for joint pain, muscle cramps, muscle weakness and myalgias.   Gastrointestinal: Negative for diarrhea, hematemesis, nausea and vomiting.   Genitourinary: Negative.    Neurological: Negative for dizziness, focal weakness, light-headedness, loss of balance, numbness, paresthesias and seizures.   Psychiatric/Behavioral: Negative.         Objective:    Physical Exam   Constitutional: She is oriented to person, place, and time. She appears well-developed and well-nourished.   Eyes: Pupils are equal, round, and reactive to light.   Neck: Normal range of motion. No thyromegaly present.   Cardiovascular: Normal rate, S1 normal, S2 normal, intact distal pulses and normal pulses.  An irregularly irregular rhythm present.  No extrasystoles are present. PMI is not displaced.  Exam reveals no friction rub.    Murmur heard.   Medium-pitched systolic murmur is present with a grade of 1/6  at the upper right sternal border  Pulmonary/Chest: Effort normal and breath sounds normal. She has no wheezes. She has no rales. She exhibits no tenderness.   Abdominal: Soft. Bowel sounds are normal. She  exhibits no distension and no mass. There is no tenderness.   Musculoskeletal: Normal range of motion.   Neurological: She is alert and oriented to person, place, and time.   Skin: Skin is warm and dry.   Vitals reviewed.      Test(s) Reviewed  I have reviewed the following in detail:  [] Stress test   [] Angiography   [x] Echocardiogram   [] Labs   [] Other:         Assessment:       1. Dilated cardiomyopathy    2. Chronic atrial fibrillation    3. Combined systolic and diastolic congestive heart failure, NYHA class 3, unspecified congestive heart failure chronicity    4. Coronary artery disease involving native coronary artery of native heart without angina pectoris    5. AICD (automatic cardioverter/defibrillator) present - Bi-V    6. S/P coronary artery stent placement - LILLIAM to RCA 08/19/2015    7. S/P TAVR (transcatheter aortic valve replacement) - 23mm Taylor 09/2015    8. Chronic bronchitis, unspecified chronic bronchitis type    9. Sleep apnea, unspecified type         Plan:       Will repeat Echo  Her weight is not changed   She has a f/u with pulmonary  Continue present Rx  F/u 6 months

## 2018-07-30 ENCOUNTER — ANTI-COAG VISIT (OUTPATIENT)
Dept: CARDIOLOGY | Facility: CLINIC | Age: 83
End: 2018-07-30

## 2018-07-30 DIAGNOSIS — Z79.01 LONG TERM CURRENT USE OF ANTICOAGULANT THERAPY: Primary | ICD-10-CM

## 2018-07-30 DIAGNOSIS — I48.20 CHRONIC ATRIAL FIBRILLATION: ICD-10-CM

## 2018-07-30 LAB — INR PPP: 2.3 (ref 2–3)

## 2018-07-30 NOTE — PROGRESS NOTES
Spoke with pt gave dosing and next inr check date pt voiced understanding.   Pt reports on 08/09 Dr. mcqueen is going in a burning some nerves she states she did not know the exact name of the procedure,but was told to stop coumadin 5 days prior.   Also reports she was taking cefuroxime 500mg 2xdaily for 7 days and finished it about 1 week ago. Aware to notify clinic will any/all med changes.

## 2018-07-31 ENCOUNTER — PES CALL (OUTPATIENT)
Dept: ADMINISTRATIVE | Facility: CLINIC | Age: 83
End: 2018-07-31

## 2018-08-07 ENCOUNTER — CLINICAL SUPPORT (OUTPATIENT)
Dept: CARDIOLOGY | Facility: CLINIC | Age: 83
End: 2018-08-07
Attending: INTERNAL MEDICINE
Payer: MEDICARE

## 2018-08-07 VITALS
BODY MASS INDEX: 44.35 KG/M2 | HEIGHT: 62 IN | WEIGHT: 241 LBS | DIASTOLIC BLOOD PRESSURE: 68 MMHG | SYSTOLIC BLOOD PRESSURE: 130 MMHG | HEART RATE: 79 BPM

## 2018-08-07 DIAGNOSIS — Z95.2 S/P TAVR (TRANSCATHETER AORTIC VALVE REPLACEMENT): ICD-10-CM

## 2018-08-07 DIAGNOSIS — I42.0 DILATED CARDIOMYOPATHY: ICD-10-CM

## 2018-08-07 DIAGNOSIS — Z95.5 S/P CORONARY ARTERY STENT PLACEMENT: ICD-10-CM

## 2018-08-07 DIAGNOSIS — Z95.810 AICD (AUTOMATIC CARDIOVERTER/DEFIBRILLATOR) PRESENT: ICD-10-CM

## 2018-08-07 DIAGNOSIS — I50.40 COMBINED SYSTOLIC AND DIASTOLIC CONGESTIVE HEART FAILURE, NYHA CLASS 3, UNSPECIFIED CONGESTIVE HEART FAILURE CHRONICITY: ICD-10-CM

## 2018-08-07 DIAGNOSIS — I48.20 CHRONIC ATRIAL FIBRILLATION: ICD-10-CM

## 2018-08-07 DIAGNOSIS — J42 CHRONIC BRONCHITIS, UNSPECIFIED CHRONIC BRONCHITIS TYPE: ICD-10-CM

## 2018-08-07 DIAGNOSIS — I25.10 CORONARY ARTERY DISEASE INVOLVING NATIVE CORONARY ARTERY OF NATIVE HEART WITHOUT ANGINA PECTORIS: ICD-10-CM

## 2018-08-07 PROCEDURE — 99999 PR PBB SHADOW E&M-EST. PATIENT-LVL II: CPT | Mod: PBBFAC,,,

## 2018-08-07 PROCEDURE — 93306 TTE W/DOPPLER COMPLETE: CPT | Mod: S$GLB,,, | Performed by: INTERNAL MEDICINE

## 2018-08-08 ENCOUNTER — DOCUMENTATION ONLY (OUTPATIENT)
Dept: CARDIOLOGY | Facility: CLINIC | Age: 83
End: 2018-08-08

## 2018-08-08 ENCOUNTER — ANESTHESIA EVENT (OUTPATIENT)
Dept: SURGERY | Facility: HOSPITAL | Age: 83
End: 2018-08-08
Payer: MEDICARE

## 2018-08-08 ENCOUNTER — TELEPHONE (OUTPATIENT)
Dept: CARDIOLOGY | Facility: CLINIC | Age: 83
End: 2018-08-08

## 2018-08-08 ENCOUNTER — PATIENT MESSAGE (OUTPATIENT)
Dept: ADMINISTRATIVE | Facility: OTHER | Age: 83
End: 2018-08-08

## 2018-08-08 DIAGNOSIS — M50.30 DDD (DEGENERATIVE DISC DISEASE), CERVICAL: Primary | ICD-10-CM

## 2018-08-08 LAB
ASCENDING AORTA: 3.19 CM
AV MEAN GRADIENT: 13.61 MMHG
AV PEAK GRADIENT: 28.03 MMHG
AV VALVE AREA: 1.57 CM2
BSA FOR ECHO PROCEDURE: 2.19 M2
CV ECHO LV RWT: 0.49 CM
DOP CALC AO PEAK VEL: 2.65 M/S
DOP CALC AO VTI: 58.57 CM
DOP CALC LVOT AREA: 4.26 CM2
DOP CALC LVOT DIAMETER: 2.33 CM
DOP CALC LVOT STROKE VOLUME: 91.88 CM3
DOP CALCLVOT PEAK VEL VTI: 21.56 CM
ECHO LV POSTERIOR WALL: 1.18 CM (ref 0.6–1.1)
FRACTIONAL SHORTENING: 26 % (ref 28–44)
INTERVENTRICULAR SEPTUM: 1.5 CM (ref 0.6–1.1)
LA MAJOR: 6.55 CM
LA MINOR: 6.48 CM
LA WIDTH: 4.98 CM
LEFT ATRIUM SIZE: 5.3 CM
LEFT ATRIUM VOLUME INDEX: 66.7 ML/M2
LEFT ATRIUM VOLUME: 146.16 CM3
LEFT INTERNAL DIMENSION IN SYSTOLE: 4.06 CM (ref 2.1–4)
LEFT VENTRICLE MASS INDEX: 145.4 G/M2
LEFT VENTRICULAR INTERNAL DIMENSION IN DIASTOLE: 5.51 CM (ref 3.5–6)
LEFT VENTRICULAR MASS: 318.48 G
PISA TR MAX VEL: 3.55 M/S
PULM VEIN S/D RATIO: 0.49
PV PEAK D VEL: 0.76 M/S
PV PEAK S VEL: 0.37 M/S
RA MAJOR: 5.73 CM
RA PRESSURE: 3 MMHG
RA WIDTH: 5.1 CM
RIGHT VENTRICULAR END-DIASTOLIC DIMENSION: 4.46 CM
SINUS: 3.99 CM
STJ: 3.54 CM
TR MAX PG: 50.41 MMHG
TRICUSPID ANNULAR PLANE SYSTOLIC EXCURSION: 0.02 CM
TV REST PULMONARY ARTERY PRESSURE: 53.41 MMHG

## 2018-08-08 NOTE — PROGRESS NOTES
Patient scheduled for RFA C2 through C5 tomorrow.  Reviewed chart, patient does not require an in person pacemaker check.  Her last remote check on 5/22/18 showed Bi-V pacing 37%, she has chronic AFib.  Recommended magnet placement over pacemaker during active ablation.

## 2018-08-08 NOTE — TELEPHONE ENCOUNTER
Test(s) Reviewed  I have reviewed the following in detail:  [] Stress test   [] Angiography   [x] Echocardiogram   [] Labs   [] Other:       Call Pt and tell her tests are unchanged from previous

## 2018-08-09 ENCOUNTER — ANESTHESIA (OUTPATIENT)
Dept: SURGERY | Facility: HOSPITAL | Age: 83
End: 2018-08-09
Payer: MEDICARE

## 2018-08-09 ENCOUNTER — HOSPITAL ENCOUNTER (OUTPATIENT)
Dept: RADIOLOGY | Facility: HOSPITAL | Age: 83
Discharge: HOME | End: 2018-08-09
Attending: PSYCHIATRY & NEUROLOGY | Admitting: PSYCHIATRY & NEUROLOGY
Payer: MEDICARE

## 2018-08-09 ENCOUNTER — HOSPITAL ENCOUNTER (OUTPATIENT)
Facility: HOSPITAL | Age: 83
Discharge: HOME | End: 2018-08-09
Attending: PSYCHIATRY & NEUROLOGY | Admitting: PSYCHIATRY & NEUROLOGY
Payer: MEDICARE

## 2018-08-09 VITALS
SYSTOLIC BLOOD PRESSURE: 150 MMHG | HEART RATE: 65 BPM | DIASTOLIC BLOOD PRESSURE: 64 MMHG | OXYGEN SATURATION: 94 % | TEMPERATURE: 97 F | RESPIRATION RATE: 20 BRPM

## 2018-08-09 DIAGNOSIS — M50.30 DDD (DEGENERATIVE DISC DISEASE), CERVICAL: ICD-10-CM

## 2018-08-09 DIAGNOSIS — M54.12 CERVICAL RADICULOPATHY: ICD-10-CM

## 2018-08-09 DIAGNOSIS — M47.812 SPONDYLOSIS OF CERVICAL REGION WITHOUT MYELOPATHY OR RADICULOPATHY: Primary | ICD-10-CM

## 2018-08-09 PROCEDURE — 64634 DESTROY C/TH FACET JNT ADDL: CPT | Mod: LT,,, | Performed by: PSYCHIATRY & NEUROLOGY

## 2018-08-09 PROCEDURE — 64633 DESTROY CERV/THOR FACET JNT: CPT | Mod: LT,,, | Performed by: PSYCHIATRY & NEUROLOGY

## 2018-08-09 PROCEDURE — 25000003 PHARM REV CODE 250: Mod: PO | Performed by: PSYCHIATRY & NEUROLOGY

## 2018-08-09 PROCEDURE — 71000033 HC RECOVERY, INTIAL HOUR: Mod: PO | Performed by: PSYCHIATRY & NEUROLOGY

## 2018-08-09 PROCEDURE — 37000008 HC ANESTHESIA 1ST 15 MINUTES: Mod: PO | Performed by: PSYCHIATRY & NEUROLOGY

## 2018-08-09 PROCEDURE — 64634 DESTROY C/TH FACET JNT ADDL: CPT | Mod: PO | Performed by: PSYCHIATRY & NEUROLOGY

## 2018-08-09 PROCEDURE — D9220A PRA ANESTHESIA: Mod: ANES,,, | Performed by: ANESTHESIOLOGY

## 2018-08-09 PROCEDURE — D9220A PRA ANESTHESIA: Mod: CRNA,,, | Performed by: NURSE ANESTHETIST, CERTIFIED REGISTERED

## 2018-08-09 PROCEDURE — 25000003 PHARM REV CODE 250: Mod: PO | Performed by: ANESTHESIOLOGY

## 2018-08-09 PROCEDURE — 63600175 PHARM REV CODE 636 W HCPCS: Mod: PO | Performed by: NURSE ANESTHETIST, CERTIFIED REGISTERED

## 2018-08-09 PROCEDURE — 71000015 HC POSTOP RECOV 1ST HR: Mod: PO | Performed by: PSYCHIATRY & NEUROLOGY

## 2018-08-09 PROCEDURE — 63600175 PHARM REV CODE 636 W HCPCS: Mod: PO | Performed by: PSYCHIATRY & NEUROLOGY

## 2018-08-09 PROCEDURE — 36000704 HC OR TIME LEV I 1ST 15 MIN: Mod: PO | Performed by: PSYCHIATRY & NEUROLOGY

## 2018-08-09 PROCEDURE — 36000705 HC OR TIME LEV I EA ADD 15 MIN: Mod: PO | Performed by: PSYCHIATRY & NEUROLOGY

## 2018-08-09 PROCEDURE — 37000009 HC ANESTHESIA EA ADD 15 MINS: Mod: PO | Performed by: PSYCHIATRY & NEUROLOGY

## 2018-08-09 PROCEDURE — 64633 DESTROY CERV/THOR FACET JNT: CPT | Mod: PO | Performed by: PSYCHIATRY & NEUROLOGY

## 2018-08-09 PROCEDURE — 25500020 PHARM REV CODE 255: Mod: PO | Performed by: PSYCHIATRY & NEUROLOGY

## 2018-08-09 PROCEDURE — 76000 FLUOROSCOPY <1 HR PHYS/QHP: CPT | Mod: TC,PO

## 2018-08-09 RX ORDER — LIDOCAINE HYDROCHLORIDE 10 MG/ML
1 INJECTION, SOLUTION EPIDURAL; INFILTRATION; INTRACAUDAL; PERINEURAL ONCE
Status: COMPLETED | OUTPATIENT
Start: 2018-08-09 | End: 2018-08-09

## 2018-08-09 RX ORDER — OXYCODONE HYDROCHLORIDE 5 MG/1
5 TABLET ORAL
Status: CANCELLED | OUTPATIENT
Start: 2018-08-09

## 2018-08-09 RX ORDER — DEXAMETHASONE SODIUM PHOSPHATE 4 MG/ML
INJECTION, SOLUTION INTRA-ARTICULAR; INTRALESIONAL; INTRAMUSCULAR; INTRAVENOUS; SOFT TISSUE
Status: DISCONTINUED | OUTPATIENT
Start: 2018-08-09 | End: 2018-08-09 | Stop reason: HOSPADM

## 2018-08-09 RX ORDER — LIDOCAINE HYDROCHLORIDE 20 MG/ML
INJECTION, SOLUTION INFILTRATION; PERINEURAL
Status: DISCONTINUED | OUTPATIENT
Start: 2018-08-09 | End: 2018-08-09 | Stop reason: HOSPADM

## 2018-08-09 RX ORDER — FENTANYL CITRATE 50 UG/ML
INJECTION, SOLUTION INTRAMUSCULAR; INTRAVENOUS
Status: DISCONTINUED | OUTPATIENT
Start: 2018-08-09 | End: 2018-08-09

## 2018-08-09 RX ORDER — FENTANYL CITRATE 50 UG/ML
25 INJECTION, SOLUTION INTRAMUSCULAR; INTRAVENOUS EVERY 5 MIN PRN
Status: CANCELLED | OUTPATIENT
Start: 2018-08-09

## 2018-08-09 RX ORDER — DEXAMETHASONE SODIUM PHOSPHATE 4 MG/ML
8 INJECTION, SOLUTION INTRA-ARTICULAR; INTRALESIONAL; INTRAMUSCULAR; INTRAVENOUS; SOFT TISSUE
Status: DISCONTINUED | OUTPATIENT
Start: 2018-08-09 | End: 2018-08-09

## 2018-08-09 RX ORDER — BUPIVACAINE HYDROCHLORIDE 2.5 MG/ML
INJECTION, SOLUTION EPIDURAL; INFILTRATION; INTRACAUDAL
Status: DISCONTINUED | OUTPATIENT
Start: 2018-08-09 | End: 2018-08-09 | Stop reason: HOSPADM

## 2018-08-09 RX ORDER — MEPERIDINE HYDROCHLORIDE 50 MG/ML
12.5 INJECTION INTRAMUSCULAR; INTRAVENOUS; SUBCUTANEOUS ONCE AS NEEDED
Status: CANCELLED | OUTPATIENT
Start: 2018-08-09 | End: 2018-08-09

## 2018-08-09 RX ORDER — PROPOFOL 10 MG/ML
VIAL (ML) INTRAVENOUS
Status: DISCONTINUED | OUTPATIENT
Start: 2018-08-09 | End: 2018-08-09

## 2018-08-09 RX ORDER — HYDROMORPHONE HYDROCHLORIDE 2 MG/ML
0.2 INJECTION, SOLUTION INTRAMUSCULAR; INTRAVENOUS; SUBCUTANEOUS EVERY 5 MIN PRN
Status: CANCELLED | OUTPATIENT
Start: 2018-08-09

## 2018-08-09 RX ORDER — DIPHENHYDRAMINE HYDROCHLORIDE 50 MG/ML
25 INJECTION INTRAMUSCULAR; INTRAVENOUS EVERY 6 HOURS PRN
Status: CANCELLED | OUTPATIENT
Start: 2018-08-09

## 2018-08-09 RX ORDER — SODIUM CHLORIDE, SODIUM LACTATE, POTASSIUM CHLORIDE, CALCIUM CHLORIDE 600; 310; 30; 20 MG/100ML; MG/100ML; MG/100ML; MG/100ML
INJECTION, SOLUTION INTRAVENOUS CONTINUOUS
Status: DISCONTINUED | OUTPATIENT
Start: 2018-08-09 | End: 2018-08-09 | Stop reason: HOSPADM

## 2018-08-09 RX ORDER — LIDOCAINE HCL/PF 100 MG/5ML
SYRINGE (ML) INTRAVENOUS
Status: DISCONTINUED | OUTPATIENT
Start: 2018-08-09 | End: 2018-08-09

## 2018-08-09 RX ADMIN — PROPOFOL 30 MG: 10 INJECTION, EMULSION INTRAVENOUS at 10:08

## 2018-08-09 RX ADMIN — LIDOCAINE HYDROCHLORIDE 75 MG: 20 INJECTION PARENTERAL at 10:08

## 2018-08-09 RX ADMIN — FENTANYL CITRATE 25 MCG: 50 INJECTION, SOLUTION INTRAMUSCULAR; INTRAVENOUS at 10:08

## 2018-08-09 RX ADMIN — SODIUM CHLORIDE, SODIUM LACTATE, POTASSIUM CHLORIDE, AND CALCIUM CHLORIDE 1000 ML: .6; .31; .03; .02 INJECTION, SOLUTION INTRAVENOUS at 09:08

## 2018-08-09 RX ADMIN — FENTANYL CITRATE 50 MCG: 50 INJECTION, SOLUTION INTRAMUSCULAR; INTRAVENOUS at 10:08

## 2018-08-09 RX ADMIN — LIDOCAINE HYDROCHLORIDE 4 MG: 10 INJECTION, SOLUTION EPIDURAL; INFILTRATION; INTRACAUDAL; PERINEURAL at 09:08

## 2018-08-09 NOTE — ANESTHESIA PREPROCEDURE EVALUATION
08/09/2018  Miesha Obando is a 82 y.o., female.    Anesthesia Evaluation    I have reviewed the Patient Summary Reports.    I have reviewed the Nursing Notes.   I have reviewed the Medications.     Review of Systems  Cardiovascular:   Hypertension CAD   CHF Pacemaker   Pulmonary:   COPD, moderate Sleep Apnea    Musculoskeletal:   Arthritis     Neurological:   Neuromuscular Disease, Headaches    Endocrine:   Diabetes, poorly controlled, type 2 Hypothyroidism    Psych:   Psychiatric History             Anesthesia Plan  Type of Anesthesia, risks & benefits discussed:  Anesthesia Type:  MAC  Patient's Preference:   Intra-op Monitoring Plan: standard ASA monitors  Intra-op Monitoring Plan Comments:   Post Op Pain Control Plan:   Post Op Pain Control Plan Comments:   Induction:    Beta Blocker:  Patient is on a Beta-Blocker and has received one dose within the past 24 hours (No further documentation required).       Informed Consent: Patient understands risks and agrees with Anesthesia plan.  Questions answered. Anesthesia consent signed with patient.  ASA Score: 3     Day of Surgery Review of History & Physical: I have interviewed and examined the patient. I have reviewed the patient's H&P dated:  There are no significant changes.          Ready For Surgery From Anesthesia Perspective.

## 2018-08-09 NOTE — ANESTHESIA POSTPROCEDURE EVALUATION
Anesthesia Post Evaluation    Patient: Miesha Obando    Procedure(s) Performed: Procedure(s) (LRB):  RADIOFREQUENCY ABLATION-C2-3-4-5 and third occipital nerve (Left)    Final Anesthesia Type: general  Patient location during evaluation: PACU  Patient participation: Yes- Able to Participate  Level of consciousness: awake and alert and oriented  Post-procedure vital signs: reviewed and stable  Pain management: adequate  Airway patency: patent  PONV status at discharge: No PONV  Anesthetic complications: no      Cardiovascular status: blood pressure returned to baseline  Respiratory status: unassisted, spontaneous ventilation and room air  Hydration status: euvolemic  Follow-up not needed.        Visit Vitals  BP (!) 150/64 (BP Location: Right arm, Patient Position: Lying)   Pulse 65   Temp 36.3 °C (97.3 °F) (Skin)   Resp 20   LMP  (LMP Unknown)   SpO2 (!) 94%   Breastfeeding? No       Pain/Niraj Score: Pain Assessment Performed: Yes (8/9/2018 11:43 AM)  Presence of Pain: complains of pain/discomfort (8/9/2018 11:43 AM)  Niraj Score: 10 (8/9/2018 11:43 AM)

## 2018-08-09 NOTE — DISCHARGE SUMMARY
[facility]  Discharge Note  Short Stay    Admit Date: 8/9/2018    Discharge Date: 8/9/2018    Attending Physician: Regine Palma MD     Discharge Provider: Regine Palma    Diagnoses:  Active Hospital Problems    Diagnosis  POA    *Spondylosis of cervical region without myelopathy or radiculopathy [M47.812]  Yes     Priority: 1 - High     Left facet loading, reproduced headache.     She is status post single diagnostic cervical medial branch block of the left C2-3-4-5 and third occipital nerve on 3/22/18 with 95% relief of her neck and head pain x 8 hours. Excellent candidate for RFA of the same nerves. I am pursuing RFA after single diagnostic block only to minimize her time off anticoagulation as she is on both ASA and coumadin, for her safety.     MAC sedation for Rfa given ASA 4         Resolved Hospital Problems    Diagnosis Date Resolved POA   No resolved problems to display.       Discharged Condition: good    Final Diagnoses: Cervical radiculopathy [M54.12]    Disposition: Home or Self Care    Hospital Course: no complications, uneventful    Outcome of Hospitalization, Treatment, Procedure, or Surgery:  Patient was admitted for outpatient procedure. The patient underwent procedure without complications and are discharged home    Follow up/Patient Instructions:  Follow up as scheduled/Patient has received instructions and follow up date    Medications:  Continue previous medications      Discharge Procedure Orders  Notify your health care provider if you experience any of the following:  temperature >100.4     Notify your health care provider if you experience any of the following:  persistent nausea and vomiting or diarrhea     Notify your health care provider if you experience any of the following:  severe uncontrolled pain     Notify your health care provider if you experience any of the following:  redness, tenderness, or signs of infection (pain, swelling, redness, odor or green/yellow  discharge around incision site)     Notify your health care provider if you experience any of the following:  difficulty breathing or increased cough     Notify your health care provider if you experience any of the following:  severe persistent headache     Notify your health care provider if you experience any of the following:  worsening rash     Notify your health care provider if you experience any of the following:  persistent dizziness, light-headedness, or visual disturbances     Notify your health care provider if you experience any of the following:  increased confusion or weakness     No dressing needed           Discharge Procedure Orders (must include Diet, Follow-up, Activity):    Discharge Procedure Orders (must include Diet, Follow-up, Activity)  Notify your health care provider if you experience any of the following:  temperature >100.4     Notify your health care provider if you experience any of the following:  persistent nausea and vomiting or diarrhea     Notify your health care provider if you experience any of the following:  severe uncontrolled pain     Notify your health care provider if you experience any of the following:  redness, tenderness, or signs of infection (pain, swelling, redness, odor or green/yellow discharge around incision site)     Notify your health care provider if you experience any of the following:  difficulty breathing or increased cough     Notify your health care provider if you experience any of the following:  severe persistent headache     Notify your health care provider if you experience any of the following:  worsening rash     Notify your health care provider if you experience any of the following:  persistent dizziness, light-headedness, or visual disturbances     Notify your health care provider if you experience any of the following:  increased confusion or weakness     No dressing needed

## 2018-08-09 NOTE — DISCHARGE INSTRUCTIONS
Home care instructions  Apply ice pack to the injection site for 20 minutes periods for the first 24 hrs for soreness/discomfort at injection site DO NOT USE HEAT FOR 24 HOURS  Keep site clean and dry for 24 hours, remove bandaid when desired  Do not drive until tomorrow  Avoid strenuous activities for 2 days  Resume home medication as prescribed today  Resume Aspirin, Plavix, or Coumadin the day after the procedure unless otherwise instructed.    SEE IMMEDIATE MEDICAL HELP FOR:  Severe increase in your usual pain or appearance of new pain  Prolonged or increasing weakness or numbness in the legs or arms  Drainage, redness, active bleeding, or increased swelling at the injection site  Temperature over 100.0 degrees F.  Headache that increases when your head is upright and decreases when you lie flat    CALL 911 OR GO DIRECTLY TO EMERGENCY DEPARTMENT FOR:  Shortness of breath, chest pain, or problems breathing        Discharge Instructions: After Your Surgery  Youve just had surgery. During surgery, you were given medicine called anesthesia to keep you relaxed and free of pain. After surgery, you may have some pain or nausea. This is common. Here are some tips for feeling better and getting well after surgery.     Stay on schedule with your medicine.   Going home  Your healthcare provider will show you how to take care of yourself when you go home. He or she will also answer your questions. Have an adult family member or friend drive you home. For the first 24 hours after your surgery:  · Do not drive or use heavy equipment.  · Do not make important decisions or sign legal papers.  · Do not drink alcohol.  · Have someone stay with you, if needed. He or she can watch for problems and help keep you safe.  Be sure to go to all follow-up visits with your healthcare provider. And rest after your surgery for as long as your healthcare provider tells you to.  Coping with pain  If you have pain after surgery, pain  medicine will help you feel better. Take it as told, before pain becomes severe. Also, ask your healthcare provider or pharmacist about other ways to control pain. This might be with heat, ice, or relaxation. And follow any other instructions your surgeon or nurse gives you.  Tips for taking pain medicine  To get the best relief possible, remember these points:  · Pain medicines can upset your stomach. Taking them with a little food may help.  · Most pain relievers taken by mouth need at least 20 to 30 minutes to start to work.  · Taking medicine on a schedule can help you remember to take it. Try to time your medicine so that you can take it before starting an activity. This might be before you get dressed, go for a walk, or sit down for dinner.  · Constipation is a common side effect of pain medicines. Call your healthcare provider before taking any medicines such as laxatives or stool softeners to help ease constipation. Also ask if you should skip any foods. Drinking lots of fluids and eating foods such as fruits and vegetables that are high in fiber can also help. Remember, do not take laxatives unless your surgeon has prescribed them.  · Drinking alcohol and taking pain medicine can cause dizziness and slow your breathing. It can even be deadly. Do not drink alcohol while taking pain medicine.  · Pain medicine can make you react more slowly to things. Do not drive or run machinery while taking pain medicine.  Your healthcare provider may tell you to take acetaminophen to help ease your pain. Ask him or her how much you are supposed to take each day. Acetaminophen or other pain relievers may interact with your prescription medicines or other over-the-counter (OTC) medicines. Some prescription medicines have acetaminophen and other ingredients. Using both prescription and OTC acetaminophen for pain can cause you to overdose. Read the labels on your OTC medicines with care. This will help you to clearly know the  list of ingredients, how much to take, and any warnings. It may also help you not take too much acetaminophen. If you have questions or do not understand the information, ask your pharmacist or healthcare provider to explain it to you before you take the OTC medicine.  Managing nausea  Some people have an upset stomach after surgery. This is often because of anesthesia, pain, or pain medicine, or the stress of surgery. These tips will help you handle nausea and eat healthy foods as you get better. If you were on a special food plan before surgery, ask your healthcare provider if you should follow it while you get better. These tips may help:  · Do not push yourself to eat. Your body will tell you when to eat and how much.  · Start off with clear liquids and soup. They are easier to digest.  · Next try semi-solid foods, such as mashed potatoes, applesauce, and gelatin, as you feel ready.  · Slowly move to solid foods. Dont eat fatty, rich, or spicy foods at first.  · Do not force yourself to have 3 large meals a day. Instead eat smaller amounts more often.  · Take pain medicines with a small amount of solid food, such as crackers or toast, to avoid nausea.     Call your surgeon if  · You still have pain an hour after taking medicine. The medicine may not be strong enough.  · You feel too sleepy, dizzy, or groggy. The medicine may be too strong.  · You have side effects like nausea, vomiting, or skin changes, such as rash, itching, or hives.       If you have obstructive sleep apnea  You were given anesthesia medicine during surgery to keep you comfortable and free of pain. After surgery, you may have more apnea spells because of this medicine and other medicines you were given. The spells may last longer than usual.   At home:  · Keep using the continuous positive airway pressure (CPAP) device when you sleep. Unless your healthcare provider tells you not to, use it when you sleep, day or night. CPAP is a common  device used to treat obstructive sleep apnea.  · Talk with your provider before taking any pain medicine, muscle relaxants, or sedatives. Your provider will tell you about the possible dangers of taking these medicines.  Date Last Reviewed: 12/1/2016  © 3375-1843 [employer]. [address] [place] [address]. All rights reserved. This information is not intended as a substitute for professional medical care. Always follow your healthcare professional's instructions.

## 2018-08-09 NOTE — OP NOTE
PROCEDURE DATE: 8/9/2018    PROCEDURE:  Radiofrequency ablation of the C2,3,4, 5 medial branch nerves and third occipital nerve on the LEFT-side under fluoroscopy    CPT:  55261  64634 x 2     DIAGNOSIS:  Cervical spondylosis    Post Op Diagnosis: Same    PHYSICIAN: Regine Palma MD    MEDICATIONS INJECTED:    Pre-RFA: 2% preservative-free lidocaine, 1cc per level     Post-RFA: From a mixture of 4ml of 0.25 % bupivicaine and 4mg of dexamethasone, 1cc of this solution was injected at each level.    LOCAL ANESTHETIC USED:   0.5cc of lidocaine 2% at each level    SEDATION: MAC (propofol)    ESTIMATED BLOOD LOSS:  <5cc    COMPLICATIONS:  none    TECHNIQUE:   A time-out taken to identify patient and procedure side prior to starting the procedure.  The patient was positioned in the lateral decubitus position with the site of interest side up. The patient was prepped and draped in the usual sterile fashion using ChloraPrep and sterile towels.  The levels were determined under fluoroscopic guidance using a lateral view.   Local anesthetic was infiltrated superficially at the skin.  Then a 100mm 20g 10mm active-tip Heaven RF needle was inserted to the anatomic location of the midsection of the lateral masses (or in the case of third occipital nerve, to the joint of C2/C3). After negative aspiration of heme and CSF, Omnipaque contrast was injected under live digital subtraction angiography to confirm there was no vascular uptake at any level. Impedance was less than 800 ohms at each level. Sensory stimulation up to 50Hz confirmed and motor stimulation up to 3V confirmed there was no nerve root involvement at each level. Pre-RFA medication was then injected slowly.  Ablation was done per level utilizing Heaven radiofrequency generator at 80°C for 90 seconds. Afterwards, the RFA probes were removed and post-RFA medication as listed above was injected at each level and needles were removed. The patient tolerated the procedure  well.     The patient was monitored after the procedure.  Patient was given post procedure and discharge instructions to follow at home.  The patient was discharged in a stable condition

## 2018-08-09 NOTE — TRANSFER OF CARE
Anesthesia Transfer of Care Note    Patient: Miesha Obando    Procedure(s) Performed: Procedure(s) (LRB):  RADIOFREQUENCY ABLATION-C2-3-4-5 and third occipital nerve (Left)    Patient location: PACU    Anesthesia Type: MAC    Transport from OR: Transported from OR on room air with adequate spontaneous ventilation    Post pain: adequate analgesia    Post assessment: no apparent anesthetic complications and tolerated procedure well    Post vital signs: stable    Level of consciousness: awake and alert    Nausea/Vomiting: no nausea/vomiting    Complications: none    Transfer of care protocol was followed      Last vitals:   Visit Vitals  BP (!) 183/74   Pulse 64   Temp 36.3 °C (97.3 °F) (Skin)   Resp 16   LMP  (LMP Unknown)   SpO2 96%   Breastfeeding? No

## 2018-08-09 NOTE — H&P
"Date of service: 8/9/2018  Referring provider: Dr. Regine Palma    Subjective:      Chief complaint: No chief complaint on file.       Patient ID: Miesha Obando is a 82 y.o. lady with cervicogenic headaches, dizziness, coronary artery disease, atrial fibrillation on anticoagulation, hypertension, cardiomyopathy (EF 30-35%), pacemaker, COPD, diabetes, who is presenting for the follow up of headaches and dizziness.     History of Present Illness    INTERVAL HISTORY - 8/9/18     Patient is status post cervical medial branch block on the LEFT C2-3-4-5 and TON nerves on 3/22/18 with 95% relief of her neck and head pain x 8 hours. Her pain has returned to baseline.    INTERVAL HISTORY - 2/22/18     Today she reports her headaches are largely unchanged. She is having a hard time sleeping due to the pain. The pain starts in left neck and radiates to the posterior head. Her current pain score is 7 with a range from 2 to 10.     She sent back the duloxetine and dolobid as they were cost prohibitive so she is just using tylenol at this time.     INTERVAL HISTORY - 12/20/17     Seen 2 months ago at which point I recommended duloxetine for prevention and dolobid for acute relief. She reports that both of these medications helped a great deal when she was taking them. She ran out of the Dolobid, and she thinks she ran out of duloxetine as well.     Today she reports she is about the same the headache is in the forehead and back of had. Her current pain score is 8, with a range from 2 to 10.     ORIGINAL HEADACHE HISTORY - 10/27/17   Age at onset and course over time: about a couple of years (2015), she thinks she "lives" with a headache, sometimes more severe than others   Location: neck, occiput, vertex   Quality: pressure, sharp   Severity: current 6, range is 4-10  Duration: constant, with escalations for 30-40 min   Frequency: daily   Alleviated by: heat   Exacerbated by: stress, change weather; turning head   Associated " with: scalp allodynia; blurred vision, double vision, dizziness, vertigo, anger  Sleep habits: wakes up and can't get back to sleeo   Caffeine intake: 1-2     Dizziness: feeling of off balance while walking, can also feel off balance while sitting (not spinning, more like a boat swaying); happens 2-3 times a week; lasts 15 min. Sometimes associated with double vision but sometimes double vision by itself. Side by side, lasts seconds to minutes. Will pass if she keeps blinking. Associated with word finding difficulty. No slurred speech. No visual aura. No paralysis.     Current acute treatment:  -- tylenol   (ASA)  (warfarin)    Current prevention:  -- metoprolol XL 50mg daily  (losartan)  (trazodone - does not help her sleep)  (melatonin)    Previously tried/failed acute treatment:  -- dolobid 500mg BID- worked well, but ran out   -- tylenol or tylenol PM- ineffective   -- naproxen    Previously tried/failed preventative treatment:  -- duloxetine 20mg daily - stopped taking   -- lisinopril     Review of patient's allergies indicates:   Allergen Reactions    Amoxicillin-pot clavula (bulk) Nausea And Vomiting    Sulfa (sulfonamide antibiotics) Hives    Adhesive Rash     Use paper tape     Current Facility-Administered Medications   Medication Dose Route Frequency Provider Last Rate Last Dose    lactated ringers infusion   Intravenous Continuous Ty Cast MD        lactated ringers infusion   Intravenous Continuous Regine Palma MD 25 mL/hr at 08/09/18 0945 1,000 mL at 08/09/18 0945       Past Medical History  Past Medical History:   Diagnosis Date    Anticoagulant long-term use     Arthritis     Atrial fibrillation     Breast cancer 1994    breast , left    Cardiomyopathy - EF 35-40% 5/11/2016    CHF (congestive heart failure)     Chronic bronchitis     COPD (chronic obstructive pulmonary disease)     Coronary artery disease without angina pectoris 8/26/2015    Depression     Diabetes with  neurologic complications     Diverticulitis     Diverticulosis     Encounter for blood transfusion     after mastectomy x 20 years ago    GERD (gastroesophageal reflux disease)     H/O aortic valve replacement     Hiatal hernia     History of colonic diverticulitis     Hypertension     Hypothyroid     Insomnia     Irritable bowel syndrome     Obesity     Pacemaker 08/2014    Schatzki's ring     mild    Sleep apnea     uses cpap with oxygen     Syncope and collapse     Type II or unspecified type diabetes mellitus without mention of complication, not stated as uncontrolled     No medications at present.     Urinary incontinence        Past Surgical History  Past Surgical History:   Procedure Laterality Date    ADENOIDECTOMY      APPENDECTOMY      BACK SURGERY      Discectomy, lumbar    BREAST SURGERY  1994    left side , mastectomy    CARDIAC PACEMAKER PLACEMENT      CARDIAC VALVE SURGERY      aortic valve replacement    CHOLECYSTECTOMY      COLONOSCOPY  2/2014    repeat in 10 years for screening    CORONARY STENT PLACEMENT      x2    EYE SURGERY      bilateral PHACO and IOL    HYSTERECTOMY      ovaries spared    LOOP RECORDER      TOE SURGERY Right     TONSILLECTOMY      UPPER GASTROINTESTINAL ENDOSCOPY  2012       Family History  Family History   Problem Relation Age of Onset    Parkinsonism Mother     Emphysema Father     Heart disease Brother     Heart attack Brother     Heart failure Brother     Heart disease Brother     Heart failure Brother     Heart disease Brother     Heart failure Brother     Arrhythmia Brother     Anesthesia problems Neg Hx     Clotting disorder Neg Hx        Social History  Social History     Social History    Marital status:      Spouse name: N/A    Number of children: N/A    Years of education: N/A     Occupational History    Not on file.     Social History Main Topics    Smoking status: Former Smoker     Packs/day: 1.00      Years: 30.00     Start date: 2/23/1958     Quit date: 1/13/1988    Smokeless tobacco: Never Used    Alcohol use 1.8 oz/week     2 Glasses of wine, 1 Shots of liquor per week      Comment: occasional    Drug use: No    Sexual activity: Not on file     Other Topics Concern    Not on file     Social History Narrative    No narrative on file        Review of Systems  Constitutional: + weight loss, no change to appetite   Eyes: no change to vision, + double vision; no redness, no tearing  Ears, nose, mouth, throat: no hearing loss, + tinnitus, no rhinorrhea, no difficulty swallowing   Cardiovascular: no palpitations + chest pain   Respiratory: no shortness of breath, + cough   Gastrointestinal: no diarrhea, no constipation  Gu: incontinence   Musculoskeletal: no joint pain  Skin: no rashes  Neurologic: no  numbness, no weakness, change to speech, loss of coordination   Endocrine: no heat or + cold intolerance  Heme: no night sweats, no easy bruising   Psych: + depression     Objective:        Vitals:    08/09/18 0942   BP: (!) 183/74   Pulse: 64   Resp: 16   Temp: 97.3 °F (36.3 °C)     There is no height or weight on file to calculate BMI.    General: Well developed, well nourished.  No acute distress.  HEENT: Atraumatic, normocephalic.  Neck: Trachea midline  Cardiovascular: Vitals reviewed. Normal peripheral perfusion.   Pulmonary: No increased work of breathing.  Abdomen/GI: No guarding  Musculoskeletal: normal tone in all four extremities. No atrophy. No abnormal movements. Gait is wide based, slow, and with assistance of cane.     Spine:   CERVICAL SPINE:  ROM: reduced lateral rotation bilaterally, normal extension and flexion   MUSCLE SPASM: + upper cervical paraspinals, facet tenderness bilaterally   FACET LOADING: + left  SPURLING: no  FRED / BALWINDER tender: no     Neurological exam:  Mental status: Awake and alert. Oriented to situation.  Speech fluent and appropriate. Recent and remote memory appear to be  intact.  Fund of knowledge normal.  Cranial nerves: Pupils equal round, extraocular movements intact, facial strength intact bilaterally, hearing grossly intact bilaterally.  Sensation: intact to light touch; reduced to TEMP in the hands to mid-forearm and feet to knee bilaterally. Reduced PPC and vibration at the toes.     Data Review:     I have personally reviewed the referring provider's notes, labs, & imaging made available to me today.      10/30/17 X-ray cervical spine:  Moderate DDD at C6-7 level; multi-level NF stenosis, most at C3-4 level, no instability on flexion or extension     Lab Results   Component Value Date    BNP 3220 (H) 03/09/2016     03/16/2018    K 5.0 03/16/2018    MG 2.0 01/05/2018     03/16/2018    CO2 24 03/16/2018    BUN 31 (H) 03/16/2018    CREATININE 1.0 03/16/2018     03/16/2018    HGBA1C 6.6 (H) 01/03/2018    AST 19 03/16/2018    AST 28 03/09/2016    ALT 13 03/16/2018    ALBUMIN 3.5 03/16/2018    PROT 7.6 03/16/2018    BILITOT 0.6 03/16/2018    CHOL 167 04/18/2017    HDL 48 04/18/2017    LDLCALC 98.0 04/18/2017    TRIG 105 04/18/2017       Lab Results   Component Value Date    WBC 8.18 01/31/2018    HGB 13.4 01/31/2018    HCT 41.3 01/31/2018    MCV 96 01/31/2018     01/31/2018       Lab Results   Component Value Date    TSH 3.014 04/18/2017           Assessment & Plan:       Problem List Items Addressed This Visit        Neuro    Cervical radiculopathy    Relevant Medications    lactated ringers infusion    Other Relevant Orders    Place in Outpatient    Diet NPO    Notify physician     Notify physician     Notify physician (specify)    Place 18-22 gaua peripheral IV     Verify informed consent    Vital signs            TESTING:  -- none     PREVENTION (use daily regardless of headache):  -- proceed with RFA of left C2-3-4-5 and third occipital nerve in the surgery center    ACUTE TREATMENT:  -- tylenol       No Follow-up on file. after procedure     Regine  RAYMON Palma MD

## 2018-08-10 ENCOUNTER — TELEPHONE (OUTPATIENT)
Dept: NEUROLOGY | Facility: CLINIC | Age: 83
End: 2018-08-10

## 2018-08-10 NOTE — TELEPHONE ENCOUNTER
----- Message from Regine Palma MD sent at 8/9/2018 11:22 AM CDT -----  Please call patient tomorrow to determine percentage and duration of relief after procedure (left cervical RFA). If a lot of post-procedural pain then remind to ice 20 min every 2 hours for rest of day.    Please make 4 week f/u appt to f/u on results of RFA

## 2018-08-10 NOTE — TELEPHONE ENCOUNTER
Called and spoke with patient. Patient is having some post procedural pain in her neck. Patient has been icing the area as directed by Dr. Palma. Patient is feeling a little dizzy. Advised patient to drink more water and to change positions slowly. Patient verbalized understanding.

## 2018-08-13 ENCOUNTER — TELEPHONE (OUTPATIENT)
Dept: NEUROLOGY | Facility: CLINIC | Age: 83
End: 2018-08-13

## 2018-08-13 NOTE — TELEPHONE ENCOUNTER
Returned call and spoke with patient. Head pain is still numbness with some pain. Patient rated her pain 9/10. Informed patient that this is typical and it should go away in about 10 days, per Dr. Palma. Informed patient per Dr. Palma that she could send over a oral steroid for her to take. Patient declined due to not being able to sleep as is.     Patient is not able to stay asleep at night. Patient said it not due to the discomfort from the procedure. Advised patient to call her PCP office.

## 2018-08-13 NOTE — TELEPHONE ENCOUNTER
----- Message from Dayana Turner sent at 8/13/2018 12:09 PM CDT -----  Contact: Patient  Patient had a procedure last Thursday and she is having trouble sleeping and is having pain.  Please call patient to discuss.  Call Back#[phone]  Thanks

## 2018-08-15 ENCOUNTER — OFFICE VISIT (OUTPATIENT)
Dept: FAMILY MEDICINE | Facility: CLINIC | Age: 83
End: 2018-08-15
Payer: MEDICARE

## 2018-08-15 VITALS
BODY MASS INDEX: 44.14 KG/M2 | DIASTOLIC BLOOD PRESSURE: 72 MMHG | HEART RATE: 88 BPM | SYSTOLIC BLOOD PRESSURE: 138 MMHG | OXYGEN SATURATION: 94 % | WEIGHT: 239.88 LBS | HEIGHT: 62 IN

## 2018-08-15 DIAGNOSIS — Z95.0 PACEMAKER: ICD-10-CM

## 2018-08-15 DIAGNOSIS — E66.01 MORBID OBESITY DUE TO EXCESS CALORIES: ICD-10-CM

## 2018-08-15 DIAGNOSIS — I25.10 CORONARY ARTERY DISEASE INVOLVING NATIVE CORONARY ARTERY OF NATIVE HEART WITHOUT ANGINA PECTORIS: ICD-10-CM

## 2018-08-15 DIAGNOSIS — Z00.00 ENCOUNTER FOR PREVENTIVE HEALTH EXAMINATION: Primary | ICD-10-CM

## 2018-08-15 DIAGNOSIS — I27.9 PULMONARY HEART DISEASE: ICD-10-CM

## 2018-08-15 DIAGNOSIS — Z95.5 S/P CORONARY ARTERY STENT PLACEMENT: ICD-10-CM

## 2018-08-15 DIAGNOSIS — I42.0 DILATED CARDIOMYOPATHY: ICD-10-CM

## 2018-08-15 DIAGNOSIS — Z95.2 S/P TAVR (TRANSCATHETER AORTIC VALVE REPLACEMENT): ICD-10-CM

## 2018-08-15 DIAGNOSIS — I50.20 SYSTOLIC CONGESTIVE HEART FAILURE, NYHA CLASS 3, UNSPECIFIED CONGESTIVE HEART FAILURE CHRONICITY: ICD-10-CM

## 2018-08-15 DIAGNOSIS — I35.0 AORTIC VALVE STENOSIS, ETIOLOGY OF CARDIAC VALVE DISEASE UNSPECIFIED: ICD-10-CM

## 2018-08-15 DIAGNOSIS — I70.0 AORTIC ATHEROSCLEROSIS: ICD-10-CM

## 2018-08-15 DIAGNOSIS — N18.30 CHRONIC KIDNEY DISEASE, STAGE III (MODERATE): ICD-10-CM

## 2018-08-15 DIAGNOSIS — I10 ESSENTIAL HYPERTENSION: ICD-10-CM

## 2018-08-15 DIAGNOSIS — F43.21 SITUATIONAL DEPRESSION: ICD-10-CM

## 2018-08-15 DIAGNOSIS — Z78.0 POSTMENOPAUSAL: ICD-10-CM

## 2018-08-15 DIAGNOSIS — E03.9 HYPOTHYROIDISM, UNSPECIFIED TYPE: ICD-10-CM

## 2018-08-15 DIAGNOSIS — E11.42 TYPE 2 DIABETES MELLITUS WITH DIABETIC POLYNEUROPATHY, WITHOUT LONG-TERM CURRENT USE OF INSULIN: ICD-10-CM

## 2018-08-15 DIAGNOSIS — I50.40 COMBINED SYSTOLIC AND DIASTOLIC CONGESTIVE HEART FAILURE, NYHA CLASS 3, UNSPECIFIED CONGESTIVE HEART FAILURE CHRONICITY: ICD-10-CM

## 2018-08-15 DIAGNOSIS — J42 CHRONIC BRONCHITIS, UNSPECIFIED CHRONIC BRONCHITIS TYPE: ICD-10-CM

## 2018-08-15 DIAGNOSIS — G47.00 INSOMNIA, UNSPECIFIED TYPE: ICD-10-CM

## 2018-08-15 DIAGNOSIS — Z95.810 AICD (AUTOMATIC CARDIOVERTER/DEFIBRILLATOR) PRESENT: ICD-10-CM

## 2018-08-15 DIAGNOSIS — F43.23 SITUATIONAL MIXED ANXIETY AND DEPRESSIVE DISORDER: ICD-10-CM

## 2018-08-15 DIAGNOSIS — I70.209 ATHEROSCLEROSIS OF ARTERIES OF EXTREMITIES: ICD-10-CM

## 2018-08-15 DIAGNOSIS — I35.0 NONRHEUMATIC AORTIC VALVE STENOSIS: ICD-10-CM

## 2018-08-15 PROCEDURE — 3078F DIAST BP <80 MM HG: CPT | Mod: CPTII,S$GLB,, | Performed by: NURSE PRACTITIONER

## 2018-08-15 PROCEDURE — 99999 PR PBB SHADOW E&M-EST. PATIENT-LVL V: CPT | Mod: PBBFAC,,, | Performed by: NURSE PRACTITIONER

## 2018-08-15 PROCEDURE — G0439 PPPS, SUBSEQ VISIT: HCPCS | Mod: S$GLB,,, | Performed by: NURSE PRACTITIONER

## 2018-08-15 PROCEDURE — 3075F SYST BP GE 130 - 139MM HG: CPT | Mod: CPTII,S$GLB,, | Performed by: NURSE PRACTITIONER

## 2018-08-15 RX ORDER — AMITRIPTYLINE HYDROCHLORIDE 10 MG/1
10 TABLET, FILM COATED ORAL NIGHTLY PRN
Qty: 30 TABLET | Refills: 0 | Status: SHIPPED | OUTPATIENT
Start: 2018-08-15 | End: 2018-11-14 | Stop reason: SDUPTHER

## 2018-08-15 RX ORDER — FUROSEMIDE 20 MG/1
20 TABLET ORAL
Qty: 30 TABLET | Refills: 0 | Status: SHIPPED | OUTPATIENT
Start: 2018-08-15 | End: 2018-08-20 | Stop reason: SDUPTHER

## 2018-08-15 NOTE — PATIENT INSTRUCTIONS
Counseling and Referral of Other Preventative  (Italic type indicates deductible and co-insurance are waived)    Patient Name: Miesha Obando  Today's Date: 8/15/2018    Health Maintenance       Date Due Completion Date    DEXA SCAN 08/26/2017 8/26/2014    Lipid Panel 04/18/2018 4/18/2017    Hemoglobin A1c 07/03/2018 1/3/2018    Influenza Vaccine 08/01/2018 10/27/2017    Override on 10/27/2017: Done ([facility])    Override on 10/29/2014: Done    Foot Exam 01/11/2019 1/11/2018 (Done)    Override on 1/11/2018: Done ([facility].  will scan report)    Override on 11/16/2016: Done (Patient sees Dr. Wright, podiatry)    Eye Exam 03/05/2019 3/5/2018    Override on 12/13/2016: Done (Patient sees outside opthalmologist)    Override on 5/14/2014: Done    Override on 12/17/1997: Done    TETANUS VACCINE 08/25/2024 8/25/2014        No orders of the defined types were placed in this encounter.    The following information is provided to all patients.  This information is to help you find resources for any of the problems found today that may be affecting your health:                Living healthy guide: www.[facility].[place].gov      Understanding Diabetes: www.diabetes.org      Eating healthy: www.cdc.gov/healthyweight      CDC home safety checklist: www.cdc.gov/steadi/patient.html      Agency on Aging: www.goea.[place].gov      Alcoholics anonymous (AA): www.aa.org      Physical Activity: www.jack.nih.gov/if2olhv      Tobacco use: www.quitwithusla.org

## 2018-08-15 NOTE — PROGRESS NOTES
"Miesha Obando presented for a  Medicare AWV and comprehensive Health Risk Assessment today. The following components were reviewed and updated:    · Medical history  · Family History  · Social history  · Allergies and Current Medications  · Health Risk Assessment  · Health Maintenance  · Care Team     ** See Completed Assessments for Annual Wellness Visit within the encounter summary.**       The following assessments were completed:  · Living Situation  · CAGE  · Depression Screening  · Timed Get Up and Go  · Whisper Test  · Cognitive Function Screening      · Nutrition Screening  · ADL Screening  · PAQ Screening    Vitals:    08/15/18 1114   BP: 138/72   BP Location: Right arm   Patient Position: Sitting   BP Method: Large (Manual)   Pulse: 88   SpO2: (!) 94%   Weight: 108.8 kg (239 lb 13.8 oz)   Height: 5' 2" (1.575 m)     Body mass index is 43.87 kg/m².  Physical Exam   Constitutional: She is oriented to person, place, and time. She appears well-nourished.   Cardiovascular: Normal rate, regular rhythm, normal heart sounds and intact distal pulses.   Neurological: She is alert and oriented to person, place, and time.   Skin: Skin is warm and dry. No rash noted.   Vitals reviewed.        Diagnoses and health risks identified today and associated recommendations/orders:    1. Encounter for preventive health examination  Reviewed and discussed health maintenance.    - Comprehensive metabolic panel; Future  - Lipid panel; Future  - Hemoglobin A1c; Future  - DXA Bone Density Spine And Hip; Future    2. Chronic kidney disease, stage III (moderate)  Stable- continue current treatment and follow up routinely with PCP   Labs reviewed. Increase fluids and avoid NSAIDs    3. Pulmonary heart disease  Stable- continue current treatment and follow up routinely with PCP and cardiology (Dr. Anderson)    4. Chronic bronchitis, unspecified chronic bronchitis type  Stable- continue current treatment and follow up routinely with PCP " and pulmonary (Dr. Lombardo)    5. S/P coronary artery stent placement - LILLIAM to RCA 08/19/2015  Stable- continue current treatment and follow up routinely with PCP and cardiology (Dr. Anderson)  - Comprehensive metabolic panel; Future  - Lipid panel; Future  - Hemoglobin A1c; Future    6. Coronary artery disease involving native coronary artery of native heart without angina pectoris  Stable- continue current treatment and follow up routinely with PCP and cardiology (Dr. Anderson)  - Comprehensive metabolic panel; Future  - Lipid panel; Future  - Hemoglobin A1c; Future    7. Aortic valve stenosis, etiology of cardiac valve disease unspecified  Stable- continue current treatment and follow up routinely with PCP and cardiology (Dr. Anderson)  - Comprehensive metabolic panel; Future  - Lipid panel; Future  - Hemoglobin A1c; Future    8. Essential hypertension  Stable- continue current treatment and follow up routinely with PCP and cardiology (Dr. Anderson)  - Comprehensive metabolic panel; Future  - Lipid panel; Future  - Hemoglobin A1c; Future    9. Nonrheumatic aortic valve stenosis  Stable- continue current treatment and follow up routinely with PCP and cardiology (Dr. Anderson)  - Comprehensive metabolic panel; Future  - Lipid panel; Future  - Hemoglobin A1c; Future    10. Atherosclerosis of arteries of extremities  Stable- continue current treatment and follow up routinely with PCP and cardiology (Dr. Anderson)  - Comprehensive metabolic panel; Future  - Lipid panel; Future  - Hemoglobin A1c; Future    11. Aortic atherosclerosis  Stable- continue current treatment and follow up routinely with PCP and cardiology (Dr. Anderson)  - Comprehensive metabolic panel; Future  - Lipid panel; Future  - Hemoglobin A1c; Future    12. Pacemaker  Stable- continue current treatment and follow up routinely with PCP and cardiology (Dr. Anderson)  - Comprehensive metabolic panel; Future  - Lipid panel; Future  - Hemoglobin A1c; Future    13. AICD (automatic  cardioverter/defibrillator) present - Bi-V  Stable- continue current treatment and follow up routinely with PCP and cardiology (Dr. Anderson)  - Comprehensive metabolic panel; Future  - Lipid panel; Future  - Hemoglobin A1c; Future    14. Combined systolic and diastolic congestive heart failure, NYHA class 3, unspecified congestive heart failure chronicity  Stable- continue current treatment and follow up routinely with PCP and cardiology (Dr. Anderson)    15. Systolic congestive heart failure, NYHA class 3, unspecified congestive heart failure chronicity  Stable- continue current treatment and follow up routinely with PCP and cardiology (Dr. Anderson)    16. Dilated cardiomyopathy  Stable- continue current treatment and follow up routinely with PCP and cardiology (Dr. Anderson)    17. 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 Anderson)    18. Hypothyroidism, unspecified type  Stable- continue current treatment and follow up routinely with PCP     19. Morbid obesity due to excess calories  Encouraged healthy eating, weight loss and routine exercise     20. Type 2 diabetes mellitus with diabetic polyneuropathy, without long-term current use of insulin  Stable- continue current treatment and follow up routinely with PCP   - Comprehensive metabolic panel; Future  - Lipid panel; Future  - Hemoglobin A1c; Future    21. Insomnia, unspecified type  - amitriptyline (ELAVIL) 10 MG tablet; Take 1 tablet (10 mg total) by mouth nightly as needed for Insomnia.  Dispense: 30 tablet; Refill: 0    22. Postmenopausal  - DXA Bone Density Spine And Hip; Future    23. Situational depression  - amitriptyline (ELAVIL) 10 MG tablet; Take 1 tablet (10 mg total) by mouth nightly as needed for Insomnia.  Dispense: 30 tablet; Refill: 0    24. Situational mixed anxiety and depressive disorder  - amitriptyline (ELAVIL) 10 MG tablet; Take 1  tablet (10 mg total) by mouth nightly as needed for Insomnia.  Dispense: 30 tablet; Refill: 0      Provided Miesha with a 5-10 year written screening schedule and personal prevention plan. Recommendations were developed using the USPSTF age appropriate recommendations. Education, counseling, and referrals were provided as needed. After Visit Summary printed and given to patient which includes a list of additional screenings\tests needed.    Susi Garcia, NP

## 2018-08-17 ENCOUNTER — ANTI-COAG VISIT (OUTPATIENT)
Dept: CARDIOLOGY | Facility: CLINIC | Age: 83
End: 2018-08-17
Payer: MEDICARE

## 2018-08-17 DIAGNOSIS — Z79.01 LONG TERM CURRENT USE OF ANTICOAGULANT THERAPY: ICD-10-CM

## 2018-08-17 DIAGNOSIS — I48.20 CHRONIC ATRIAL FIBRILLATION: ICD-10-CM

## 2018-08-17 PROCEDURE — G0250 MD INR TEST REVIE INTER MGMT: HCPCS | Mod: S$GLB,,, | Performed by: FAMILY MEDICINE

## 2018-08-20 NOTE — TELEPHONE ENCOUNTER
----- Message from Octavia De La Cruz sent at 8/20/2018  3:52 PM CDT -----  Refill on Rx Furosemide 20 mg.  Please fax to [facility] at [phone], any questions call patient at [phone].

## 2018-08-21 ENCOUNTER — HOSPITAL ENCOUNTER (OUTPATIENT)
Dept: RADIOLOGY | Facility: HOSPITAL | Age: 83
Discharge: HOME | End: 2018-08-21
Attending: NURSE PRACTITIONER
Payer: MEDICARE

## 2018-08-21 DIAGNOSIS — Z00.00 ENCOUNTER FOR PREVENTIVE HEALTH EXAMINATION: ICD-10-CM

## 2018-08-21 DIAGNOSIS — Z78.0 POSTMENOPAUSAL: ICD-10-CM

## 2018-08-21 PROCEDURE — 77080 DXA BONE DENSITY AXIAL: CPT | Mod: TC,PO

## 2018-08-21 PROCEDURE — 77080 DXA BONE DENSITY AXIAL: CPT | Mod: 26,,, | Performed by: RADIOLOGY

## 2018-08-22 RX ORDER — FUROSEMIDE 20 MG/1
20 TABLET ORAL
Qty: 30 TABLET | Refills: 0 | Status: SHIPPED | OUTPATIENT
Start: 2018-08-22 | End: 2018-10-24 | Stop reason: DRUGHIGH

## 2018-08-23 ENCOUNTER — TELEPHONE (OUTPATIENT)
Dept: FAMILY MEDICINE | Facility: CLINIC | Age: 83
End: 2018-08-23

## 2018-08-23 DIAGNOSIS — N18.30 STAGE 3 CHRONIC KIDNEY DISEASE: ICD-10-CM

## 2018-08-23 DIAGNOSIS — E87.5 HYPERKALEMIA: Primary | ICD-10-CM

## 2018-08-23 NOTE — TELEPHONE ENCOUNTER
Please call patient and let her know that DEXA scan revealed osteoporosis. I recommend patient follow up with PCP as planned to discuss treatment options. Also, blood work is acceptable, however kidney function has slightly decreased and potassium is slightly elevated. This is most likely from fasting/dehydration. Repeat labs next week. Please schedule. Please let me know if she has any additional questions and concerns. Thank you

## 2018-08-23 NOTE — TELEPHONE ENCOUNTER
"Spoke with patient and informed of results.   She will "walk-in" for repeat labs next week, per patient.   Informed patient DEXA results and tx discussed in detail at next appt, confirmed 9/25/18 appt with PCP. Pt verbalized understanding.   "

## 2018-08-24 ENCOUNTER — TELEPHONE (OUTPATIENT)
Dept: FAMILY MEDICINE | Facility: CLINIC | Age: 83
End: 2018-08-24

## 2018-08-24 NOTE — TELEPHONE ENCOUNTER
----- Message from Dayana Turner sent at 8/24/2018 10:29 AM CDT -----  Contact: Megan with [facility]  Type:  Pharmacy Calling to Clarify an RX    Name of Caller:  Megan  Pharmacy Name:  [facility]   Prescription Name:  furosemide (LASIX) 20 MG tablet  What do they need to clarify?:  How many time per day can patient take the medication   Best Call Back Number:    Additional Information:  Ref#[id number]

## 2018-08-27 ENCOUNTER — LAB VISIT (OUTPATIENT)
Dept: LAB | Facility: HOSPITAL | Age: 83
End: 2018-08-27
Attending: NURSE PRACTITIONER
Payer: MEDICARE

## 2018-08-27 ENCOUNTER — CLINICAL SUPPORT (OUTPATIENT)
Dept: CARDIOLOGY | Facility: CLINIC | Age: 83
End: 2018-08-27
Attending: INTERNAL MEDICINE
Payer: MEDICARE

## 2018-08-27 DIAGNOSIS — E87.5 HYPERKALEMIA: ICD-10-CM

## 2018-08-27 DIAGNOSIS — Z95.0 PACEMAKER: ICD-10-CM

## 2018-08-27 DIAGNOSIS — N18.30 STAGE 3 CHRONIC KIDNEY DISEASE: ICD-10-CM

## 2018-08-27 DIAGNOSIS — R00.1 BRADYCARDIA: ICD-10-CM

## 2018-08-27 LAB
ANION GAP SERPL CALC-SCNC: 8 MMOL/L
BUN SERPL-MCNC: 20 MG/DL
CALCIUM SERPL-MCNC: 9.9 MG/DL
CHLORIDE SERPL-SCNC: 106 MMOL/L
CO2 SERPL-SCNC: 26 MMOL/L
CREAT SERPL-MCNC: 1.1 MG/DL
EST. GFR  (AFRICAN AMERICAN): 55 ML/MIN/1.73 M^2
EST. GFR  (NON AFRICAN AMERICAN): 48 ML/MIN/1.73 M^2
GLUCOSE SERPL-MCNC: 154 MG/DL
POTASSIUM SERPL-SCNC: 5.3 MMOL/L
SODIUM SERPL-SCNC: 140 MMOL/L

## 2018-08-27 PROCEDURE — 80048 BASIC METABOLIC PNL TOTAL CA: CPT | Mod: PO

## 2018-08-27 PROCEDURE — 36415 COLL VENOUS BLD VENIPUNCTURE: CPT | Mod: PO

## 2018-08-29 ENCOUNTER — TELEPHONE (OUTPATIENT)
Dept: FAMILY MEDICINE | Facility: CLINIC | Age: 83
End: 2018-08-29

## 2018-08-29 DIAGNOSIS — E87.5 HYPERKALEMIA: Primary | ICD-10-CM

## 2018-08-29 DIAGNOSIS — N18.30 CKD (CHRONIC KIDNEY DISEASE) STAGE 3, GFR 30-59 ML/MIN: ICD-10-CM

## 2018-08-29 NOTE — TELEPHONE ENCOUNTER
Ms. Obando notified of her lab results and recommendations from Susi Garcia NP. Pt verbalized understanding of lab results and recommendation. Ms. Obando was scheduled for her repeat lab for 9/5/18~ pt aware.

## 2018-08-29 NOTE — TELEPHONE ENCOUNTER
Blood work is virtually the same. Potassium is still elevated and kidney function remains decreased by stable. I recommend avoiding high potassium foods and drink (oj, spinach, baked potatoes, bananas, etc.), increase fluid intake and repeat labs next week. Please schedule. Thank you

## 2018-09-05 ENCOUNTER — LAB VISIT (OUTPATIENT)
Dept: LAB | Facility: HOSPITAL | Age: 83
End: 2018-09-05
Attending: NURSE PRACTITIONER
Payer: MEDICARE

## 2018-09-05 DIAGNOSIS — E87.5 HYPERKALEMIA: ICD-10-CM

## 2018-09-05 DIAGNOSIS — N18.30 CKD (CHRONIC KIDNEY DISEASE) STAGE 3, GFR 30-59 ML/MIN: ICD-10-CM

## 2018-09-05 LAB
ANION GAP SERPL CALC-SCNC: 9 MMOL/L
BUN SERPL-MCNC: 29 MG/DL
CALCIUM SERPL-MCNC: 9.8 MG/DL
CHLORIDE SERPL-SCNC: 106 MMOL/L
CO2 SERPL-SCNC: 25 MMOL/L
CREAT SERPL-MCNC: 1.1 MG/DL
EST. GFR  (AFRICAN AMERICAN): 55 ML/MIN/1.73 M^2
EST. GFR  (NON AFRICAN AMERICAN): 48 ML/MIN/1.73 M^2
GLUCOSE SERPL-MCNC: 100 MG/DL
POTASSIUM SERPL-SCNC: 4.8 MMOL/L
SODIUM SERPL-SCNC: 140 MMOL/L

## 2018-09-05 PROCEDURE — 36415 COLL VENOUS BLD VENIPUNCTURE: CPT | Mod: PO

## 2018-09-05 PROCEDURE — 80048 BASIC METABOLIC PNL TOTAL CA: CPT | Mod: PO

## 2018-09-05 RX ORDER — LOSARTAN POTASSIUM 25 MG/1
TABLET ORAL
Qty: 90 TABLET | Refills: 1 | Status: SHIPPED | OUTPATIENT
Start: 2018-09-05 | End: 2019-04-24 | Stop reason: SDUPTHER

## 2018-09-05 RX ORDER — WARFARIN 2.5 MG/1
2.5 TABLET ORAL DAILY
Qty: 90 TABLET | Refills: 3 | Status: SHIPPED | OUTPATIENT
Start: 2018-09-05 | End: 2018-09-05 | Stop reason: SDUPTHER

## 2018-09-05 NOTE — TELEPHONE ENCOUNTER
----- Message from Danica Cisneros sent at 9/5/2018  3:41 PM CDT -----  Afternoon,     She stopped by and said that Dr. Dorantes sent her Warfarin to [facility], but needs to be sent thru [facility]. Please call patient to let her know .    Thank you!

## 2018-09-06 RX ORDER — SPIRONOLACTONE 25 MG/1
TABLET ORAL
Qty: 90 TABLET | Refills: 3 | Status: SHIPPED | OUTPATIENT
Start: 2018-09-06 | End: 2019-07-29 | Stop reason: SDUPTHER

## 2018-09-06 RX ORDER — WARFARIN 2.5 MG/1
2.5 TABLET ORAL DAILY
Qty: 90 TABLET | Refills: 3 | Status: SHIPPED | OUTPATIENT
Start: 2018-09-06 | End: 2019-11-09 | Stop reason: SDUPTHER

## 2018-09-07 ENCOUNTER — TELEPHONE (OUTPATIENT)
Dept: NEUROLOGY | Facility: CLINIC | Age: 83
End: 2018-09-07

## 2018-09-07 NOTE — TELEPHONE ENCOUNTER
Called and spoke with patient. Patient had her procedure on 8/9/2018. Patient call to report her neck pain has worsened. Patient rated her pain 7/10. Patient is having a constant dull pain in her neck and top of head. Sometimes she will have a sharp shooting pain. Left side hurts worse than the right side. Her head feels tight and swollen. Patient would like to know what to do? Please advise.

## 2018-09-07 NOTE — TELEPHONE ENCOUNTER
Called and spoke with patient. Informed patient per Dr. Palma, that she will need to address the pain in clinic at her next appointment on Tuesday. Patient verbalized understanding.

## 2018-09-07 NOTE — TELEPHONE ENCOUNTER
----- Message from Flory Kline sent at 9/7/2018  1:50 PM CDT -----  Please call [phone]  Asking to speak to nurse / had a procedure 6-7 weeks ago / nerves in her neck .. Now she is suffering with more pain

## 2018-09-11 ENCOUNTER — OFFICE VISIT (OUTPATIENT)
Dept: NEUROLOGY | Facility: CLINIC | Age: 83
End: 2018-09-11
Payer: MEDICARE

## 2018-09-11 ENCOUNTER — TELEPHONE (OUTPATIENT)
Dept: NEUROLOGY | Facility: CLINIC | Age: 83
End: 2018-09-11

## 2018-09-11 ENCOUNTER — TELEPHONE (OUTPATIENT)
Dept: ELECTROPHYSIOLOGY | Facility: CLINIC | Age: 83
End: 2018-09-11

## 2018-09-11 VITALS
DIASTOLIC BLOOD PRESSURE: 64 MMHG | RESPIRATION RATE: 16 BRPM | SYSTOLIC BLOOD PRESSURE: 131 MMHG | WEIGHT: 239 LBS | HEIGHT: 62 IN | BODY MASS INDEX: 43.98 KG/M2 | HEART RATE: 106 BPM

## 2018-09-11 DIAGNOSIS — M79.2 NEURITIS: Primary | ICD-10-CM

## 2018-09-11 DIAGNOSIS — G44.86 CERVICOGENIC HEADACHE: ICD-10-CM

## 2018-09-11 PROCEDURE — 99999 PR PBB SHADOW E&M-EST. PATIENT-LVL V: CPT | Mod: PBBFAC,,, | Performed by: PSYCHIATRY & NEUROLOGY

## 2018-09-11 PROCEDURE — 3075F SYST BP GE 130 - 139MM HG: CPT | Mod: CPTII,,, | Performed by: PSYCHIATRY & NEUROLOGY

## 2018-09-11 PROCEDURE — 3078F DIAST BP <80 MM HG: CPT | Mod: CPTII,,, | Performed by: PSYCHIATRY & NEUROLOGY

## 2018-09-11 PROCEDURE — 1101F PT FALLS ASSESS-DOCD LE1/YR: CPT | Mod: CPTII,,, | Performed by: PSYCHIATRY & NEUROLOGY

## 2018-09-11 PROCEDURE — 99214 OFFICE O/P EST MOD 30 MIN: CPT | Mod: S$PBB,,, | Performed by: PSYCHIATRY & NEUROLOGY

## 2018-09-11 PROCEDURE — 99499 UNLISTED E&M SERVICE: CPT | Mod: S$GLB,,, | Performed by: PSYCHIATRY & NEUROLOGY

## 2018-09-11 PROCEDURE — 99215 OFFICE O/P EST HI 40 MIN: CPT | Mod: PBBFAC,PO | Performed by: PSYCHIATRY & NEUROLOGY

## 2018-09-11 RX ORDER — GABAPENTIN 100 MG/1
CAPSULE ORAL
Qty: 90 CAPSULE | Refills: 11 | Status: SHIPPED | OUTPATIENT
Start: 2018-09-11 | End: 2018-10-24

## 2018-09-11 NOTE — TELEPHONE ENCOUNTER
Spoke with patient. Patient reports that she has never been on Xarelto. She states she has always been on coumadin. She did not call to inquire about Xarelto. Message sent to SILVINA An PharmD to alert that this patient did not inquire about Xarelto.      Charan CHOWDHURY CCM

## 2018-09-11 NOTE — TELEPHONE ENCOUNTER
----- Message from Sarah Mejia RN sent at 9/10/2018  5:39 PM CDT -----      ----- Message -----  From: Toña An, PharmD  Sent: 9/7/2018   9:57 AM  To: Rena Child RN    Mrs Orozco is inquiring about when she can d/c coumadin and restart Xarelto.  She had RFA in May and was supposed to be able to restart Xarelto after.  Can you please call her to discuss.

## 2018-09-11 NOTE — PROGRESS NOTES
Date of service: 9/11/2018  Referring provider: No ref. provider found    Subjective:      Chief complaint: Headache       Patient ID: Miesha Obando is a 82 y.o. lady with cervicogenic headaches, dizziness, coronary artery disease, atrial fibrillation on anticoagulation, hypertension, cardiomyopathy (EF 30-35%), pacemaker, COPD, diabetes, who is presenting for the follow up of headaches after cervical medial branch RFA    History of Present Illness    INTERVAL HISTORY - 9/11/18    Patient is status post left C2, 3, 4, 5 medial branch nerve and 3rd occipital nerve ablation done on 08/09/2018.  On postprocedural phone follow-up she reported having postprocedural pain, dizziness as well as uncomfortable numbness in the left occiput.  I had offered her oral steroids for treatment of neuritis which she declined.  She called in about a month after the procedure reporting that her neck pain had worsened having constant dull pain in the neck until the head along with sharp shooting pain left side hurts more than the right side head feels tight and swollen.    Today she reports she is the same it worse.  She has pain in the left neck a numbness/swollen/tight feeling in the left occipital and left temple that is painful in allodynia to touch.  Gait is she is having a hard time sleeping.  She was started on amitriptyline by her PCP but is afraid to take it consistently.  She takes Tylenol p.m. at night and also upon waking or melena.  Her current pain score is 5 with a range of 3-10.    INTERVAL HISTORY - 4/18/18     Status post cervical medial branch block of the left C2-3-4-5 and third occipital nerve on 3/22/18 with 95% relief of her neck and head pain x 8 hours. Her pain has returned to baseline. She is using her CPAP nightly and finds she is sleeping much better.     Her current pain score is 5 with a range of 2 to 10. Pain remians at the neck and top of head. No new health concerns.     INTERVAL HISTORY - 2/22/18  "    Today she reports her headaches are largely unchanged. She is having a hard time sleeping due to the pain. The pain starts in left neck and radiates to the posterior head. Her current pain score is 7 with a range from 2 to 10.     She sent back the duloxetine and dolobid as they were cost prohibitive so she is just using tylenol at this time.     INTERVAL HISTORY - 12/20/17     Seen 2 months ago at which point I recommended duloxetine for prevention and dolobid for acute relief. She reports that both of these medications helped a great deal when she was taking them. She ran out of the Dolobid, and she thinks she ran out of duloxetine as well.     Today she reports she is about the same the headache is in the forehead and back of had. Her current pain score is 8, with a range from 2 to 10.     ORIGINAL HEADACHE HISTORY - 10/27/17   Age at onset and course over time: about a couple of years (2015), she thinks she "lives" with a headache, sometimes more severe than others   Location: neck, occiput, vertex   Quality: pressure, sharp   Severity: current 6, range is 4-10  Duration: constant, with escalations for 30-40 min   Frequency: daily   Alleviated by: heat   Exacerbated by: stress, change weather; turning head   Associated with: scalp allodynia; blurred vision, double vision, dizziness, vertigo, anger  Sleep habits: wakes up and can't get back to sleeo   Caffeine intake: 1-2     Dizziness: feeling of off balance while walking, can also feel off balance while sitting (not spinning, more like a boat swaying); happens 2-3 times a week; lasts 15 min. Sometimes associated with double vision but sometimes double vision by itself. Side by side, lasts seconds to minutes. Will pass if she keeps blinking. Associated with word finding difficulty. No slurred speech. No visual aura. No paralysis.     Current acute treatment:  -- tylenol   (ASA)  (warfarin)    Current prevention:  -- metoprolol XL 50mg daily  -- amitripltyine " 10 mg - not taking regularly.  (losartan)  (trazodone - does not help her sleep)  (melatonin)    Previously tried/failed acute treatment:  -- dolobid 500mg BID- worked well, but ran out   -- tylenol or tylenol PM- ineffective   -- naproxen    Previously tried/failed preventative treatment:  -- duloxetine 20mg daily - stopped taking   -- lisinopril   -- cervical medial branch block of the left C2-3-4-5 and third occipital nerve on 3/22/18 with 95% relief of her neck and head pain x 8 hours.   -- 8/9/18 RFA left  C2-3-4-5 and third occipital nerve - neuritis and unclear result 4 weeks out     Review of patient's allergies indicates:   Allergen Reactions    Amoxicillin-pot clavula (bulk) Nausea And Vomiting    Sulfa (sulfonamide antibiotics) Hives    Adhesive Rash     Use paper tape     Current Outpatient Medications   Medication Sig Dispense Refill    ACCU-CHEK FASTCLIX Misc TEST TWICE DAILY 200 each 11    ACCU-CHEK SMARTVIEW TEST STRIP Strp TEST  TWICE DAILY 200 strip 11    acetaminophen (TYLENOL) 325 MG tablet Take 325 mg by mouth as needed for Pain or Temperature greater than.       albuterol (PROVENTIL) 2.5 mg /3 mL (0.083 %) nebulizer solution Take 3 mLs (2.5 mg total) by nebulization every 6 (six) hours as needed for Wheezing. Rescue 90 mL 0    amitriptyline (ELAVIL) 10 MG tablet Take 1 tablet (10 mg total) by mouth nightly as needed for Insomnia. 30 tablet 0    ascorbic acid (VITAMIN C) 500 MG tablet Take 500 mg by mouth once daily.        aspirin (ECOTRIN) 81 MG EC tablet Take 81 mg by mouth once daily.      azelastine (ASTELIN) 137 mcg (0.1 %) nasal spray 1 spray (137 mcg total) by Nasal route 2 (two) times daily. 30 mL 0    b complex vitamins tablet Take 1 tablet by mouth once daily.        BD ALCOHOL SWABS PadM USE TWICE DAILY 200 each 11    diphenhydramine-acetaminophen (TYLENOL PM)  mg Tab Take 1 tablet by mouth nightly as needed.      fish oil-omega-3 fatty acids 300-1,000 mg capsule  Take 2 g by mouth once daily.      fluticasone-vilanterol (BREO ELLIPTA) 200-25 mcg/dose DsDv diskus inhaler Inhale 1 puff into the lungs once daily. 3 each 3    furosemide (LASIX) 20 MG tablet Take 1 tablet (20 mg total) by mouth as needed. 30 tablet 0    levothyroxine (SYNTHROID) 100 MCG tablet TAKE 1 TABLET ONE TIME DAILY 90 tablet 1    losartan (COZAAR) 25 MG tablet TAKE 1 TABLET EVERY DAY 90 tablet 1    metoprolol succinate (TOPROL-XL) 50 MG 24 hr tablet TAKE 1 TABLET EVERY DAY (DISCONTINUE METOPROLOL ER 25MG) 90 tablet 3    nitroGLYCERIN (NITROSTAT) 0.4 MG SL tablet Place 1 tablet (0.4 mg total) under the tongue every 5 (five) minutes as needed for Chest pain. 20 tablet 0    ranitidine (ZANTAC) 300 MG tablet Take 300 mg by mouth every evening.      spironolactone (ALDACTONE) 25 MG tablet TAKE 1 TABLET EVERY DAY 90 tablet 3    torsemide (DEMADEX) 20 MG Tab Take 1 tablet (20 mg total) by mouth once daily. 30 tablet 0    warfarin (COUMADIN) 2.5 MG tablet Take 1 tablet (2.5 mg total) by mouth Daily. 90 tablet 3    gabapentin (NEURONTIN) 100 MG capsule Take 1 capsule (100 mg total) by mouth every evening for 7 days, THEN 2 capsules (200 mg total) every evening for 7 days, THEN 3 capsules (300 mg total) every evening for 14 days. 90 capsule 11    pantoprazole (PROTONIX) 20 MG tablet Take 1 tablet (20 mg total) by mouth 2 (two) times daily before meals. 28 tablet 0     No current facility-administered medications for this visit.        Past Medical History  Past Medical History:   Diagnosis Date    Anticoagulant long-term use     Arthritis     Atrial fibrillation     Breast cancer 1994    breast , left    Cardiomyopathy - EF 35-40% 5/11/2016    CHF (congestive heart failure)     Chronic bronchitis     COPD (chronic obstructive pulmonary disease)     Coronary artery disease without angina pectoris 8/26/2015    Depression     Diabetes with neurologic complications     Diverticulitis      Diverticulosis     Encounter for blood transfusion     after mastectomy x 20 years ago    GERD (gastroesophageal reflux disease)     H/O aortic valve replacement     Hiatal hernia     History of colonic diverticulitis     Hypertension     Hypothyroid     Insomnia     Irritable bowel syndrome     Obesity     Pacemaker 08/2014    Schatzki's ring     mild    Sleep apnea     uses cpap with oxygen     Syncope and collapse     Type II or unspecified type diabetes mellitus without mention of complication, not stated as uncontrolled     No medications at present.     Urinary incontinence        Past Surgical History  Past Surgical History:   Procedure Laterality Date    ADENOIDECTOMY      APPENDECTOMY      BACK SURGERY      Discectomy, lumbar    BLOCK-NERVE-MEDIAL [id number],C2,3,4,5, Left 3/22/2018    [facility]    BREAST SURGERY  1994    left side , mastectomy    CARDIAC PACEMAKER PLACEMENT      CARDIAC VALVE SURGERY      aortic valve replacement    CHOLECYSTECTOMY      COLONOSCOPY  2/2014    repeat in 10 years for screening    COLONOSCOPY N/A 2/7/2014    [facility] at [facility] ENDO    CORONARY STENT PLACEMENT      x2    EGD (ESOPHAGOGASTRODUODENOSCOPY) N/A 6/25/2012    [facility] ENDO    ESOPHAGOGASTRODUODENOSCOPY (EGD) N/A 2/8/2018    [facility] END[facility]    ESOPHAGOGASTRODUODENOSCOPY (EGD) N/A 7/7/2016    [facility] ENDO    ESOPHAGOGASTRODUODENOSCOPY (EGD) N/A 2/23/2016    [facility] ENDO    EYE SURGERY      bilateral PHACO and IOL    HEART CATH-LEFT Left 8/19/2015    [facility] CATH LAB    HYSTERECTOMY      ovaries spared    INJECTION-STEROID-EPIDURAL-TRANSFORAMINAL Left 4/10/2012    [facility] Georges [facility]es [facility] OR    INSERTION-ICD-BIVENTRICULAR  [facility]  [place] N/A 2016    [facility] CATH    INSERTION-PACEMAKER-SINGLE-[facility].[place] N/A 2014    Performed by [facility] at [facility] CATH LAB    LOOP RECORDER      RADIOFREQUENCY ABLATION-C2-3-4-5 and third occipital nerve Left 2018    [facility] OR    REMOVAL-LOOP RECORDER N/A 2014    Performed by [facility] at [facility] CATH LAB    REPLACEMENT-VALVE-AORTIC N/A 2015    [facility] at [facility] CATH LAB    TOE SURGERY Right     TONSILLECTOMY      UPPER GASTROINTESTINAL ENDOSCOPY         Family History  Family History   Problem Relation Age of Onset    Parkinsonism Mother     Emphysema Father     Heart disease Brother     Heart attack Brother     Heart failure Brother     Heart disease Brother     Heart failure Brother     Heart disease Brother     Heart failure Brother     Arrhythmia Brother     Anesthesia problems Neg Hx     Clotting disorder Neg Hx        Social History  Social History     Socioeconomic History    Marital status:      Spouse name: Not on file    Number of children: Not on file    Years of education: Not on file    Highest education level: Not on file   Social Needs    Financial resource strain: Not on file    Food insecurity - worry: Not on file    Food insecurity - inability: Not on file    Transportation needs - medical: Not on file    Transportation needs - non-medical: Not on file   Occupational History    Not on file   Tobacco Use    Smoking status: Former Smoker     Packs/day: 1.00     Years: 30.00     Pack years: 30.00     Start date: 1958     Last attempt to quit: 1988     Years since quittin.6    Smokeless tobacco: Never Used   Substance and Sexual Activity    Alcohol use: Yes     Alcohol/week: 1.8 oz     Types: 2 Glasses of wine, 1 Shots of liquor per week     Comment: occasional    Drug use: No    Sexual activity: Not  on file   Other Topics Concern    Not on file   Social History Narrative    Not on file        Review of Systems    14-point review of systems as follows:   No check jude indicates NEGATIVE response   Constitutional: [] weight loss, [x] change to appetite   Eyes: [x] change in vision, [x] double vision   Ears, nose, mouth, throat: [] frequent nose bleeds, [] ringing in the ears   Respiratory: [x] cough, [] wheezing   Cardiovascular: [x] chest pain, [x] palpitations   Gastrointestinal: [] jaundice, [] nausea/vomiting   Genitourinary: [] incontinence, [] burning with urination   Hematologic/lymphatic: [] easy bruising/bleeding, [] night sweats   Neurological: [x] numbness, [x] weakness   Endocrine: [] fatigue, [] heat/cold intolerance   Allergy/Immunologic: [] fevers, [x] chills   Musculoskeletal: [x] muscle pain, [x] joint pain   Psychiatric: [] thoughts of harming self/others, [x] depression   Integumentary: [] rashes, [] sores that do not heal       Objective:        Vitals:    09/11/18 1147   BP: 131/64   Pulse: 106   Resp: 16     Body mass index is 43.71 kg/m².     Improved cervical range of motion in all planes  Allodynia left occipital  Mild left facet loading    PREVIOUS:  General: Well developed, well nourished.  No acute distress.  HEENT: Atraumatic, normocephalic.  Neck: Trachea midline  Cardiovascular: Vitals reviewed. Normal peripheral perfusion.   Pulmonary: No increased work of breathing.  Abdomen/GI: No guarding  Musculoskeletal: normal tone in all four extremities. No atrophy. No abnormal movements. Gait is wide based, slow, and with assistance of cane.     Spine:   CERVICAL SPINE:  ROM: reduced lateral rotation bilaterally, normal extension and flexion   MUSCLE SPASM: + upper cervical paraspinals, facet tenderness bilaterally   FACET LOADING: + left  SPURLING: no  FRED / BALWINDER tender: no     Neurological exam:  Mental status: Awake and alert. Oriented to situation.  Speech fluent and appropriate.  Recent and remote memory appear to be intact.  Fund of knowledge normal.  Cranial nerves: Pupils equal round, extraocular movements intact, facial strength intact bilaterally, hearing grossly intact bilaterally.  Sensation: intact to light touch; reduced to TEMP in the hands to mid-forearm and feet to knee bilaterally. Reduced PPC and vibration at the toes.     Data Review:     I have personally reviewed the referring provider's notes, labs, & imaging made available to me today.      10/30/17 X-ray cervical spine:  Moderate DDD at C6-7 level; multi-level NF stenosis, most at C3-4 level, no instability on flexion or extension     Lab Results   Component Value Date    BNP 3220 (H) 03/09/2016     09/05/2018    K 4.8 09/05/2018    MG 2.0 01/05/2018     09/05/2018    CO2 25 09/05/2018    BUN 29 (H) 09/05/2018    CREATININE 1.1 09/05/2018     09/05/2018    HGBA1C 6.3 (H) 08/21/2018    AST 23 08/21/2018    AST 28 03/09/2016    ALT 18 08/21/2018    ALBUMIN 3.5 08/21/2018    PROT 7.4 08/21/2018    BILITOT 0.8 08/21/2018    CHOL 165 08/21/2018    HDL 44 08/21/2018    LDLCALC 96.4 08/21/2018    TRIG 123 08/21/2018       Lab Results   Component Value Date    WBC 8.18 01/31/2018    HGB 13.4 01/31/2018    HCT 41.3 01/31/2018    MCV 96 01/31/2018     01/31/2018       Lab Results   Component Value Date    TSH 3.014 04/18/2017           Assessment & Plan:       Problem List Items Addressed This Visit        Neuro    Cervicogenic headache    Overview     Her headaches, I believe are cervicogenic secondary to presumed cervical facet arthropathy as she is tender in the facets with only a fair degree of muscle spasm. This is further supported by neck pain, headache, and dizziness when rotating the head. Mild facet arthropathy evident on cervical Xr. She has very many medical problems, and is on aspirin and warfarin for atrial fibrillation, CAD, and cardiomyopathy thus offering cervical medial branch blocks and  RFA would be tricky here but we have exhausteed medical options - cymbalta not helpful and safe NSAIDs (from a bleeding standpoint) are cost prohibitive to her or not covered on her insurance.    S/p C2-3-4-5 and third occipital nerve on 3/22/18 with 95% relief of her neck and head pain x 8 hours and RFA of same nerves on 8/9/18 with postprocedural pain and neuritis complicating recovery course.  It has been 4 weeks, she may experience more result as a neuro this wears off.         Neuritis - Primary    Overview     The patient had a left C2, 3, 4, 5 and 3rd occipital nerve RFA with me on 8/9/18 and has developed some neuritis like sensations in the left specifically combination of lack of sensation in her occiput which is intended result as well as some neuropathic sensation of allodynia. I do think she will improve as nerve fully dies off but in meantime we can provide symptom control with gabapentin and compound cream          Relevant Medications    gabapentin (NEURONTIN) 100 MG capsule                You have increased pain in the left neck and left head because the nerve that I burned is being inflamed as it is dying off - this is a process that may take 4 weeks typically to resolve but for some people takes longer. Using nerve pain medicines can help relieve the pain until the nerve fully dies off.     TESTING:  -- none     PREVENTION (use daily regardless of headache):  -- begin gabapentin 100mg one capsule at night. If not helping after 7 days may raise to 2 capsules at night, and in another 7 days can raise to 3 capsules at night     ACUTE TREATMENT:  -- continue tylenol   -- order compound cream for your neck ([facility] will call you)     No Follow-up on file.     Regine Palma MD

## 2018-09-11 NOTE — PATIENT INSTRUCTIONS
You have increased pain in the left neck and left head because the nerve that I burned is being inflamed as it is dying off - this is a process that may take 4 weeks typically to resolve but for some people takes longer. Using nerve pain medicines can help relieve the pain until the nerve fully dies off.     TESTING:  -- none     PREVENTION (use daily regardless of headache):  -- begin gabapentin 100mg one capsule at night. If not helping after 7 days may raise to 2 capsules at night, and in another 7 days can raise to 3 capsules at night     ACUTE TREATMENT:  -- continue tylenol   -- order compound cream for your neck ([facility] will call you)

## 2018-09-11 NOTE — TELEPHONE ENCOUNTER
Rx for Gabapentin faxed to [facility] at [phone]. Rx for compound cream faxed to [facility].

## 2018-09-25 ENCOUNTER — OFFICE VISIT (OUTPATIENT)
Dept: FAMILY MEDICINE | Facility: CLINIC | Age: 83
End: 2018-09-25
Payer: MEDICARE

## 2018-09-25 VITALS
HEIGHT: 62 IN | BODY MASS INDEX: 44.46 KG/M2 | SYSTOLIC BLOOD PRESSURE: 110 MMHG | OXYGEN SATURATION: 93 % | WEIGHT: 241.63 LBS | HEART RATE: 110 BPM | DIASTOLIC BLOOD PRESSURE: 80 MMHG

## 2018-09-25 DIAGNOSIS — J44.1 COPD WITH ACUTE EXACERBATION: ICD-10-CM

## 2018-09-25 DIAGNOSIS — I10 ESSENTIAL HYPERTENSION: ICD-10-CM

## 2018-09-25 DIAGNOSIS — N18.30 CHRONIC KIDNEY DISEASE, STAGE III (MODERATE): ICD-10-CM

## 2018-09-25 DIAGNOSIS — E11.42 TYPE 2 DIABETES MELLITUS WITH DIABETIC POLYNEUROPATHY, WITHOUT LONG-TERM CURRENT USE OF INSULIN: Primary | ICD-10-CM

## 2018-09-25 DIAGNOSIS — M81.0 OSTEOPOROSIS, UNSPECIFIED OSTEOPOROSIS TYPE, UNSPECIFIED PATHOLOGICAL FRACTURE PRESENCE: ICD-10-CM

## 2018-09-25 PROBLEM — M85.80 OSTEOPENIA: Status: RESOLVED | Noted: 2017-02-20 | Resolved: 2018-09-25

## 2018-09-25 PROCEDURE — 3074F SYST BP LT 130 MM HG: CPT | Mod: CPTII,,, | Performed by: FAMILY MEDICINE

## 2018-09-25 PROCEDURE — 99214 OFFICE O/P EST MOD 30 MIN: CPT | Mod: S$PBB,,, | Performed by: FAMILY MEDICINE

## 2018-09-25 PROCEDURE — 99499 UNLISTED E&M SERVICE: CPT | Mod: S$GLB,,, | Performed by: FAMILY MEDICINE

## 2018-09-25 PROCEDURE — 99213 OFFICE O/P EST LOW 20 MIN: CPT | Mod: PBBFAC,PO | Performed by: FAMILY MEDICINE

## 2018-09-25 PROCEDURE — 1101F PT FALLS ASSESS-DOCD LE1/YR: CPT | Mod: CPTII,,, | Performed by: FAMILY MEDICINE

## 2018-09-25 PROCEDURE — 90662 IIV NO PRSV INCREASED AG IM: CPT | Mod: PBBFAC,PO

## 2018-09-25 PROCEDURE — 3079F DIAST BP 80-89 MM HG: CPT | Mod: CPTII,,, | Performed by: FAMILY MEDICINE

## 2018-09-25 PROCEDURE — 99999 PR PBB SHADOW E&M-EST. PATIENT-LVL III: CPT | Mod: PBBFAC,,, | Performed by: FAMILY MEDICINE

## 2018-09-25 RX ORDER — DOXYCYCLINE 100 MG/1
100 CAPSULE ORAL EVERY 12 HOURS
Qty: 14 CAPSULE | Refills: 0 | Status: SHIPPED | OUTPATIENT
Start: 2018-09-25 | End: 2018-10-24

## 2018-09-25 RX ORDER — ALENDRONATE SODIUM 70 MG/1
70 TABLET ORAL
Qty: 12 TABLET | Refills: 3 | Status: ON HOLD | OUTPATIENT
Start: 2018-09-25 | End: 2019-06-21 | Stop reason: SDUPTHER

## 2018-09-25 NOTE — PROGRESS NOTES
Subjective:       Patient ID: Miesha Obando is a 82 y.o. female.    Chief Complaint: Diabetes    Here for f/u htn, dm II and chronic medical issues. Doing well overall but some nasal congestion for last week.      Hypertension   This is a chronic problem. The current episode started more than 1 year ago. The problem is controlled. Pertinent negatives include no chest pain, palpitations or shortness of breath.   Diabetes   She presents for her follow-up diabetic visit. She has type 2 diabetes mellitus. Her disease course has been stable. Pertinent negatives for hypoglycemia include no nervousness/anxiousness. Pertinent negatives for diabetes include no chest pain and no fatigue.     Review of Systems   Constitutional: Negative for chills, fatigue and fever.   HENT: Positive for congestion.    Respiratory: Negative for cough, chest tightness and shortness of breath.    Cardiovascular: Negative for chest pain, palpitations and leg swelling.   Gastrointestinal: Negative for abdominal distention and abdominal pain.   Endocrine: Negative for cold intolerance and heat intolerance.   Skin: Negative for rash.   Psychiatric/Behavioral: Negative for dysphoric mood. The patient is not nervous/anxious.        Objective:      Physical Exam   Constitutional: She appears well-developed and well-nourished.   HENT:   Head: Normocephalic and atraumatic.   Cardiovascular: Normal rate, regular rhythm and normal heart sounds.   Pulmonary/Chest: Effort normal and breath sounds normal.   Psychiatric: She has a normal mood and affect.   Nursing note and vitals reviewed.      Assessment:       1. Type 2 diabetes mellitus with diabetic polyneuropathy, without long-term current use of insulin    2. Chronic kidney disease, stage III (moderate)    3. Essential hypertension    4. Osteoporosis, unspecified osteoporosis type, unspecified pathological fracture presence    5. COPD with acute exacerbation        Plan:       Type 2 diabetes mellitus  with diabetic polyneuropathy, without long-term current use of insulin  -     CBC auto differential; Future; Expected date: 09/25/2018  -     Comprehensive metabolic panel; Future; Expected date: 09/25/2018  -     Hemoglobin A1c; Future; Expected date: 09/25/2018  -     Lipid panel; Future; Expected date: 09/25/2018  -     Microalbumin/creatinine urine ratio; Future; Expected date: 09/25/2018  -     TSH; Future; Expected date: 09/25/2018    Chronic kidney disease, stage III (moderate)  -     CBC auto differential; Future; Expected date: 09/25/2018  -     Comprehensive metabolic panel; Future; Expected date: 09/25/2018  -     Hemoglobin A1c; Future; Expected date: 09/25/2018  -     Lipid panel; Future; Expected date: 09/25/2018  -     Microalbumin/creatinine urine ratio; Future; Expected date: 09/25/2018  -     TSH; Future; Expected date: 09/25/2018    Essential hypertension  -     CBC auto differential; Future; Expected date: 09/25/2018  -     Comprehensive metabolic panel; Future; Expected date: 09/25/2018  -     Hemoglobin A1c; Future; Expected date: 09/25/2018  -     Lipid panel; Future; Expected date: 09/25/2018  -     Microalbumin/creatinine urine ratio; Future; Expected date: 09/25/2018  -     TSH; Future; Expected date: 09/25/2018    Osteoporosis, unspecified osteoporosis type, unspecified pathological fracture presence    COPD with acute exacerbation    Other orders  -     alendronate (FOSAMAX) 70 MG tablet; Take 1 tablet (70 mg total) by mouth every 7 days.  Dispense: 12 tablet; Refill: 3  -     doxycycline (VIBRAMYCIN) 100 MG Cap; Take 1 capsule (100 mg total) by mouth every 12 (twelve) hours.  Dispense: 14 capsule; Refill: 0        Calcium/vit d and start fosamax.  Reviewed recent labs.  Will monitor chronic medical issues and continue current plan of care.    Follow-up in about 4 months (around 1/25/2019), or if symptoms worsen or fail to improve.

## 2018-09-26 NOTE — TELEPHONE ENCOUNTER
----- Message from Carol Stokes sent at 9/26/2018 12:59 PM CDT -----  Contact: se;cristy  Type:  RX Refill Request    Who Called:  self  Refill or New Rx:  new  RX Name and Strength:  levothyroxine (SYNTHROID) 100 MCG tablet  How is the patient currently taking it? (ex. 1XDay):    Is this a 30 day or 90 day RX:  90  Preferred Pharmacy with phone number:  [facility]  Local or Mail Order:  Mail order  Ordering Provider:  Dr Patria Rebollar Call Back Number:  [phone]  Additional Information:  Patient has about one week of medication left. Please call patient. Thanks!     [facility] - [place], [place] - [address]  [address] [address]  Phone: [phone] Fax: [phone]

## 2018-09-27 RX ORDER — LEVOTHYROXINE SODIUM 100 UG/1
TABLET ORAL
Qty: 90 TABLET | Refills: 1 | Status: SHIPPED | OUTPATIENT
Start: 2018-09-27 | End: 2019-03-05 | Stop reason: SDUPTHER

## 2018-10-01 ENCOUNTER — TELEPHONE (OUTPATIENT)
Dept: NEUROLOGY | Facility: CLINIC | Age: 83
End: 2018-10-01

## 2018-10-01 NOTE — TELEPHONE ENCOUNTER
Returned call and spoke with patient. Patient reports she is still having pain on the top of her head and some numbness. Patient started Gabapentin and is not taking 300 mg nightly.  Advised patient it could take up to one month of taking Gabapentin before patients have significant relief. Instructed patient to call back on 10/11 (one month from the start of taking Gabapentin) to report how she is doing. Patient verbalized understanding.

## 2018-10-01 NOTE — TELEPHONE ENCOUNTER
----- Message from Dayana Turner sent at 10/1/2018 12:42 PM CDT -----  Contact: Patient  Patient would like to let the doctor know that she is really not doing that much better with the new medication, she is still having pain and numbness.  Call Back#[phone]  Thanks

## 2018-10-15 ENCOUNTER — HOSPITAL ENCOUNTER (OUTPATIENT)
Dept: RADIOLOGY | Facility: HOSPITAL | Age: 83
Discharge: HOME | End: 2018-10-15
Attending: INTERNAL MEDICINE
Payer: MEDICARE

## 2018-10-15 DIAGNOSIS — G45.3 AMAUROSIS FUGAX: ICD-10-CM

## 2018-10-15 PROCEDURE — 93880 EXTRACRANIAL BILAT STUDY: CPT | Mod: 26,,, | Performed by: RADIOLOGY

## 2018-10-15 PROCEDURE — 93880 EXTRACRANIAL BILAT STUDY: CPT | Mod: TC,PO

## 2018-10-22 ENCOUNTER — TELEPHONE (OUTPATIENT)
Dept: NEUROLOGY | Facility: CLINIC | Age: 83
End: 2018-10-22

## 2018-10-22 NOTE — TELEPHONE ENCOUNTER
----- Message from Chao Acosta sent at 10/22/2018  8:19 AM CDT -----  Type: Needs Medical Advice    Who Called:  pt  Symptoms (please be specific):  Sharp pains in head  How long has patient had these symptoms:  Procedure done in august / still having the pains in that area  Pharmacy name and phone #:    [facility] [address] [place] [address] AT SEC OF [facility] & [address] [address]  Phone: [phone] Fax: [phone]    Best Call Back Number: [phone]  Additional Information:

## 2018-10-22 NOTE — TELEPHONE ENCOUNTER
"Returned patients call in regards to recent pain believed to be from her procedure in August. Per Nurse Calloway " the pain she is experiencing is not from her procedure. We can see if Dr Scott will do a nerve block or patient can go the ER if pain becomes unbearable." Patient stated she will call her PCP in regards to this issue , she refused the nerve block , stated she will call back if she has any questions.   "

## 2018-10-23 ENCOUNTER — TELEPHONE (OUTPATIENT)
Dept: FAMILY MEDICINE | Facility: CLINIC | Age: 83
End: 2018-10-23

## 2018-10-23 NOTE — TELEPHONE ENCOUNTER
----- Message from Kennedi Lawson sent at 10/23/2018  9:52 AM CDT -----  Contact: Self   Pt calling to speak to a nurse regarding health. [phone]

## 2018-10-23 NOTE — TELEPHONE ENCOUNTER
Spoke with pt;  Pt reports HA for > 3 months;  Began after having procedure with Dr. Palma.  Was evaluated by Louie and was told this HA is not related to procedure. I recommend pt have an evaluation completed.  Pt is scheduled to see Carla tomorrow at 2:20

## 2018-10-24 ENCOUNTER — OFFICE VISIT (OUTPATIENT)
Dept: FAMILY MEDICINE | Facility: CLINIC | Age: 83
End: 2018-10-24
Payer: MEDICARE

## 2018-10-24 ENCOUNTER — TELEPHONE (OUTPATIENT)
Dept: NEUROLOGY | Facility: CLINIC | Age: 83
End: 2018-10-24

## 2018-10-24 VITALS
OXYGEN SATURATION: 96 % | DIASTOLIC BLOOD PRESSURE: 80 MMHG | BODY MASS INDEX: 44.46 KG/M2 | HEART RATE: 72 BPM | RESPIRATION RATE: 18 BRPM | WEIGHT: 241.63 LBS | TEMPERATURE: 98 F | SYSTOLIC BLOOD PRESSURE: 110 MMHG | HEIGHT: 62 IN

## 2018-10-24 DIAGNOSIS — I65.23 BILATERAL CAROTID ARTERY STENOSIS: ICD-10-CM

## 2018-10-24 DIAGNOSIS — E11.42 TYPE 2 DIABETES MELLITUS WITH DIABETIC POLYNEUROPATHY, WITHOUT LONG-TERM CURRENT USE OF INSULIN: ICD-10-CM

## 2018-10-24 DIAGNOSIS — I10 ESSENTIAL HYPERTENSION: ICD-10-CM

## 2018-10-24 DIAGNOSIS — M79.2 NEURITIS: Primary | ICD-10-CM

## 2018-10-24 PROCEDURE — 99213 OFFICE O/P EST LOW 20 MIN: CPT | Mod: S$PBB,HCWC,, | Performed by: NURSE PRACTITIONER

## 2018-10-24 PROCEDURE — 99215 OFFICE O/P EST HI 40 MIN: CPT | Mod: PBBFAC,PO | Performed by: NURSE PRACTITIONER

## 2018-10-24 PROCEDURE — 1101F PT FALLS ASSESS-DOCD LE1/YR: CPT | Mod: CPTII,HCWC,, | Performed by: NURSE PRACTITIONER

## 2018-10-24 PROCEDURE — 3074F SYST BP LT 130 MM HG: CPT | Mod: CPTII,HCWC,, | Performed by: NURSE PRACTITIONER

## 2018-10-24 PROCEDURE — 99999 PR PBB SHADOW E&M-EST. PATIENT-LVL V: CPT | Mod: PBBFAC,,, | Performed by: NURSE PRACTITIONER

## 2018-10-24 PROCEDURE — 3079F DIAST BP 80-89 MM HG: CPT | Mod: CPTII,HCWC,, | Performed by: NURSE PRACTITIONER

## 2018-10-24 RX ORDER — ALBUTEROL SULFATE 90 UG/1
1 AEROSOL, METERED RESPIRATORY (INHALATION) DAILY PRN
COMMUNITY
Start: 2018-10-10 | End: 2019-11-09 | Stop reason: SDUPTHER

## 2018-10-24 RX ORDER — NEPAFENAC 3 MG/ML
SUSPENSION/ DROPS OPHTHALMIC
COMMUNITY
Start: 2018-10-07 | End: 2019-01-07

## 2018-10-24 RX ORDER — GABAPENTIN 300 MG/1
300 CAPSULE ORAL NIGHTLY
Qty: 30 CAPSULE | Refills: 2 | Status: SHIPPED | OUTPATIENT
Start: 2018-10-24 | End: 2019-08-16 | Stop reason: SDUPTHER

## 2018-10-24 NOTE — PROGRESS NOTES
"Subjective:       Patient ID: Miesha Obando is a 83 y.o. female.    Chief Complaint: Headache  She was last seen in primary care by Patria on 09/25/2017. I last saw her on 07/14/2017.   HPI   States on the left side of her head occipital region it "feels like it is numb and painful". This has been going on since August. Dull constantly and every once in a while it is stabbing and only last a couple of minutes and goes away.  She had an injection with Fermo in August and states could not lay on that side for a while.  States one day she was watching TV and had problems seeing out of right eye and she says was told that she had a "mini stroke".   Vitals:    10/24/18 1421   BP: 110/80   Pulse: 72   Resp: 18   Temp: 98.2 °F (36.8 °C)     Review of Systems    Lab Results   Component Value Date    HGBA1C 6.3 (H) 08/21/2018     She does take a vitamin B daily  Objective:      Physical Exam   Constitutional: Vital signs are normal. She appears well-developed and well-nourished. She is active and cooperative.   HENT:   Head: Normocephalic and atraumatic.       Right Ear: Hearing, tympanic membrane, external ear and ear canal normal.   Left Ear: Hearing, tympanic membrane, external ear and ear canal normal.   Nose: Nose normal.   Mouth/Throat: Uvula is midline, oropharynx is clear and moist and mucous membranes are normal. She has dentures.   Eyes: Lids are normal.   Neck: Trachea normal, normal range of motion, full passive range of motion without pain and phonation normal. Neck supple.   Cardiovascular: Normal rate and regular rhythm.   Pulmonary/Chest: Effort normal and breath sounds normal.   Abdominal: Soft. There is no tenderness.   Musculoskeletal: Normal range of motion.   Lymphadenopathy:        Head (right side): No submental, no submandibular, no tonsillar, no preauricular, no posterior auricular and no occipital adenopathy present.        Head (left side): No submental, no submandibular, no tonsillar, no " preauricular, no posterior auricular and no occipital adenopathy present.     She has no cervical adenopathy.   Neurological: She is alert. She has normal strength. No cranial nerve deficit.   Tingling and numbness to left occipital region   Skin: Skin is warm, dry and intact.   Psychiatric: She has a normal mood and affect. Her speech is normal and behavior is normal. Judgment and thought content normal. Cognition and memory are normal.   Nursing note and vitals reviewed.      Assessment & Plan:       Neuritis  -     gabapentin (NEURONTIN) 300 MG capsule; Take 1 capsule (300 mg total) by mouth every evening.  Dispense: 30 capsule; Refill: 2    Essential hypertension    Type 2 diabetes mellitus with diabetic polyneuropathy, without long-term current use of insulin    Bilateral carotid artery stenosis    She has been off the neurontin about 5-7 days and it was giving some improvement  Will have her follow up with neurology Louie or Tyler.         No Follow-up on file.

## 2018-10-25 ENCOUNTER — OFFICE VISIT (OUTPATIENT)
Dept: CARDIOLOGY | Facility: CLINIC | Age: 83
End: 2018-10-25
Payer: MEDICARE

## 2018-10-25 VITALS
DIASTOLIC BLOOD PRESSURE: 77 MMHG | HEIGHT: 62 IN | HEART RATE: 72 BPM | WEIGHT: 241.38 LBS | BODY MASS INDEX: 44.42 KG/M2 | SYSTOLIC BLOOD PRESSURE: 122 MMHG

## 2018-10-25 DIAGNOSIS — I42.0 DILATED CARDIOMYOPATHY: ICD-10-CM

## 2018-10-25 DIAGNOSIS — Z95.5 S/P CORONARY ARTERY STENT PLACEMENT: ICD-10-CM

## 2018-10-25 DIAGNOSIS — Z95.2 S/P TAVR (TRANSCATHETER AORTIC VALVE REPLACEMENT): ICD-10-CM

## 2018-10-25 DIAGNOSIS — I48.20 CHRONIC ATRIAL FIBRILLATION: ICD-10-CM

## 2018-10-25 DIAGNOSIS — I65.23 BILATERAL CAROTID ARTERY STENOSIS: ICD-10-CM

## 2018-10-25 DIAGNOSIS — J42 CHRONIC BRONCHITIS, UNSPECIFIED CHRONIC BRONCHITIS TYPE: ICD-10-CM

## 2018-10-25 DIAGNOSIS — Z95.810 AICD (AUTOMATIC CARDIOVERTER/DEFIBRILLATOR) PRESENT: ICD-10-CM

## 2018-10-25 DIAGNOSIS — I25.10 CORONARY ARTERY DISEASE INVOLVING NATIVE CORONARY ARTERY OF NATIVE HEART WITHOUT ANGINA PECTORIS: ICD-10-CM

## 2018-10-25 DIAGNOSIS — E11.42 TYPE 2 DIABETES MELLITUS WITH DIABETIC POLYNEUROPATHY, WITHOUT LONG-TERM CURRENT USE OF INSULIN: ICD-10-CM

## 2018-10-25 DIAGNOSIS — Z79.01 LONG TERM CURRENT USE OF ANTICOAGULANT THERAPY: ICD-10-CM

## 2018-10-25 DIAGNOSIS — I35.0 NONRHEUMATIC AORTIC VALVE STENOSIS: Primary | ICD-10-CM

## 2018-10-25 PROCEDURE — 3288F FALL RISK ASSESSMENT DOCD: CPT | Mod: CPTII,HCWC,, | Performed by: INTERNAL MEDICINE

## 2018-10-25 PROCEDURE — 1100F PTFALLS ASSESS-DOCD GE2>/YR: CPT | Mod: CPTII,HCWC,, | Performed by: INTERNAL MEDICINE

## 2018-10-25 PROCEDURE — 3074F SYST BP LT 130 MM HG: CPT | Mod: CPTII,HCWC,, | Performed by: INTERNAL MEDICINE

## 2018-10-25 PROCEDURE — 3078F DIAST BP <80 MM HG: CPT | Mod: CPTII,HCWC,, | Performed by: INTERNAL MEDICINE

## 2018-10-25 PROCEDURE — 99999 PR PBB SHADOW E&M-EST. PATIENT-LVL III: CPT | Mod: PBBFAC,HCWC,, | Performed by: INTERNAL MEDICINE

## 2018-10-25 PROCEDURE — 99214 OFFICE O/P EST MOD 30 MIN: CPT | Mod: S$PBB,HCWC,, | Performed by: INTERNAL MEDICINE

## 2018-10-25 PROCEDURE — 99213 OFFICE O/P EST LOW 20 MIN: CPT | Mod: PBBFAC,HCWC,PO | Performed by: INTERNAL MEDICINE

## 2018-10-25 NOTE — PROGRESS NOTES
Subjective:    Patient ID:  Miesha Obando is a 83 y.o. female who presents for follow-up of Hypertension; Aortic Stenosis (follow up - discuss carotid disease); COPD; Headache; and Congestive Heart Failure      Pt back a bit early to discuss a carotid US. She apparently had what appears to be an embolic event to the R eye and a subsequent carotid US showed 55-69% R carotid stenosis. Otherwise no new cardiac issues.         Review of Systems   Constitution: Negative for weight gain and weight loss.   HENT: Negative.    Eyes: Positive for vision loss in right eye.   Cardiovascular: Negative for chest pain, claudication, cyanosis, dyspnea on exertion, irregular heartbeat, leg swelling, near-syncope, orthopnea (no PND) and palpitations.   Respiratory: Negative for cough, hemoptysis, shortness of breath and snoring.    Endocrine: Negative.    Skin: Negative.    Musculoskeletal: Negative for joint pain, muscle cramps, muscle weakness and myalgias.   Gastrointestinal: Negative for diarrhea, hematemesis, nausea and vomiting.   Genitourinary: Negative.    Neurological: Negative for dizziness, focal weakness, light-headedness, loss of balance, numbness, paresthesias and seizures.   Psychiatric/Behavioral: Negative.         Objective:    Physical Exam   Constitutional: She is oriented to person, place, and time. She appears well-developed and well-nourished.   Eyes: Pupils are equal, round, and reactive to light.   Neck: Normal range of motion. No thyromegaly present.   Cardiovascular: Normal rate, regular rhythm, S1 normal, S2 normal and normal heart sounds.  No extrasystoles are present. PMI is not displaced. Exam reveals no friction rub.   No murmur heard.  Pulmonary/Chest: Effort normal and breath sounds normal. She has no wheezes. She has no rales. She exhibits no tenderness.   Abdominal: Soft. Bowel sounds are normal. She exhibits no distension and no mass. There is no tenderness.   Musculoskeletal: Normal range of  motion. She exhibits no edema.   Neurological: She is alert and oriented to person, place, and time.   Skin: Skin is warm and dry.   Vitals reviewed.      Test(s) Reviewed  I have reviewed the following in detail:  [] Stress test   [] Angiography   [] Echocardiogram   [] Labs   [x] Other:  Carotid US       Assessment:       1. Nonrheumatic aortic valve stenosis    2. S/P TAVR (transcatheter aortic valve replacement) - 23mm Taylor 09/2015    3. Coronary artery disease involving native coronary artery of native heart without angina pectoris    4. S/P coronary artery stent placement - LILLIAM to RCA 08/19/2015    5. Bilateral carotid artery stenosis    6. Cardiomyopathy - EF 30-35%    7. Chronic atrial fibrillation    8. AICD (automatic cardioverter/defibrillator) present - Bi-V    9. Chronic bronchitis, unspecified chronic bronchitis type    10. Long term current use of anticoagulant therapy    11. Type 2 diabetes mellitus with diabetic polyneuropathy, without long-term current use of insulin         Plan:       We discussed the occular event in the setting of a R carotid stenosis  Will refer to Vascular surgery for further evaluation and w/u  F/u here in Feb

## 2018-10-31 ENCOUNTER — INITIAL CONSULT (OUTPATIENT)
Dept: VASCULAR SURGERY | Facility: CLINIC | Age: 83
End: 2018-10-31
Payer: MEDICARE

## 2018-10-31 VITALS
HEIGHT: 62 IN | DIASTOLIC BLOOD PRESSURE: 79 MMHG | SYSTOLIC BLOOD PRESSURE: 128 MMHG | HEART RATE: 67 BPM | WEIGHT: 234.81 LBS | BODY MASS INDEX: 43.21 KG/M2

## 2018-10-31 DIAGNOSIS — I65.21 STENOSIS OF RIGHT CAROTID ARTERY: Primary | ICD-10-CM

## 2018-10-31 PROCEDURE — 99205 OFFICE O/P NEW HI 60 MIN: CPT | Mod: S$PBB,HCWC,, | Performed by: THORACIC SURGERY (CARDIOTHORACIC VASCULAR SURGERY)

## 2018-10-31 PROCEDURE — 99999 PR PBB SHADOW E&M-EST. PATIENT-LVL III: CPT | Mod: PBBFAC,HCWC,, | Performed by: THORACIC SURGERY (CARDIOTHORACIC VASCULAR SURGERY)

## 2018-10-31 PROCEDURE — 3078F DIAST BP <80 MM HG: CPT | Mod: CPTII,HCWC,, | Performed by: THORACIC SURGERY (CARDIOTHORACIC VASCULAR SURGERY)

## 2018-10-31 PROCEDURE — 3074F SYST BP LT 130 MM HG: CPT | Mod: CPTII,HCWC,, | Performed by: THORACIC SURGERY (CARDIOTHORACIC VASCULAR SURGERY)

## 2018-10-31 PROCEDURE — 1101F PT FALLS ASSESS-DOCD LE1/YR: CPT | Mod: CPTII,HCWC,, | Performed by: THORACIC SURGERY (CARDIOTHORACIC VASCULAR SURGERY)

## 2018-10-31 PROCEDURE — 99213 OFFICE O/P EST LOW 20 MIN: CPT | Mod: PBBFAC,HCWC,PO | Performed by: THORACIC SURGERY (CARDIOTHORACIC VASCULAR SURGERY)

## 2018-10-31 NOTE — PROGRESS NOTES
OFFICE VISIT NOTE    HISTORY OF PRESENT ILLNESS:  I was asked to see this patient by Dr. Gonzales.  The   patient is an 83-year-old female who experienced sudden loss of vision in her   right eye that lasted for about 2 minutes about a month ago.  She went to see   her ophthalmologist and apparently she has an appointment tomorrow to undergo   laser surgery for a small hemorrhage in the back of her right eye.    Ultrasound of the carotid artery showed a 50-69% stenosis of the right internal   carotid artery and it was thought that she probably had amaurosis fugax.    PAST MEDICAL HISTORY:  Arthritis, atrial fibrillation, left breast cancer,   cardiomyopathy with an ejection fraction of 35 to 40%, congestive heart failure,   chronic obstructive pulmonary disease, coronary artery disease, depression,   diabetes, diverticulitis, diverticulosis, gastroesophageal reflux disease,   aortic stenosis, hypertension, hypothyroidism, insomnia, irritable bowel   syndrome, Schatzki ring, sleep apnea, syncope, urinary incontinence, familial   tremor, unsteady gait, dizziness.    PAST SURGICAL HISTORY:  Tonsillectomy, appendectomy, cholecystectomy,   adenoidectomy, left mastectomy, colonoscopy, back surgery with lumbar   diskectomy, hysterectomy, placement of cardiac pacemaker, percutaneous coronary   intervention, placement of loop recorder, TAVR.    ALLERGIES:  Statins, sulfa, adhesives, Augmentin.    MEDICATIONS:  Tylenol, Proventil, Fosamax, Elavil, vitamin C, Ecotrin, Astelin,   Tylenol, Breo Ellipta, Neurontin, Synthroid, Cozaar, Toprol-XL, Nitrostat,   Aldactone, Demadex, Ventolin, Coumadin.    FAMILY HISTORY:  Arrhythmias, myocardial infarction, coronary artery disease,   congestive heart failure, emphysema, parkinsonism.    SOCIAL HISTORY:  She used to smoke cigarettes, and smoked one pack of cigarettes   per day for 30 years.  She quit smoking in 1988.  She drinks two glasses of   wine per week.    REVISION OF SYSTEMS:  She  had an episode of sudden loss of vision that lasted   about 2 minutes about one month ago and has not had another episode.  She has   bleeding in the right retina for which she is scheduled to undergo laser surgery   tomorrow.  She also has a generalized tremor and she has significantly unsteady   gait for which she uses a walker to walk and get around.  All other systems are   reviewed and are negative.    PHYSICAL EXAMINATION:  VITAL SIGNS:  Blood pressure is 128/79, heart rate is 67, respiratory rate is   20, weight 106 pounds.  GENERAL:  She is awake and alert, in no apparent distress.  HEENT:  Head is normocephalic.  Pupils are equal, round, reactive.  Sclerae are   anicteric.  NECK:  Supple.  Trachea midline.  No masses.  LUNGS:  Clear.  HEART:  Has a regular rate and rhythm.  ABDOMEN:  Soft and nontender.  No masses.  Bowel sounds are present.  EXTREMITIES:  She has 1+ edema of bilateral lower extremities.  Feet are pink   and warm with good capillary refill.  NEUROLOGIC:  Awake, alert and oriented.  She has generalized fine tremor.  No   lateralizing neurologic deficits.  She has an unsteady gait.    STUDIES:  Ultrasound of the carotid arteries showed a 50-69% stenosis of the   right internal carotid artery.  There was less than 50% stenosis of the left   internal carotid artery.    ASSESSMENT AND PLAN:  The patient had an episode of some loss of vision which   was transient in the right eye.  She does have diabetic retinopathy and   apparently has an area of hemorrhage in the right retina for which she is   scheduled to undergo laser surgery tomorrow.  She does have 50-69% stenosis of   the right internal carotid artery. I explained to her that it is possible that   she did have an embolic episode from the right carotid artery and in that case   we would recommend right carotid artery endarterectomy.  However, if her loss of   vision occurred because of a rupture of a small blood vessel in the retina at    the time she lost her vision, then right carotid endarterectomy would not be   indicated.  At this point in time, she does not know whether she wants to   proceed with carotid endarterectomy.  I asked her to discuss this with her   ophthalmologist tomorrow when  she goes in.  If he thinks that the loss of   vision was because of the hemorrhage, then we do not need to proceed with   carotid endarterectomy, but if he saw a Hollenhorst plaque, then we would   recommend carotid endarterectomy.  She states that if we would recommend carotid   endarterectomy, she is not sure that she wants to proceed with that.  She has   to think about it, and we will also pray about it and will let me know.      RACHNA  dd: 10/31/2018 10:46:47 (CDT)  td: 11/01/2018 03:07:57 (CDT)  Doc ID   #[id number]  Job ID #[id number]    CC:

## 2018-11-12 ENCOUNTER — TELEPHONE (OUTPATIENT)
Dept: VASCULAR SURGERY | Facility: CLINIC | Age: 83
End: 2018-11-12

## 2018-11-12 NOTE — TELEPHONE ENCOUNTER
Spoke to pt about scheduling surgery. She stated that she will callback when she is ready to schedule as she has not agreed to proceed with surgery.

## 2018-11-14 DIAGNOSIS — F43.21 SITUATIONAL DEPRESSION: ICD-10-CM

## 2018-11-14 DIAGNOSIS — G47.00 INSOMNIA, UNSPECIFIED TYPE: ICD-10-CM

## 2018-11-14 DIAGNOSIS — F43.23 SITUATIONAL MIXED ANXIETY AND DEPRESSIVE DISORDER: ICD-10-CM

## 2018-11-16 RX ORDER — AMITRIPTYLINE HYDROCHLORIDE 10 MG/1
TABLET, FILM COATED ORAL
Qty: 30 TABLET | Refills: 0 | Status: SHIPPED | OUTPATIENT
Start: 2018-11-16 | End: 2019-01-07

## 2018-12-06 ENCOUNTER — HOSPITAL ENCOUNTER (OUTPATIENT)
Dept: RADIOLOGY | Facility: HOSPITAL | Age: 83
Discharge: HOME | End: 2018-12-06
Attending: NURSE PRACTITIONER
Payer: MEDICARE

## 2018-12-06 ENCOUNTER — OFFICE VISIT (OUTPATIENT)
Dept: FAMILY MEDICINE | Facility: CLINIC | Age: 83
End: 2018-12-06
Payer: MEDICARE

## 2018-12-06 VITALS
WEIGHT: 240.75 LBS | DIASTOLIC BLOOD PRESSURE: 68 MMHG | OXYGEN SATURATION: 90 % | TEMPERATURE: 98 F | HEART RATE: 70 BPM | BODY MASS INDEX: 44.3 KG/M2 | SYSTOLIC BLOOD PRESSURE: 102 MMHG | HEIGHT: 62 IN

## 2018-12-06 DIAGNOSIS — R09.02 HYPOXEMIA: Primary | ICD-10-CM

## 2018-12-06 DIAGNOSIS — R07.9 CHEST PAIN, UNSPECIFIED TYPE: ICD-10-CM

## 2018-12-06 DIAGNOSIS — R09.02 HYPOXEMIA: ICD-10-CM

## 2018-12-06 PROCEDURE — 1101F PT FALLS ASSESS-DOCD LE1/YR: CPT | Mod: CPTII,HCWC,S$GLB, | Performed by: NURSE PRACTITIONER

## 2018-12-06 PROCEDURE — 3074F SYST BP LT 130 MM HG: CPT | Mod: CPTII,HCWC,S$GLB, | Performed by: NURSE PRACTITIONER

## 2018-12-06 PROCEDURE — 3078F DIAST BP <80 MM HG: CPT | Mod: CPTII,HCWC,S$GLB, | Performed by: NURSE PRACTITIONER

## 2018-12-06 PROCEDURE — 71046 X-RAY EXAM CHEST 2 VIEWS: CPT | Mod: 26,HCWC,, | Performed by: RADIOLOGY

## 2018-12-06 PROCEDURE — 99215 OFFICE O/P EST HI 40 MIN: CPT | Mod: HCWC,25,S$GLB, | Performed by: NURSE PRACTITIONER

## 2018-12-06 PROCEDURE — 96372 THER/PROPH/DIAG INJ SC/IM: CPT | Mod: 59,HCWC,S$GLB, | Performed by: NURSE PRACTITIONER

## 2018-12-06 PROCEDURE — 99999 PR PBB SHADOW E&M-EST. PATIENT-LVL V: CPT | Mod: PBBFAC,HCWC,, | Performed by: NURSE PRACTITIONER

## 2018-12-06 PROCEDURE — 71046 X-RAY EXAM CHEST 2 VIEWS: CPT | Mod: TC,HCWC,PN

## 2018-12-06 PROCEDURE — 94640 AIRWAY INHALATION TREATMENT: CPT | Mod: HCWC,59,S$GLB, | Performed by: NURSE PRACTITIONER

## 2018-12-06 RX ORDER — IPRATROPIUM BROMIDE AND ALBUTEROL SULFATE 2.5; .5 MG/3ML; MG/3ML
3 SOLUTION RESPIRATORY (INHALATION)
Status: COMPLETED | OUTPATIENT
Start: 2018-12-06 | End: 2018-12-06

## 2018-12-06 RX ORDER — CEFTRIAXONE 250 MG/1
250 INJECTION, POWDER, FOR SOLUTION INTRAMUSCULAR; INTRAVENOUS
Status: COMPLETED | OUTPATIENT
Start: 2018-12-06 | End: 2018-12-06

## 2018-12-06 RX ORDER — TORSEMIDE 20 MG/1
20 TABLET ORAL DAILY
Qty: 30 TABLET | Refills: 1 | Status: SHIPPED | OUTPATIENT
Start: 2018-12-06 | End: 2018-12-06 | Stop reason: SDUPTHER

## 2018-12-06 RX ORDER — BETAMETHASONE SODIUM PHOSPHATE AND BETAMETHASONE ACETATE 3; 3 MG/ML; MG/ML
6 INJECTION, SUSPENSION INTRA-ARTICULAR; INTRALESIONAL; INTRAMUSCULAR; SOFT TISSUE
Status: COMPLETED | OUTPATIENT
Start: 2018-12-06 | End: 2018-12-06

## 2018-12-06 RX ORDER — TORSEMIDE 20 MG/1
TABLET ORAL
Qty: 90 TABLET | Refills: 1 | Status: SHIPPED | OUTPATIENT
Start: 2018-12-06 | End: 2020-06-29 | Stop reason: SDUPTHER

## 2018-12-06 RX ADMIN — BETAMETHASONE SODIUM PHOSPHATE AND BETAMETHASONE ACETATE 6 MG: 3; 3 INJECTION, SUSPENSION INTRA-ARTICULAR; INTRALESIONAL; INTRAMUSCULAR; SOFT TISSUE at 03:12

## 2018-12-06 RX ADMIN — CEFTRIAXONE 250 MG: 250 INJECTION, POWDER, FOR SOLUTION INTRAMUSCULAR; INTRAVENOUS at 03:12

## 2018-12-06 RX ADMIN — IPRATROPIUM BROMIDE AND ALBUTEROL SULFATE 3 ML: 2.5; .5 SOLUTION RESPIRATORY (INHALATION) at 02:12

## 2018-12-06 NOTE — Clinical Note
VIJI Suarez I saw your patient today. She had missed doses of torsemide and had sob. I refilled med and asked that she follow up with me tomorrow. Clinically looked stable. I treated her with duoneb which states helped symptoms of SOB. Also injections. I have labs cooking and she will follow up tomorrow. Thanks

## 2018-12-06 NOTE — PROGRESS NOTES
This dictation has been generated using Modal Fluency Dictation some phonetic errors may occur. Please contact author for clarification if needed.     Problem List Items Addressed This Visit     None      Visit Diagnoses     Hypoxemia    -  Primary    Relevant Orders    X-Ray Chest PA And Lateral (Completed)    CBC auto differential    Comprehensive metabolic panel    Brain natriuretic peptide    Chest pain, unspecified type        Relevant Orders    X-Ray Chest PA And Lateral (Completed)    CBC auto differential    Comprehensive metabolic panel    Brain natriuretic peptide        Orders Placed This Encounter    X-Ray Chest PA And Lateral    CBC auto differential    Comprehensive metabolic panel    Brain natriuretic peptide    albuterol-ipratropium 2.5 mg-0.5 mg/3 mL nebulizer solution 3 mL    cefTRIAXone injection 250 mg    betamethasone acetate-betamethasone sodium phosphate injection 6 mg    torsemide (DEMADEX) 20 MG Tab     Blunt angle noted on the left and in the lateral projection. Minimal vascular congestion noted. CXR per my interpretation. Pt should tolerate out pt treatment, she missed doses of torsemide causing exacerbation of symptoms. I also cannot rule out early infectious process and will cover with injections as above for infection and copd exacerbation.   In excessive of 45 minutes spent with patient with greater than 50% of time dedicated to education on symptoms, diagnosis, treatments, and coordination of care.       Follow-up in about 1 day (around 12/7/2018).    ________________________________________________________________  ________________________________________________________________      Chief Complaint   Patient presents with    Nasal Congestion    Shortness of Breath    Back Pain     History of present illness  This 83 y.o. presents today for complaint of nasal congestion, shortness of breath, and some back pain. Patient indicates cough symptoms started a little over a week  ago.  Her symptoms have worsened somewhat.  She made appointment because she is concerned because she has had a history of pneumonia in the past.  She does have COPD coronary artery disease and history of CHF.  She tried [place] cold +for symptoms.  We discussed avoiding this med in future given her history.  She did have her flu shot this year.  After evaluation and discussion of med adjustments patient reveals that she is out of her to [place] some eyed.  She has not had any doses recently.  This was surgically exacerbate the fluid retention.  Review of systems  No fever or chills.  She has had some malaise and body aches.  Patient does note some sinus congestion and pain.  She complains of gland swelling and sore throat.  Sore throat related to the postnasal drip.  Patient notes chest pain with deep breath and coughing.  She has had shortness of breath and notes exacerbation.  No nausea or vomiting.  She did have 1 episode of diarrhea yesterday.  No polyuria or polydipsia.  She has felt somewhat thirsty at times.  Patient denies rash.  No abnormal bruising.    Past medical social and surgical history reviewed.  Patient is new to me.  Follows with in the system  Past Medical History:   Diagnosis Date    Anticoagulant long-term use     Arthritis     Atrial fibrillation     Breast cancer 1994    breast , left    Cardiomyopathy - EF 35-40% 5/11/2016    CHF (congestive heart failure)     Chronic bronchitis     COPD (chronic obstructive pulmonary disease)     Coronary artery disease without angina pectoris 8/26/2015    Depression     Diabetes with neurologic complications     Diverticulitis     Diverticulosis     Encounter for blood transfusion     after mastectomy x 20 years ago    GERD (gastroesophageal reflux disease)     H/O aortic valve replacement     Hiatal hernia     History of colonic diverticulitis     Hypertension     Hypothyroid     Insomnia     Irritable bowel syndrome     Obesity      Pacemaker 08/2014    Schatzki's ring     mild    Sleep apnea     uses cpap with oxygen     Syncope and collapse     Type II or unspecified type diabetes mellitus without mention of complication, not stated as uncontrolled     No medications at present.     Urinary incontinence        Past Surgical History:   Procedure Laterality Date    ADENOIDECTOMY      APPENDECTOMY      BACK SURGERY      Discectomy, lumbar    BLOCK-NERVE-MEDIAL [id number],C2,3,4,5, Left 3/22/2018    [facility] at [facility] OR    BREAST SURGERY  1994    left side , mastectomy    CARDIAC PACEMAKER PLACEMENT      CARDIAC VALVE SURGERY      aortic valve replacement    CHOLECYSTECTOMY      COLONOSCOPY  2/2014    repeat in 10 years for screening    COLONOSCOPY N/A 2/7/2014    [facility] at [facility] ENDO    CORONARY STENT PLACEMENT      x2    EGD (ESOPHAGOGASTRODUODENOSCOPY) N/A 6/25/2012    [facility] at [facility] ENDO    ESOPHAGOGASTRODUODENOSCOPY (EGD) N/A 2/8/2018    [facility]    ESOPHAGOGASTRODUODENOSCOPY (EGD) N/A 7/7/2016    [facility]    ESOPHAGOGASTRODUODENOSCOPY (EGD) N/A 2/23/2016    [facility] at [facility] ENDO    EYE SURGERY      bilateral PHACO and IOL    HEART CATH-LEFT Left 8/19/2015    [facility] Nic [facility] at [facility] CATH LAB    HYSTERECTOMY      ovaries spared    INJECTION-STEROID-EPIDURAL-TRANSFORAMINAL Left 4/10/2012    [facility]es [facility] at [facility] OR    INSERTION-ICD-BIVENTRICULAR  [place] N/A 5/23/2016    [facility] CATH    INSERTION-PACEMAKER-SINGLE-[place] N/A 9/2/2014    Performed [facility] at [facility] CATH LAB    LOOP RECORDER      RADIOFREQUENCY ABLATION-C2-3-4-5 and third occipital nerve Left 8/9/2018    [facility] at [facility] OR     REMOVAL-LOOP RECORDER N/A 2014    [facility] CATH LAB    REPLACEMENT-VALVE-AORTIC N/A 2015    [facility] CATH LAB    TOE SURGERY Right     TONSILLECTOMY      UPPER GASTROINTESTINAL ENDOSCOPY         Family History   Problem Relation Age of Onset    Parkinsonism Mother     Emphysema Father     Heart disease Brother     Heart attack Brother     Heart failure Brother     Heart disease Brother     Heart failure Brother     Heart disease Brother     Heart failure Brother     Arrhythmia Brother     Anesthesia problems Neg Hx     Clotting disorder Neg Hx        Social History     Socioeconomic History    Marital status:      Spouse name: None    Number of children: None    Years of education: None    Highest education level: None   Social Needs    Financial resource strain: None    Food insecurity - worry: None    Food insecurity - inability: None    Transportation needs - medical: None    Transportation needs - non-medical: None   Occupational History    None   Tobacco Use    Smoking status: Former Smoker     Packs/day: 1.00     Years: 30.00     Pack years: 30.00     Start date: 1958     Last attempt to quit: 1988     Years since quittin.9    Smokeless tobacco: Never Used   Substance and Sexual Activity    Alcohol use: Yes     Alcohol/week: 1.8 oz     Types: 2 Glasses of wine, 1 Shots of liquor per week     Comment: occasional    Drug use: No    Sexual activity: None   Other Topics Concern    None   Social History Narrative    None       Current Outpatient Medications   Medication Sig Dispense Refill    ACCU-CHEK FASTCLIX Misc TEST TWICE DAILY 200 each 11    ACCU-CHEK SMARTVIEW TEST STRIP Strp TEST  TWICE DAILY 200 strip 11    albuterol (PROVENTIL) 2.5 mg /3 mL (0.083 %) nebulizer solution Take 3 mLs (2.5 mg total) by nebulization every 6 (six) hours as needed for Wheezing. Rescue 90 mL  0    alendronate (FOSAMAX) 70 MG tablet Take 1 tablet (70 mg total) by mouth every 7 days. 12 tablet 3    amitriptyline (ELAVIL) 10 MG tablet TAKE 1 TABLET(10 MG) BY MOUTH EVERY NIGHT AS NEEDED FOR INSOMNIA 30 tablet 0    ascorbic acid (VITAMIN C) 500 MG tablet Take 500 mg by mouth once daily.        aspirin (ECOTRIN) 81 MG EC tablet Take 81 mg by mouth once daily.      azelastine (ASTELIN) 137 mcg (0.1 %) nasal spray 1 spray (137 mcg total) by Nasal route 2 (two) times daily. 30 mL 0    b complex vitamins tablet Take 1 tablet by mouth once daily.        BD ALCOHOL SWABS PadM USE TWICE DAILY 200 each 11    diphenhydramine-acetaminophen (TYLENOL PM)  mg Tab Take 1 tablet by mouth nightly as needed.      fish oil-omega-3 fatty acids 300-1,000 mg capsule Take 2 g by mouth once daily.      fluticasone-vilanterol (BREO ELLIPTA) 200-25 mcg/dose DsDv diskus inhaler Inhale 1 puff into the lungs once daily. 3 each 3    gabapentin (NEURONTIN) 300 MG capsule Take 1 capsule (300 mg total) by mouth every evening. 30 capsule 2    levothyroxine (SYNTHROID) 100 MCG tablet TAKE 1 TABLET ONE TIME DAILY 90 tablet 1    losartan (COZAAR) 25 MG tablet TAKE 1 TABLET EVERY DAY 90 tablet 1    metoprolol succinate (TOPROL-XL) 50 MG 24 hr tablet TAKE 1 TABLET EVERY DAY (DISCONTINUE METOPROLOL ER 25MG) 90 tablet 3    spironolactone (ALDACTONE) 25 MG tablet TAKE 1 TABLET EVERY DAY 90 tablet 3    torsemide (DEMADEX) 20 MG Tab Take 1 tablet (20 mg total) by mouth once daily. 30 tablet 1    VENTOLIN HFA 90 mcg/actuation inhaler       warfarin (COUMADIN) 2.5 MG tablet Take 1 tablet (2.5 mg total) by mouth Daily. 90 tablet 3    acetaminophen (TYLENOL) 325 MG tablet Take 325 mg by mouth as needed for Pain or Temperature greater than.       ILEVRO 0.3 % DrpS       nitroGLYCERIN (NITROSTAT) 0.4 MG SL tablet Place 1 tablet (0.4 mg total) under the tongue every 5 (five) minutes as needed for Chest pain. 20 tablet 0     No  "current facility-administered medications for this visit.        Review of patient's allergies indicates:   Allergen Reactions    Statins-hmg-coa reductase inhibitors      "bad leg cramps"    Sulfa (sulfonamide antibiotics) Hives    Adhesive Rash     Use paper tape    Amoxicillin-pot clavula (bulk) Nausea And Vomiting       Physical examination  Vitals Reviewed 5 # weight gain one month.   Gen. Well-dressed well-nourished.  Patient does look fatigued.  She does not look septic.  Skin warm dry and intact.  No rashes noted.  HEENT.  TM intact bilateral with normal light reflex.  No mastoid tenderness during percussion.  Nares patent bilateral.  Pharynx is unremarkable.  No maxillary or frontal sinus tenderness when percussed.    Neck is supple without adenopathy  Chest.  Respirations are even unlabored.  No respiratory distress.  Faint wheeze noted in the left base during auscultation.  Cardiac regular rate and rhythm.  No chest wall adenopathy noted.  Neuro. Awake alert oriented x4.  Normal judgment and cognition noted.  Extremities no clubbing cyanosis or marked edema noted.     Call or return to clinic prn if these symptoms worsen or fail to improve as anticipated.    "

## 2018-12-07 ENCOUNTER — OFFICE VISIT (OUTPATIENT)
Dept: FAMILY MEDICINE | Facility: CLINIC | Age: 83
End: 2018-12-07
Payer: MEDICARE

## 2018-12-07 VITALS
BODY MASS INDEX: 44.42 KG/M2 | SYSTOLIC BLOOD PRESSURE: 138 MMHG | HEIGHT: 62 IN | OXYGEN SATURATION: 92 % | WEIGHT: 241.38 LBS | DIASTOLIC BLOOD PRESSURE: 68 MMHG | HEART RATE: 89 BPM

## 2018-12-07 DIAGNOSIS — J18.9 PNEUMONIA OF LEFT LOWER LOBE DUE TO INFECTIOUS ORGANISM: ICD-10-CM

## 2018-12-07 DIAGNOSIS — I50.43 ACUTE ON CHRONIC COMBINED SYSTOLIC AND DIASTOLIC CONGESTIVE HEART FAILURE: Primary | ICD-10-CM

## 2018-12-07 PROCEDURE — 1101F PT FALLS ASSESS-DOCD LE1/YR: CPT | Mod: CPTII,HCWC,S$GLB, | Performed by: NURSE PRACTITIONER

## 2018-12-07 PROCEDURE — 3078F DIAST BP <80 MM HG: CPT | Mod: CPTII,HCWC,S$GLB, | Performed by: NURSE PRACTITIONER

## 2018-12-07 PROCEDURE — 99999 PR PBB SHADOW E&M-EST. PATIENT-LVL V: CPT | Mod: PBBFAC,HCWC,, | Performed by: NURSE PRACTITIONER

## 2018-12-07 PROCEDURE — 93005 ELECTROCARDIOGRAM TRACING: CPT | Mod: HCWC,S$GLB,, | Performed by: NURSE PRACTITIONER

## 2018-12-07 PROCEDURE — 99499 UNLISTED E&M SERVICE: CPT | Mod: HCNC,S$GLB,, | Performed by: NURSE PRACTITIONER

## 2018-12-07 PROCEDURE — 93010 ELECTROCARDIOGRAM REPORT: CPT | Mod: HCWC,S$GLB,, | Performed by: INTERNAL MEDICINE

## 2018-12-07 PROCEDURE — 3075F SYST BP GE 130 - 139MM HG: CPT | Mod: CPTII,HCWC,S$GLB, | Performed by: NURSE PRACTITIONER

## 2018-12-07 PROCEDURE — 99214 OFFICE O/P EST MOD 30 MIN: CPT | Mod: HCWC,S$GLB,, | Performed by: NURSE PRACTITIONER

## 2018-12-07 RX ORDER — DOXYCYCLINE HYCLATE 100 MG
100 TABLET ORAL 2 TIMES DAILY
Qty: 20 TABLET | Refills: 0 | Status: SHIPPED | OUTPATIENT
Start: 2018-12-07 | End: 2019-01-07

## 2018-12-07 RX ORDER — PREDNISONE 10 MG/1
TABLET ORAL
Qty: 10 TABLET | Refills: 0 | Status: SHIPPED | OUTPATIENT
Start: 2018-12-07 | End: 2019-01-07 | Stop reason: ALTCHOICE

## 2018-12-07 NOTE — PROGRESS NOTES
This dictation has been generated using Modal Fluency Dictation some phonetic errors may occur. Please contact author for clarification if needed.     Problem List Items Addressed This Visit     None      Visit Diagnoses     Acute on chronic combined systolic and diastolic congestive heart failure    -  Primary    Pneumonia of left lower lobe due to infectious organism            Orders Placed This Encounter    doxycycline (VIBRA-TABS) 100 MG tablet    predniSONE (DELTASONE) 10 MG tablet   with pacemaker doubtful of useful information on EP. We will check for capture. She is irregular but has afib.  Atrial fibrillation noted and defibrillator.      Blunt angle noted on the left and in the lateral projection. Minimal vascular congestion noted. CXR per my interpretation. Pt should tolerate out pt treatment, she missed doses of torsemide causing exacerbation of symptoms. I also cannot rule out early infectious process and add abx. She picked up the fluid pill today and will start.   Patient Instructions   Torsemide 2 pills today and tomorrow. If you are not better Saturday PM or Sunday go to ER. You have failed out patient therapy.        In excessive of 45 minutes spent with patient with greater than 50% of time dedicated to education on symptoms, diagnosis, treatments, and coordination of care.     Follow-up in about 1 week (around 12/14/2018).    ________________________________________________________________  ________________________________________________________________      Chief Complaint   Patient presents with    Follow-up     History of present illness  This 83 y.o. presents today for complaint of follow-up for shortness of breath and coughing.  Patient notes cough symptoms have improved.  She is still short of breath.  She gets short of breath with exertion.  She did not  that throw some I had yesterday.  She did take to the spironolactone.  We discussed again today rescue with fluid pills and  if that is not addressing the issue she will have to go to the emergency room.  nasal congestion, shortness of breath, and some back pain. Patient indicates cough symptoms started a little over a week ago.  Her symptoms have worsened somewhat.  She made appointment because she is concerned because she has had a history of pneumonia in the past.  She does have COPD coronary artery disease and history of CHF.  She tried [place] cold +for symptoms.  We discussed avoiding this med in future given her history.  She did have her flu shot this year.  After evaluation and discussion of med adjustments patient reveals that she is out of her to [place] some eyed.  She has not had any doses recently.  This was surgically exacerbate the fluid retention.  Review of systems  No fever or chills.  She has had some malaise and body aches.  Patient does note some sinus congestion and pain.  She complains of gland swelling and sore throat.  Sore throat related to the postnasal drip.  Patient notes chest pain with deep breath and coughing.  She has had shortness of breath and notes exacerbation.  No nausea or vomiting.  She did have 1 episode of diarrhea previously.   No polyuria or polydipsia.  She has felt somewhat thirsty at times.  Patient denies rash.  No abnormal bruising.    Past medical social and surgical history reviewed.  Patient is new to me.  Follows with in the system  Past Medical History:   Diagnosis Date    Anticoagulant long-term use     Arthritis     Atrial fibrillation     Breast cancer 1994    breast , left    Cardiomyopathy - EF 35-40% 5/11/2016    CHF (congestive heart failure)     Chronic bronchitis     COPD (chronic obstructive pulmonary disease)     Coronary artery disease without angina pectoris 8/26/2015    Depression     Diabetes with neurologic complications     Diverticulitis     Diverticulosis     Encounter for blood transfusion     after mastectomy x 20 years ago    GERD (gastroesophageal  reflux disease)     H/O aortic valve replacement     Hiatal hernia     History of colonic diverticulitis     Hypertension     Hypothyroid     Insomnia     Irritable bowel syndrome     Obesity     Pacemaker 08/2014    Schatzki's ring     mild    Sleep apnea     uses cpap with oxygen     Syncope and collapse     Type II or unspecified type diabetes mellitus without mention of complication, not stated as uncontrolled     No medications at present.     Urinary incontinence        Past Surgical History:   Procedure Laterality Date    ADENOIDECTOMY      APPENDECTOMY      BACK SURGERY      Discectomy, lumbar    BLOCK-NERVE-MEDIAL [id number],C2,3,4,5, Left 3/22/2018    [facility] OR    BREAST SURGERY  1994    left side , mastectomy    CARDIAC PACEMAKER PLACEMENT      CARDIAC VALVE SURGERY      aortic valve replacement    CHOLECYSTECTOMY      COLONOSCOPY  2/2014    repeat in 10 years for screening    COLONOSCOPY N/A 2/7/2014    [facility] at [facility] ENDO    CORONARY STENT PLACEMENT      x2    EGD (ESOPHAGOGASTRODUODENOSCOPY) N/A 6/25/2012    [facility] ENDO    ESOPHAGOGASTRODUODENOSCOPY (EGD) N/A 2/8/2018    [facility] ENDO    ESOPHAGOGASTRODUODENOSCOPY (EGD) N/A 7/7/2016    [facility] ENDO    ESOPHAGOGASTRODUODENOSCOPY (EGD) N/A 2/23/2016    [facility] ENDO    EYE SURGERY      bilateral PHACO and IOL    HEART CATH-LEFT Left 8/19/2015    [facility] at [facility] CATH LAB    HYSTERECTOMY      ovaries spared    INJECTION-STEROID-EPIDURAL-TRANSFORAMINAL Left 4/10/2012    [facility]es [facility]es [facility] at [facility] OR    INSERTION-ICD-BIVENTRICULAR  [place] N/A 5/23/2016    [facility] at [facility] CATH    INSERTION-PACEMAKER-SINGLE-[place] N/A 9/2/2014    [facility]  [facility] CATH LAB    LOOP RECORDER      RADIOFREQUENCY ABLATION-C2-3-4-5 and third occipital nerve Left 2018    [facility] at [facility] OR    REMOVAL-LOOP RECORDER N/A 2014    [facility] at [facility] CATH LAB    REPLACEMENT-VALVE-AORTIC N/A 2015    [facility]ATH LAB    TOE SURGERY Right     TONSILLECTOMY      UPPER GASTROINTESTINAL ENDOSCOPY         Family History   Problem Relation Age of Onset    Parkinsonism Mother     Emphysema Father     Heart disease Brother     Heart attack Brother     Heart failure Brother     Heart disease Brother     Heart failure Brother     Heart disease Brother     Heart failure Brother     Arrhythmia Brother     Anesthesia problems Neg Hx     Clotting disorder Neg Hx        Social History     Socioeconomic History    Marital status:      Spouse name: Not on file    Number of children: Not on file    Years of education: Not on file    Highest education level: Not on file   Social Needs    Financial resource strain: Not on file    Food insecurity - worry: Not on file    Food insecurity - inability: Not on file    Transportation needs - medical: Not on file    Transportation needs - non-medical: Not on file   Occupational History    Not on file   Tobacco Use    Smoking status: Former Smoker     Packs/day: 1.00     Years: 30.00     Pack years: 30.00     Start date: 1958     Last attempt to quit: 1988     Years since quittin.9    Smokeless tobacco: Never Used   Substance and Sexual Activity    Alcohol use: Yes     Alcohol/week: 1.8 oz     Types: 2 Glasses of wine, 1 Shots of liquor per week     Comment: occasional    Drug use: No    Sexual activity: Not on file   Other Topics Concern    Not on file   Social History Narrative    Not on file       Current Outpatient Medications   Medication Sig Dispense Refill     ACCU-CHEK FASTCLIX Misc TEST TWICE DAILY 200 each 11    ACCU-CHEK SMARTVIEW TEST STRIP Strp TEST  TWICE DAILY 200 strip 11    albuterol (PROVENTIL) 2.5 mg /3 mL (0.083 %) nebulizer solution Take 3 mLs (2.5 mg total) by nebulization every 6 (six) hours as needed for Wheezing. Rescue 90 mL 0    alendronate (FOSAMAX) 70 MG tablet Take 1 tablet (70 mg total) by mouth every 7 days. 12 tablet 3    amitriptyline (ELAVIL) 10 MG tablet TAKE 1 TABLET(10 MG) BY MOUTH EVERY NIGHT AS NEEDED FOR INSOMNIA 30 tablet 0    ascorbic acid (VITAMIN C) 500 MG tablet Take 500 mg by mouth once daily.        aspirin (ECOTRIN) 81 MG EC tablet Take 81 mg by mouth once daily.      azelastine (ASTELIN) 137 mcg (0.1 %) nasal spray 1 spray (137 mcg total) by Nasal route 2 (two) times daily. 30 mL 0    b complex vitamins tablet Take 1 tablet by mouth once daily.        BD ALCOHOL SWABS PadM USE TWICE DAILY 200 each 11    diphenhydramine-acetaminophen (TYLENOL PM)  mg Tab Take 1 tablet by mouth nightly as needed.      gabapentin (NEURONTIN) 300 MG capsule Take 1 capsule (300 mg total) by mouth every evening. 30 capsule 2    levothyroxine (SYNTHROID) 100 MCG tablet TAKE 1 TABLET ONE TIME DAILY 90 tablet 1    losartan (COZAAR) 25 MG tablet TAKE 1 TABLET EVERY DAY 90 tablet 1    metoprolol succinate (TOPROL-XL) 50 MG 24 hr tablet TAKE 1 TABLET EVERY DAY (DISCONTINUE METOPROLOL ER 25MG) 90 tablet 3    spironolactone (ALDACTONE) 25 MG tablet TAKE 1 TABLET EVERY DAY 90 tablet 3    torsemide (DEMADEX) 20 MG Tab TAKE 1 TABLET(20 MG) BY MOUTH EVERY DAY 90 tablet 1    VENTOLIN HFA 90 mcg/actuation inhaler       warfarin (COUMADIN) 2.5 MG tablet Take 1 tablet (2.5 mg total) by mouth Daily. 90 tablet 3    acetaminophen (TYLENOL) 325 MG tablet Take 325 mg by mouth as needed for Pain or Temperature greater than.       doxycycline (VIBRA-TABS) 100 MG tablet Take 1 tablet (100 mg total) by mouth 2 (two) times daily. 20 tablet 0    fish  "oil-omega-3 fatty acids 300-1,000 mg capsule Take 2 g by mouth once daily.      fluticasone-vilanterol (BREO ELLIPTA) 200-25 mcg/dose DsDv diskus inhaler Inhale 1 puff into the lungs once daily. 3 each 3    ILEVRO 0.3 % DrpS       nitroGLYCERIN (NITROSTAT) 0.4 MG SL tablet Place 1 tablet (0.4 mg total) under the tongue every 5 (five) minutes as needed for Chest pain. 20 tablet 0    predniSONE (DELTASONE) 10 MG tablet 4 by mouth once today, then 3 by mouth tomorrow, then 2 by mouth on day 3, then one by mouth on the last day. 10 tablet 0     No current facility-administered medications for this visit.        Review of patient's allergies indicates:   Allergen Reactions    Statins-hmg-coa reductase inhibitors      "bad leg cramps"    Sulfa (sulfonamide antibiotics) Hives    Adhesive Rash     Use paper tape    Amoxicillin-pot clavula (bulk) Nausea And Vomiting       Physical examination  Vitals Reviewed 5 # weight gain one month.   Gen. Well-dressed well-nourished.  Patient does look fatigued.  She does not look septic.  Skin warm dry and intact.  No rashes noted.  HEENT.  TM intact bilateral with normal light reflex.  No mastoid tenderness during percussion.  Nares patent bilateral.  Pharynx is unremarkable.  No maxillary or frontal sinus tenderness when percussed.    Neck is supple without adenopathy  Chest.  Respirations are even unlabored.  No respiratory distress.  Faint wheeze noted in the left base during auscultation.  Cardiac regular rate and rhythm.  No chest wall adenopathy noted.  Neuro. Awake alert oriented x4.  Normal judgment and cognition noted.  Extremities no clubbing cyanosis or marked edema noted.     Call or return to clinic prn if these symptoms worsen or fail to improve as anticipated.    "

## 2018-12-07 NOTE — PATIENT INSTRUCTIONS
Torsemide 2 pills today and tomorrow. If you are not better Saturday PM or Sunday go to ER. You have failed out patient therapy.

## 2018-12-14 ENCOUNTER — OFFICE VISIT (OUTPATIENT)
Dept: FAMILY MEDICINE | Facility: CLINIC | Age: 83
End: 2018-12-14
Payer: MEDICARE

## 2018-12-14 ENCOUNTER — HOSPITAL ENCOUNTER (OUTPATIENT)
Dept: RADIOLOGY | Facility: HOSPITAL | Age: 83
Discharge: HOME | End: 2018-12-14
Attending: NURSE PRACTITIONER
Payer: MEDICARE

## 2018-12-14 VITALS
OXYGEN SATURATION: 94 % | BODY MASS INDEX: 42.87 KG/M2 | RESPIRATION RATE: 18 BRPM | SYSTOLIC BLOOD PRESSURE: 118 MMHG | HEIGHT: 62 IN | HEART RATE: 73 BPM | WEIGHT: 232.94 LBS | TEMPERATURE: 98 F | DIASTOLIC BLOOD PRESSURE: 64 MMHG

## 2018-12-14 DIAGNOSIS — I50.40 COMBINED SYSTOLIC AND DIASTOLIC CONGESTIVE HEART FAILURE, NYHA CLASS 3, UNSPECIFIED CONGESTIVE HEART FAILURE CHRONICITY: ICD-10-CM

## 2018-12-14 DIAGNOSIS — I50.40 COMBINED SYSTOLIC AND DIASTOLIC CONGESTIVE HEART FAILURE, NYHA CLASS 3, UNSPECIFIED CONGESTIVE HEART FAILURE CHRONICITY: Primary | ICD-10-CM

## 2018-12-14 PROCEDURE — 99214 OFFICE O/P EST MOD 30 MIN: CPT | Mod: S$GLB,,, | Performed by: NURSE PRACTITIONER

## 2018-12-14 PROCEDURE — 1101F PT FALLS ASSESS-DOCD LE1/YR: CPT | Mod: CPTII,S$GLB,, | Performed by: NURSE PRACTITIONER

## 2018-12-14 PROCEDURE — 71046 X-RAY EXAM CHEST 2 VIEWS: CPT | Mod: TC,PN

## 2018-12-14 PROCEDURE — 99999 PR PBB SHADOW E&M-EST. PATIENT-LVL V: CPT | Mod: PBBFAC,,, | Performed by: NURSE PRACTITIONER

## 2018-12-14 PROCEDURE — 3074F SYST BP LT 130 MM HG: CPT | Mod: CPTII,S$GLB,, | Performed by: NURSE PRACTITIONER

## 2018-12-14 PROCEDURE — 3078F DIAST BP <80 MM HG: CPT | Mod: CPTII,S$GLB,, | Performed by: NURSE PRACTITIONER

## 2018-12-14 PROCEDURE — 71046 X-RAY EXAM CHEST 2 VIEWS: CPT | Mod: 26,,, | Performed by: RADIOLOGY

## 2018-12-14 NOTE — PATIENT INSTRUCTIONS
Follow up with us if cold symptoms worsen.  Go to ER for shortness of breath(sob), sob when active or laying down, or chest pain.   Increase exercise.

## 2018-12-17 ENCOUNTER — TELEPHONE (OUTPATIENT)
Dept: FAMILY MEDICINE | Facility: CLINIC | Age: 83
End: 2018-12-17

## 2018-12-17 NOTE — PROGRESS NOTES
This dictation has been generated using Modal Fluency Dictation some phonetic errors may occur. Please contact author for clarification if needed.     Problem List Items Addressed This Visit     Combined systolic and diastolic congestive heart failure, NYHA class 3 - Primary    Relevant Orders    X-Ray Chest PA And Lateral (Completed)        Orders Placed This Encounter    X-Ray Chest PA And Lateral    Patient notes dramatic improvement had in the way she feels and her functional capacity.  She notes that her breathing is better.  She is currently taking the fluid pills as directed.    Patient Instructions   Follow up with us if cold symptoms worsen.  Go to ER for shortness of breath(sob), sob when active or laying down, or chest pain.   Increase exercise.      Reviewed chest x-ray image.  Improvement per my interpretation.  There is some interstitial changes.  No blunting noted.  In excessive of 45 minutes spent with patient with greater than 50% of time dedicated to education on symptoms, diagnosis, treatments, and coordination of care.     Follow-up if symptoms worsen or fail to improve.    ________________________________________________________________  ________________________________________________________________      Chief Complaint   Patient presents with    Pneumonia     Follow up     History of present illness  This 83 y.o. presents today for complaint of follow-up for shortness of breath and coughing.  Patient is taking a fluid pill.  She notes improvement in her shortness of breath.  She states she feels much better.  The coughing and shortness of breath have both improved.  She notes that she is resting better now.  Her weight has decreased.  She was talking to a friend recently who has got more exercise and patient is considering lifestyle change.  At last visit note: Patient notes cough symptoms have improved.  She is still short of breath.  She gets short of breath with exertion.  She did not   fluid pill.  She did take to the spironolactone.  We discussed again today rescue with fluid pills and if that is not addressing the issue she will have to go to the emergency room.  nasal congestion, shortness of breath, and some back pain. Patient indicates cough symptoms started a little over a week ago.  Her symptoms have worsened somewhat.  She made appointment because she is concerned because she has had a history of pneumonia in the past.  She does have COPD coronary artery disease and history of CHF.  She tried [facility] cold +for symptoms.  We discussed avoiding this med in future given her history.  She did have her flu shot this year.  After evaluation and discussion of med adjustments patient reveals that she is out of her to Ro some eyed.  She has not had any doses recently.  This was surgically exacerbate the fluid retention.  Review of systems  No fever or chills.  She has had some malaise and body aches.  Patient does note some sinus congestion and pain.  She complains of gland swelling and sore throat.  Sore throat related to the postnasal drip.  Patient notes chest pain with deep breath and coughing.  She has had shortness of breath and notes exacerbation.  No nausea or vomiting.  She did have 1 episode of diarrhea previously.   No polyuria or polydipsia.  She has felt somewhat thirsty at times.  Patient denies rash.  No abnormal bruising.    Past medical social and surgical history reviewed.  Patient is new to me.  Follows with in the system  Past Medical History:   Diagnosis Date    Anticoagulant long-term use     Arthritis     Atrial fibrillation     Breast cancer 1994    breast , left    Cardiomyopathy - EF 35-40% 5/11/2016    CHF (congestive heart failure)     Chronic bronchitis     COPD (chronic obstructive pulmonary disease)     Coronary artery disease without angina pectoris 8/26/2015    Depression     Diabetes with neurologic complications     Diverticulitis      Diverticulosis     Encounter for blood transfusion     after mastectomy x 20 years ago    GERD (gastroesophageal reflux disease)     H/O aortic valve replacement     Hiatal hernia     History of colonic diverticulitis     Hypertension     Hypothyroid     Insomnia     Irritable bowel syndrome     Obesity     Pacemaker 08/2014    Schatzki's ring     mild    Sleep apnea     uses cpap with oxygen     Syncope and collapse     Type II or unspecified type diabetes mellitus without mention of complication, not stated as uncontrolled     No medications at present.     Urinary incontinence        Past Surgical History:   Procedure Laterality Date    ADENOIDECTOMY      APPENDECTOMY      BACK SURGERY      Discectomy, lumbar    BLOCK-NERVE-MEDIAL [id number]-AMD,C2,3,4,5, Left 3/22/2018    [facility]    BREAST SURGERY  1994    left side , mastectomy    CARDIAC PACEMAKER PLACEMENT      CARDIAC VALVE SURGERY      aortic valve replacement    CHOLECYSTECTOMY      COLONOSCOPY  2/2014    repeat in 10 years for screening    COLONOSCOPY N/A 2/7/2014    [facility] ENDO    CORONARY STENT PLACEMENT      x2    EGD (ESOPHAGOGASTRODUODENOSCOPY) N/A 6/25/2012    [facility] ENDO    ESOPHAGOGASTRODUODENOSCOPY (EGD) N/A 2/8/2018    [facility]    ESOPHAGOGASTRODUODENOSCOPY (EGD) N/A 7/7/2016    [facility] ENDO    ESOPHAGOGASTRODUODENOSCOPY (EGD) N/A 2/23/2016    [facility] ENDO    EYE SURGERY      bilateral PHACO and IOL    HEART CATH-LEFT Left 8/19/2015    [facility] CATH LAB    HYSTERECTOMY      ovaries spared    INJECTION-STEROID-EPIDURAL-TRANSFORAMINAL Left 4/10/2012    [facility]es [facility]es [facility] OR    INSERTION-ICD-BIVENTRICULAR  [place] N/A 5/23/2016     [facility] CATH    INSERTION-PACEMAKER-SINGLE-ST.ELLI N/A 2014    [facility] CATH LAB    LOOP RECORDER      RADIOFREQUENCY ABLATION-C2-3-4-5 and third occipital nerve Left 2018    [facility] OR    REMOVAL-LOOP RECORDER N/A 2014    Per[facility] CATH LAB    REPLACEMENT-VALVE-AORTIC N/A 2015    [facility]    TOE SURGERY Right     TONSILLECTOMY      UPPER GASTROINTESTINAL ENDOSCOPY         Family History   Problem Relation Age of Onset    Parkinsonism Mother     Emphysema Father     Heart disease Brother     Heart attack Brother     Heart failure Brother     Heart disease Brother     Heart failure Brother     Heart disease Brother     Heart failure Brother     Arrhythmia Brother     Anesthesia problems Neg Hx     Clotting disorder Neg Hx        Social History     Socioeconomic History    Marital status:      Spouse name: None    Number of children: None    Years of education: None    Highest education level: None   Social Needs    Financial resource strain: None    Food insecurity - worry: None    Food insecurity - inability: None    Transportation needs - medical: None    Transportation needs - non-medical: None   Occupational History    None   Tobacco Use    Smoking status: Former Smoker     Packs/day: 1.00     Years: 30.00     Pack years: 30.00     Start date: 1958     Last attempt to quit: 1988     Years since quittin.9    Smokeless tobacco: Never Used   Substance and Sexual Activity    Alcohol use: Yes     Alcohol/week: 1.8 oz     Types: 2 Glasses of wine, 1 Shots of liquor per week     Comment: occasional    Drug use: No    Sexual activity: None   Other Topics Concern    None   Social History Narrative    None       Current Outpatient Medications   Medication Sig  Dispense Refill    ACCU-CHEK FASTCLIX Misc TEST TWICE DAILY 200 each 11    ACCU-CHEK SMARTVIEW TEST STRIP Strp TEST  TWICE DAILY 200 strip 11    acetaminophen (TYLENOL) 325 MG tablet Take 325 mg by mouth as needed for Pain or Temperature greater than.       albuterol (PROVENTIL) 2.5 mg /3 mL (0.083 %) nebulizer solution Take 3 mLs (2.5 mg total) by nebulization every 6 (six) hours as needed for Wheezing. Rescue 90 mL 0    alendronate (FOSAMAX) 70 MG tablet Take 1 tablet (70 mg total) by mouth every 7 days. 12 tablet 3    amitriptyline (ELAVIL) 10 MG tablet TAKE 1 TABLET(10 MG) BY MOUTH EVERY NIGHT AS NEEDED FOR INSOMNIA 30 tablet 0    ascorbic acid (VITAMIN C) 500 MG tablet Take 500 mg by mouth once daily.        aspirin (ECOTRIN) 81 MG EC tablet Take 81 mg by mouth once daily.      azelastine (ASTELIN) 137 mcg (0.1 %) nasal spray 1 spray (137 mcg total) by Nasal route 2 (two) times daily. 30 mL 0    b complex vitamins tablet Take 1 tablet by mouth once daily.        BD ALCOHOL SWABS PadM USE TWICE DAILY 200 each 11    diphenhydramine-acetaminophen (TYLENOL PM)  mg Tab Take 1 tablet by mouth nightly as needed.      doxycycline (VIBRA-TABS) 100 MG tablet Take 1 tablet (100 mg total) by mouth 2 (two) times daily. 20 tablet 0    fish oil-omega-3 fatty acids 300-1,000 mg capsule Take 2 g by mouth once daily.      fluticasone-vilanterol (BREO ELLIPTA) 200-25 mcg/dose DsDv diskus inhaler Inhale 1 puff into the lungs once daily. 3 each 3    gabapentin (NEURONTIN) 300 MG capsule Take 1 capsule (300 mg total) by mouth every evening. 30 capsule 2    levothyroxine (SYNTHROID) 100 MCG tablet TAKE 1 TABLET ONE TIME DAILY 90 tablet 1    losartan (COZAAR) 25 MG tablet TAKE 1 TABLET EVERY DAY 90 tablet 1    metoprolol succinate (TOPROL-XL) 50 MG 24 hr tablet TAKE 1 TABLET EVERY DAY (DISCONTINUE METOPROLOL ER 25MG) 90 tablet 3    predniSONE (DELTASONE) 10 MG tablet 4 by mouth once today, then 3 by mouth  "tomorrow, then 2 by mouth on day 3, then one by mouth on the last day. 10 tablet 0    spironolactone (ALDACTONE) 25 MG tablet TAKE 1 TABLET EVERY DAY 90 tablet 3    torsemide (DEMADEX) 20 MG Tab TAKE 1 TABLET(20 MG) BY MOUTH EVERY DAY 90 tablet 1    VENTOLIN HFA 90 mcg/actuation inhaler       warfarin (COUMADIN) 2.5 MG tablet Take 1 tablet (2.5 mg total) by mouth Daily. 90 tablet 3    ILEVRO 0.3 % DrpS       nitroGLYCERIN (NITROSTAT) 0.4 MG SL tablet Place 1 tablet (0.4 mg total) under the tongue every 5 (five) minutes as needed for Chest pain. 20 tablet 0     No current facility-administered medications for this visit.        Review of patient's allergies indicates:   Allergen Reactions    Statins-hmg-coa reductase inhibitors      "bad leg cramps"    Sulfa (sulfonamide antibiotics) Hives    Adhesive Rash     Use paper tape    Amoxicillin-pot clavula (bulk) Nausea And Vomiting       Physical examination  Vitals Reviewed wt loss  9#  Gen. Well-dressed well-nourished.  Patient does look fatigued.  She does not look septic.  Skin warm dry and intact.  No rashes noted.  HEENT.  TM intact bilateral with normal light reflex.  No mastoid tenderness during percussion.  Nares patent bilateral.  Pharynx is unremarkable.  No maxillary or frontal sinus tenderness when percussed.    Neck is supple without adenopathy  Chest.  Respirations are even unlabored.  No respiratory distress.  No wheeze noted. No crackles noted during auscultation.  Cardiac regular rate and rhythm.  No chest wall adenopathy noted.  Neuro. Awake alert oriented x4.  Normal judgment and cognition noted.  Extremities no clubbing cyanosis or marked edema noted.     Call or return to clinic prn if these symptoms worsen or fail to improve as anticipated.    "

## 2018-12-17 NOTE — TELEPHONE ENCOUNTER
----- Message from Monica Mclain sent at 12/17/2018 10:59 AM CST -----  Contact: self  Pt would like to get test results.  She can be reached at [phone]

## 2018-12-17 NOTE — TELEPHONE ENCOUNTER
Spoke w/ pt. Informed pt about results and recommendations per provider. pt verbalized understanding.

## 2019-01-07 ENCOUNTER — HOSPITAL ENCOUNTER (OUTPATIENT)
Dept: RADIOLOGY | Facility: HOSPITAL | Age: 84
Discharge: HOME | End: 2019-01-07
Attending: PHYSICIAN ASSISTANT
Payer: MEDICARE

## 2019-01-07 ENCOUNTER — OFFICE VISIT (OUTPATIENT)
Dept: FAMILY MEDICINE | Facility: CLINIC | Age: 84
End: 2019-01-07
Payer: MEDICARE

## 2019-01-07 VITALS
RESPIRATION RATE: 18 BRPM | BODY MASS INDEX: 43.41 KG/M2 | HEIGHT: 62 IN | WEIGHT: 235.88 LBS | HEART RATE: 82 BPM | DIASTOLIC BLOOD PRESSURE: 72 MMHG | SYSTOLIC BLOOD PRESSURE: 130 MMHG | TEMPERATURE: 98 F | OXYGEN SATURATION: 95 %

## 2019-01-07 DIAGNOSIS — J44.1 COPD EXACERBATION: ICD-10-CM

## 2019-01-07 DIAGNOSIS — J44.1 COPD EXACERBATION: Primary | ICD-10-CM

## 2019-01-07 DIAGNOSIS — R06.2 WHEEZING: ICD-10-CM

## 2019-01-07 PROCEDURE — 71046 X-RAY EXAM CHEST 2 VIEWS: CPT | Mod: 26,,, | Performed by: RADIOLOGY

## 2019-01-07 PROCEDURE — 99999 PR PBB SHADOW E&M-EST. PATIENT-LVL III: CPT | Mod: PBBFAC,,, | Performed by: PHYSICIAN ASSISTANT

## 2019-01-07 PROCEDURE — 3078F DIAST BP <80 MM HG: CPT | Mod: CPTII,S$GLB,, | Performed by: PHYSICIAN ASSISTANT

## 2019-01-07 PROCEDURE — 3078F PR MOST RECENT DIASTOLIC BLOOD PRESSURE < 80 MM HG: ICD-10-PCS | Mod: CPTII,S$GLB,, | Performed by: PHYSICIAN ASSISTANT

## 2019-01-07 PROCEDURE — 1101F PR PT FALLS ASSESS DOC 0-1 FALLS W/OUT INJ PAST YR: ICD-10-PCS | Mod: CPTII,S$GLB,, | Performed by: PHYSICIAN ASSISTANT

## 2019-01-07 PROCEDURE — 99999 PR PBB SHADOW E&M-EST. PATIENT-LVL III: ICD-10-PCS | Mod: PBBFAC,,, | Performed by: PHYSICIAN ASSISTANT

## 2019-01-07 PROCEDURE — 3075F PR MOST RECENT SYSTOLIC BLOOD PRESS GE 130-139MM HG: ICD-10-PCS | Mod: CPTII,S$GLB,, | Performed by: PHYSICIAN ASSISTANT

## 2019-01-07 PROCEDURE — 3075F SYST BP GE 130 - 139MM HG: CPT | Mod: CPTII,S$GLB,, | Performed by: PHYSICIAN ASSISTANT

## 2019-01-07 PROCEDURE — 71046 X-RAY EXAM CHEST 2 VIEWS: CPT | Mod: TC,FY,PO

## 2019-01-07 PROCEDURE — 99214 OFFICE O/P EST MOD 30 MIN: CPT | Mod: S$GLB,,, | Performed by: PHYSICIAN ASSISTANT

## 2019-01-07 PROCEDURE — 99214 PR OFFICE/OUTPT VISIT, EST, LEVL IV, 30-39 MIN: ICD-10-PCS | Mod: S$GLB,,, | Performed by: PHYSICIAN ASSISTANT

## 2019-01-07 PROCEDURE — 99499 UNLISTED E&M SERVICE: CPT | Mod: HCNC,S$GLB,, | Performed by: PHYSICIAN ASSISTANT

## 2019-01-07 PROCEDURE — 1101F PT FALLS ASSESS-DOCD LE1/YR: CPT | Mod: CPTII,S$GLB,, | Performed by: PHYSICIAN ASSISTANT

## 2019-01-07 PROCEDURE — 71046 XR CHEST PA AND LATERAL: ICD-10-PCS | Mod: 26,,, | Performed by: RADIOLOGY

## 2019-01-07 PROCEDURE — 99499 RISK ADDL DX/OHS AUDIT: ICD-10-PCS | Mod: HCNC,S$GLB,, | Performed by: PHYSICIAN ASSISTANT

## 2019-01-07 RX ORDER — DOXYCYCLINE HYCLATE 100 MG
100 TABLET ORAL EVERY 12 HOURS
Qty: 14 TABLET | Refills: 0 | Status: SHIPPED | OUTPATIENT
Start: 2019-01-07 | End: 2019-01-14

## 2019-01-07 NOTE — PROGRESS NOTES
"Subjective:      Patient ID: Miesha Obando is a 83 y.o. female.    Chief Complaint: Nasal Congestion (few weeks); Cough (productive); and Shortness of Breath    Patient is new to me, here today for cough/congestion      Cough   This is a new problem. The current episode started 1 to 4 weeks ago. The problem has been unchanged. The cough is productive of sputum. Associated symptoms include ear pain (bilateral, pressure and pain), headaches, a sore throat and shortness of breath. Pertinent negatives include no chills, fever ("felt warm"), rash or wheezing. Treatments tried: inhalers (intermittently), nasal spray, Tylenol. Her past medical history is significant for COPD and pneumonia (1mth ago). There is no history of asthma or bronchitis.     Review of Systems   Constitutional: Positive for fatigue. Negative for chills, diaphoresis and fever ("felt warm").   HENT: Positive for congestion, ear pain (bilateral, pressure and pain), sinus pressure, sinus pain and sore throat.    Respiratory: Positive for cough (productive) and shortness of breath. Negative for wheezing.    Gastrointestinal: Negative for abdominal pain, constipation, diarrhea, nausea and vomiting.   Skin: Negative for rash.   Neurological: Positive for dizziness (on and off, worse when active) and headaches. Negative for light-headedness.       Objective:   /72   Pulse 82   Temp 98.2 °F (36.8 °C) (Oral)   Resp 18   Ht 5' 2" (1.575 m)   Wt 107 kg (235 lb 14.3 oz)   LMP  (LMP Unknown)   SpO2 95%   BMI 43.15 kg/m²   Physical Exam   Constitutional: Vital signs are normal. She appears well-developed and well-nourished. She does not appear ill. No distress.   HENT:   Head: Normocephalic and atraumatic.   Right Ear: Ear canal normal. Tympanic membrane is not erythematous. A middle ear effusion is present.   Left Ear: Ear canal normal. Tympanic membrane is not erythematous. A middle ear effusion is present.   Nose: Mucosal edema and rhinorrhea " present. Right sinus exhibits maxillary sinus tenderness and frontal sinus tenderness. Left sinus exhibits maxillary sinus tenderness and frontal sinus tenderness.   Mouth/Throat: Uvula is midline and oropharynx is clear and moist. No posterior oropharyngeal erythema.   Cardiovascular: Normal rate, regular rhythm and normal heart sounds.   No murmur heard.  Pulmonary/Chest: Effort normal. No respiratory distress. She has decreased breath sounds. She has wheezes (expiratory, generalized). She has no rhonchi. She has no rales.   Lymphadenopathy:     She has no cervical adenopathy.   Skin: Skin is warm and dry. No rash noted. She is not diaphoretic.   Psychiatric: She has a normal mood and affect. Her speech is normal and behavior is normal. Thought content normal. Cognition and memory are normal.     Assessment:      1. COPD exacerbation    2. Wheezing       Plan:   COPD exacerbation  -     X-Ray Chest PA And Lateral; Future; Expected date: 01/07/2019  -     doxycycline (VIBRA-TABS) 100 MG tablet; Take 1 tablet (100 mg total) by mouth every 12 (twelve) hours. for 7 days  Dispense: 14 tablet; Refill: 0    Wheezing  -     X-Ray Chest PA And Lateral; Future; Expected date: 01/07/2019      Will call with x-ray results   Discussed worsening signs/symptoms and when to return to clinic or go to ED.   Patient expresses understanding and agrees with treatment plan.

## 2019-01-11 ENCOUNTER — PATIENT OUTREACH (OUTPATIENT)
Dept: ADMINISTRATIVE | Facility: HOSPITAL | Age: 84
End: 2019-01-11

## 2019-01-21 ENCOUNTER — LAB VISIT (OUTPATIENT)
Dept: LAB | Facility: HOSPITAL | Age: 84
End: 2019-01-21
Attending: FAMILY MEDICINE
Payer: MEDICARE

## 2019-01-21 DIAGNOSIS — I10 ESSENTIAL HYPERTENSION: ICD-10-CM

## 2019-01-21 DIAGNOSIS — E11.42 TYPE 2 DIABETES MELLITUS WITH DIABETIC POLYNEUROPATHY, WITHOUT LONG-TERM CURRENT USE OF INSULIN: ICD-10-CM

## 2019-01-21 DIAGNOSIS — N18.30 CHRONIC KIDNEY DISEASE, STAGE III (MODERATE): ICD-10-CM

## 2019-01-21 LAB
ALBUMIN SERPL BCP-MCNC: 3.2 G/DL
ALP SERPL-CCNC: 61 U/L
ALT SERPL W/O P-5'-P-CCNC: 15 U/L
ANION GAP SERPL CALC-SCNC: 7 MMOL/L
AST SERPL-CCNC: 22 U/L
BASOPHILS # BLD AUTO: 0.06 K/UL
BASOPHILS NFR BLD: 0.8 %
BILIRUB SERPL-MCNC: 0.7 MG/DL
BUN SERPL-MCNC: 25 MG/DL
CALCIUM SERPL-MCNC: 9.8 MG/DL
CHLORIDE SERPL-SCNC: 108 MMOL/L
CHOLEST SERPL-MCNC: 174 MG/DL
CHOLEST/HDLC SERPL: 3.8 {RATIO}
CO2 SERPL-SCNC: 26 MMOL/L
CREAT SERPL-MCNC: 1 MG/DL
DIFFERENTIAL METHOD: ABNORMAL
EOSINOPHIL # BLD AUTO: 0.2 K/UL
EOSINOPHIL NFR BLD: 2.3 %
ERYTHROCYTE [DISTWIDTH] IN BLOOD BY AUTOMATED COUNT: 14 %
EST. GFR  (AFRICAN AMERICAN): >60 ML/MIN/1.73 M^2
EST. GFR  (NON AFRICAN AMERICAN): 53 ML/MIN/1.73 M^2
ESTIMATED AVG GLUCOSE: 143 MG/DL
GLUCOSE SERPL-MCNC: 104 MG/DL
HBA1C MFR BLD HPLC: 6.6 %
HCT VFR BLD AUTO: 43.1 %
HDLC SERPL-MCNC: 46 MG/DL
HDLC SERPL: 26.4 %
HGB BLD-MCNC: 13.1 G/DL
IMM GRANULOCYTES # BLD AUTO: 0.01 K/UL
IMM GRANULOCYTES NFR BLD AUTO: 0.1 %
LDLC SERPL CALC-MCNC: 100.8 MG/DL
LYMPHOCYTES # BLD AUTO: 2.6 K/UL
LYMPHOCYTES NFR BLD: 33.2 %
MCH RBC QN AUTO: 30.4 PG
MCHC RBC AUTO-ENTMCNC: 30.4 G/DL
MCV RBC AUTO: 100 FL
MONOCYTES # BLD AUTO: 1 K/UL
MONOCYTES NFR BLD: 12.7 %
NEUTROPHILS # BLD AUTO: 4 K/UL
NEUTROPHILS NFR BLD: 50.9 %
NONHDLC SERPL-MCNC: 128 MG/DL
NRBC BLD-RTO: 0 /100 WBC
PLATELET # BLD AUTO: 213 K/UL
PMV BLD AUTO: 11.6 FL
POTASSIUM SERPL-SCNC: 4.7 MMOL/L
PROT SERPL-MCNC: 7.3 G/DL
RBC # BLD AUTO: 4.31 M/UL
SODIUM SERPL-SCNC: 141 MMOL/L
TRIGL SERPL-MCNC: 136 MG/DL
TSH SERPL DL<=0.005 MIU/L-ACNC: 1.97 UIU/ML
WBC # BLD AUTO: 7.86 K/UL

## 2019-01-21 PROCEDURE — 85025 COMPLETE CBC W/AUTO DIFF WBC: CPT

## 2019-01-21 PROCEDURE — 84443 ASSAY THYROID STIM HORMONE: CPT

## 2019-01-21 PROCEDURE — 80053 COMPREHEN METABOLIC PANEL: CPT

## 2019-01-21 PROCEDURE — 80061 LIPID PANEL: CPT

## 2019-01-21 PROCEDURE — 36415 COLL VENOUS BLD VENIPUNCTURE: CPT | Mod: PO

## 2019-01-21 PROCEDURE — 83036 HEMOGLOBIN GLYCOSYLATED A1C: CPT

## 2019-01-25 ENCOUNTER — OFFICE VISIT (OUTPATIENT)
Dept: FAMILY MEDICINE | Facility: CLINIC | Age: 84
End: 2019-01-25
Payer: MEDICARE

## 2019-01-25 VITALS
SYSTOLIC BLOOD PRESSURE: 114 MMHG | BODY MASS INDEX: 42.94 KG/M2 | HEART RATE: 76 BPM | WEIGHT: 234.81 LBS | DIASTOLIC BLOOD PRESSURE: 70 MMHG

## 2019-01-25 DIAGNOSIS — I10 ESSENTIAL HYPERTENSION: ICD-10-CM

## 2019-01-25 DIAGNOSIS — N18.30 CHRONIC KIDNEY DISEASE, STAGE III (MODERATE): ICD-10-CM

## 2019-01-25 DIAGNOSIS — E11.42 TYPE 2 DIABETES MELLITUS WITH DIABETIC POLYNEUROPATHY, WITHOUT LONG-TERM CURRENT USE OF INSULIN: Primary | ICD-10-CM

## 2019-01-25 DIAGNOSIS — E66.01 MORBID OBESITY DUE TO EXCESS CALORIES: ICD-10-CM

## 2019-01-25 DIAGNOSIS — I70.0 AORTIC ATHEROSCLEROSIS: ICD-10-CM

## 2019-01-25 DIAGNOSIS — I50.40 COMBINED SYSTOLIC AND DIASTOLIC CONGESTIVE HEART FAILURE, NYHA CLASS 3, UNSPECIFIED CONGESTIVE HEART FAILURE CHRONICITY: ICD-10-CM

## 2019-01-25 PROCEDURE — 99999 PR PBB SHADOW E&M-EST. PATIENT-LVL III: CPT | Mod: PBBFAC,HCNC,, | Performed by: FAMILY MEDICINE

## 2019-01-25 PROCEDURE — 3078F PR MOST RECENT DIASTOLIC BLOOD PRESSURE < 80 MM HG: ICD-10-PCS | Mod: HCNC,CPTII,S$GLB, | Performed by: FAMILY MEDICINE

## 2019-01-25 PROCEDURE — 99499 RISK ADDL DX/OHS AUDIT: ICD-10-PCS | Mod: HCNC,S$GLB,, | Performed by: FAMILY MEDICINE

## 2019-01-25 PROCEDURE — 99999 PR PBB SHADOW E&M-EST. PATIENT-LVL III: ICD-10-PCS | Mod: PBBFAC,HCNC,, | Performed by: FAMILY MEDICINE

## 2019-01-25 PROCEDURE — 99397 PER PM REEVAL EST PAT 65+ YR: CPT | Mod: HCNC,S$GLB,, | Performed by: FAMILY MEDICINE

## 2019-01-25 PROCEDURE — 3078F DIAST BP <80 MM HG: CPT | Mod: HCNC,CPTII,S$GLB, | Performed by: FAMILY MEDICINE

## 2019-01-25 PROCEDURE — 3074F PR MOST RECENT SYSTOLIC BLOOD PRESSURE < 130 MM HG: ICD-10-PCS | Mod: HCNC,CPTII,S$GLB, | Performed by: FAMILY MEDICINE

## 2019-01-25 PROCEDURE — 3074F SYST BP LT 130 MM HG: CPT | Mod: HCNC,CPTII,S$GLB, | Performed by: FAMILY MEDICINE

## 2019-01-25 PROCEDURE — 99499 UNLISTED E&M SERVICE: CPT | Mod: HCNC,S$GLB,, | Performed by: FAMILY MEDICINE

## 2019-01-25 PROCEDURE — 99397 PR PREVENTIVE VISIT,EST,65 & OVER: ICD-10-PCS | Mod: HCNC,S$GLB,, | Performed by: FAMILY MEDICINE

## 2019-01-25 RX ORDER — DICLOFENAC SODIUM 10 MG/G
2 GEL TOPICAL DAILY PRN
Qty: 100 G | Refills: 1 | Status: ON HOLD | OUTPATIENT
Start: 2019-01-25 | End: 2019-06-21 | Stop reason: SDUPTHER

## 2019-01-25 NOTE — PROGRESS NOTES
Subjective:       Patient ID: Miesha Obando is a 83 y.o. female.    Chief Complaint: Diabetes and Results    Here for f/u htn and DM II. Had labs this week. Doing well overall. Due for wellness.    Hypertension   This is a chronic problem. The current episode started more than 1 year ago. The problem is controlled. Pertinent negatives include no chest pain, palpitations or shortness of breath.   Diabetes   She presents for her follow-up diabetic visit. She has type 2 diabetes mellitus. Her disease course has been stable. Pertinent negatives for hypoglycemia include no nervousness/anxiousness. Pertinent negatives for diabetes include no chest pain and no fatigue.     Review of Systems   Constitutional: Negative for chills, fatigue and fever.   Respiratory: Negative for cough, chest tightness and shortness of breath.    Cardiovascular: Negative for chest pain, palpitations and leg swelling.   Endocrine: Negative for cold intolerance and heat intolerance.   Skin: Negative for rash.   Psychiatric/Behavioral: Negative for dysphoric mood. The patient is not nervous/anxious.        Objective:      Physical Exam   Constitutional: She appears well-developed and well-nourished.   HENT:   Head: Normocephalic and atraumatic.   Cardiovascular: Normal rate, regular rhythm and normal heart sounds.   Pulmonary/Chest: Effort normal and breath sounds normal.   Psychiatric: She has a normal mood and affect.   Nursing note and vitals reviewed.      Assessment:       1. Type 2 diabetes mellitus with diabetic polyneuropathy, without long-term current use of insulin    2. Morbid obesity due to excess calories    3. Chronic kidney disease, stage III (moderate)    4. Essential hypertension    5. Combined systolic and diastolic congestive heart failure, NYHA class 3, unspecified congestive heart failure chronicity    6. Aortic atherosclerosis        Plan:       Type 2 diabetes mellitus with diabetic polyneuropathy, without long-term  current use of insulin  -     Comprehensive metabolic panel; Future; Expected date: 01/25/2019  -     Hemoglobin A1c; Future; Expected date: 01/25/2019    Morbid obesity due to excess calories    Chronic kidney disease, stage III (moderate)  -     Comprehensive metabolic panel; Future; Expected date: 01/25/2019  -     Hemoglobin A1c; Future; Expected date: 01/25/2019    Essential hypertension  -     Comprehensive metabolic panel; Future; Expected date: 01/25/2019  -     Hemoglobin A1c; Future; Expected date: 01/25/2019    Combined systolic and diastolic congestive heart failure, NYHA class 3, unspecified congestive heart failure chronicity    Aortic atherosclerosis    Other orders  -     diclofenac sodium (VOLTAREN) 1 % Gel; Apply 2 g topically daily as needed.  Dispense: 100 g; Refill: 1      Reviewed recent labs and at goal.  Will monitor chronic medical issues and continue current plan of care.    Follow-up in about 4 months (around 5/25/2019), or if symptoms worsen or fail to improve.

## 2019-02-12 ENCOUNTER — HOSPITAL ENCOUNTER (OUTPATIENT)
Dept: RADIOLOGY | Facility: HOSPITAL | Age: 84
Discharge: HOME | End: 2019-02-12
Attending: NURSE PRACTITIONER
Payer: MEDICARE

## 2019-02-12 ENCOUNTER — TELEPHONE (OUTPATIENT)
Dept: FAMILY MEDICINE | Facility: CLINIC | Age: 84
End: 2019-02-12

## 2019-02-12 ENCOUNTER — OFFICE VISIT (OUTPATIENT)
Dept: FAMILY MEDICINE | Facility: CLINIC | Age: 84
End: 2019-02-12
Payer: MEDICARE

## 2019-02-12 VITALS
TEMPERATURE: 98 F | BODY MASS INDEX: 44.12 KG/M2 | HEIGHT: 62 IN | OXYGEN SATURATION: 93 % | WEIGHT: 239.75 LBS | DIASTOLIC BLOOD PRESSURE: 66 MMHG | SYSTOLIC BLOOD PRESSURE: 124 MMHG | HEART RATE: 62 BPM

## 2019-02-12 DIAGNOSIS — R09.81 SINUS CONGESTION: Primary | ICD-10-CM

## 2019-02-12 DIAGNOSIS — R09.81 SINUS CONGESTION: ICD-10-CM

## 2019-02-12 PROCEDURE — 70220 X-RAY EXAM OF SINUSES: CPT | Mod: 26,HCNC,, | Performed by: RADIOLOGY

## 2019-02-12 PROCEDURE — 3078F PR MOST RECENT DIASTOLIC BLOOD PRESSURE < 80 MM HG: ICD-10-PCS | Mod: HCNC,CPTII,S$GLB, | Performed by: NURSE PRACTITIONER

## 2019-02-12 PROCEDURE — 3078F DIAST BP <80 MM HG: CPT | Mod: HCNC,CPTII,S$GLB, | Performed by: NURSE PRACTITIONER

## 2019-02-12 PROCEDURE — 3074F SYST BP LT 130 MM HG: CPT | Mod: HCNC,CPTII,S$GLB, | Performed by: NURSE PRACTITIONER

## 2019-02-12 PROCEDURE — 99999 PR PBB SHADOW E&M-EST. PATIENT-LVL V: CPT | Mod: PBBFAC,HCNC,, | Performed by: NURSE PRACTITIONER

## 2019-02-12 PROCEDURE — 99214 OFFICE O/P EST MOD 30 MIN: CPT | Mod: HCNC,S$GLB,, | Performed by: NURSE PRACTITIONER

## 2019-02-12 PROCEDURE — 70220 X-RAY EXAM OF SINUSES: CPT | Mod: TC,HCNC,PN

## 2019-02-12 PROCEDURE — 3074F PR MOST RECENT SYSTOLIC BLOOD PRESSURE < 130 MM HG: ICD-10-PCS | Mod: HCNC,CPTII,S$GLB, | Performed by: NURSE PRACTITIONER

## 2019-02-12 PROCEDURE — 1101F PR PT FALLS ASSESS DOC 0-1 FALLS W/OUT INJ PAST YR: ICD-10-PCS | Mod: HCNC,CPTII,S$GLB, | Performed by: NURSE PRACTITIONER

## 2019-02-12 PROCEDURE — 70220 XR SINUSES MIN 3 VIEWS: ICD-10-PCS | Mod: 26,HCNC,, | Performed by: RADIOLOGY

## 2019-02-12 PROCEDURE — 99214 PR OFFICE/OUTPT VISIT, EST, LEVL IV, 30-39 MIN: ICD-10-PCS | Mod: HCNC,S$GLB,, | Performed by: NURSE PRACTITIONER

## 2019-02-12 PROCEDURE — 1101F PT FALLS ASSESS-DOCD LE1/YR: CPT | Mod: HCNC,CPTII,S$GLB, | Performed by: NURSE PRACTITIONER

## 2019-02-12 PROCEDURE — 99999 PR PBB SHADOW E&M-EST. PATIENT-LVL V: ICD-10-PCS | Mod: PBBFAC,HCNC,, | Performed by: NURSE PRACTITIONER

## 2019-02-12 RX ORDER — FLUTICASONE PROPIONATE 50 MCG
SPRAY, SUSPENSION (ML) NASAL
Qty: 48 ML | Refills: 1 | Status: SHIPPED | OUTPATIENT
Start: 2019-02-12 | End: 2019-03-30

## 2019-02-12 RX ORDER — FLUTICASONE PROPIONATE 50 MCG
2 SPRAY, SUSPENSION (ML) NASAL DAILY
Qty: 16 G | Refills: 1 | Status: SHIPPED | OUTPATIENT
Start: 2019-02-12 | End: 2019-02-12 | Stop reason: SDUPTHER

## 2019-02-12 RX ORDER — OMEPRAZOLE 20 MG/1
20 TABLET, DELAYED RELEASE ORAL DAILY
Qty: 30 TABLET | Refills: 11 | COMMUNITY
Start: 2019-02-12 | End: 2020-06-22

## 2019-02-12 NOTE — PROGRESS NOTES
This dictation has been generated using Modal Fluency Dictation some phonetic errors may occur. Please contact author for clarification if needed.     Problem List Items Addressed This Visit     None      Visit Diagnoses     Sinus congestion    -  Primary    Relevant Orders    X-Ray Sinuses Min 3 Views        Orders Placed This Encounter    X-Ray Sinuses Min 3 Views    omeprazole (PRILOSEC OTC) 20 MG tablet    fluticasone (FLONASE) 50 mcg/actuation nasal spray     Sinus congestion check x-ray as above given the sinus pain. Treat for any acute sinus issues.  May consider ENT referral or CT of sinuses   Patient Instructions   Call results.     Follow-up if symptoms worsen or fail to improve.    ________________________________________________________________  ________________________________________________________________      Chief Complaint   Patient presents with    Sinus Problem     pain on left side of face and sob     History of present illness  This 83 y.o. presents today for complaint of sinus congestion. Symptoms have been present over the last month.  Patient notes some left-sided sinus pressure.  No real improvement since last visit.  Denies any fever or chills.  Denies any dental issues.  She tried Mucinex without improvement.  Complete review of systems otherwise negative    Past medical and social history reviewed.    Past Medical History:   Diagnosis Date    Anticoagulant long-term use     Arthritis     Atrial fibrillation     Breast cancer 1994    breast , left    Cardiomyopathy - EF 35-40% 5/11/2016    CHF (congestive heart failure)     Chronic bronchitis     COPD (chronic obstructive pulmonary disease)     Coronary artery disease without angina pectoris 8/26/2015    Depression     Diabetes with neurologic complications     Diverticulitis     Diverticulosis     Encounter for blood transfusion     after mastectomy x 20 years ago    GERD (gastroesophageal reflux disease)     H/O  aortic valve replacement     Hiatal hernia     History of colonic diverticulitis     Hypertension     Hypothyroid     Insomnia     Irritable bowel syndrome     Obesity     Pacemaker 08/2014    Schatzki's ring     mild    Sleep apnea     uses cpap with oxygen     Syncope and collapse     Type II or unspecified type diabetes mellitus without mention of complication, not stated as uncontrolled     No medications at present.     Urinary incontinence        Past Surgical History:   Procedure Laterality Date    ADENOIDECTOMY      APPENDECTOMY      BACK SURGERY      Discectomy, lumbar    BLOCK-NERVE-MEDIAL [id number],C2,3,4,5, Left 3/22/2018    [facility]    BREAST SURGERY  1994    left side , mastectomy    CARDIAC PACEMAKER PLACEMENT      CARDIAC VALVE SURGERY      aortic valve replacement    CHOLECYSTECTOMY      COLONOSCOPY  2/2014    repeat in 10 years for screening    COLONOSCOPY N/A 2/7/2014    [facility] ENDO    CORONARY STENT PLACEMENT      x2    EGD (ESOPHAGOGASTRODUODENOSCOPY) N/A 6/25/2012    [facility] ENDO    ESOPHAGOGASTRODUODENOSCOPY (EGD) N/A 2/8/2018    [facility] ENDO    ESOPHAGOGASTRODUODENOSCOPY (EGD) N/A 7/7/2016    Per[facility] at [facility] ENDO    ESOPHAGOGASTRODUODENOSCOPY (EGD) N/A 2/23/2016    [facility] ENDO    EYE SURGERY      bilateral PHACO and IOL    HEART CATH-LEFT Left 8/19/2015    [facility] CATH LAB    HYSTERECTOMY      ovaries spared    INJECTION-STEROID-EPIDURAL-TRANSFORAMINAL Left 4/10/2012    [facility]es [facility]es [facility] at [facility] OR    INSERTION-ICD-BIVENTRICULAR  [place] N/A 5/23/2016    Performed by [facility] at [facility] CATH    INSERTION-PACEMAKER-SINGLE-ST.AMAN N/A 9/2/2014    Performed by Nic LINK  MD Polo at [facility] CATH LAB    LOOP RECORDER      RADIOFREQUENCY ABLATION-C2-3-4-5 and third occipital nerve Left 2018    Performed by [facility]ine [facility]ma MD at [facility] OR    REMOVAL-LOOP RECORDER N/A 2014    Performed by Nic Cuellar MD at [facility] CATH LAB    REPLACEMENT-VALVE-AORTIC N/A 2015    Per[facility] by [facility] at [facility] CATH LAB    TOE SURGERY Right     TONSILLECTOMY      UPPER GASTROINTESTINAL ENDOSCOPY         Family History   Problem Relation Age of Onset    Parkinsonism Mother     Emphysema Father     Heart disease Brother     Heart attack Brother     Heart failure Brother     Heart disease Brother     Heart failure Brother     Heart disease Brother     Heart failure Brother     Arrhythmia Brother     Anesthesia problems Neg Hx     Clotting disorder Neg Hx        Social History     Socioeconomic History    Marital status:      Spouse name: None    Number of children: None    Years of education: None    Highest education level: None   Social Needs    Financial resource strain: None    Food insecurity - worry: None    Food insecurity - inability: None    Transportation needs - medical: None    Transportation needs - non-medical: None   Occupational History    None   Tobacco Use    Smoking status: Former Smoker     Packs/day: 1.00     Years: 30.00     Pack years: 30.00     Start date: 1958     Last attempt to quit: 1988     Years since quittin.1    Smokeless tobacco: Never Used   Substance and Sexual Activity    Alcohol use: Yes     Alcohol/week: 1.8 oz     Types: 2 Glasses of wine, 1 Shots of liquor per week     Comment: occasional    Drug use: No    Sexual activity: None   Other Topics Concern    None   Social History Narrative    None       Current Outpatient Medications   Medication Sig Dispense Refill    ACCU-CHEK FASTCLIX Misc TEST TWICE DAILY 200 each 11    ACCU-CHEK SMARTVIEW TEST STRIP Strp TEST  TWICE DAILY 200  strip 11    ascorbic acid (VITAMIN C) 500 MG tablet Take 500 mg by mouth once daily.        aspirin (ECOTRIN) 81 MG EC tablet Take 81 mg by mouth once daily.      azelastine (ASTELIN) 137 mcg (0.1 %) nasal spray 1 spray (137 mcg total) by Nasal route 2 (two) times daily. 30 mL 0    b complex vitamins tablet Take 1 tablet by mouth once daily.        BD ALCOHOL SWABS PadM USE TWICE DAILY 200 each 11    fish oil-omega-3 fatty acids 300-1,000 mg capsule Take 2 g by mouth once daily.      fluticasone-vilanterol (BREO ELLIPTA) 200-25 mcg/dose DsDv diskus inhaler Inhale 1 puff into the lungs once daily. 3 each 3    levothyroxine (SYNTHROID) 100 MCG tablet TAKE 1 TABLET ONE TIME DAILY 90 tablet 1    losartan (COZAAR) 25 MG tablet TAKE 1 TABLET EVERY DAY 90 tablet 1    metoprolol succinate (TOPROL-XL) 50 MG 24 hr tablet TAKE 1 TABLET EVERY DAY (DISCONTINUE METOPROLOL ER 25MG) 90 tablet 3    spironolactone (ALDACTONE) 25 MG tablet TAKE 1 TABLET EVERY DAY 90 tablet 3    warfarin (COUMADIN) 2.5 MG tablet Take 1 tablet (2.5 mg total) by mouth Daily. (Patient taking differently: Take 2.5 mg by mouth. This medication is managed by [facility]. Please contact [phone]. See most recent anticoag visit for current coumadin dosing. Current dose: Take 2.5mg daily or as instructed by coumadin clinic.) 90 tablet 3    acetaminophen (TYLENOL) 325 MG tablet Take 325 mg by mouth as needed for Pain or Temperature greater than.       alendronate (FOSAMAX) 70 MG tablet Take 1 tablet (70 mg total) by mouth every 7 days. 12 tablet 3    diclofenac sodium (VOLTAREN) 1 % Gel Apply 2 g topically daily as needed. 100 g 1    diphenhydramine-acetaminophen (TYLENOL PM)  mg Tab Take 1 tablet by mouth nightly as needed.      fluticasone (FLONASE) 50 mcg/actuation nasal spray 2 sprays (100 mcg total) by Each Nare route once daily. 16 g 1    gabapentin (NEURONTIN) 300 MG capsule Take 1 capsule (300 mg total) by mouth every  "evening. 30 capsule 2    nitroGLYCERIN (NITROSTAT) 0.4 MG SL tablet Place 1 tablet (0.4 mg total) under the tongue every 5 (five) minutes as needed for Chest pain. 20 tablet 0    omeprazole (PRILOSEC OTC) 20 MG tablet Take 1 tablet (20 mg total) by mouth once daily. 30 tablet 11    torsemide (DEMADEX) 20 MG Tab TAKE 1 TABLET(20 MG) BY MOUTH EVERY DAY 90 tablet 1    VENTOLIN HFA 90 mcg/actuation inhaler        No current facility-administered medications for this visit.        Review of patient's allergies indicates:   Allergen Reactions    Statins-hmg-coa reductase inhibitors      "bad leg cramps"    Sulfa (sulfonamide antibiotics) Hives    Adhesive Rash     Use paper tape    Amoxicillin-pot clavula (bulk) Nausea And Vomiting       Physical examination  Vitals Reviewed  Gen. Well-dressed well-nourished   Skin warm dry and intact.  No rashes noted.  HEENT.  TM intact bilateral with normal light reflex.  No mastoid tenderness during percussion.  Nares patent bilateral.  Pharynx is unremarkable.  + left maxillary sinus tenderness when percussed.    Neck is supple without adenopathy  Chest.  Respirations are even unlabored.  Lungs are clear to auscultation.  Cardiac regular rate and rhythm.  No chest wall adenopathy noted.  Neuro. Awake alert oriented x4.  Normal judgment and cognition noted.  Extremities no clubbing cyanosis or edema noted.     Call or return to clinic prn if these symptoms worsen or fail to improve as anticipated.    "

## 2019-02-12 NOTE — TELEPHONE ENCOUNTER
Sinus xray looks alright. No acute infection noted. Use the flonase and if symptoms do not improve let us know.   Thanks

## 2019-02-18 ENCOUNTER — CLINICAL SUPPORT (OUTPATIENT)
Dept: CARDIOLOGY | Facility: CLINIC | Age: 84
End: 2019-02-18
Attending: INTERNAL MEDICINE
Payer: MEDICARE

## 2019-02-18 DIAGNOSIS — Z95.0 PACEMAKER: ICD-10-CM

## 2019-02-18 DIAGNOSIS — R00.1 BRADYCARDIA: ICD-10-CM

## 2019-02-18 PROCEDURE — 93294 CARDIAC DEVICE CHECK CHECK - REMOTE: ICD-10-PCS | Mod: S$GLB,,, | Performed by: INTERNAL MEDICINE

## 2019-02-18 PROCEDURE — 93296 REM INTERROG EVL PM/IDS: CPT | Mod: S$GLB,,, | Performed by: INTERNAL MEDICINE

## 2019-02-18 PROCEDURE — 93296 CARDIAC DEVICE CHECK CHECK - REMOTE: ICD-10-PCS | Mod: S$GLB,,, | Performed by: INTERNAL MEDICINE

## 2019-02-18 PROCEDURE — 93294 REM INTERROG EVL PM/LDLS PM: CPT | Mod: S$GLB,,, | Performed by: INTERNAL MEDICINE

## 2019-03-01 DIAGNOSIS — Z95.0 PACEMAKER: Primary | ICD-10-CM

## 2019-03-01 DIAGNOSIS — I42.0 DILATED CARDIOMYOPATHY: ICD-10-CM

## 2019-03-06 RX ORDER — LEVOTHYROXINE SODIUM 100 UG/1
TABLET ORAL
Qty: 90 TABLET | Refills: 1 | Status: SHIPPED | OUTPATIENT
Start: 2019-03-06 | End: 2019-11-09 | Stop reason: SDUPTHER

## 2019-03-15 DIAGNOSIS — N18.9 CHRONIC KIDNEY DISEASE, UNSPECIFIED CKD STAGE: ICD-10-CM

## 2019-03-18 LAB
BATTERY VOLTAGE (V): 3.02 V
IMPEDANCE RA LEAD (NATIVE): 800 OHMS
IMPEDANCE RA LEAD: 490 OHMS
OHS CV DC PP MS1: NORMAL MS
OHS CV DC PP MS2: 0.4 MS
OHS CV DC PP V1: NORMAL V
OHS CV DC PP V2: NORMAL V
P/R-WAVE RA LEAD: 12 MV
THRESHOLD MS RA LEAD (NATIVE): 0.4 MS
THRESHOLD MS RA LEAD: 0.4 MS
THRESHOLD V RA LEAD (NATIVE): 1 V
THRESHOLD V RA LEAD: 0.62 V

## 2019-03-21 ENCOUNTER — NURSE TRIAGE (OUTPATIENT)
Dept: ADMINISTRATIVE | Facility: CLINIC | Age: 84
End: 2019-03-21

## 2019-03-21 NOTE — TELEPHONE ENCOUNTER
Reason for Disposition   Chest pain   SEVERE difficulty breathing (e.g., struggling for each breath, speaks in single words)    Protocols used: ST VOMITING-A-OH, ST CHEST PAIN-A-OH    Ms. Obando states she began vomiting this morning. She states she is also experiencing chest pain. Patient states she is sob with speaking and she is a cardiac patient. Advised Ms. Obando to call 911.

## 2019-03-23 ENCOUNTER — NURSE TRIAGE (OUTPATIENT)
Dept: ADMINISTRATIVE | Facility: CLINIC | Age: 84
End: 2019-03-23

## 2019-03-23 NOTE — TELEPHONE ENCOUNTER
Reason for Disposition   No answer.  First attempt to contact caller.  Follow-up call scheduled within 15 minutes.    Protocols used: NO CONTACT OR DUPLICATE CONTACT CALL-A-AH

## 2019-03-23 NOTE — TELEPHONE ENCOUNTER
Reason for Disposition   Caller has already spoken with another triager and has no further questions.    Protocols used: NO CONTACT OR DUPLICATE CONTACT CALL-A-AH

## 2019-03-23 NOTE — TELEPHONE ENCOUNTER
Reason for Disposition   [1] Abdominal pain AND [2] age > 60    Protocols used: BACK PAIN-A-AH

## 2019-03-25 ENCOUNTER — TELEPHONE (OUTPATIENT)
Dept: FAMILY MEDICINE | Facility: CLINIC | Age: 84
End: 2019-03-25

## 2019-03-25 NOTE — TELEPHONE ENCOUNTER
----- Message from Lydia Gao sent at 3/25/2019 11:11 AM CDT -----  Contact: Miesha zuniga  Type: Needs Medical Advice    Who Called:  Miesha  Best Call Back Number:[phone]  Additional Information: Form was faxed over from [employer]. She is checking on the status check regarding the form

## 2019-03-25 NOTE — TELEPHONE ENCOUNTER
Call placed to patient, gave fax number so that she can request the facility send info to correct number.

## 2019-03-28 ENCOUNTER — CLINICAL SUPPORT (OUTPATIENT)
Dept: CARDIOLOGY | Facility: CLINIC | Age: 84
End: 2019-03-28
Attending: INTERNAL MEDICINE
Payer: MEDICARE

## 2019-03-28 DIAGNOSIS — I42.0 DILATED CARDIOMYOPATHY: ICD-10-CM

## 2019-03-28 DIAGNOSIS — Z95.0 PACEMAKER: ICD-10-CM

## 2019-03-30 ENCOUNTER — OFFICE VISIT (OUTPATIENT)
Dept: URGENT CARE | Facility: CLINIC | Age: 84
End: 2019-03-30
Payer: MEDICARE

## 2019-03-30 VITALS
DIASTOLIC BLOOD PRESSURE: 60 MMHG | RESPIRATION RATE: 18 BRPM | WEIGHT: 233.94 LBS | SYSTOLIC BLOOD PRESSURE: 126 MMHG | BODY MASS INDEX: 43.05 KG/M2 | HEART RATE: 70 BPM | OXYGEN SATURATION: 96 % | HEIGHT: 62 IN | TEMPERATURE: 97 F

## 2019-03-30 DIAGNOSIS — R09.82 ALLERGIC RHINITIS WITH POSTNASAL DRIP: Primary | ICD-10-CM

## 2019-03-30 DIAGNOSIS — J30.9 ALLERGIC RHINITIS WITH POSTNASAL DRIP: Primary | ICD-10-CM

## 2019-03-30 PROCEDURE — 1101F PT FALLS ASSESS-DOCD LE1/YR: CPT | Mod: CPTII,S$GLB,, | Performed by: INTERNAL MEDICINE

## 2019-03-30 PROCEDURE — 3078F PR MOST RECENT DIASTOLIC BLOOD PRESSURE < 80 MM HG: ICD-10-PCS | Mod: CPTII,S$GLB,, | Performed by: INTERNAL MEDICINE

## 2019-03-30 PROCEDURE — 3074F PR MOST RECENT SYSTOLIC BLOOD PRESSURE < 130 MM HG: ICD-10-PCS | Mod: CPTII,S$GLB,, | Performed by: INTERNAL MEDICINE

## 2019-03-30 PROCEDURE — 3074F SYST BP LT 130 MM HG: CPT | Mod: CPTII,S$GLB,, | Performed by: INTERNAL MEDICINE

## 2019-03-30 PROCEDURE — 99213 PR OFFICE/OUTPT VISIT, EST, LEVL III, 20-29 MIN: ICD-10-PCS | Mod: S$GLB,,, | Performed by: INTERNAL MEDICINE

## 2019-03-30 PROCEDURE — 3078F DIAST BP <80 MM HG: CPT | Mod: CPTII,S$GLB,, | Performed by: INTERNAL MEDICINE

## 2019-03-30 PROCEDURE — 1101F PR PT FALLS ASSESS DOC 0-1 FALLS W/OUT INJ PAST YR: ICD-10-PCS | Mod: CPTII,S$GLB,, | Performed by: INTERNAL MEDICINE

## 2019-03-30 PROCEDURE — 99213 OFFICE O/P EST LOW 20 MIN: CPT | Mod: S$GLB,,, | Performed by: INTERNAL MEDICINE

## 2019-03-30 RX ORDER — MOMETASONE FUROATE 50 UG/1
1 SPRAY, METERED NASAL 2 TIMES DAILY
Qty: 17 G | Refills: 1 | Status: SHIPPED | OUTPATIENT
Start: 2019-03-30 | End: 2020-01-16 | Stop reason: SDUPTHER

## 2019-03-30 RX ORDER — METRONIDAZOLE 500 MG/1
500 TABLET ORAL 3 TIMES DAILY
COMMUNITY
End: 2019-06-04 | Stop reason: ALTCHOICE

## 2019-03-30 RX ORDER — CIPROFLOXACIN 500 MG/5ML
KIT ORAL 2 TIMES DAILY
COMMUNITY
End: 2019-05-27

## 2019-03-30 NOTE — PATIENT INSTRUCTIONS

## 2019-03-30 NOTE — PROGRESS NOTES
"Subjective:       Patient ID: Miesha Obando is a 83 y.o. female.    Vitals:  height is 5' 2" (1.575 m) and weight is 106.1 kg (233 lb 14.5 oz). Her oral temperature is 97 °F (36.1 °C). Her blood pressure is 126/60 and her pulse is 70. Her respiration is 18 and oxygen saturation is 96%.     Chief Complaint: Headache    Pt complains of facial pain (right side), feeling slightly off balance since this morning. Right lymph node pain and headache since yesterday. Pt states she went to ER last Thursday because she had chest pain and vomiting and she was diagnosed with diverticulitis. She ws prescribed Cipro and Metronidazole but discontinued them both yesterday  due to diarrhea.  She reports no diarrhea or abdominal pain today.     Facial Pain   This is a new problem. The current episode started yesterday. The problem occurs constantly. The problem has been unchanged. Associated symptoms include congestion, coughing (Dry), fatigue, headaches, nausea, neck pain and swollen glands. Pertinent negatives include no chills, diaphoresis, fever, myalgias, rash, vertigo, vomiting or weakness. The symptoms are aggravated by bending and walking. Treatments tried: tylenol. The treatment provided no relief.       Constitution: Positive for fatigue. Negative for chills, sweating and fever.   HENT: Positive for ear pain and congestion. Negative for tinnitus, facial swelling and sinus pain.    Neck: Positive for neck pain. Negative for neck stiffness and neck swelling.   Eyes: Negative for eye pain, photophobia, double vision and blurred vision.   Respiratory: Positive for cough (Dry). Negative for sputum production.    Gastrointestinal: Positive for nausea. Negative for vomiting and diarrhea.   Musculoskeletal: Negative for trauma and muscle ache.   Skin: Negative for rash.   Neurological: Positive for light-headedness, loss of balance and headaches. Negative for dizziness, history of vertigo, facial drooping, speech difficulty, " coordination disturbances, history of migraines, disorientation and loss of consciousness.   Psychiatric/Behavioral: Negative for disorientation, confusion, nervous/anxious and depression. The patient is not nervous/anxious.        Objective:      Physical Exam   Constitutional: She is oriented to person, place, and time. She appears well-developed and well-nourished. She is cooperative.  Non-toxic appearance. She does not appear ill. No distress.   HENT:   Head: Normocephalic and atraumatic.   Right Ear: Hearing, external ear and ear canal normal. A middle ear effusion is present.   Left Ear: Hearing, tympanic membrane, external ear and ear canal normal.   Nose: Nose normal. No mucosal edema, rhinorrhea or nasal deformity. No epistaxis. Right sinus exhibits no maxillary sinus tenderness and no frontal sinus tenderness. Left sinus exhibits no maxillary sinus tenderness and no frontal sinus tenderness.   Mouth/Throat: Uvula is midline, oropharynx is clear and moist and mucous membranes are normal. No trismus in the jaw. Normal dentition. No uvula swelling. No posterior oropharyngeal erythema.   Eyes: Conjunctivae and lids are normal.   Neck: Trachea normal, full passive range of motion without pain and phonation normal. Neck supple.   Cardiovascular: Normal rate, regular rhythm, normal heart sounds and normal pulses.   Pulmonary/Chest: Effort normal and breath sounds normal. No respiratory distress.   Abdominal: Soft. Normal appearance and bowel sounds are normal. She exhibits no distension. There is no tenderness.   Musculoskeletal: Normal range of motion. She exhibits no edema.   Lymphadenopathy:     She has no cervical adenopathy (Slight right TTP ).   Neurological: She is alert and oriented to person, place, and time. She exhibits normal muscle tone. Coordination normal.   Skin: Skin is warm, dry and intact. No rash noted.   Psychiatric: She has a normal mood and affect. Her speech is normal and behavior is  normal. Cognition and memory are normal.   Nursing note and vitals reviewed.      Assessment:       1. Allergic rhinitis with postnasal drip        Plan:         Allergic rhinitis with postnasal drip  -     mometasone (NASONEX) 50 mcg/actuation nasal spray; 1 spray by Nasal route 2 (two) times daily.  Dispense: 17 g; Refill: 1

## 2019-04-02 ENCOUNTER — TELEPHONE (OUTPATIENT)
Dept: URGENT CARE | Facility: CLINIC | Age: 84
End: 2019-04-02

## 2019-04-25 RX ORDER — LOSARTAN POTASSIUM 25 MG/1
TABLET ORAL
Qty: 90 TABLET | Refills: 1 | Status: SHIPPED | OUTPATIENT
Start: 2019-04-25 | End: 2019-06-13

## 2019-05-13 ENCOUNTER — PATIENT OUTREACH (OUTPATIENT)
Dept: ADMINISTRATIVE | Facility: HOSPITAL | Age: 84
End: 2019-05-13

## 2019-05-13 NOTE — PROGRESS NOTES
Health Maintenance Due   Topic Date Due    Foot Exam  01/11/2019     Chart review complete/scrubbed 05/13/2019  Future Appointments   Date Time Provider Department Center   5/20/2019 10:35 AM LAB [facility] LAB [place]   5/27/2019  1:00 PM ANNUAL WELLNESS VISIT-NURSE PRACTITIONERDOMINICKTEJAL[address]   5/27/2019  2:00 PM PACEMAKER, [facility] CARDIO [place]   5/27/2019  2:40 PM ZOEY España MD [facility]

## 2019-05-20 ENCOUNTER — LAB VISIT (OUTPATIENT)
Dept: LAB | Facility: HOSPITAL | Age: 84
End: 2019-05-20
Attending: FAMILY MEDICINE
Payer: MEDICARE

## 2019-05-20 DIAGNOSIS — E11.42 TYPE 2 DIABETES MELLITUS WITH DIABETIC POLYNEUROPATHY, WITHOUT LONG-TERM CURRENT USE OF INSULIN: ICD-10-CM

## 2019-05-20 DIAGNOSIS — I10 ESSENTIAL HYPERTENSION: ICD-10-CM

## 2019-05-20 DIAGNOSIS — N18.30 CHRONIC KIDNEY DISEASE, STAGE III (MODERATE): ICD-10-CM

## 2019-05-20 PROCEDURE — 80053 COMPREHEN METABOLIC PANEL: CPT | Mod: HCNC

## 2019-05-20 PROCEDURE — 36415 COLL VENOUS BLD VENIPUNCTURE: CPT | Mod: HCNC,PO

## 2019-05-20 PROCEDURE — 83036 HEMOGLOBIN GLYCOSYLATED A1C: CPT | Mod: HCNC

## 2019-05-21 LAB
ALBUMIN SERPL BCP-MCNC: 3.2 G/DL (ref 3.5–5.2)
ALP SERPL-CCNC: 68 U/L (ref 55–135)
ALT SERPL W/O P-5'-P-CCNC: 16 U/L (ref 10–44)
ANION GAP SERPL CALC-SCNC: 9 MMOL/L (ref 8–16)
AST SERPL-CCNC: 23 U/L (ref 10–40)
BILIRUB SERPL-MCNC: 0.4 MG/DL (ref 0.1–1)
BUN SERPL-MCNC: 54 MG/DL (ref 8–23)
CALCIUM SERPL-MCNC: 10.1 MG/DL (ref 8.7–10.5)
CHLORIDE SERPL-SCNC: 101 MMOL/L (ref 95–110)
CO2 SERPL-SCNC: 29 MMOL/L (ref 23–29)
CREAT SERPL-MCNC: 1.5 MG/DL (ref 0.5–1.4)
EST. GFR  (AFRICAN AMERICAN): 37.4 ML/MIN/1.73 M^2
EST. GFR  (NON AFRICAN AMERICAN): 32.4 ML/MIN/1.73 M^2
ESTIMATED AVG GLUCOSE: 151 MG/DL (ref 68–131)
GLUCOSE SERPL-MCNC: 143 MG/DL (ref 70–110)
HBA1C MFR BLD HPLC: 6.9 % (ref 4–5.6)
POTASSIUM SERPL-SCNC: 4.3 MMOL/L (ref 3.5–5.1)
PROT SERPL-MCNC: 7.6 G/DL (ref 6–8.4)
SODIUM SERPL-SCNC: 139 MMOL/L (ref 136–145)

## 2019-05-27 ENCOUNTER — OFFICE VISIT (OUTPATIENT)
Dept: FAMILY MEDICINE | Facility: CLINIC | Age: 84
End: 2019-05-27
Payer: MEDICARE

## 2019-05-27 ENCOUNTER — CLINICAL SUPPORT (OUTPATIENT)
Dept: CARDIOLOGY | Facility: CLINIC | Age: 84
End: 2019-05-27
Attending: INTERNAL MEDICINE
Payer: MEDICARE

## 2019-05-27 VITALS
HEIGHT: 62 IN | OXYGEN SATURATION: 95 % | SYSTOLIC BLOOD PRESSURE: 130 MMHG | BODY MASS INDEX: 42.92 KG/M2 | HEART RATE: 67 BPM | WEIGHT: 233.25 LBS | DIASTOLIC BLOOD PRESSURE: 74 MMHG

## 2019-05-27 DIAGNOSIS — I42.0 DILATED CARDIOMYOPATHY: ICD-10-CM

## 2019-05-27 DIAGNOSIS — I70.0 AORTIC ATHEROSCLEROSIS: ICD-10-CM

## 2019-05-27 DIAGNOSIS — I48.20 CHRONIC ATRIAL FIBRILLATION: ICD-10-CM

## 2019-05-27 DIAGNOSIS — I35.0 NONRHEUMATIC AORTIC VALVE STENOSIS: ICD-10-CM

## 2019-05-27 DIAGNOSIS — I10 ESSENTIAL HYPERTENSION: ICD-10-CM

## 2019-05-27 DIAGNOSIS — E66.01 MORBID OBESITY DUE TO EXCESS CALORIES: ICD-10-CM

## 2019-05-27 DIAGNOSIS — I70.209 ATHEROSCLEROSIS OF ARTERIES OF EXTREMITIES: ICD-10-CM

## 2019-05-27 DIAGNOSIS — I27.9 PULMONARY HEART DISEASE: ICD-10-CM

## 2019-05-27 DIAGNOSIS — M51.36 DDD (DEGENERATIVE DISC DISEASE), LUMBAR: ICD-10-CM

## 2019-05-27 DIAGNOSIS — N18.30 CHRONIC KIDNEY DISEASE, STAGE III (MODERATE): ICD-10-CM

## 2019-05-27 DIAGNOSIS — Z00.00 ENCOUNTER FOR PREVENTIVE HEALTH EXAMINATION: Primary | ICD-10-CM

## 2019-05-27 DIAGNOSIS — J42 CHRONIC BRONCHITIS, UNSPECIFIED CHRONIC BRONCHITIS TYPE: ICD-10-CM

## 2019-05-27 DIAGNOSIS — G47.30 SLEEP APNEA, UNSPECIFIED TYPE: ICD-10-CM

## 2019-05-27 DIAGNOSIS — G44.86 CERVICOGENIC HEADACHE: ICD-10-CM

## 2019-05-27 DIAGNOSIS — E11.42 TYPE 2 DIABETES MELLITUS WITH DIABETIC POLYNEUROPATHY, WITHOUT LONG-TERM CURRENT USE OF INSULIN: ICD-10-CM

## 2019-05-27 DIAGNOSIS — Z95.0 PACEMAKER: ICD-10-CM

## 2019-05-27 DIAGNOSIS — I25.10 CORONARY ARTERY DISEASE INVOLVING NATIVE CORONARY ARTERY OF NATIVE HEART WITHOUT ANGINA PECTORIS: ICD-10-CM

## 2019-05-27 DIAGNOSIS — I65.23 BILATERAL CAROTID ARTERY STENOSIS: ICD-10-CM

## 2019-05-27 DIAGNOSIS — I50.40 COMBINED SYSTOLIC AND DIASTOLIC CONGESTIVE HEART FAILURE, NYHA CLASS 3, UNSPECIFIED CONGESTIVE HEART FAILURE CHRONICITY: ICD-10-CM

## 2019-05-27 PROCEDURE — 3078F PR MOST RECENT DIASTOLIC BLOOD PRESSURE < 80 MM HG: ICD-10-PCS | Mod: HCNC,CPTII,S$GLB, | Performed by: NURSE PRACTITIONER

## 2019-05-27 PROCEDURE — 3075F SYST BP GE 130 - 139MM HG: CPT | Mod: HCNC,CPTII,S$GLB, | Performed by: NURSE PRACTITIONER

## 2019-05-27 PROCEDURE — G0439 PPPS, SUBSEQ VISIT: HCPCS | Mod: HCNC,S$GLB,, | Performed by: NURSE PRACTITIONER

## 2019-05-27 PROCEDURE — 3075F PR MOST RECENT SYSTOLIC BLOOD PRESS GE 130-139MM HG: ICD-10-PCS | Mod: HCNC,CPTII,S$GLB, | Performed by: NURSE PRACTITIONER

## 2019-05-27 PROCEDURE — 99499 RISK ADDL DX/OHS AUDIT: ICD-10-PCS | Mod: HCNC,S$GLB,, | Performed by: NURSE PRACTITIONER

## 2019-05-27 PROCEDURE — 99499 UNLISTED E&M SERVICE: CPT | Mod: HCNC,S$GLB,, | Performed by: NURSE PRACTITIONER

## 2019-05-27 PROCEDURE — 3078F DIAST BP <80 MM HG: CPT | Mod: HCNC,CPTII,S$GLB, | Performed by: NURSE PRACTITIONER

## 2019-05-27 PROCEDURE — 99999 PR PBB SHADOW E&M-EST. PATIENT-LVL V: CPT | Mod: PBBFAC,HCNC,, | Performed by: NURSE PRACTITIONER

## 2019-05-27 PROCEDURE — 99999 PR PBB SHADOW E&M-EST. PATIENT-LVL V: ICD-10-PCS | Mod: PBBFAC,HCNC,, | Performed by: NURSE PRACTITIONER

## 2019-05-27 PROCEDURE — G0439 PR MEDICARE ANNUAL WELLNESS SUBSEQUENT VISIT: ICD-10-PCS | Mod: HCNC,S$GLB,, | Performed by: NURSE PRACTITIONER

## 2019-05-27 NOTE — PATIENT INSTRUCTIONS
Counseling and Referral of Other Preventative  (Italic type indicates deductible and co-insurance are waived)    Patient Name: Miesha Obando  Today's Date: 5/27/2019    Health Maintenance       Date Due Completion Date    Foot Exam 01/11/2019 1/11/2018 (Done)    Override on 1/11/2018: Done (Kyle.  will scan report)    Override on 11/16/2016: Done (Patient sees Dr. Wright, podiatry)    Influenza Vaccine 08/01/2019 9/25/2018    Eye Exam 11/01/2019 11/1/2018    Override on 12/13/2016: Done (Patient sees outside opthalmologist)    Override on 5/14/2014: Done    Override on 12/17/1997: Done    Hemoglobin A1c 11/20/2019 5/20/2019    Lipid Panel 01/21/2020 1/21/2019    DEXA SCAN 08/21/2021 8/21/2018    TETANUS VACCINE 08/25/2024 8/25/2014        No orders of the defined types were placed in this encounter.    The following information is provided to all patients.  This information is to help you find resources for any of the problems found today that may be affecting your health:                Living healthy guide: www.[facility].[place].gov      Understanding Diabetes: www.diabetes.org      Eating healthy: www.cdc.gov/healthyweight      CDC home safety checklist: www.cdc.gov/steadi/patient.html      Agency on Aging: www.goea.[place].[facility]      Alcoholics anonymous (AA): www.aa.org      Physical Activity: www.jack.nih.gov/hk4dtab      Tobacco use: www.quitwithusla.org

## 2019-05-28 NOTE — PROGRESS NOTES
"Miesha Obando presented for a  Medicare AWV and comprehensive Health Risk Assessment today. The following components were reviewed and updated:    · Medical history  · Family History  · Social history  · Allergies and Current Medications  · Health Risk Assessment  · Health Maintenance  · Care Team     ** See Completed Assessments for Annual Wellness Visit within the encounter summary.**       The following assessments were completed:  · Living Situation  · CAGE  · Depression Screening  · Timed Get Up and Go  · Whisper Test  · Cognitive Function Screening          · Nutrition Screening  · ADL Screening  · PAQ Screening    Vitals:    05/27/19 1311   BP: 130/74   BP Location: Right leg   Patient Position: Sitting   BP Method: Large (Manual)   Pulse: 67   SpO2: 95%   Weight: 105.8 kg (233 lb 4 oz)   Height: 5' 2" (1.575 m)     Body mass index is 42.66 kg/m².  Physical Exam   Constitutional: No distress.   HENT:   Head: Normocephalic.   Eyes: Conjunctivae are normal.   Cardiovascular: Normal rate and regular rhythm.   Murmur heard.  Pulmonary/Chest: Effort normal. No respiratory distress. She has no wheezes.   Neurological: She is alert.   Psychiatric: She has a normal mood and affect. Her behavior is normal. Judgment and thought content normal.   Nursing note and vitals reviewed.        Diagnoses and health risks identified today and associated recommendations/orders:    1. Encounter for preventive health examination  Reviewed health maintenance and provided recommendations   Educated on shignrix vaccine     2. Cervicogenic headache  Continue to monitor  Followed by Louie.      3. DDD (degenerative disc disease), lumbar  Continue to monitor  Followed by KEON España MD .      4. Pulmonary heart disease  Continue to monitor  Followed by Janet.      5. Chronic bronchitis, unspecified chronic bronchitis type  Continue to monitor  Followed by Grupo.      6. Essential hypertension  Continue to monitor  Followed by KEON" Eddie España MD .      7. Aortic atherosclerosis  Continue to monitor  Followed by Janet.      8. Atherosclerosis of arteries of extremities  Continue to monitor  Followed by Janet.      9. Nonrheumatic aortic valve stenosis  Continue to monitor  Followed by Janet.      10. Coronary artery disease involving native coronary artery of native heart without angina pectoris  Continue to monitor  Followed by Janet  No CP.      11. Dilated cardiomyopathy  Continue to monitor  Followed by Janet.      12. Chronic atrial fibrillation  Continue to monitor  Followed by Janet.      13. Combined systolic and diastolic congestive heart failure, NYHA class 3, unspecified congestive heart failure chronicity  Continue to monitor  Followed by Janet.      14. Bilateral carotid artery stenosis  Continue to monitor  Followed by Janet.      15. Chronic kidney disease, stage III (moderate)  Continue to monitor  Followed by KEON España MD .      16. Type 2 diabetes mellitus with diabetic polyneuropathy, without long-term current use of insulin  Continue to monitor  Followed by KEON España MD   Last a1c 6.9.      17. Morbid obesity due to excess calories  Continue to monitor  Followed by KEON España MD   Encourage healthy food chocies.      18. Sleep apnea, unspecified type  Continue to monitor  Followed by KEON España MD .        Provided Miesha with a 5-10 year written screening schedule and personal prevention plan. Recommendations were developed using the USPSTF age appropriate recommendations. Education, counseling, and referrals were provided as needed. After Visit Summary printed and given to patient which includes a list of additional screenings\tests needed.    Follow up in about 1 year (around 5/27/2020).    Yanelis Joshua NP

## 2019-06-04 ENCOUNTER — OFFICE VISIT (OUTPATIENT)
Dept: FAMILY MEDICINE | Facility: CLINIC | Age: 84
End: 2019-06-04
Payer: MEDICARE

## 2019-06-04 VITALS
HEART RATE: 73 BPM | DIASTOLIC BLOOD PRESSURE: 72 MMHG | SYSTOLIC BLOOD PRESSURE: 110 MMHG | HEIGHT: 62 IN | BODY MASS INDEX: 43.77 KG/M2 | WEIGHT: 237.88 LBS | OXYGEN SATURATION: 94 %

## 2019-06-04 DIAGNOSIS — I50.40 COMBINED SYSTOLIC AND DIASTOLIC CONGESTIVE HEART FAILURE, NYHA CLASS 3, UNSPECIFIED CONGESTIVE HEART FAILURE CHRONICITY: ICD-10-CM

## 2019-06-04 DIAGNOSIS — E11.42 TYPE 2 DIABETES MELLITUS WITH DIABETIC POLYNEUROPATHY, WITHOUT LONG-TERM CURRENT USE OF INSULIN: Primary | ICD-10-CM

## 2019-06-04 DIAGNOSIS — E87.5 HYPERKALEMIA: ICD-10-CM

## 2019-06-04 PROCEDURE — 3078F DIAST BP <80 MM HG: CPT | Mod: HCNC,CPTII,S$GLB, | Performed by: INTERNAL MEDICINE

## 2019-06-04 PROCEDURE — 99213 OFFICE O/P EST LOW 20 MIN: CPT | Mod: HCNC,S$GLB,, | Performed by: INTERNAL MEDICINE

## 2019-06-04 PROCEDURE — 3074F SYST BP LT 130 MM HG: CPT | Mod: HCNC,CPTII,S$GLB, | Performed by: INTERNAL MEDICINE

## 2019-06-04 PROCEDURE — 1101F PT FALLS ASSESS-DOCD LE1/YR: CPT | Mod: HCNC,CPTII,S$GLB, | Performed by: INTERNAL MEDICINE

## 2019-06-04 PROCEDURE — 99213 PR OFFICE/OUTPT VISIT, EST, LEVL III, 20-29 MIN: ICD-10-PCS | Mod: HCNC,S$GLB,, | Performed by: INTERNAL MEDICINE

## 2019-06-04 PROCEDURE — 99999 PR PBB SHADOW E&M-EST. PATIENT-LVL IV: CPT | Mod: PBBFAC,HCNC,, | Performed by: INTERNAL MEDICINE

## 2019-06-04 PROCEDURE — 3074F PR MOST RECENT SYSTOLIC BLOOD PRESSURE < 130 MM HG: ICD-10-PCS | Mod: HCNC,CPTII,S$GLB, | Performed by: INTERNAL MEDICINE

## 2019-06-04 PROCEDURE — 3078F PR MOST RECENT DIASTOLIC BLOOD PRESSURE < 80 MM HG: ICD-10-PCS | Mod: HCNC,CPTII,S$GLB, | Performed by: INTERNAL MEDICINE

## 2019-06-04 PROCEDURE — 1101F PR PT FALLS ASSESS DOC 0-1 FALLS W/OUT INJ PAST YR: ICD-10-PCS | Mod: HCNC,CPTII,S$GLB, | Performed by: INTERNAL MEDICINE

## 2019-06-04 PROCEDURE — 99999 PR PBB SHADOW E&M-EST. PATIENT-LVL IV: ICD-10-PCS | Mod: PBBFAC,HCNC,, | Performed by: INTERNAL MEDICINE

## 2019-06-04 NOTE — PROGRESS NOTES
Subjective:       Patient ID: Miesha Obando is a 83 y.o. female.    Chief Complaint: Dizziness (patient wants her toe nails cut) and Abdominal Pain    HPI     Past medical history:  Hypertension, coronary artery disease, heart failure, CKD stage 3, type 2 diabetes mellitus.     Ambulates with Rollator.    Is here today for 4 month f/u.     BP at goal.     TIIDM:   Was on metformin in the past. Last A1c 6.9 (5/20/19), was 6.6 in jan 2019.   Metformin contraindicated with renal function.   Consider glimepiride.   Will attempt digital diabetes  Will refer to diabetes endo.     CKD 3:  Recent GFR increased from previous.  Continue to monitor    Was seen by Dr. Lombardo and had hyperkalemia. He apparently stopped spironolactone. Repeat K+ 5.3.   Will continue to monitor.  Continue torsemide.    Current Outpatient Medications on File Prior to Visit   Medication Sig Dispense Refill    ACCU-CHEK FASTCLIX Misc TEST TWICE DAILY 200 each 11    ACCU-CHEK SMARTVIEW TEST STRIP Strp TEST  TWICE DAILY 200 strip 11    acetaminophen (TYLENOL) 325 MG tablet Take 325 mg by mouth as needed for Pain or Temperature greater than.       alendronate (FOSAMAX) 70 MG tablet Take 1 tablet (70 mg total) by mouth every 7 days. 12 tablet 3    ascorbic acid (VITAMIN C) 500 MG tablet Take 500 mg by mouth once daily.        aspirin (ECOTRIN) 81 MG EC tablet Take 81 mg by mouth once daily.      b complex vitamins tablet Take 1 tablet by mouth once daily.        BD ALCOHOL SWABS PadM USE TWICE DAILY 200 each 11    diclofenac sodium (VOLTAREN) 1 % Gel Apply 2 g topically daily as needed. 100 g 1    diphenhydramine-acetaminophen (TYLENOL PM)  mg Tab Take 1 tablet by mouth nightly as needed.      fish oil-omega-3 fatty acids 300-1,000 mg capsule Take 2 g by mouth once daily.      levothyroxine (SYNTHROID) 100 MCG tablet TAKE 1 TABLET EVERY DAY 90 tablet 1    losartan (COZAAR) 25 MG tablet TAKE 1 TABLET EVERY DAY 90 tablet 1     mometasone (NASONEX) 50 mcg/actuation nasal spray 1 spray by Nasal route 2 (two) times daily. 17 g 1    nitroGLYCERIN (NITROSTAT) 0.4 MG SL tablet Place 1 tablet (0.4 mg total) under the tongue every 5 (five) minutes as needed for Chest pain. 20 tablet 0    omeprazole (PRILOSEC OTC) 20 MG tablet Take 1 tablet (20 mg total) by mouth once daily. 30 tablet 11    torsemide (DEMADEX) 20 MG Tab TAKE 1 TABLET(20 MG) BY MOUTH EVERY DAY 90 tablet 1    warfarin (COUMADIN) 2.5 MG tablet Take 1 tablet (2.5 mg total) by mouth Daily. (Patient taking differently: Take 2.5 mg by mouth. This medication is managed by [facility]. Please contact [phone]. See most recent [facility] visit for current coumadin dosing. Current dose: Take 2.5mg daily or as instructed by coumadin clinic.) 90 tablet 3    azelastine (ASTELIN) 137 mcg (0.1 %) nasal spray 1 spray (137 mcg total) by Nasal route 2 (two) times daily. 30 mL 0    fluticasone-vilanterol (BREO ELLIPTA) 200-25 mcg/dose DsDv diskus inhaler Inhale 1 puff into the lungs once daily. 3 each 3    gabapentin (NEURONTIN) 300 MG capsule Take 1 capsule (300 mg total) by mouth every evening. 30 capsule 2    metoprolol succinate (TOPROL-XL) 50 MG 24 hr tablet TAKE 1 TABLET EVERY DAY (DISCONTINUE METOPROLOL ER 25MG) 90 tablet 3    spironolactone (ALDACTONE) 25 MG tablet TAKE 1 TABLET EVERY DAY 90 tablet 3    VENTOLIN HFA 90 mcg/actuation inhaler       [DISCONTINUED] metroNIDAZOLE (FLAGYL) 500 MG tablet Take 500 mg by mouth 3 (three) times daily.       No current facility-administered medications on file prior to visit.      I personally reviewed past medical, family and social history.  Review of Systems   Constitutional: Negative for activity change, fever and unexpected weight change.   HENT: Negative for facial swelling, hearing loss and trouble swallowing.    Eyes: Negative for visual disturbance.   Respiratory: Negative for cough, chest tightness, shortness of breath and  wheezing.    Cardiovascular: Negative for chest pain, palpitations and leg swelling.   Gastrointestinal: Negative for abdominal pain, blood in stool, constipation, diarrhea, nausea and vomiting.   Endocrine: Negative for cold intolerance, polydipsia, polyphagia and polyuria.   Genitourinary: Negative for decreased urine volume and dysuria.   Musculoskeletal: Negative for gait problem and neck pain.   Skin: Negative for rash.   Neurological: Negative for dizziness, syncope and light-headedness.   Psychiatric/Behavioral: Negative for dysphoric mood. The patient is not nervous/anxious.        Objective:     Vitals:    06/04/19 1354   BP: 110/72   Pulse: 73        Physical Exam   Constitutional: She is oriented to person, place, and time. She appears well-developed and well-nourished. No distress.   HENT:   Head: Normocephalic and atraumatic.   Eyes: Pupils are equal, round, and reactive to light. EOM are normal. Right eye exhibits no discharge. Left eye exhibits no discharge. No scleral icterus.   Neck: Normal range of motion. Neck supple. No JVD present. No thyromegaly present.   Cardiovascular: Normal rate, regular rhythm and normal heart sounds. Exam reveals no gallop and no friction rub.   No murmur heard.  Pulmonary/Chest: Effort normal and breath sounds normal. No respiratory distress. She has no wheezes.   Abdominal: Soft. Bowel sounds are normal. She exhibits no distension and no mass. There is no tenderness.   Musculoskeletal: Normal range of motion. She exhibits no edema.   Lymphadenopathy:     She has no cervical adenopathy.   Neurological: She is alert and oriented to person, place, and time. No cranial nerve deficit. Coordination normal.   Skin: Skin is warm and dry. Capillary refill takes less than 2 seconds. No rash noted. She is not diaphoretic.   Psychiatric: She has a normal mood and affect. Her behavior is normal.       Assessment/Plan   Miesha was seen today for dizziness and abdominal  pain.    Diagnoses and all orders for this visit:    Type 2 diabetes mellitus with diabetic polyneuropathy, without long-term current use of insulin  -     Ambulatory referral to Endocrinology  -     Diabetes Digital Medicine (DDMP) Enrollment Order  -     Ambulatory Referral to Outpatient Case Management    Combined systolic and diastolic congestive heart failure, NYHA class 3, unspecified congestive heart failure chronicity  -     Ambulatory Referral to Outpatient Case Management    Other orders  -     Diabetes Digital Medicine (DDMP) Enrollment Order  -     Diabetes Digital Medicine (DDMP): Assign Onboarding Questionnaires

## 2019-06-05 ENCOUNTER — OUTPATIENT CASE MANAGEMENT (OUTPATIENT)
Dept: ADMINISTRATIVE | Facility: OTHER | Age: 84
End: 2019-06-05

## 2019-06-05 NOTE — PATIENT INSTRUCTIONS
Fall Prevention  Falls often occur due to slipping, tripping or losing your balance. Millions of people fall every year and injure themselves. Here are ways to reduce your risk of falling again.  · Think about your fall, was there anything that caused your fall that can be fixed, removed, or replaced?  · Make your home safe by keeping walkways clear of objects you may trip over.  · Use non-slip pads under rugs. Do not use area rugs or small throw rugs.  · Use non-slip mats in bathtubs and showers.  · Install handrails and lights on staircases.  · Do not walk in poorly lit areas.  · Do not stand on chairs or wobbly ladders.  · Use caution when reaching overhead or looking upward. This position can cause a loss of balance.  · Be sure your shoes fit properly, have non-slip bottoms and are in good condition.   · Wear shoes both inside and out. Avoid going barefoot or wearing slippers.  · Be cautious when going up and down stairs, curbs, and when walking on uneven sidewalks.  · If your balance is poor, consider using a cane or walker.  · If your fall was related to alcohol use, stop or limit alcohol intake.   · If your fall was related to use of sleeping medicines, talk to your doctor about this. You may need to reduce your dosage at bedtime if you awaken during the night to go to the bathroom.    · To reduce the need for nighttime bathroom trips:  ¨ Avoid drinking fluids for several hours before going to bed  ¨ Empty your bladder before going to bed  ¨ Men can keep a urinal at the bedside  · Stay as active as you can. Balance, flexibility, strength, and endurance all come from exercise. They all play a role in preventing falls. Ask your healthcare provider which types of activity are right for you.  · Get your vision checked on a regular basis.  · If you have pets, know where they are before you stand up or walk so you don't trip over them.  · Use night lights.  Date Last Reviewed: 11/5/2015  © 8064-3518 [employer]  "[employer]. [address] [place] [address], [place] [address]. All rights reserved. This information is not intended as a substitute for professional medical care. Always follow your healthcare professional's instructions.        Exercises to Prevent Falls  Certain types of exercises may help make you less likely to fall. Try the ones below. Or do other exercises that your health care provider suggests. Depending on your health, you may need to start slowly. Don't let that stop you. Even small amounts of exercise can help you. Be sure to talk to your health care provider before starting any exercise program.       Improve balance  Many types of exercise can help improve balance. Angel chi and yoga are good examples. Here's another one to try. You can do it anytime and almost anywhere.  · Stand next to a counter or solid support.  · Push yourself up onto your tiptoes.  · Hold for 5 seconds. If you start to lose your balance, hold on to the counter.  · Rest and repeat 5 times. Work up to holding for 20 to 30 seconds, if you can. Increase flexibility  Being more flexible makes it easier for you to move around safely. Try exercises like the seated hamstring stretch.  · Sit in a chair and put one foot on a stool.  · Straighten your leg and reach with both hands down either side of your leg. Reach as far down your leg as you can.  · Hold for about 20 seconds.  · Go back to the starting position. Then repeat 5 times. Switch legs. Build strength  "Resistance" exercises help build strength. You can do them without equipment. Or you can use weights, elastic bands, or special machines. One such exercise is called the biceps curl. You can hold a 1-pound weight or even a can of soup. Do this exercise at least 3 times a week. Strive for every day.  · Sit up straight in a chair.  · Keep your elbow close to your body and your wrist straight.  · Bend your arm, moving your hand up to your shoulder. Then slowly lower your arm.  · Repeat 5 " times. Switch to the other arm.   Build your staying power  Aerobic exercises make your heart and lungs stronger so you can keep moving longer. Walking and swimming are two of the best types of exercises you can do. Using a stationary bike is great, too. Find an aerobic exercise that you enjoy. Start slowly and build up. Even 5 minutes is helpful. Aim for a goal of 30 minutes, at least 3 times a week. You don't have to do 30 minutes in 1 session. Break it up and walk a little throughout the day.  More helpful tips  · Start easy. Slowly work up to doing more.  · Talk with your health care provider about the best exercises for you.  · Call senior centers or health clubs about exercise programs.  · If needed, have a family member watch you walk every so often to check your stability.  · Exercise with a friend. Choose an activity you both enjoy.  · Consider bernarda chi or yoga to strengthen your balance.  · Try exercises that you can do anytime, anywhere. Here are 2 examples. Have someone with you when you first try these:  ¨ Practice walking by placing 1 foot right in front of the other.  ¨ Stand up and sit down 10 times. Repeat this throughout the day.   Date Last Reviewed: 6/13/2015  © 5738-2430 [employer]. [address] [place] [address], [place] [address]. All rights reserved. This information is not intended as a substitute for professional medical care. Always follow your healthcare professional's instructions.        Preventing Falls in the Home  An adult or child can fall for many reasons. If you are an older adult, you may fall because your reaction time slows down. Your muscles and joints may get stiff, weak, or less flexible because of illness, medicines, or a physical condition. These things can also make a child more likely to fall or be injured in a fall.  Other health problems that make falls more likely include:  · Arthritis  · Dizziness or lightheadedness when you get out of bed (orthostatic  hypotension)  · History of a stroke  · Dizziness  · Anemia  · Certain medicines taken for mental illness  · Problems with balance or gait  · History of falls with or without an injury  · Changes in vision (vision impairment)  · Changes in thinking skills and memory (cognitive impairment)  Injuries from a fall can include broken bones, dislocated joints, and cuts. When these injuries are serious enough, they can make it impossible for you or a child who is injured in a fall to live on his or her own.  Prevention tips  To help prevent falls and fall-related injuries, follow the tips below.   Floors  Make floors safer by doing the following:   · Put nonskid pads under area rugs.  · Remove throw rugs.  · Replace worn floor coverings.  · Tack carpets firmly to each step on carpeted stairs. Put nonskid strips on the edges of uncarpeted stairs.  · Keep floors and stairs free of clutter and cords.  · Arrange furniture so there are clear pathways.  · Clean up any spills right away.  · Wear shoes that fit.  Bathrooms    Make bathrooms safer by doing the following:   · Install grab bars in the tub or shower.  · Apply nonskid strips or put a nonskid rubber mat in the tub or shower.  · Sit on a bath chair to bathe.  · Use bathmats with nonskid backing.  Lighting and the environment  Improve lighting in your home by doing the following:   · Keep a flashlight in each room. Or put a lamp next to the bed within easy reach.  · Put nightlights in the bedrooms, hallways, kitchen, and bathrooms.  · Make sure all stairways have good lighting.  · Take your time when going up and down stairs.  · Put handrails on both sides of stairs and in walkways for more support. To prevent injury to your wrist or arm, dont use handrails to pull yourself up.  · Install grab bars to pull yourself up.  · Move or rearrange items that you use often. This will make them easier to find or reach.  · Look at your home to find any safety hazards. Especially  look at doorways, walkways, and the driveway. Remove or repair any safety problems that you find.  Date Last Reviewed: 8/1/2016  © 4353-2957 [employer]. [address] [place] [address], [place] [address]. All rights reserved. This information is not intended as a substitute for professional medical care. Always follow your healthcare professional's instructions.        Preventing Falls: Making Changes in Your Living Space  Is your living space filled with hazards that could cause you to fall? Changes can make you safer. They could even save your life. Take a careful look around your home. Change what you can on your own. Hire someone or ask friends or family to help with harder tasks.      Be sure to add a nonslip mat to the inside of your shower or bathtub. Always keep a nightlight on. Keep a clear path from your bed to the bathroom. Move items from higher shelves to lower ones.   Remove hazards  · Remove things that can trip you, like throw rugs, boxes, piles of paper, or cords.  · Nail down rugs or carpeting if you don't want to remove them.  · Don't store items on stairs.  · Keep walkways clear.  · Clean up spills right away.  · Replace glass tables with wooden ones. They're safer if you fall.  Add safety devices  · Add handrails to both sides of stairs.  · Buy a raised toilet seat.  · Add grab bars near the toilet and in the shower.  · Get grabbers to help you reach things and avoid climbing.  Improve lighting  · Add nightlights to halls, bedrooms, and bathrooms.   · Put light switches at the top and bottom of stairs.  · Be sure each room and flight of stairs has proper lighting.  · Use shades or curtains to cut glare from windows.  · Put flashlights in each room. Replace burned-out bulbs.  · Get glowing light switches for room entrances.  Take other precautions  · Use nonskid floor wax.  · Buy a nonslip mat and a liquid soap dispenser for the shower.  · Tack down carpets or use slip-resistant  backing.  · Put most-used items within easy reach.  · Add bright paint or tape on the top front edge of steps.  · Save big jobs, such as moving furniture or other heavy objects, for family or friends.  · Get professional help installing grab bars. They can be unsafe if not installed the right way.  Fix riskier rooms first  Don't tackle everything at once. Focus on one room at a time. The bathroom is a common spot for falls, so you may start there. Or start with a room you spend lots of time in, such as your bedroom. Make only a few changes at once. This will give you time to adjust to them.     Outside your home  You might arrange for these changes yourself, or you might need to talk to your  or homeowners' association about them.  · Have loose boards on porches or damaged stairs repaired.  · Have rough edges, holes, or large cracks in sidewalks or driveways repaired.  · Have hazards that could trip you, such as hoses or jennifer, removed.  · Use high-wattage light bulbs (100 or greater) near outside doors and stairs.  · Get handrails added to outside stairs. Have them extend beyond the bottom step.  · Get help in winter weather with ice or snow removal.   Date Last Reviewed: 6/12/2015  © 6017-9573 [employer]. [address] [place] [address]. All rights reserved. This information is not intended as a substitute for professional medical care. Always follow your healthcare professional's instructions.        Hyperkalemia  Hyperkalemia is too much potassium in the blood. This most often occurs in people who take certain medicines or people with kidney disease.  This condition often has no symptoms until levels of potassium become high. If symptoms do occur, they include muscle weakness and changes in the heartbeat. A blood test is done to diagnose the problem. An ECG (electrocardiogram) may also be done to test the heartbeat.  If hyperkalemia is caused by a medicine, the healthcare  provider may lower the dose or switch to a different medicine. A low-potassium diet may also be prescribed.   Home care  · Be sure your healthcare provider knows about all of the medicines you take. Follow your healthcare provider's advice about making changes to your medicines.  · If a low-potassium diet has been prescribed, follow this closely. If you need help, ask to be referred to a dietitian for advice on how to follow this diet.  Follow-up care  Follow up with your healthcare provider. You may need a repeat blood test within the next 7 days. Schedule this as advised.  When to seek medical advice  Call your healthcare provider if any of the following occur:  · Weakness, dizziness, or lightheadedness  · Nausea, vomiting, or diarrhea  · Urinating only in small amounts or not urinating  · Symptoms don't go away or get worse  Call 911  Call 911 or emergency services right away if any of the following occur:  · Fast or irregular heartbeat  · Fainting  · Severe shortness of breath  · Chest, arm, shoulder, neck or upper back pain  · Trouble controlling your muscles  Date Last Reviewed: 6/26/2015  © 1154-4691 [employer]. [address] [place] [address]. All rights reserved. This information is not intended as a substitute for professional medical care. Always follow your healthcare professional's instructions.        Discharge Instructions for Hyperkalemia  You have been diagnosed with hyperkalemia (a high level of potassium in the blood). Potassium is important to the function of the nerve and muscle cells, including the cells of the heart. But a high level of potassium in the blood can cause  serious problems such as abnormal heart rhythms and even heart attack.  Diet changes  · Eat less of these potassium-rich foods:  ¨ Bananas (avoid bananas completely)  ¨ Apricots, fresh or dried  ¨ Oranges and orange juice  ¨ Grapefruit juice  ¨ Tomatoes, tomato sauce, and tomato  "juice  ¨ Spinach  ¨ Green, leafy vegetables, including salad greens, kale, broccoli, chard, and collards  ¨ Melons (all kinds)  ¨ Peas  ¨ Beans  ¨ Potatoes  ¨ Sweet potatoes  ¨ Avocados and guacamole  ¨ Vegetable juice (homemade or store-bought) and vegetable juice cocktail  ¨ Fruit juices  ¨ Nuts, including pistachios, almonds, peanuts, hazelnuts, [place], cashew, mixed  ¨ "Lite" or reduced sodium salt  Other home care  · Tell your healthcare provider about all prescription and over-the-counter medicines you are taking. Certain medicines can increase potassium levels.  · Take all medicines exactly as directed.  · Have your potassium levels checked regularly.  · Keep all follow-up appointments. Your healthcare provider needs to monitor your condition closely.  · Learn to take your own pulse. If your pulse is less than 60 beats per minute or irregular, call your provider.  Follow-up  Make a follow-up appointment as directed by our staff.     When to Call Your healthcare provider  Call your provider right away if you have any of the following:  · Chest pain (call 911)  · Fainting (call 911)  · Shortness of breath (call 911 if severe)  · Slow, irregular heartbeat  · Fatigue  · Dizziness  · Lightheadedness  · Confusion   Date Last Reviewed: 6/19/2015  © 7159-1037 [employer]. [address] [place] [address]. All rights reserved. This information is not intended as a substitute for professional medical care. Always follow your healthcare professional's instructions.        What Is Insomnia?    Do you have trouble falling asleep? Do you wake up often during the night? Or maybe you wake up too early in the morning. You may be suffering from insomnia. Talk to your healthcare provider if it lasts longer than a few weeks and you feel tired most of the time.  What causes insomnia?  Some common causes of insomnia are:  · Medical problems such as pain, depression, medicine side effects, or trouble " breathing  · Circadian rhythm disorder, a shift in the bodys normal 24-hour activity cycle  · Lifestyle factors such as a changing sleep schedule, lack of exercise, or too much caffeine  · Sleep settings such as a poor mattress, noise, or a room thats too hot or too cold  · Stress such as problems at work, money worries, or family events  Talk to your healthcare provider  Describe your sleeping problems to your healthcare provider. Try to keep a daily sleep diary for a couple weeks. Write down the time you go to bed, the time you wake up, and anything that seems to affect your sleep. Your healthcare provider can work with you to develop a treatment plan. You may need to learn good sleeping habits and make some lifestyle changes. If you have any medical problems, these may need to be treated first.  Date Last Reviewed: 7/18/2015 © 2000-2017 [employer]. [address] [place] [address]. All rights reserved. This information is not intended as a substitute for professional medical care. Always follow your healthcare professional's instructions.        Insomnia  Insomnia is repeated difficulty going to sleep or staying asleep, or both. Whether you have insomnia is not defined by a specific amount of sleep. Different people need different amounts of sleep, and you may need more or less sleep at different times of your life.  There are 3 major types of insomnia:  short-term, chronic, and other.  Short-term, or acute insomnia lasts less than 3 months.  The symptoms are temporary and can be linked directly to a stressor, such as the death of a loved one, financial problems, or a new physical problem.  Short-term insomnia stops when the stressor resolves or the person adapts to its presence.  Chronic insomnia occurs at least 3 times a week and lasts longer than 3 months.  Chronic insomnia can occur when either the cause of the sleeping problem is not clear, or the insomnia does not get better  when the stressor is resolved. A number of other criteria are also used to make the diagnosis of chronic insomnia.   Other insomnia is the third type of insomnia-related sleep disorders.  This description applies to people who have problems getting to sleep or staying asleep, but do not meet all of the factors that describe either short-term or chronic insomnia.    Many things cause insomnia. Different people may have different causes. It can be from an underlying medical or psychological condition, or lifestyle. It can also be primary insomnia, which means no cause can be found.  Causes of insomnia include:  · Chronic medical problems- heart disease, gastrointestinal problems, hormonal changes, breathing problems  · Anxiety  · Stress  · Depression  · Pain  · Work schedule  · Sleep apnea  · Illegal drugs  · Certain medicines  Many different medidcines can affect your sleep, such as stimulants, caffeine, alcohol, some decongestants, and diet pills. Other medicines may include some types of blood pressure pills, steroids, asthma medicines, antihistamines, antidepressants, seizure medicines and statins. Not all of these will affect your sleep, and they shouldnt be stopped without talking to your doctor.  Symptoms of insomnia can include:  · Lying awake for long periods at night before falling asleep  · Waking up several times during the night  · Waking up early in the morning and not being able to get back to sleep  · Feeling tired and not refreshed by sleep  · Not being able to function properly during the day and finding it hard to concentrate  · Irritability  · Tiredness and fatigue during the day  Home care  1. Review your medicines with your doctor or pharmacist to find out if they can cause insomnia. Not all medicines will affect your sleep, but they shouldn't be stopped without reviewing them with your doctor. There may be serious side effects and consequences from suddenly stopping your medicines. Not taking  them may cause strokes, heart attacks, and many other problems.  2. Caffeine, smoking and alcohol also affect sleep. Limit your daily use and do not use these before bedtime. Alcohol may make you sleepy at first, but as its effects wear off, you may awaken a few hours later and have trouble returning to sleep.  3. Do not exercise, eat or drink large amounts of liquid within 2 hours of your bedtime.  4. Improve your sleep habits. Have a fixed bed and wake-up time. Try to keep noise, light and heat in your bedroom at a comfortable level. Try using earplugs or eyeshades if needed.   5. Avoid watching TV in bed.  6. If you do not fall asleep within 30 minutes, try to relax by reading or listening to soft music.  7. Limit daytime napping to one 30 minute period, early in the day.  8. Get regular exercise. Find other ways to lessen your stress level.  9. If a medicine was prescribed to help reset your sleep patterns, take it as directed. Sleeping pills are intended for short-term use, only. If taken for too long, the effect wears off while the risk of physical addiction and psychological dependence increases.  Sleep diary  If the cause isnt obvious and it is not improving, try keeping a sleep diary for a couple of weeks. Include in it:  · The time you go to bed  · How long it takes to fall asleep  · How many times you wake up  · What time you wake up  · Your meal times and what you eat  · What time you drink alcohol  · Your exercise habits and times  Follow-up care  Follow up with your healthcare provider, or as advised. If X-rays or CT scans were done, you will be notified if there is a change in the reading, especially if it affects treatment.  Call 911  Call 911 if any of these occur:  · Trouble breathing  · Confusion or trouble waking  · Fainting or loss of consciousness  · Rapid heart rate  · New chest, arm, shoulder, neck or upper back pain  · Trouble with speech or vision, weakness of an arm or leg  · Trouble  walking or talking, loss of balance, numbness or weakness in one side of your body, facial droop  When to seek medical advice  Call your healthcare provider right away if any of these occur:  · Extreme restlessness or irritability  · Confusion or hallucinations (seeing or hearing things that are not there)  · Anxiety, depression  · Several days without sleeping  Date Last Reviewed: 11/19/2015  © 0079-4757 [employer]. [address] [place] [address], [place] [address]. All rights reserved. This information is not intended as a substitute for professional medical care. Always follow your healthcare professional's instructions.        Treating Insomnia     Learning to relax before bedtime can improve your sleep.     Good sleeping habits are a key part of treatment. If needed, some medications may help you sleep better at first. Making healthy lifestyle changes and learning to relax can improve your sleep. Treating insomnia takes commitment, but trust that your efforts will pay off. Talk to your health care provider before taking any medication.  Healthy lifestyle  Your lifestyle affects your health and your sleep. Here are some healthy habits:  · Keep a regular sleep schedule. Go to bed and get up at the same time each day.  · Exercise regularly. It may help you reduce stress. Avoid strenuous exercise for 2 to 4 hours before bedtime.  · Avoid or limit naps, especially in the late afternoon.  · Use your bed only for sleep and sex.  · Dont spend too much time in bed trying to fall asleep. If you cant fall asleep, get up and do something (no electronics) until you become tired and drowsy.  · Avoid or limit caffeine and nicotine for up to 6 hours before bedtime. They can keep you awake at night.   · Also avoid alcohol for at least 4 to 6 hours before bedtime. It may help you fall asleep at first, but you will have more awakenings throughout the night, and your sleep will not be restful.  Before bedtime  To sleep  better every night, try these tips:  · Have a bedtime routine to let your body and mind know when its time to sleep.  · Think of going to bed as relaxing and enjoyable. Sleep will come sooner.  · If your worries dont let you sleep, write them down in a diary. Then close it, and go to bed.  · Make sure the room is not too hot or too cold. If its not dark enough, an eye mask can help. If its noisy, try using earplugs.  Learn to relax  Stress, anxiety, and body tension may keep you awake at night. To unwind before bedtime, try a warm bath, meditation, or yoga. Also, try the following:  · Deep breathing. Sit or lie back in a chair. Take a slow, deep breath. Hold it for 5 counts. Then breathe out slowly through your mouth. Keep doing this until you feel relaxed.  · Progressive muscle relaxation. Tense and then relax the muscles in your body as you breathe deeply. Start with your feet and work up your body to your neck and face.  Date Last Reviewed: 7/18/2015  © 2763-1043 [employer]. [address] [place] [address]. All rights reserved. This information is not intended as a substitute for professional medical care. Always follow your healthcare professional's instructions.        What Is Osteoporosis?  Osteoporosis is a disease that weakens the bones. Weakened bones are more likely to fracture (break). Osteoporosis affects men and women, but postmenopausal women are most at risk. To help prevent osteoporosis, you need to exercise and nourish your bones throughout your life.    Childhood  The body builds the most bone during these years. That's why boys and girls need foods rich in calcium. They also need plenty of exercise. A healthy diet and exercise helps bones grow strong.  Young adulthood to age 30  During young adulthood, bones become their strongest. This is called peak bone mass. The same good habits that kept bones healthy in childhood help keep bone healthy in adulthood.  Age 30 to  menopause  Bone mass declines slightly during these years. Your body makes just enough new bone to maintain peak bone mass. To keep your bones at their peak mass, be sure to exercise and get plenty of calcium.  After menopause  Menopause is when a woman stops having monthly periods. After menopause, the body makes less estrogen (female hormone). This increases bone loss. At this point, treatment may be needed to reduce the risk for fracture. Exercise and calcium can also help keep your bones strong.  Later in life  In later years, both men and women need to take extra care of their bones. By this point, the body loses more bone than it makes. If too much bone is lost, you may be at risk for fractures. With age, the quality and quantity of bone declines. You can lessen bone loss by staying active and increasing your calcium intake. Calcium supplements and other osteoporosis treatments do have risks, so talk to your healthcare provider if you have concerns. If you have osteoporosis, you can also learn ways to increase everyday safety.  Date Last Reviewed: 10/17/2015  ©2007 [employer]. [address] [place] [address]. All Rights reserved. This information is not intended as a substitute for professional medical care. Always follow your healthcare providers instructions.        Osteoporosis: Understanding Bone Loss     A balanced system keeps building and resorbing bone.   The body has a natural system for maintaining bone. Understanding this system can help you learn how to maintain your bones.  A balanced system supports the body  The body is always losing (resorbing) and making bone. This process is called remodeling. Bone-resorbing cells take bone apart. They do this so the minerals can be used to repair an injury or make new bone. Bone-making cells form new bone using calcium and other minerals. These minerals come from the food you eat. When this bone-making system is in balance, the same  amount of bone is built and resorbed.        An unbalanced system cant give support     An unblalanced system builds too little bone and resorbs too much.   Changes in hormone levels, activity, medications, or diet can affect the bone-making system. When the system gets out of balance, the amount of bone lost is greater than the amount of bone made. This can cause osteopenia (when bone starts to become less dense). Left untreated, bone loss gets worse, leading to osteoporosis. Weak bones cant support the body. In fact, they can fracture just from the weight of your body. This often happens in vertebrae (bones of the spine). When vertebrae fracture, parts of the spine compress. This causes the back to bend or hump over, and it can also cause back pain.  Date Last Reviewed: 10/11/2015  © 8319-2788 [employer]. [address] [place] [address]. All rights reserved. This information is not intended as a substitute for professional medical care. Always follow your healthcare professional's instructions.        Osteoporosis Medications: Bisphosphonates  Depending on your needs, your healthcare provider may prescribe medicines to prevent or treat osteoporosis.    Bisphosphonates  Several medicines make up the class of drugs known as bisphosphonates. Bisphosphonates are the most common type of medicine used to help prevent and treat bone loss. Bisphosphonates are given in pill or injectable form as an IV infusion. They must be taken exactly as directed. Benefits may include:  · Reducing bone loss  · Increasing bone density in the hip and spine  · Reducing risk of fractures in the spine, hip, and wrist  Side effects may include:  · Heartburn  · Nausea  · Abdominal pain  · Bone or muscle pain  Taking bisphosphonates pills  Always read medicine information closely. Certain bisphosphonates must be taken:  · On an empty stomach.  · With a full glass of water (8 oz) first thing in the morning.  · At least  30 minutes to one hour before any food, drink, or other medications.  · While sitting or standing. You should not lie down for at least 30 minutes after taking the medicine.  Newer medicines can be taken weekly or monthly. Talk with your doctor to find out which one is right for you.   Date Last Reviewed: 10/11/2015  © 1387-6033 [employer]. [address] [place] [address]. All rights reserved. This information is not intended as a substitute for professional medical care. Always follow your healthcare professional's instructions.        Left- or Right- Side Congestive Heart Failure (CHF)    The heart is a large muscle. It is a pump that circulates blood throughout the body. Blood carries oxygen to all of the organs, including the brain, muscles, and skin. After your body takes the oxygen out of the blood, the blood returns to the heart. The right side of the heart collects the blood from the body and pumps it to the lungs. In the lungs, it gets fresh oxygen and gives up carbon dioxide. The oxygen-rich blood from the lungs then returns to the left side of the heart, where it is pumped back out to the rest of your body, starting the process all over.  Congestive heart failure (CHF) occurs when the heart muscle is weakened. This affects the pumping action of the heart. Heart failure can affect the right side of the heart or the left side. But heart failure may affect not only the right side of the heart or only the left side. Although it may have started on one side, it can and often eventually does affect both sides.  Right-side heart failure  When the right side of the heart is weakened, it cant handle the blood it is getting from the rest of the body. This blood returns to the heart through veins. When too much pressure builds up in the veins, fluid leaks out into the tissues. Gravity then causes that fluid to move to those parts of the body that are the lowest. So one of the first symptoms  of right-side CHF can include swelling in the feet and ankles. If the condition gets worse, the swelling can even go up past the knees. Sometimes it gets so severe, the liver can get congested as well.  Left-side heart failure  When the left side of the heart is weakened, it cant handle the blood it gets from the lungs. Pressure then builds up in the veins of the lungs, causing fluid to leak into the lung tissues. This may cause CHF and pulmonary edema. This causes you to feel short of breath, weak, or dizzy. These symptoms are often worse with exertion, such as when climbing stairs or walking up hills. Lying with your head flat is uncomfortable and can make your breathing worse. This may make sleeping difficult. You may need to use extra pillows to elevate your upper body to sleep well. The same is true when just resting during the daytime.  There are many causes of heart failure including:  · Coronary artery disease  · Past heart attack (also known as acute myocardial infarction, or AMI)  · High blood pressure  · Damaged heart valve  · Diabetes  · Obesity  · Cigarette smoking  · Alcohol abuse  Heart failure is a chronic condition. There is no cure. The purpose of medical treatment is to improve the pumping action of the heart. The main way to do this is to remove excess water from the body. A number of medicines can help reach this goal, improve symptoms, and prevent the heart from becoming weaker. Sometimes, heart failure can become so severe that a device is placed in the heart to help with pumping. Another major goal is to better treat the causes of heart failure, such as diabetes and high blood pressure, by making changes in your lifestyle and maximizing medical control when needed.  Home care  Follow these guidelines when caring for yourself at home:  · Check your weight every day. This is very important because a sudden increase in weight gain could mean worsening heart failure. Keep these things in  mind:  ¨ Use the same scale every day.  ¨ Weigh yourself at the same time every day.  ¨ Make sure the scale is on a hard floor surface, not on a rug or carpet.  ¨ Keep a record of your weight every day so your healthcare provider can see it. If you are not given a log sheet for this, keep a separate journal for this purpose.   · Cut back on the amount of salt (sodium) you eat. Follow your healthcare provider's recommendation on how much salt or sodium you should have each day.  ¨ Avoid high-salt foods. These include olives, pickles, smoked meats, salted potato chips, and most prepared foods.  ¨ Don't add salt to your food at the table. Use only small amounts of salt when cooking.  ¨ Read the labels carefully on food packages to learn how much salt or sodium is in each serving in the package. Remember, a can or package of food may contain more than 1 serving. So if you eat all the food in the package, you may be getting more salt than you think.  · Follow your healthcare provider's recommendations about how much fluid you should have. Be aware that some foods, such as soup, pudding, and juicy fruits like oranges or melons, contain liquid. You'll need to count the liquid in those foods as part of your daily fluid intake. Your provider can help you with this.  · Stop smoking.  · Cut back on how much alcohol you drink.  · Lose weight if you are overweight. The excess weight adds a lot of stress on the workload of the heart.  · Stay active. Talk with your provider about an exercise program that is safe for your heart.  · Keep your feet elevated to reduce swelling. Ask your provider about support hose as a preventive treatment for daytime leg swelling.  Besides taking your medicine as instructed, an important part of treatment is lifestyle changes. These include diet, physical activity, stopping smoking, and weight control.  Improve your diet by including more fresh foods, cutting back on how much sugar and saturated fat  you eat, and eating fewer processed foods and less salt.  Follow-up care  Follow up with your healthcare provider, or as advised.  Make sure to keep any appointments that were made for you. These can help better control your congestive heart failure. You will need to follow up with your provider on a routine basis to make sure your heart failure is well managed.  If an X-ray, ECG, or other tests were done, you will be told of any new findings that may affect your care.  Call 911  Call 911 if you:  · Become severely short of breath  · Feel lightheaded, or feel like you might pass out or faint  · Have chest pain or discomfort that is different than usual, the medicines your doctor told you to use for this don't help, or the pain lasts longer than 10 to 15 minutes  · You suddenly develop a rapid heart rate  When to seek medical advice  The following may be signs that your heart failure is getting worse. Call your healthcare provider right away if any of these happen:  · Sudden weight gain. This means 3 or more pounds in one day, or 5 or more pounds in 1 week.  · Trouble breathing not related to being active  · New or increased swelling of your legs or ankles  · Swelling or pain in your abdomen  · Breathing trouble at night. This means waking up short of breath or needing more pillows to breathe.  · Frequent coughing that doesnt go away  · Feeling much more tired than usual  Date Last Reviewed: 1/4/2016  © 3379-2378 [employer]. [address] [place] [address], [place] [address]. All rights reserved. This information is not intended as a substitute for professional medical care. Always follow your healthcare professional's instructions.        Heart Failure: Warning Signs of a Flare-Up  You have a condition called heart failure. Once you have heart failure, flare-ups can happen. Below are signs that can mean your heart failure is getting worse. If you notice any of these warning signs, call your healthcare  provider.  Swelling    · Your feet, ankles, or lower legs get puffier.  · You notice skin changes on your lower legs.  · Your shoes feel too tight.  · Your clothes are tighter in the waist.  · You have trouble getting rings on or off your fingers.  Shortness of breath  · You have to breathe harder even when youre doing your normal activities or when youre resting.  · You are short of breath walking up stairs or even short distances.  · You wake up at night short of breath or coughing.  · You need to use more pillows or sit up to sleep.  · You wake up tired or restless.  Other warning signs  · You feel weaker, dizzy, or more tired.  · You have chest pain or changes in your heartbeat.  · You have a cough that wont go away.  · You cant remember things or dont feel like eating.  Tracking your weight  Gaining weight is often the first warning sign that heart failure is getting worse. Gaining even a few pounds can be a sign that your body is retaining excess water and salt. Weighing yourself each day in the morning after you urinate and before you eat, is the best way to know if you're retaining water. Get a scale that is easy to read and make sure you wear the same clothes and use the same scale every time you weigh. Your healthcare provider will show you how to track your weight. Call your doctor if you gain more than 2 pounds in 1 day, 5 pounds in 1 week, or whatever weight gain you were told to report by your doctor. This is often a sign of worsening heart failure and needs to be evaluated and treated before it compromises your breathing. Your doctor will tell you what to do next.    Date Last Reviewed: 3/15/2016  © 9413-1018 [employer]. [address] [place] [address], [place] [address]. All rights reserved. This information is not intended as a substitute for professional medical care. Always follow your healthcare professional's instructions.        Heart Failure: Know Your Baselines     Your healthcare  provider can help you come up with baselines for measuring your symptoms.   The first step to managing heart failure symptoms is getting to know whats normal for you. How much can you usually do before shortness of breath is a problem? Do your socks and shoes fit comfortably? How much do you weigh? How your symptoms usually feel are your baselines. Knowing whats normal for you will help you see when symptoms are getting worse. Youll know because you wont feel normal anymore. Worsening symptoms means your heart is under stress. It is having a hard time pumping blood to the rest of your body and your body may be retaining fluid. Write some baselines in the box below. These will help you measure your symptoms.  Watch for changes  Once youve come up with baselines, watch for changes daily. Pay attention to how much you can do today. Is it the same as yesterday? Are your shoes tight? Do you need to use a different belt hole? Can you lie flat in bed to sleep without feeling that you are suffocating? Can you eat without feeling full too soon, or short of breath? Are you gaining weight but eating the same amount?  If todays symptoms are different from your baselines, you need to take action. The problem wont go away by itself. So, if you notice even a small change, dont ignore it. Your healthcare provider is counting on you to call when you think your symptoms are worse. He or she will tell you what to do next. Working together this way helps keep heart failure under control and improves the number of good days you have. It could even keep you out of the hospital.    Date Last Reviewed: 7/1/2016 © 2000-2017 [employer]. [address] [place] [address], [place] [address]. All rights reserved. This information is not intended as a substitute for professional medical care. Always follow your healthcare professional's instructions.        Biventricular Pacemaker and ICD (Biventricular ICD)     You have a condition  called heart failure. It is also known as congestive heart failure (CHF). This condition causes symptoms such as getting tired very quickly and being short of breath. To help treat these symptoms, your healthcare provider is recommending a biventricular pacemaker and implantable cardioverter defibrillator (ICD). This is sometimes called a biventricular ICD. Or it is called cardiac resynchronization pacing with an ICD (CRT-D).  A biventricular pacemaker and ICD is a small, lightweight device powered by batteries. This device helps keep your heart pumping normally. It also protects you from dangerous heart rhythms. Read on to learn more about this device and how it works.  Understanding the heart  The heart is made up of 4 sections (chambers) that pump to move blood through the heart. The top 2 chambers are the left atrium and right atrium. These are filling chambers of the heart. The bottom chambers are the left ventricle and right ventricle. These are the pumping chambers of the heart. The heart has an electrical system. This system sends signals that make the atria and ventricles work together. This causes the heart to beat and move blood through the heart and lungs and out to the body.  How the device works  Heart failure weakens your heart muscle. As a result, the ventricles dont pump as strongly as they should.  The pathways that carry the heart's electrical signals are located in the heart muscle. They can also be damaged by CHF. This can cause a bundle branch block. A bundle branch block can throw off the timing of the heart's contraction. This can make the heart's squeezing contraction even weaker.  A biventricular pacemaker and ICD help keep the heart pumping in a more normal way. The pacemaker device keeps the heart from beating too slowly. It tries to restore the normal squeezing pattern of the heart. This is called resynchronization pacing. This can lead to more efficient and stronger heart contraction.  "The ICD part of the device detects dangerously fast heart rhythms and stops them. If the device detects an abnormally fast heartbeat that can cause cardiac arrest, it will send a "shock" to the heart. The shock stops this dangerous heart rhythm and restores a normal heartbeat.  Before the procedure  Make sure to:  · Not eat or drink after midnight or 8 hours before your surgery  · Follow instructions from your doctor about bathing the night before and the morning of your procedure. You may need to use a special cleaning solution.  · Tell your doctor what medicines you take. This includes over-the-counter medicines such as ibuprofen. It also includes vitamins, herbs, and other supplements. You may need to stop taking some medicines before the procedure, such as blood thinners and aspirin.  · Tell your doctor if you are sensitive or allergic to anything. This includes medicines, latex, tape, and anesthetic medicines.  · Ask a family member or friend to take you home from the hospital  Tests before your procedure  Before your procedure you may need tests such as:  · Chest X-ray  · Blood tests, to test your general health  During the procedure  · You will get medicine (anesthesia) so you wont feel pain. Most likely, you will get medicine (sedation) that will make you drowsy or lightly asleep. The doctor will inject local pain medicine to numb the skin on your chest where the cut (incision) will be made.  · The doctor will make an incision where the device will be implanted. This is most often on the left side of the chest just below the collarbone (clavicle).  · The doctor makes a small pocket for the generator under the skin.  · The doctor will put a thin, flexible tube (catheter) into a vein leading to the right atrium. He or she will guide the devices wires (leads) through the catheter to the heart. The doctor will use an X-ray monitor to move the leads into the right ventricle. He or she will usually also put a " lead in the right atrium.  · The doctor will put the lead for the left ventricle into a vein that runs along the outside of this chamber. He or she will also use the X-ray monitor to put this lead in the correct spot. The device will stimulate the left ventricle from the outside. The other leads will stimulate the heart from inside the chambers.  · The doctor will attach the generator to the leads. He or she will send pulses through the leads to test the generator. This testing may cause your heart to race.  · The doctor will then put the generator into the pocket under your skin.  · The doctor will program the device based on the heart rate thats best for you. He or she will check the device to see that it is working.  · The doctor will close the skin with stitches (sutures), surgical glue, or staples. Any stitches used will dissolve over time. If glue is used, it will seal the cut and prevent infection. A bandage will be put on the area.  After the procedure  You will spend several hours in a recovery room. Once you are stable and awake, you will be put in a room that can monitor your heart rhythm. Your healthcare team will also watch your breathing and other vital signs. Youll be given pain medicine if you need it.  The team may place the arm on the side of the device in a sling. This is just for a short time, to keep the arm and shoulder still.  You will have a chest X-ray to make sure the leads are where they should be. The doctor will also check that your lungs were not injured during the procedure.  You can resume a normal diet as soon as you are able. You will be watched overnight. The team will check the device the next morning. You will likely go home the day after your procedure. You may need to take antibiotics for several days to prevent infection.  Recovering at home  Follow all the instructions your healthcare provider gives you for medicines, bathing, exercise, diet, and wound care. Ask your doctor  when you can go back to work or start driving again.  Dont raise your arm above your shoulder on the side of your incision until your doctor says its OK to do so. This gives the leads a chance to secure themselves inside your heart.  Follow-up care  Make sure to keep all your follow-up appointments. This is so your doctor can download information from your device and check its settings. Be sure to tell your doctor how the device is working for you.  Most devices can now be connected to a wireless home monitoring system via the internet. The monitor can download information from your device and send it to your doctor. This lets your doctor make sure the device is working as it should.  Life with a biventricular pacemaker and ICD  · Carry an ID card. When you first get your pacemaker, youll be given an ID card to carry. This card contains important information about the device. Show it to any doctor, dentist, or other provider you visit. Pacemakers may set off metal detectors. So you may need to show your card to security personnel.  · Be careful when using a cell phone. Hold it to the ear farthest from your pacemaker. Dont carry the phone in your breast pocket, even when its turned off.  · Avoid very strong magnets. These include those used for an MRI or in hand-held security wands. Show your ID card when you go through security.  · Avoid strong electrical fields. These are made by radio transmitting towers and ham radios. They are also made by heavy-duty electrical equipment. A running engine makes an electrical field. Don't lean over the open seymour of a running car. Most household and yard appliances will not cause any problems. If you use any large power tools, such as an industrial , talk to your doctor.   · Keep an eye on the battery. The battery inside the device is checked regularly. It will last 5 to 7 years, depending on how often it paces or has to stop dangerous heart rhythms. Once it starts  to run down, you will need to have it changed. This is like the implantation surgery, but it takes less time. This is because the battery/generator is the only part that needs to be replaced.  · Be careful when driving. Your device has a defibrillator built in. You may not be allowed to drive for the first 6 months after you get the device. You may also not be allowed to drive for 6 months after the defibrillator delivers a shock. You will not be allowed to work as a  if you have an ICD.     When should I call my healthcare provider?  Call 911 if you have chest pain or trouble breathing.  Call your healthcare provider if you have any of the following:  · Redness, severe swelling, severe pain, drainage, bleeding, or warmth at the incision site  · Incision site or opens up or does not heal well  · A fever of 100.4°F (38.0°C) or higher, or as directed by your healthcare provider  · Pain around the generator that gets worse  · Swelling in the arms or hands on the side of the incision  · Sudden weight gain  · Twitching chest or abdominal muscles  · Frequent or constant hiccups  · A shock from your device  · Very fast heartbeat that doesnt stop  · Generator feels loose or like it is wiggling in the pocket under the skin    Date Last Reviewed: 3/17/2016  © 6251-4343 [employer] [address] [place] [address], [place] [address]. All rights reserved. This information is not intended as a substitute for professional medical care. Always follow your healthcare professional's instructions.

## 2019-06-05 NOTE — PROGRESS NOTES
Summary:  06/05/2019  RN-CM received OPCM referral for High Risk (84%) patient from Mikel Xiong DO. Patient's PCP is Dr. Eddie España. Reason for referral: Type 2 diabetes mellitus with diabetic polyneuropathy, without long-term current use of insulin; Combined systolic and diastolic congestive heart failure, NYHA class 3, unspecified congestive heart failure chronicity. Chart review completed. RN-CM contacted patient to complete initial assessment for OPCM. Spoke with Ms. Whiteside via telephone and explained OPCM program. Patient in agreement with enrollment and assessment was completed with patient today. Patient is an 83 year-old , female with a PMH of Lumbar DDD, Cervicogenic Headache, Spondylosis of Cervical Region w/o Myelopathy or Radiculopathy, Neuritis, Pulmonary Heart Disease, Chronic Bronchitis, COPD, Essential Hypertension, Pacemake and AICD Placement, Aortic Atherosclerosis, Atherosclerosis of Arteries of Extremities, Aortic Stenosis, CAD, S/P Transcatheter Aortic Valve Replacement, S/P LILLIAM to RCA, Cardiomyopathy - EF 30-35%, Chronic Atrial Fibrillation, Combined Systolic and Diastolic CHF, Bilateral Carotid Artery Stenosis, Urinary Incontinence, CKD Stage 3, Long Term Current Use of Anticoagulant Therapy, History of Breast Cancer, Type 2 DM, Morbid Obesity, Hypothyroidism, GERD, Dysphagia, Primary Osteoarthritis of Both Knees, Cervicalgia, Cervical Myofascial Pain Syndrome, Cervical Facet Arthropathy, Sleep Apnea (compliant with CPAP), Insomnia, Depression, Diverticulosis, Diverticulitis, Hiatal Hernia, IBS, Schatzki's Ring. Patient is AAOX4. Reports that she has 5 children, one daughter that lives in [place] that she is not in contact with and 4 sons. Patient states that her oldest son, Ty Obando, lives in [place] and holds Financial POA for her. Reports independence in ADLs and IADLs. Has some difficulty with cooking and household chores. States that she eats out a lot and has a lady  "that comes to do "heavy cleaning" every 2 weeks. Patient lives in a condominium with a reverse mortgage, which she states is too big for her to handle any longer. States that she purchased the condominium 10 years ago with a reverse mortgage and is now unclear as to what happens when you leave, (ie, do you owe money, do you sell the house and your equity, etc). Reports that she applied to [employer] in [place], a little over a year ago and just found out that she is now 3rd on the list to get in.  Encouraged patient not to wait until she got the call from [employer] to see where she stood with the reverse mortgage. Encouraged her to ask her son, Ty, to contact the mortgage company to determine what the process is for moving and how it would affect the reverse mortgage and the money involved. Patient verbalized understanding and states that was a "good idea" and will follow up with her son. Patient states that she still drives and also has a friend who can drive her if needed. States that her friend is 94, "still has his mind", is in good health and is able to drive. Reports that he is moving to [place] soon to live with his daughter, so may need assistance with transportation in the future, but not at this time. Patient denies any falls, states that she ambulates with a Rollator at all times. Reports several "near falls". I will mail educational literature about Fall Prevention to patient and will review literature with patient at next phone call. Reports compliance with medication regimen. Sets up pill organizer for a week at a time; forgets to take her AM meds on occasion, but takes them as soon as she realizes. States that she is unable to really "cook" any more due to back problems and unable to stand up at the stove very long. Can heat up things in the microwave; reports only eats 2 meals per day and usually "eats out". Will check to see if patient is eligible for [employer] Chronic Conditions meals " "([phone]). Patient denies any recent financial struggles, just states that it is becoming more difficult to afford utilities, medication co-pays, bills as she gets older. States that she gets behind every now and then on bills, but reports all that should improve, once she moves into [facility]. Denies need for financial assistance at this time. Patient has a history of LYUDMILA and reports compliance with CPAP nightly. Patient states that she really does not have a"disaster plan", but states that she knows [facility] has one. States that if she had to evacuate, she would just go with her son. Provided patient with [facility] phone number [phone]. Reports that she has a Living Will and that her son, Watson Obando holds POA. Patient has a history of Type 2 DM and checks, logs her blood glucose every AM, reports this AM reading of 104. Patient has maintained her A1c below 7.0% for the last several years and denies need for Diabetes educational resources. Patient reports that she likes to get out, is very involved with her [employer], [facility], and is part of a group called the "[employer]", goes with a group to lunch once per month and is part of a [employer] ministry. Patient has history of CKD; states that she is "very closely" monitored by her nephrologist and last BMP from 6/3/19 showed "great improvement" in renal function. Denies need for educational resources on CKD. States that she has some difficulty with insomnia and uses OTC melatonin or benadryl as needed. I will mail educational literature about Insomnia to patient and will review literature with patient at next phone call. Patient reports that her blood pressure is well controlled; reports consistent blood pressure ranges of 110's to 120's over 60's to 70's. Patient has had several stent placements as well as an aortic valve replacement and a biventricular AICD/pacemaker. Denies any recent " chest pain; states that she knows what to do if she has chest pain and would not hesitate to call 911 for any signs/symptoms that were not relieved with NTG. Patient has history of CHF and has not been weighing/logging daily. I will mail educational literature about CHF/CHF Flare to patient and will review literature with patient at next phone call. Encouraged patient to weigh and log daily. Stressed the importance of contacting healthcare provider right away for weight gain of 2 or more pounds overnight or 5 or more pounds in one week; verbalized understanding. Patient unable to complete medication reconciliation at this time; will complete at next call. Patient expressed appreciation for outreach and is in agreement with a follow up call in 2 weeks. Will continue to follow. - JOHN Jameson RN-OPCM    Interventions:  - Chart review completed 6/5/19.  - Assessment completed with patient.  - Mailed educational information/resources ([facility]) on Fall Prevention, CHF/CHF Flare, Hyperkalemia, Biventricular ICD, Insomnia, Osteoporosis.   - Mailed weight logs to patient.  - Encouraged patient to weigh and log daily.   - Contacted Sarika at [employer] [phone] regarding referral from PCP for Chronic Conditions meal delivery.   - Brought [employer] form for Chronic Conditions verification to Dr. España's office; completed by office and faxed to Chronic Conditions Dept. [phone]. Patient will be contacted directly by Chronic Conditions Department.  - Reviewed upcoming scheduled appointments with patient.   - Empowered patient to discuss treatment plan with Physician/care team.  - Encouraged compliance with Physician follow-ups.  - Encouraged Dietary Compliance (low sodium, low sugar).  - Encouraged Exercise as tolerated.  - Encouraged continued Medication Compliance.    Plan:    - Assess for receipt of mailed educational resources.  - Complete medication reconciliation at next call.   - Assess for contact with  [employer] for Chronic Conditions meal delivery.   - Fall Prevention - Review educational literature with patient. Continue to educate.  - CHF/CHF Flare - Review educational literature with patient. Continue to educate.  - Hyperkalemia - Review educational literature with patient. Assess for questions. Educate as needed.   - Biventriculare ICD - Review educational literature with patient. Assess for questions. Educate as needed.   - Osteoporosis - Review educational literature with patient. Assess for questions. Educate as needed.   - Insomnia - Review educational literature with patient. Assess for questions. Educate as needed.   - Assess for any additional needs.     [facility] Self-Management Care Plan was developed with the patients input and was based on identified barriers from todays assessment.  Goals were written today with the patient and the patient has agreed to work towards these goals to improve her overall well-being. Patient verbalized understanding of the care plan, goals, and all of today's instructions. Encouraged patient to communicate with her physician and health care team about health conditions and the treatment plan.  Provided my contact information today and encouraged patient to call me with any questions as needed. Angel Jameson, RN-[facility]       Clinical Reference Documents Added to Patient Instructions       Document    BIVENTRICULAR PACEMAKER AND ICD (BIVENTRICULAR ICD) (ENGLISH)    DIET: LOW POTASSIUM, DISCHARGE INSTRUCTIONS (ENGLISH)    FALL PREVENTION (ENGLISH)    FALLS IN THE HOME, PREVENTING (ENGLISH)    FALLS, PREVENTING, EXERCISES TO IMPROVE BALANCE, FLEXIBILITY, STRENGTH, AND STAYING POWER (ENGLISH)    FALLS, PREVENTING, MAKING CHANGES IN YOUR LIVING SPACE (ENGLISH)    HEART FAILURE, CONGESTIVE (CHF) (ENGLISH)    HEART FAILURE: TRACKING YOUR WEIGHT (ENGLISH)    HEART FAILURE: WARNING SIGNS OF A FLARE-UP (ENGLISH)    HYPERKALEMIA (ENGLISH)    HYPERKALEMIA, DISCHARGE  INSTRUCTIONS (ENGLISH)    INSOMNIA (ENGLISH)    INSOMNIA, TREATING (ENGLISH)    INSOMNIA, WHAT IS (ENGLISH)    KNOW YOUR BASELINES, HEART FAILURE  (ENGLISH)    OSTEOPOROSIS MEDICATIONS: BISPHOSPHONATES (ENGLISH)    OSTEOPOROSIS, WHAT IS? (ENGLISH)    OSTEOPOROSIS: UNDERSTANDING BONE LOSS (ENGLISH)

## 2019-06-11 ENCOUNTER — LAB VISIT (OUTPATIENT)
Dept: LAB | Facility: HOSPITAL | Age: 84
End: 2019-06-11
Attending: INTERNAL MEDICINE
Payer: MEDICARE

## 2019-06-11 DIAGNOSIS — E87.5 HYPERKALEMIA: ICD-10-CM

## 2019-06-11 LAB
ANION GAP SERPL CALC-SCNC: 10 MMOL/L (ref 8–16)
BUN SERPL-MCNC: 34 MG/DL (ref 8–23)
CALCIUM SERPL-MCNC: 10.3 MG/DL (ref 8.7–10.5)
CHLORIDE SERPL-SCNC: 102 MMOL/L (ref 95–110)
CO2 SERPL-SCNC: 28 MMOL/L (ref 23–29)
CREAT SERPL-MCNC: 1.3 MG/DL (ref 0.5–1.4)
EST. GFR  (AFRICAN AMERICAN): 44.5 ML/MIN/1.73 M^2
EST. GFR  (NON AFRICAN AMERICAN): 38.6 ML/MIN/1.73 M^2
GLUCOSE SERPL-MCNC: 107 MG/DL (ref 70–110)
POTASSIUM SERPL-SCNC: 4.5 MMOL/L (ref 3.5–5.1)
SODIUM SERPL-SCNC: 140 MMOL/L (ref 136–145)

## 2019-06-11 PROCEDURE — 36415 COLL VENOUS BLD VENIPUNCTURE: CPT | Mod: HCNC,PO

## 2019-06-11 PROCEDURE — 80048 BASIC METABOLIC PNL TOTAL CA: CPT | Mod: HCNC

## 2019-06-13 ENCOUNTER — DOCUMENTATION ONLY (OUTPATIENT)
Dept: SURGERY | Facility: HOSPITAL | Age: 84
End: 2019-06-13

## 2019-06-13 ENCOUNTER — TELEPHONE (OUTPATIENT)
Dept: PODIATRY | Facility: CLINIC | Age: 84
End: 2019-06-13

## 2019-06-13 ENCOUNTER — HOSPITAL ENCOUNTER (OUTPATIENT)
Dept: RADIOLOGY | Facility: HOSPITAL | Age: 84
Discharge: HOME | End: 2019-06-13
Attending: PODIATRIST
Payer: MEDICARE

## 2019-06-13 ENCOUNTER — OFFICE VISIT (OUTPATIENT)
Dept: PODIATRY | Facility: CLINIC | Age: 84
End: 2019-06-13
Payer: MEDICARE

## 2019-06-13 VITALS
DIASTOLIC BLOOD PRESSURE: 70 MMHG | HEIGHT: 61 IN | HEART RATE: 95 BPM | SYSTOLIC BLOOD PRESSURE: 119 MMHG | WEIGHT: 230 LBS | BODY MASS INDEX: 43.43 KG/M2

## 2019-06-13 DIAGNOSIS — R09.89 SUSPECTED DVT (DEEP VEIN THROMBOSIS): ICD-10-CM

## 2019-06-13 DIAGNOSIS — I73.9 PERIPHERAL VASCULAR DISEASE: ICD-10-CM

## 2019-06-13 DIAGNOSIS — E11.49 TYPE II DIABETES MELLITUS WITH NEUROLOGICAL MANIFESTATIONS: ICD-10-CM

## 2019-06-13 DIAGNOSIS — M79.661 PAIN IN BOTH LOWER LEGS: ICD-10-CM

## 2019-06-13 DIAGNOSIS — S99.922A INJURY OF LEFT FOOT, INITIAL ENCOUNTER: ICD-10-CM

## 2019-06-13 DIAGNOSIS — M79.662 PAIN IN BOTH LOWER LEGS: ICD-10-CM

## 2019-06-13 DIAGNOSIS — I87.2 EDEMA OF BOTH LOWER EXTREMITIES DUE TO PERIPHERAL VENOUS INSUFFICIENCY: ICD-10-CM

## 2019-06-13 DIAGNOSIS — B35.1 ONYCHOMYCOSIS DUE TO DERMATOPHYTE: ICD-10-CM

## 2019-06-13 PROCEDURE — 73630 X-RAY EXAM OF FOOT: CPT | Mod: 26,HCNC,LT, | Performed by: RADIOLOGY

## 2019-06-13 PROCEDURE — 1101F PR PT FALLS ASSESS DOC 0-1 FALLS W/OUT INJ PAST YR: ICD-10-PCS | Mod: HCNC,CPTII,S$GLB, | Performed by: PODIATRIST

## 2019-06-13 PROCEDURE — 3078F PR MOST RECENT DIASTOLIC BLOOD PRESSURE < 80 MM HG: ICD-10-PCS | Mod: HCNC,CPTII,S$GLB, | Performed by: PODIATRIST

## 2019-06-13 PROCEDURE — 73630 X-RAY EXAM OF FOOT: CPT | Mod: TC,HCNC,PO,LT

## 2019-06-13 PROCEDURE — 99999 PR PBB SHADOW E&M-EST. PATIENT-LVL III: ICD-10-PCS | Mod: PBBFAC,HCNC,, | Performed by: PODIATRIST

## 2019-06-13 PROCEDURE — 3078F DIAST BP <80 MM HG: CPT | Mod: HCNC,CPTII,S$GLB, | Performed by: PODIATRIST

## 2019-06-13 PROCEDURE — 99204 OFFICE O/P NEW MOD 45 MIN: CPT | Mod: 25,HCNC,S$GLB, | Performed by: PODIATRIST

## 2019-06-13 PROCEDURE — 3074F SYST BP LT 130 MM HG: CPT | Mod: HCNC,CPTII,S$GLB, | Performed by: PODIATRIST

## 2019-06-13 PROCEDURE — 99204 PR OFFICE/OUTPT VISIT, NEW, LEVL IV, 45-59 MIN: ICD-10-PCS | Mod: 25,HCNC,S$GLB, | Performed by: PODIATRIST

## 2019-06-13 PROCEDURE — 3074F PR MOST RECENT SYSTOLIC BLOOD PRESSURE < 130 MM HG: ICD-10-PCS | Mod: HCNC,CPTII,S$GLB, | Performed by: PODIATRIST

## 2019-06-13 PROCEDURE — 11721 DEBRIDE NAIL 6 OR MORE: CPT | Mod: Q9,HCNC,S$GLB, | Performed by: PODIATRIST

## 2019-06-13 PROCEDURE — 1101F PT FALLS ASSESS-DOCD LE1/YR: CPT | Mod: HCNC,CPTII,S$GLB, | Performed by: PODIATRIST

## 2019-06-13 PROCEDURE — 99999 PR PBB SHADOW E&M-EST. PATIENT-LVL III: CPT | Mod: PBBFAC,HCNC,, | Performed by: PODIATRIST

## 2019-06-13 PROCEDURE — 11721 PR DEBRIDEMENT OF NAILS, 6 OR MORE: ICD-10-PCS | Mod: Q9,HCNC,S$GLB, | Performed by: PODIATRIST

## 2019-06-13 PROCEDURE — 73630 XR FOOT COMPLETE 3 VIEW LEFT: ICD-10-PCS | Mod: 26,HCNC,LT, | Performed by: RADIOLOGY

## 2019-06-13 RX ORDER — IPRATROPIUM BROMIDE AND ALBUTEROL SULFATE 2.5; .5 MG/3ML; MG/3ML
3 SOLUTION RESPIRATORY (INHALATION) EVERY 4 HOURS PRN
COMMUNITY
Start: 2019-06-10 | End: 2019-08-07

## 2019-06-13 NOTE — SIGNIFICANT EVENT
"Response Team - Patient Flow Sheet     Date: 2019  Time: 1348  Location: 1st Floor, waiting room  Name: Miesha Obando  : 1935  MRN: [id number]  PCP: KEON España MD  Allergies:   Review of patient's allergies indicates:   Allergen Reactions    Statins-hmg-coa reductase inhibitors      "bad leg cramps"    Sulfa (sulfonamide antibiotics) Hives    Adhesive Rash     Use paper tape    Amoxicillin-pot clavula (bulk) Nausea And Vomiting     Past Medical History:    Past Medical History:   Diagnosis Date    Anticoagulant long-term use     Arthritis     Atrial fibrillation     Breast cancer     breast , left    Cardiomyopathy - EF 35-40% 2016    CHF (congestive heart failure)     Chronic bronchitis     COPD (chronic obstructive pulmonary disease)     Coronary artery disease without angina pectoris 2015    Depression     Diabetes with neurologic complications     Diverticulitis     Diverticulosis     Encounter for blood transfusion     after mastectomy x 20 years ago    GERD (gastroesophageal reflux disease)     H/O aortic valve replacement     Hiatal hernia     History of colonic diverticulitis     Hypertension     Hypothyroid     Insomnia     Irritable bowel syndrome     Obesity     Pacemaker 2014    Schatzki's ring     mild    Sleep apnea     uses cpap with oxygen     Syncope and collapse     Systolic congestive heart failure, NYHA class 3 2016    Type II or unspecified type diabetes mellitus without mention of complication, not stated as uncontrolled     No medications at present.     Urinary incontinence        Reason for response team call: Triage team called for patient w/ c/o SOB    Injury: NO    Vitals:  Time BP HR Resp SPO2 Glucose   1348 140/78 114 22 93                                        EKG performed:NO      Time Notes   1348 Triage called for patient w/ c/o SOB. Denies chest pain. Pt reports wearing oxygen at home at night only. " Reports SOB x 2 weeks. Reports APPT w/ pulmonologist in July. Patient reports feeling better sitting in the wheelchair for a few minutes. Patient has podiatry appt scheduled for today. Patient reports history of COPD. Patient instructed to follow up with pulmonologist sooner than July for possible adjustments of medications or possible continuous oxygen. Patient reports history of A-fib. Patient noted with pulse regular via radial pulse. Recommended patient follow up with cardiologist as well. REC: pulmonology appt first.    3951 SILVINA Kendall RN to take patient to appt on second floor with podiatry and will ensure that patient is taken to vehicle after appt via wheelchair. Patient offered to visit the ED for symptoms. Patient reports feeling better after resting and states will follow up with MD.                  Response Team Members:   TRENTON Dickey RN C. Alexander, RN    Provider Response Called: NO    Ambulance Called: NO

## 2019-06-13 NOTE — TELEPHONE ENCOUNTER
"Patient given results per Dr Dodd:  "No fractures seen.  Demineralization of the bones seen, which is a sign of osteoporosis.  Continue current treatment plan."  Patient verbalized understanding.  "

## 2019-06-13 NOTE — PATIENT INSTRUCTIONS
- Check feet daily for wounds and areas of irritation.    - Keep regularly scheduled appointments with primary care, ophthalmology, and podiatry.    - Maintain blood glucose control via taking medications as prescribed, diabetic diet, and exercise.    - Apply lotion to feet and ankles daily but not between toes.    - Keep feet clean and dry, especially between toes.    - Elevate feet as much as possible throughout the day.    - Wear supportive/accommodative shoes at all times when ambulating.    - Apply OTC topical arnica to affected area for pain relief and to reduce bruising/swelling.    - Supplement with alpha lipoic acid and vitamin B complex to reduce and repair nerve damage.    - Keep scheduled appointments for xrays and ultrasound.    - Notify clinic immediately of any new or worsening conditions.    - Follow up in 9 weeks for nail care.

## 2019-06-13 NOTE — TELEPHONE ENCOUNTER
----- Message from Watson Macias sent at 6/13/2019  4:26 PM CDT -----  Type:  Patient Returning Call    Who Called:  Patient  Who Left Message for Patient:   SAMARA  Does the patient know what this is regarding?: SAMARA   Best Call Back Number:  [phone]  Additional Information:

## 2019-06-14 ENCOUNTER — NURSE TRIAGE (OUTPATIENT)
Dept: ADMINISTRATIVE | Facility: CLINIC | Age: 84
End: 2019-06-14

## 2019-06-14 NOTE — TELEPHONE ENCOUNTER
Reason for Disposition   Vomiting occurs with dizziness    Protocols used: DIZZINESS-A-OH    Patient called with concerns that her breathing/shortness of breath is getting worse. Her bp is 151/79 HR 77, . Patient mentioned she wants appt with cardiology and pulmonary.     She is nauseated right now. Has a lot of different symptoms. Advised patient to go urgent care. Not sure about the numerous vague symptoms. V/s and blood sugars are WNL, SOB not audible while she is sitting and talking, but when she walks around her house it is worsening. She is nauseated but denies fever and cold symptoms. She has allergies. She wanted to use the nitroglycerin for some chest tightness, but she is going through a stressful time with her finances and housing; her children dont' call or visit much. Hard to determine if patient is having issues with anxiety. She is taking her torsemide and weighing herself daily, and doesn't think she is having fluid retention issues. She states from time to time she feels palpitations, but not now.     Patient states she will have her friend to bring her to the urgent care for evaluation now.

## 2019-06-15 PROBLEM — R11.2 NAUSEA & VOMITING: Status: ACTIVE | Noted: 2019-06-15

## 2019-06-15 PROBLEM — R79.1 SUBTHERAPEUTIC INTERNATIONAL NORMALIZED RATIO (INR): Status: ACTIVE | Noted: 2019-06-15

## 2019-06-15 PROBLEM — R07.9 CHEST PAIN: Status: ACTIVE | Noted: 2019-06-15

## 2019-06-17 ENCOUNTER — OUTPATIENT CASE MANAGEMENT (OUTPATIENT)
Dept: ADMINISTRATIVE | Facility: OTHER | Age: 84
End: 2019-06-17

## 2019-06-19 PROBLEM — E87.5 HYPERKALEMIA: Status: ACTIVE | Noted: 2019-06-19

## 2019-06-19 PROBLEM — S40.021S HEMATOMA OF ARM, RIGHT, SEQUELA: Status: ACTIVE | Noted: 2019-06-19

## 2019-06-21 PROBLEM — I20.0 UNSTABLE ANGINA: Status: ACTIVE | Noted: 2019-06-15

## 2019-06-24 ENCOUNTER — OUTPATIENT CASE MANAGEMENT (OUTPATIENT)
Dept: ADMINISTRATIVE | Facility: OTHER | Age: 84
End: 2019-06-24

## 2019-06-24 NOTE — PROGRESS NOTES
Subjective:      Patient ID: Miesha Obando is a 83 y.o. female.    Chief Complaint: Diabetes Mellitus (polyneuropathy PCP Patria, A1C 6.9 5/2019, tingling in both feet more so R ) and Nail Care      HPI:  Miesha is a 83 y.o. female who presents to the clinic for evaluation and treatment of high risk feet. Miesha has a past medical history of Anticoagulant long-term use, Arthritis, Atrial fibrillation, Breast cancer (1994), Cardiomyopathy - EF 35-40% (5/11/2016), CHF (congestive heart failure), Chronic bronchitis, COPD (chronic obstructive pulmonary disease), Coronary artery disease without angina pectoris (8/26/2015), Depression, Diabetes with neurologic complications, Diverticulitis, Diverticulosis, Encounter for blood transfusion, GERD (gastroesophageal reflux disease), H/O aortic valve replacement, Hiatal hernia, History of colonic diverticulitis, Hypertension, Hypothyroid, Insomnia, Irritable bowel syndrome, Obesity, Pacemaker (08/2014), Schatzki's ring, Sleep apnea, Syncope and collapse, Systolic congestive heart failure, NYHA class 3 (5/23/2016), Type II or unspecified type diabetes mellitus without mention of complication, not stated as uncontrolled, and Urinary incontinence. The patient's chief complaint is long, thick toenails. This patient has documented high risk feet requiring routine maintenance secondary to diabetes mellitis and those secondary complications of diabetes, as mentioned..  She states that she is not interested in treating the toenail fungus with medication.  She also complains of having dropped a can on her left foot approximately a week ago with bruising still noticeable.  She denies doing anything to help alleviate pain other than resting and slightly elevating her foot. She does remark of a tightness in both calves and decreased sensation both lower legs.  Patient denies any other pedal complaints at this time.    PCP:  Mikel Xiong, DO   Date Last Seen by PCP:  6/4/19    Current shoe gear:  Affected Foot: Casual shoes     Unaffected Foot: Casual shoes    Review of Systems   Constitutional: Negative for appetite change, fever, chills, fatigue and unexpected weight change.   Respiratory: Negative for cough, wheezing, and shortness of breath.   Cardiovascular: Negative for chest pain, claudication, cyanosis.  Positive for leg swelling.  Endocrine:  Negative for intolerance to cold, intolerance to heat, polydipsia, polyphagia, and polyuria.    Gastrointestinal: Negative for nausea, vomiting, diarrhea, and constipation.   Musculoskeletal:  Positive for back pain, arthritis, joint pain, joint swelling, myalgias, and stiffness.   Skin: Negative for rash, itching, poor wound healing, suspicious lesion.  Positive for nail bed changes, discoloration, unusual hair distribution.   Neurological: Negative for loss of balance, numbness.  Positive for sensory change, paresthesias.  Hematological: Negative for adenopathy, bleeding, and bruising easily.   Psychiatric/Behavioral: The patient is not nervous/anxious.  Negative for altered mental status.    Hemoglobin A1C   Date Value Ref Range Status   05/20/2019 6.9 (H) 4.0 - 5.6 % Final     Comment:     ADA Screening Guidelines:  5.7-6.4%  Consistent with prediabetes  >or=6.5%  Consistent with diabetes  High levels of fetal hemoglobin interfere with the HbA1C  assay. Heterozygous hemoglobin variants (HbS, HgC, etc)do  not significantly interfere with this assay.   However, presence of multiple variants may affect accuracy.     01/21/2019 6.6 (H) 4.0 - 5.6 % Final     Comment:     ADA Screening Guidelines:  5.7-6.4%  Consistent with prediabetes  >or=6.5%  Consistent with diabetes  High levels of fetal hemoglobin interfere with the HbA1C  assay. Heterozygous hemoglobin variants (HbS, HgC, etc)do  not significantly interfere with this assay.   However, presence of multiple variants may affect accuracy.     08/21/2018 6.3 (H) 4.0 - 5.6 % Final      Comment:     ADA Screening Guidelines:  5.7-6.4%  Consistent with prediabetes  >or=6.5%  Consistent with diabetes  High levels of fetal hemoglobin interfere with the HbA1C  assay. Heterozygous hemoglobin variants (HbS, HgC, etc)do  not significantly interfere with this assay.   However, presence of multiple variants may affect accuracy.         Past Medical History:   Diagnosis Date    Anticoagulant long-term use     Arthritis     Atrial fibrillation     Breast cancer 1994    breast , left    Cardiomyopathy - EF 35-40% 5/11/2016    CHF (congestive heart failure)     Chronic bronchitis     COPD (chronic obstructive pulmonary disease)     Coronary artery disease without angina pectoris 8/26/2015    Depression     Diabetes with neurologic complications     Diverticulitis     Diverticulosis     Encounter for blood transfusion     after mastectomy x 20 years ago    GERD (gastroesophageal reflux disease)     H/O aortic valve replacement     Hiatal hernia     History of colonic diverticulitis     Hypertension     Hypothyroid     Insomnia     Irritable bowel syndrome     Obesity     Pacemaker 08/2014    Schatzki's ring     mild    Sleep apnea     uses cpap with oxygen     Syncope and collapse     Systolic congestive heart failure, NYHA class 3 5/23/2016    Type II or unspecified type diabetes mellitus without mention of complication, not stated as uncontrolled     No medications at present.     Urinary incontinence      Past Surgical History:   Procedure Laterality Date    ADENOIDECTOMY      ANGIOGRAM, CORONARY ARTERY Bilateral 6/18/2019    Performed by Dayron Macias [facility] at [facility] CATH    APPENDECTOMY      BACK SURGERY      Discectomy, lumbar    BLOCK-NERVE-MEDIAL PERAJX-CSSWEDV-NUG,C2,3,4,5, Left 3/22/2018    [facility] at [facility] OR    BREAST SURGERY  1994    left side , mastectomy    CARDIAC PACEMAKER PLACEMENT      CARDIAC VALVE SURGERY      aortic valve  replacement    CHOLECYSTECTOMY      COLONOSCOPY  2/2014    repeat in 10 years for screening    COLONOSCOPY N/A 2/7/2014    Per[facility] at [facility] ENDO    CORONARY STENT PLACEMENT      x2    EGD (ESOPHAGOGASTRODUODENOSCOPY) N/A 6/25/2012    Performed [facility] at [facility] ENDO    ESOPHAGOGASTRODUODENOSCOPY (EGD) N/A 2/8/2018    [facility] at [facility] ENDO    ESOPHAGOGASTRODUODENOSCOPY (EGD) N/A 7/7/2016    Performed by [facility] at [facility] ENDO    ESOPHAGOGASTRODUODENOSCOPY (EGD) N/A 2/23/2016    Performed [facility] at [facility] ENDO    EYE SURGERY      bilateral PHACO and IOL    HEART CATH-LEFT Left 8/19/2015    Performed by Nic Cuellar [facility] at [facility] CATH LAB    HYSTERECTOMY      ovaries spared    INJECTION-STEROID-EPIDURAL-TRANSFORAMINAL Left 4/10/2012    Performed by Georges Montes MD at [facility] OR    INSERTION-ICD-BIVENTRICULAR  [place] N/A 5/23/2016    Performed by [facility] at [facility] CATH    INSERTION-PACEMAKER-SINGLE-[place]AN N/A 9/2/2014    Performed by Nic [facility]ellar [facility] at [facility] CATH LAB    LHC/ Coronary Angio Right 6/18/2019    Performed by [facility] at [facility] CATH    LOOP RECORDER      Percutaneous coronary intervention Left 6/18/2019    Performed by [facility] at [facility] CATH    RADIOFREQUENCY ABLATION-C2-3-4-5 and third occipital nerve Left 8/9/2018    Performed by [facility]ma [facility] at [facility] OR    REMOVAL-LOOP RECORDER N/A 9/2/2014    Performed by Nic Cuellar MD at [facility] CATH LAB    REPLACEMENT-VALVE-AORTIC N/A 9/24/2015    [facility] CATH LAB    TOE SURGERY Right     TONSILLECTOMY      UPPER GASTROINTESTINAL ENDOSCOPY  2012     Family History   Problem Relation Age of Onset    Parkinsonism Mother     Emphysema Father     Heart disease Brother     Heart attack Brother     Heart failure Brother     Heart disease Brother      "Heart failure Brother     Heart disease Brother     Heart failure Brother     Arrhythmia Brother     Anesthesia problems Neg Hx     Clotting disorder Neg Hx      Social History     Socioeconomic History    Marital status:      Spouse name: Not on file    Number of children: Not on file    Years of education: Not on file    Highest education level: Not on file   Occupational History    Not on file   Social Needs    Financial resource strain: Not on file    Food insecurity:     Worry: Not on file     Inability: Not on file    Transportation needs:     Medical: Not on file     Non-medical: Not on file   Tobacco Use    Smoking status: Former Smoker     Packs/day: 1.00     Years: 30.00     Pack years: 30.00     Start date: 1958     Last attempt to quit: 1988     Years since quittin.4    Smokeless tobacco: Never Used   Substance and Sexual Activity    Alcohol use: Yes     Comment: 0-2 glasses of wine per month    Drug use: No    Sexual activity: Not on file   Lifestyle    Physical activity:     Days per week: Not on file     Minutes per session: Not on file    Stress: Not on file   Relationships    Social connections:     Talks on phone: Not on file     Gets together: Not on file     Attends Zoroastrianism service: Not on file     Active member of club or organization: Not on file     Attends meetings of clubs or organizations: Not on file     Relationship status: Not on file   Other Topics Concern    Not on file   Social History Narrative    Not on file           Objective:        /70   Pulse 95   Ht 5' 1" (1.549 m)   Wt 104.3 kg (230 lb)   LMP  (LMP Unknown)   BMI 43.46 kg/m²     Physical Exam   Constitutional: Patient is oriented to person, place, and time. Patient appears well-developed and well-nourished. No acute distress.     Psychiatric: Patient has a normal mood and affect. Patient's speech is normal and behavior is normal. Judgment is normal. Cognition and " memory are normal.     Bilateral pedal exam was performed today.  Vascular: Pedal pulses palpable 1/4 DP & PT.  CFT is > 3 seconds to the hallux.  Skin temperature is cool to cool proximal tibia to distal toes without localized increase in calor noted.  No erythema noted to the foot or ankle.  +1/4 pitting edema noted to B/L LE.  Resolving ecchymosis noted to the dorsal aspect of the left forefoot.  Hair growth decreased distally to the LE.  Telangectasias and reticular veins present B/L LE with hemosiderin deposition.  Positive Homans sign B/L LE.     Musculoskeletal: Ankle joint ROM is decreased. Subtalar joint ROM is decreased.  Midtarsal joint ROM is decreased.  1st ray ROM is decreased.  1st  MTPJ ROM is decreased.  Ankle joint dorsiflexion is restricted with the knee extended and flexed per Silfverskiold exam.    Muscle strength is 5/5 for all LE muscle groups tested.    Neurological: Protective sensation is intact to 10/10 sites on the bilateral foot via Stirum-Giuliano monofilament.    Proprioception is Intact to the foot.  Vibratory sensation is Decreased to the foot.   Light touch sensation is Intact to the foot.   Achilles DTR and Chaddock STR are Intact to bilateral lower extremities.   Negative Babinski sign bilateral lower extremities.  Negative clonus sign bilateral lower extremities.  Tenderness upon squeeze of B/L calf.  Pain upon palpation noted to area of ecchymosis left dorsal forefoot.    Dermatological: Toenails 1-5 bilateral are elongated, thickened, dystrophic, brittle, discolored, and mycotic with subungal debris present.  Webspaces 1-4 bilateral are clean, dry, and intact.  Skin turgor is increased.  Skin texture shiny and atrophic.  No dry, flaky skin noted to the LE.  No open wounds or suspicious pigmented lesions appreciable to the foot or ankle.    Nursing note and vitals reviewed.          Assessment:       Encounter Diagnoses   Name Primary?    Onychomycosis due to dermatophyte      Injury of left foot, initial encounter     Edema of both lower extremities due to peripheral venous insufficiency     Suspected DVT (deep vein thrombosis)     Pain in both lower legs     Type II diabetes mellitus with neurological manifestations     Peripheral vascular disease          Plan:       Miesha was seen today for diabetes mellitus and nail care.    Diagnoses and all orders for this visit:    Onychomycosis due to dermatophyte    Injury of left foot, initial encounter  -     X-Ray Foot Complete Left; Future    Edema of both lower extremities due to peripheral venous insufficiency  -     US Lower Extremity Veins Bilateral; Future    Suspected DVT (deep vein thrombosis)  -     US Lower Extremity Veins Bilateral; Future    Pain in both lower legs  -     US Lower Extremity Veins Bilateral; Future  -     X-Ray Foot Complete Left; Future    Type II diabetes mellitus with neurological manifestations  -     US Lower Extremity Veins Bilateral; Future  -     X-Ray Foot Complete Left; Future    Peripheral vascular disease  -     US Lower Extremity Veins Bilateral; Future  -     X-Ray Foot Complete Left; Future      I counseled the patient on her conditions, their implications and medical management.    - Ordered and reviewed xrays with patient.    - Will notify patient of lower extremity ultrasound results.    - Shoe inspection. Diabetic Foot Education. Patient reminded of the importance of good nutrition and blood sugar control to help prevent podiatric complications of diabetes. Patient instructed on proper foot hygeine. We discussed wearing proper shoe gear, daily foot inspections, never walking without protective shoe gear, never putting sharp instruments to feet, routine podiatric nail visits every 2-3 months.     - With the patient's permission, toenails 1, 2, 3, 4, and 5 of the right foot and 1, 2, 3, 4, and 5 of the left foot were debrided in length and thickness via nail nippers and electric  to  patient's tolerance without incident.    - Educated patient on PRICE therapy.    Patient was given the following recommendations and instructions:  Patient Instructions   - Check feet daily for wounds and areas of irritation.    - Keep regularly scheduled appointments with primary care, ophthalmology, and podiatry.    - Maintain blood glucose control via taking medications as prescribed, diabetic diet, and exercise.    - Apply lotion to feet and ankles daily but not between toes.    - Keep feet clean and dry, especially between toes.    - Elevate feet as much as possible throughout the day.    - Wear supportive/accommodative shoes at all times when ambulating.    - Apply OTC topical arnica to affected area for pain relief and to reduce bruising/swelling.    - Supplement with alpha lipoic acid and vitamin B complex to reduce and repair nerve damage.    - Keep scheduled appointments for xrays and ultrasound.    - Notify clinic immediately of any new or worsening conditions.    - Follow up in 9 weeks for nail care.          Carly Dodd DPM        Dictation was performed using M*Modal Fluency.  Transcription errors may be present.

## 2019-06-25 DIAGNOSIS — N18.9 CHRONIC KIDNEY DISEASE, UNSPECIFIED CKD STAGE: ICD-10-CM

## 2019-07-01 ENCOUNTER — OUTPATIENT CASE MANAGEMENT (OUTPATIENT)
Dept: ADMINISTRATIVE | Facility: OTHER | Age: 84
End: 2019-07-01

## 2019-07-01 NOTE — PROGRESS NOTES
Summary:  06/24/2019  RN-CM checked chart for patient's status. Patient received angiogram and stent placement and was discharged to [facility] SNF unit on 6/21/19, seen back in [facility] ED on 6/22/19 for back and leg pain (experiencing pain since stent placement); determined likely cause as sciatica pain and was discharged back to SNF. Attempted to contact patient at [phone]; no answer; voice mail box not set up. Will attempt to contact patient next week to check status. If patient remains admitted to SNF unit, will close case. - JOHN Jameson, RN-OPCM    Interventions:  - Chart reviewed; patient in SNF.  - Will attempt to contact patient again next week to check status.     Plan:    - If patient remains admitted to SNF unit, will close case.

## 2019-07-01 NOTE — PROGRESS NOTES
Summary:  06/17/2019  RN-ANNIE noted in chart review that patient was admitted to [facility] through the ED on 6/15/19 for chest pain with shortness of breath. Patient remains admitted at this time. Attempted to contact patient at [phone]; no answer; voice mail box not set up. Will check back in one week to determine patient's discharge plan/status. - JOHN Jameson RN-OPCM    Interventions:  - Chart reviewed; inpatient at [facility].    Plan:    - Assess for receipt of mailed educational resources.  - Complete medication reconciliation at next call.   - Assess for contact with [facility] meal delivery.   - Fall Prevention - Review educational literature with patient. Continue to educate.  - CHF/CHF Flare - Review educational literature with patient. Continue to educate.  - Hyperkalemia - Review educational literature with patient. Assess for questions. Educate as needed.   - Biventricular ICD - Review educational literature with patient. Assess for questions. Educate as needed.   - Osteoporosis - Review educational literature with patient. Assess for questions. Educate as needed.   - Insomnia - Review educational literature with patient. Assess for questions. Educate as needed.   - Assess for any additional needs.        Clinical Reference Documents Added to Patient Instructions       Document    BIVENTRICULAR PACEMAKER AND ICD (BIVENTRICULAR ICD) (ENGLISH)    DIET: LOW POTASSIUM, DISCHARGE INSTRUCTIONS (ENGLISH)    FALL PREVENTION (ENGLISH)    FALLS IN THE HOME, PREVENTING (ENGLISH)    FALLS, PREVENTING, EXERCISES TO IMPROVE BALANCE, FLEXIBILITY, STRENGTH, AND STAYING POWER (ENGLISH)    FALLS, PREVENTING, MAKING CHANGES IN YOUR LIVING SPACE (ENGLISH)    HEART FAILURE, CONGESTIVE (CHF) (ENGLISH)    HEART FAILURE: TRACKING YOUR WEIGHT (ENGLISH)    HEART FAILURE: WARNING SIGNS OF A FLARE-UP (ENGLISH)    HYPERKALEMIA (ENGLISH)    HYPERKALEMIA, DISCHARGE INSTRUCTIONS (ENGLISH)    INSOMNIA (ENGLISH)     INSOMNIA, TREATING (ENGLISH)    INSOMNIA, WHAT IS (ENGLISH)    KNOW YOUR BASELINES, HEART FAILURE  (ENGLISH)    OSTEOPOROSIS MEDICATIONS: BISPHOSPHONATES (ENGLISH)    OSTEOPOROSIS, WHAT IS? (ENGLISH)    OSTEOPOROSIS: UNDERSTANDING BONE LOSS (ENGLISH)

## 2019-07-01 NOTE — LETTER
July 1, 2019    Miesha Obando  [address]  [place] [address]             [facility] [address] [place] [address] Dear Ms. Obando,    I work with [facility] Outpatient Care Management Department. I have been unsuccessful at contacting you on your mobile phone to see how you have been doing since your enrollment in Outpatient Care Management Services on 6/5/19. I recently became aware that you are currently residing in the Skilled Unit at [facility]. I will be closing your case today as your needs are being currently met in the Skilled Unit. Upon discharge from [facility], please do not hesitate to contact me if there are any questions, needs or concerns that our department can assist you with.       I can be reached at [phone] from 8:00 AM to 4:30 PM on Monday thru Friday. [facility] is a program offered through [facility] where a nurse is available 24/7 to answer questions or provide medical advice, their number is [phone].    Kind Regards,      Yuko Jameson, RN  [facility]  [phone]

## 2019-07-01 NOTE — PROGRESS NOTES
Summary:  07/01/2019 (1st Attempt)  RN-CM attempted to contact patient for f/u. Called [phone] X 2 attempts; no answer; voice mail not set up; no ability to leave message. Attempted to contact patient's son/Watson [place]. Called [phone]; left message requesting a return call. Called [facility] [phone]. Confirmed that patient remains admitted to SNF. Will close case at this time as patient's needs are currently being met by others. Will mail letter to patient's home address encouraging patient to contact [facility] when discharged from SNF if any needs that we can assist with. - JOHN Jameson RN-OPCM    Interventions:  Left message requesting a return call on son's voice mail as patient's voice mail is not set up. - 7/1/19  Confirmed with facility that patient remains enrolled in SNF.  Case closed today.  Mailed letter to patient to contact [facility] for any needs.     Plan:  Will re-open case if patient is discharged from SNF and has needs that [facility] can assist with.

## 2019-07-09 ENCOUNTER — OFFICE VISIT (OUTPATIENT)
Dept: CARDIOLOGY | Facility: CLINIC | Age: 84
End: 2019-07-09
Payer: MEDICARE

## 2019-07-09 VITALS
HEART RATE: 68 BPM | SYSTOLIC BLOOD PRESSURE: 119 MMHG | BODY MASS INDEX: 43.29 KG/M2 | HEIGHT: 62 IN | DIASTOLIC BLOOD PRESSURE: 64 MMHG | WEIGHT: 235.25 LBS

## 2019-07-09 DIAGNOSIS — I42.0 DILATED CARDIOMYOPATHY: ICD-10-CM

## 2019-07-09 DIAGNOSIS — Z95.2 S/P TAVR (TRANSCATHETER AORTIC VALVE REPLACEMENT): ICD-10-CM

## 2019-07-09 DIAGNOSIS — I35.0 NONRHEUMATIC AORTIC VALVE STENOSIS: Primary | ICD-10-CM

## 2019-07-09 DIAGNOSIS — Z79.01 LONG TERM CURRENT USE OF ANTICOAGULANT THERAPY: ICD-10-CM

## 2019-07-09 DIAGNOSIS — Z95.810 AICD (AUTOMATIC CARDIOVERTER/DEFIBRILLATOR) PRESENT: ICD-10-CM

## 2019-07-09 DIAGNOSIS — I25.10 CORONARY ARTERY DISEASE INVOLVING NATIVE CORONARY ARTERY OF NATIVE HEART WITHOUT ANGINA PECTORIS: ICD-10-CM

## 2019-07-09 DIAGNOSIS — Z95.5 S/P DRUG ELUTING CORONARY STENT PLACEMENT: ICD-10-CM

## 2019-07-09 DIAGNOSIS — I50.42 CHRONIC COMBINED SYSTOLIC AND DIASTOLIC CONGESTIVE HEART FAILURE, NYHA CLASS 3: ICD-10-CM

## 2019-07-09 DIAGNOSIS — I48.20 CHRONIC A-FIB: ICD-10-CM

## 2019-07-09 PROCEDURE — 3078F DIAST BP <80 MM HG: CPT | Mod: HCNC,CPTII,S$GLB, | Performed by: INTERNAL MEDICINE

## 2019-07-09 PROCEDURE — 3074F PR MOST RECENT SYSTOLIC BLOOD PRESSURE < 130 MM HG: ICD-10-PCS | Mod: HCNC,CPTII,S$GLB, | Performed by: INTERNAL MEDICINE

## 2019-07-09 PROCEDURE — 99999 PR PBB SHADOW E&M-EST. PATIENT-LVL III: ICD-10-PCS | Mod: PBBFAC,HCNC,, | Performed by: INTERNAL MEDICINE

## 2019-07-09 PROCEDURE — 1100F PTFALLS ASSESS-DOCD GE2>/YR: CPT | Mod: HCNC,CPTII,S$GLB, | Performed by: INTERNAL MEDICINE

## 2019-07-09 PROCEDURE — 1100F PR PT FALLS ASSESS DOC 2+ FALLS/FALL W/INJURY/YR: ICD-10-PCS | Mod: HCNC,CPTII,S$GLB, | Performed by: INTERNAL MEDICINE

## 2019-07-09 PROCEDURE — 3288F PR FALLS RISK ASSESSMENT DOCUMENTED: ICD-10-PCS | Mod: HCNC,CPTII,S$GLB, | Performed by: INTERNAL MEDICINE

## 2019-07-09 PROCEDURE — 3074F SYST BP LT 130 MM HG: CPT | Mod: HCNC,CPTII,S$GLB, | Performed by: INTERNAL MEDICINE

## 2019-07-09 PROCEDURE — 99214 OFFICE O/P EST MOD 30 MIN: CPT | Mod: HCNC,S$GLB,, | Performed by: INTERNAL MEDICINE

## 2019-07-09 PROCEDURE — 99214 PR OFFICE/OUTPT VISIT, EST, LEVL IV, 30-39 MIN: ICD-10-PCS | Mod: HCNC,S$GLB,, | Performed by: INTERNAL MEDICINE

## 2019-07-09 PROCEDURE — 3288F FALL RISK ASSESSMENT DOCD: CPT | Mod: HCNC,CPTII,S$GLB, | Performed by: INTERNAL MEDICINE

## 2019-07-09 PROCEDURE — 3078F PR MOST RECENT DIASTOLIC BLOOD PRESSURE < 80 MM HG: ICD-10-PCS | Mod: HCNC,CPTII,S$GLB, | Performed by: INTERNAL MEDICINE

## 2019-07-09 PROCEDURE — 99999 PR PBB SHADOW E&M-EST. PATIENT-LVL III: CPT | Mod: PBBFAC,HCNC,, | Performed by: INTERNAL MEDICINE

## 2019-07-09 NOTE — PROGRESS NOTES
Subjective:    Patient ID:  Miesha Obando is a 83 y.o. female who presents for follow-up of Coronary Artery Disease (hosp dc); Cardiomyopathy; Hypertension; and Hyperlipidemia      Pt here for f/u. She had recent hospitalization for chest pain and had PCI with stent to proximal LCX. She he to be discharged home tomorrow from rehab facility. She reports she is doing well with PT. She denies any palpitations, dizziness, syncope or pre-syncope. She denies any chest pain, SOB, PND, orthopnea.      Review of Systems   Constitution: Negative for weight gain and weight loss.   HENT: Negative.    Eyes: Negative.    Cardiovascular: Negative for chest pain, claudication, cyanosis, dyspnea on exertion, irregular heartbeat, leg swelling, near-syncope, orthopnea (no PND) and palpitations.   Respiratory: Negative for cough, hemoptysis, shortness of breath and snoring.    Endocrine: Negative.    Skin: Negative.    Musculoskeletal: Positive for arthritis and back pain. Negative for joint pain, muscle cramps, muscle weakness and myalgias.   Gastrointestinal: Negative for diarrhea, hematemesis, nausea and vomiting.   Genitourinary: Negative.    Neurological: Negative for dizziness, focal weakness, light-headedness, loss of balance, numbness, paresthesias and seizures.   Psychiatric/Behavioral: Negative.         Objective:    Physical Exam   Constitutional: She is oriented to person, place, and time. She appears well-developed and well-nourished.   Eyes: Pupils are equal, round, and reactive to light.   Neck: Normal range of motion. No thyromegaly present.   Cardiovascular: Normal rate, regular rhythm, S1 normal, S2 normal, intact distal pulses and normal pulses.  No extrasystoles are present. PMI is not displaced. Exam reveals no friction rub.   Murmur heard.   Medium-pitched systolic murmur is present with a grade of 1/6 at the upper right sternal border.  Pulmonary/Chest: Effort normal and breath sounds normal. She has no  wheezes. She has no rales. She exhibits no tenderness.   Abdominal: Soft. Bowel sounds are normal. She exhibits no distension and no mass. There is no tenderness.   Musculoskeletal: Normal range of motion. She exhibits no edema.   Neurological: She is alert and oriented to person, place, and time.   Skin: Skin is warm and dry.   Vitals reviewed.      Test(s) Reviewed  I have reviewed the following in detail:  [x] Stress test   [x] Angiography   [x] Echocardiogram   [] Labs   [x] Other:  Hospital record       Assessment:       1. Nonrheumatic aortic valve stenosis    2. S/P TAVR (transcatheter aortic valve replacement) - 23mm Taylor 09/2015    3. Coronary artery disease involving native coronary artery of native heart without angina pectoris    4. S/P drug eluting coronary stent placement - RCA 2015; LCX 06/2019    5. Chronic a-fib    6. Dilated cardiomyopathy    7. Chronic combined systolic and diastolic congestive heart failure, NYHA class 3    8. AICD (automatic cardioverter/defibrillator) present - Bi-V    9. Long term current use of anticoagulant therapy         Plan:       Pt doing well since PCI  Cardiac status stable  Continue present Rx  F/u in 3 months

## 2019-07-10 ENCOUNTER — CLINICAL SUPPORT (OUTPATIENT)
Dept: CARDIOLOGY | Facility: CLINIC | Age: 84
End: 2019-07-10
Attending: INTERNAL MEDICINE
Payer: MEDICARE

## 2019-07-10 DIAGNOSIS — Z95.0 PACEMAKER: ICD-10-CM

## 2019-07-10 DIAGNOSIS — I42.0 DILATED CARDIOMYOPATHY: ICD-10-CM

## 2019-07-11 PROCEDURE — G0180 PR HOME HEALTH MD CERTIFICATION: ICD-10-PCS | Mod: ,,, | Performed by: FAMILY MEDICINE

## 2019-07-11 PROCEDURE — G0180 MD CERTIFICATION HHA PATIENT: HCPCS | Mod: ,,, | Performed by: FAMILY MEDICINE

## 2019-07-15 ENCOUNTER — NURSE TRIAGE (OUTPATIENT)
Dept: ADMINISTRATIVE | Facility: CLINIC | Age: 84
End: 2019-07-15

## 2019-07-15 NOTE — TELEPHONE ENCOUNTER
Pt has several medication with potential interactions, -  - aspirin interacts with coumadin,   - plavix and coumadin interact with voltaren gel,   - plavix interacts with prilosec.    These are all drug interactions with severity of level 2.  Patient is not having any side effects, but she wanted to report the possible interactions to pt's pcp.    Reason for Disposition   Caller has urgent medication question about med that PCP prescribed and triager unable to answer question    Protocols used: MEDICATION QUESTION CALL-A-AH

## 2019-07-16 ENCOUNTER — TELEPHONE (OUTPATIENT)
Dept: CARDIOLOGY | Facility: CLINIC | Age: 84
End: 2019-07-16

## 2019-07-16 NOTE — TELEPHONE ENCOUNTER
----- Message from Rubens Andre sent at 7/16/2019 11:00 AM CDT -----  Contact: Laneshia with [facility]  Type: Needs Medical Advice    Who Called:  Laneshia    Best Call Back Number: [phone]  Additional Information: Pt complains of chest flurry yesterday 7/15/19 after 5pm. Pt said it only happened one time. Vitals were good. No chest pain or chest pressure. Just a chest flurry one time yesterday morning.  Please call to advise.

## 2019-07-22 ENCOUNTER — EXTERNAL HOME HEALTH (OUTPATIENT)
Dept: HOME HEALTH SERVICES | Facility: HOSPITAL | Age: 84
End: 2019-07-22
Payer: MEDICARE

## 2019-07-22 ENCOUNTER — TELEPHONE (OUTPATIENT)
Dept: HOME HEALTH SERVICES | Facility: HOSPITAL | Age: 84
End: 2019-07-22
Payer: MEDICARE

## 2019-07-25 ENCOUNTER — TELEPHONE (OUTPATIENT)
Dept: CARDIOLOGY | Facility: CLINIC | Age: 84
End: 2019-07-25

## 2019-07-25 NOTE — TELEPHONE ENCOUNTER
----- Message from Melinda Monge sent at 7/25/2019  4:48 PM CDT -----  Contact: Laneshia kavon/[facility] [phone]  Patient is complaining of dull pain in the upper gastric area.  Vitals are stable, her heart rate and rhythm are good. Patient rates the pain as an 8 out of 10 on the pain scale.  It's been going on for 2 days. She has taken tylenol, it was ineffective, she has recently taken gabapentin.  Lanesha advised the patient to go to the ER, she wants to try heat and will then make a decision about going to the ER.  Thank you!

## 2019-07-26 ENCOUNTER — OFFICE VISIT (OUTPATIENT)
Dept: FAMILY MEDICINE | Facility: CLINIC | Age: 84
End: 2019-07-26
Payer: MEDICARE

## 2019-07-26 ENCOUNTER — HOSPITAL ENCOUNTER (OUTPATIENT)
Dept: RADIOLOGY | Facility: HOSPITAL | Age: 84
Discharge: HOME | End: 2019-07-26
Attending: FAMILY MEDICINE
Payer: MEDICARE

## 2019-07-26 VITALS
OXYGEN SATURATION: 97 % | HEART RATE: 69 BPM | BODY MASS INDEX: 41.94 KG/M2 | TEMPERATURE: 98 F | HEIGHT: 62 IN | WEIGHT: 227.94 LBS

## 2019-07-26 DIAGNOSIS — R07.89 CHEST WALL PAIN: ICD-10-CM

## 2019-07-26 DIAGNOSIS — R07.89 CHEST WALL PAIN: Primary | ICD-10-CM

## 2019-07-26 PROCEDURE — 99214 OFFICE O/P EST MOD 30 MIN: CPT | Mod: HCNC,S$GLB,, | Performed by: FAMILY MEDICINE

## 2019-07-26 PROCEDURE — 71046 XR CHEST PA AND LATERAL: ICD-10-PCS | Mod: 26,HCNC,, | Performed by: RADIOLOGY

## 2019-07-26 PROCEDURE — 71046 X-RAY EXAM CHEST 2 VIEWS: CPT | Mod: 26,HCNC,, | Performed by: RADIOLOGY

## 2019-07-26 PROCEDURE — 71046 X-RAY EXAM CHEST 2 VIEWS: CPT | Mod: TC,HCNC,PN

## 2019-07-26 PROCEDURE — 71100 X-RAY EXAM RIBS UNI 2 VIEWS: CPT | Mod: TC,HCNC,PN,RT

## 2019-07-26 PROCEDURE — 71100 XR RIBS 2 VIEW RIGHT: ICD-10-PCS | Mod: 26,HCNC,RT, | Performed by: RADIOLOGY

## 2019-07-26 PROCEDURE — 99999 PR PBB SHADOW E&M-EST. PATIENT-LVL III: CPT | Mod: PBBFAC,HCNC,, | Performed by: FAMILY MEDICINE

## 2019-07-26 PROCEDURE — 1101F PT FALLS ASSESS-DOCD LE1/YR: CPT | Mod: HCNC,CPTII,S$GLB, | Performed by: FAMILY MEDICINE

## 2019-07-26 PROCEDURE — 99999 PR PBB SHADOW E&M-EST. PATIENT-LVL III: ICD-10-PCS | Mod: PBBFAC,HCNC,, | Performed by: FAMILY MEDICINE

## 2019-07-26 PROCEDURE — 99214 PR OFFICE/OUTPT VISIT, EST, LEVL IV, 30-39 MIN: ICD-10-PCS | Mod: HCNC,S$GLB,, | Performed by: FAMILY MEDICINE

## 2019-07-26 PROCEDURE — 1101F PR PT FALLS ASSESS DOC 0-1 FALLS W/OUT INJ PAST YR: ICD-10-PCS | Mod: HCNC,CPTII,S$GLB, | Performed by: FAMILY MEDICINE

## 2019-07-26 PROCEDURE — 71100 X-RAY EXAM RIBS UNI 2 VIEWS: CPT | Mod: 26,HCNC,RT, | Performed by: RADIOLOGY

## 2019-07-26 NOTE — PROGRESS NOTES
Assessment and Plan:    1. Chest wall pain  X-rays appear unremarkable.  Likely contusion from couch.  Take Tylenol as needed, let us know if you have any other symptoms.  Report to emergency department with any severe chest discomfort or shortness of breath.  - X-Ray Chest PA And Lateral; Future  - X-Ray Ribs 2 View Right; Future          ______________________________________________________________________  Subjective:    Chief Complaint:  Chief Complaint   Patient presents with    Chest Pain     under breast x's 2 days, tender on rt flank    Back Pain        HPI:  Miesha is a 83 y.o. year old     Chest wall discomfort  Located on right flank underneath breast for the last 2 days.  Bumped into the edge of the couch the other day prior to pain onset.  Denies any severe shortness of breath or cough or wheeze.  Denies any overlying rash.    Medications:  Current Outpatient Medications on File Prior to Visit   Medication Sig Dispense Refill    acetaminophen (TYLENOL) 325 MG tablet Take 2 tablets (650 mg total) by mouth every 6 (six) hours as needed for Pain or Temperature greater than.  0    albuterol-ipratropium (DUO-NEB) 2.5 mg-0.5 mg/3 mL nebulizer solution Take 3 mLs by nebulization every 4 (four) hours as needed for Shortness of Breath.       aspirin (ECOTRIN) 81 MG EC tablet Take 81 mg by mouth once daily.      b complex vitamins tablet Take 1 tablet by mouth once daily.        BD ALCOHOL SWABS PadM USE TWICE DAILY 200 each 11    clopidogrel (PLAVIX) 75 mg tablet Take 1 tablet (75 mg total) by mouth once daily. 90 tablet 0    diclofenac sodium (VOLTAREN) 1 % Gel Apply 2 g topically daily as needed. Apply to right shoulder 100 g 1    diphenhydramine-acetaminophen (TYLENOL PM)  mg Tab Take 1 tablet by mouth nightly as needed.      fish oil-omega-3 fatty acids 300-1,000 mg capsule Take 2 g by mouth once daily.      fluticasone-vilanterol (BREO ELLIPTA) 200-25 mcg/dose DsDv diskus inhaler Inhale 1  puff into the lungs once daily. 3 each 3    gabapentin (NEURONTIN) 300 MG capsule Take 1 capsule (300 mg total) by mouth every evening. 30 capsule 2    levothyroxine (SYNTHROID) 100 MCG tablet TAKE 1 TABLET EVERY DAY 90 tablet 1    lisinopril (PRINIVIL,ZESTRIL) 2.5 MG tablet Take 1 tablet (2.5 mg total) by mouth once daily. 90 tablet 0    metoprolol tartrate (LOPRESSOR) 25 MG tablet Take 1 tablet (25 mg total) by mouth 2 (two) times daily. 180 tablet 0    mometasone (NASONEX) 50 mcg/actuation nasal spray 1 spray by Nasal route 2 (two) times daily. 17 g 1    omeprazole (PRILOSEC OTC) 20 MG tablet Take 1 tablet (20 mg total) by mouth once daily. 30 tablet 11    spironolactone (ALDACTONE) 25 MG tablet TAKE 1 TABLET EVERY DAY 90 tablet 3    torsemide (DEMADEX) 20 MG Tab TAKE 1 TABLET(20 MG) BY MOUTH EVERY DAY 90 tablet 1    VENTOLIN HFA 90 mcg/actuation inhaler Inhale 1 puff into the lungs daily as needed for Wheezing.       warfarin (COUMADIN) 2.5 MG tablet Take 1 tablet (2.5 mg total) by mouth Daily. (Patient taking differently: Take 2.5 mg by mouth. This medication is managed by [facility]. Please contact [phone]. See most recent [facility] visit for current coumadin dosing. Current dose: Take 2.5mg daily or as instructed by coumadin clinic.) 90 tablet 3    ACCU-CHEK FASTCLIX Misc TEST TWICE DAILY 200 each 11    ACCU-CHEK SMARTVIEW TEST STRIP Strp TEST  TWICE DAILY 200 strip 11    alendronate (FOSAMAX) 70 MG tablet Take 1 tablet (70 mg total) by mouth every 7 days. First dose 6- at [facility] 12 tablet 3    azelastine (ASTELIN) 137 mcg (0.1 %) nasal spray 1 spray (137 mcg total) by Nasal route 2 (two) times daily. 30 mL 0    nitroGLYCERIN (NITROSTAT) 0.4 MG SL tablet Place 1 tablet (0.4 mg total) under the tongue every 5 (five) minutes as needed for Chest pain. 20 tablet 0     No current facility-administered medications on file prior to visit.        Review of Systems:  Review of  "Systems   Constitutional: Negative for fever.   Respiratory: Negative for cough and shortness of breath.    Cardiovascular: Negative for chest pain.   Gastrointestinal: Negative for abdominal pain, diarrhea, nausea and vomiting.   Musculoskeletal:        Chest wall pain   Skin: Negative for rash.   Psychiatric/Behavioral: Negative for dysphoric mood.       Past Medical History:  Past Medical History:   Diagnosis Date    Anticoagulant long-term use     Arthritis     Atrial fibrillation     Breast cancer 1994    breast , left    Cardiomyopathy - EF 35-40% 5/11/2016    CHF (congestive heart failure)     Chronic bronchitis     COPD (chronic obstructive pulmonary disease)     Coronary artery disease without angina pectoris 8/26/2015    Depression     Diabetes with neurologic complications     Diverticulitis     Diverticulosis     Encounter for blood transfusion     after mastectomy x 20 years ago    GERD (gastroesophageal reflux disease)     H/O aortic valve replacement     Hiatal hernia     History of colonic diverticulitis     Hypertension     Hypothyroid     Insomnia     Irritable bowel syndrome     Obesity     Pacemaker 08/2014    Schatzki's ring     mild    Sleep apnea     uses cpap with oxygen     Syncope and collapse     Systolic congestive heart failure, NYHA class 3 5/23/2016    Type II or unspecified type diabetes mellitus without mention of complication, not stated as uncontrolled     No medications at present.     Urinary incontinence        Objective:    Vitals:  Vitals:    07/26/19 1323   Pulse: 69   Temp: 97.6 °F (36.4 °C)   TempSrc: Oral   SpO2: 97%   Weight: 103.4 kg (227 lb 15.3 oz)   Height: 5' 2" (1.575 m)       Physical Exam   Constitutional: No distress.   HENT:   Head: Normocephalic and atraumatic.   Eyes: Pupils are equal, round, and reactive to light. EOM are normal.   Neck: Neck supple.   Cardiovascular: Normal rate and regular rhythm. Exam reveals no friction rub. "   No murmur heard.  Pulmonary/Chest: Effort normal and breath sounds normal.   Abdominal: Soft. Bowel sounds are normal. She exhibits no distension. There is no tenderness.   Musculoskeletal:        Arms:  Skin: Skin is warm and dry. No rash noted.   Psychiatric: She has a normal mood and affect. Her behavior is normal.         Miah Bender MD  Family Medicine

## 2019-07-29 DIAGNOSIS — I10 ESSENTIAL HYPERTENSION: Primary | ICD-10-CM

## 2019-07-29 RX ORDER — SPIRONOLACTONE 25 MG/1
TABLET ORAL
Qty: 90 TABLET | Refills: 3 | Status: SHIPPED | OUTPATIENT
Start: 2019-07-29 | End: 2020-06-12

## 2019-07-30 RX ORDER — FUROSEMIDE 20 MG/1
TABLET ORAL
Qty: 30 TABLET | Refills: 0 | OUTPATIENT
Start: 2019-07-30

## 2019-07-30 NOTE — PROGRESS NOTES
Refill Authorization Note     is requesting a refill authorization.    Brief assessment and rationale for refill: DEFER: Not active on med list  Name and strength of medication: FUROSEMIDE 20 MG Tablet       Medication Therapy Plan: HTN LCO controlled 1/19; Labs WNL 6/19; lasix is not active on pt med list; pt has spironolactone and torsemide from 2 different physicians; medmined shows pt is filling both of these meds; defer to you    Medication reconciliation completed: Yes              How patient will take medication: t1t qd          Comments:   Blood Pressure Readings: <139/89mmHg (12 months)  BP Readings from Last 3 Encounters:   07/09/19 119/64   06/22/19 121/64   06/21/19 122/76        Last Creatinine/ Potassium/ Sodium(diuretics)) (6 months: Diuretics-(Cr/K/Na)  or 12 months: non-diuretics)  Lab Results   Component Value Date    CREATININE 1.40 06/22/2019    CREATININE 1.42 (H) 06/20/2019    CREATININE 1.23 06/19/2019      Lab Results   Component Value Date    K 4.7 06/22/2019    K 4.5 06/20/2019    K 5.1 06/19/2019    Lab Results   Component Value Date     06/22/2019     06/20/2019     06/19/2019         Digital Hypertension Data: (values will display if enrolled)  Last 5 Patient Entered Readings                                      Current 30 Day Average:      There is no flowsheet data to display.        APPOINTMENTS (past 12m or future 3m authorizing provider)  LAST VISIT DATE  ZOEY España MD 1/25/2019         NEXT VISIT DATE  ZOEY España MD Visit date not found

## 2019-07-30 NOTE — TELEPHONE ENCOUNTER
Pt is listed as taking torsemide but pharmacy is requesting furosemide.  Furosemide was used at recent hospitalization however has not been used recently; will refuse.

## 2019-08-02 ENCOUNTER — OFFICE VISIT (OUTPATIENT)
Dept: FAMILY MEDICINE | Facility: CLINIC | Age: 84
End: 2019-08-02
Payer: MEDICARE

## 2019-08-02 ENCOUNTER — LAB VISIT (OUTPATIENT)
Dept: LAB | Facility: HOSPITAL | Age: 84
End: 2019-08-02
Attending: INTERNAL MEDICINE
Payer: MEDICARE

## 2019-08-02 VITALS
DIASTOLIC BLOOD PRESSURE: 62 MMHG | TEMPERATURE: 98 F | WEIGHT: 236.75 LBS | RESPIRATION RATE: 18 BRPM | OXYGEN SATURATION: 94 % | HEART RATE: 74 BPM | BODY MASS INDEX: 43.57 KG/M2 | HEIGHT: 62 IN | SYSTOLIC BLOOD PRESSURE: 96 MMHG

## 2019-08-02 DIAGNOSIS — Z95.810 AICD (AUTOMATIC CARDIOVERTER/DEFIBRILLATOR) PRESENT: ICD-10-CM

## 2019-08-02 DIAGNOSIS — I35.0 NODULAR CALCIFIC AORTIC VALVE STENOSIS: ICD-10-CM

## 2019-08-02 DIAGNOSIS — I42.0 DILATED CARDIOMYOPATHY: ICD-10-CM

## 2019-08-02 DIAGNOSIS — K12.1 MOUTH ULCERS: Primary | ICD-10-CM

## 2019-08-02 DIAGNOSIS — R42 DIZZINESS: ICD-10-CM

## 2019-08-02 DIAGNOSIS — I48.91 ATRIAL FIBRILLATION: Primary | ICD-10-CM

## 2019-08-02 DIAGNOSIS — I50.42 CHRONIC COMBINED SYSTOLIC AND DIASTOLIC CONGESTIVE HEART FAILURE, NYHA CLASS 3: ICD-10-CM

## 2019-08-02 DIAGNOSIS — I10 ESSENTIAL HYPERTENSION: ICD-10-CM

## 2019-08-02 DIAGNOSIS — I70.0 AORTIC ATHEROSCLEROSIS: ICD-10-CM

## 2019-08-02 DIAGNOSIS — I27.9 PULMONARY HEART DISEASE: ICD-10-CM

## 2019-08-02 DIAGNOSIS — N18.30 CHRONIC KIDNEY DISEASE, STAGE III (MODERATE): ICD-10-CM

## 2019-08-02 DIAGNOSIS — E87.5 HYPERPOTASSEMIA: ICD-10-CM

## 2019-08-02 LAB
ANION GAP SERPL CALC-SCNC: 9 MMOL/L (ref 8–16)
BUN SERPL-MCNC: 46 MG/DL (ref 8–23)
CALCIUM SERPL-MCNC: 9 MG/DL (ref 8.7–10.5)
CHLORIDE SERPL-SCNC: 105 MMOL/L (ref 95–110)
CO2 SERPL-SCNC: 26 MMOL/L (ref 23–29)
CREAT SERPL-MCNC: 1.3 MG/DL (ref 0.5–1.4)
EST. GFR  (AFRICAN AMERICAN): 44.5 ML/MIN/1.73 M^2
EST. GFR  (NON AFRICAN AMERICAN): 38.6 ML/MIN/1.73 M^2
GLUCOSE SERPL-MCNC: 163 MG/DL (ref 70–110)
POTASSIUM SERPL-SCNC: 5 MMOL/L (ref 3.5–5.1)
SODIUM SERPL-SCNC: 140 MMOL/L (ref 136–145)

## 2019-08-02 PROCEDURE — 99214 PR OFFICE/OUTPT VISIT, EST, LEVL IV, 30-39 MIN: ICD-10-PCS | Mod: HCNC,S$GLB,, | Performed by: NURSE PRACTITIONER

## 2019-08-02 PROCEDURE — 36415 COLL VENOUS BLD VENIPUNCTURE: CPT | Mod: HCNC,PO

## 2019-08-02 PROCEDURE — 99214 OFFICE O/P EST MOD 30 MIN: CPT | Mod: HCNC,S$GLB,, | Performed by: NURSE PRACTITIONER

## 2019-08-02 PROCEDURE — 99999 PR PBB SHADOW E&M-EST. PATIENT-LVL V: CPT | Mod: PBBFAC,HCNC,, | Performed by: NURSE PRACTITIONER

## 2019-08-02 PROCEDURE — 3074F SYST BP LT 130 MM HG: CPT | Mod: HCNC,CPTII,S$GLB, | Performed by: NURSE PRACTITIONER

## 2019-08-02 PROCEDURE — 3078F DIAST BP <80 MM HG: CPT | Mod: HCNC,CPTII,S$GLB, | Performed by: NURSE PRACTITIONER

## 2019-08-02 PROCEDURE — 3078F PR MOST RECENT DIASTOLIC BLOOD PRESSURE < 80 MM HG: ICD-10-PCS | Mod: HCNC,CPTII,S$GLB, | Performed by: NURSE PRACTITIONER

## 2019-08-02 PROCEDURE — 3074F PR MOST RECENT SYSTOLIC BLOOD PRESSURE < 130 MM HG: ICD-10-PCS | Mod: HCNC,CPTII,S$GLB, | Performed by: NURSE PRACTITIONER

## 2019-08-02 PROCEDURE — 1101F PR PT FALLS ASSESS DOC 0-1 FALLS W/OUT INJ PAST YR: ICD-10-PCS | Mod: HCNC,CPTII,S$GLB, | Performed by: NURSE PRACTITIONER

## 2019-08-02 PROCEDURE — 99999 PR PBB SHADOW E&M-EST. PATIENT-LVL V: ICD-10-PCS | Mod: PBBFAC,HCNC,, | Performed by: NURSE PRACTITIONER

## 2019-08-02 PROCEDURE — 1101F PT FALLS ASSESS-DOCD LE1/YR: CPT | Mod: HCNC,CPTII,S$GLB, | Performed by: NURSE PRACTITIONER

## 2019-08-02 PROCEDURE — 80048 BASIC METABOLIC PNL TOTAL CA: CPT | Mod: HCNC

## 2019-08-02 RX ORDER — TRIAMCINOLONE ACETONIDE 1 MG/G
PASTE DENTAL 2 TIMES DAILY
Qty: 5 G | Refills: 0 | Status: SHIPPED | OUTPATIENT
Start: 2019-08-02 | End: 2019-09-01

## 2019-08-02 NOTE — PROGRESS NOTES
Subjective:       Patient ID: Miesha Obando is a 83 y.o. female.    Chief Complaint: Oral Pain (noticed abt a month ago)    The patient is here today for   Pain in gums and dizziness   This is the first time that I am seeing the patient for this problem.     BP at home has been 80-90s/50s - some elevation to 100/50   But also Dizzy with lower BP     Dizziness:   Chronicity:  New  Onset:  In the past 7 days  Progression since onset:  Gradually worsening  Frequency:  Constantly  Severity:  Moderate  Duration:  Very brief  Dizziness characteristics:  Off-balance and sensation of movement   Associated symptoms: headaches.no hearing loss, no ear congestion, no ear pain, no fever, no tinnitus, no nausea, no vomiting, no diaphoresis, no aural fullness, no weakness, no visual disturbances, no light-headedness, no syncope, no palpitations, no panic, no facial weakness, no slurred speech, no numbness in extremities and no chest pain.  Aggravated by:  Bending, getting up and position changes  Treatments tried:  Rest  Oral Pain    This is a new problem. The current episode started in the past 7 days. The problem occurs constantly. The problem has been unchanged. The pain is at a severity of 3/10. The pain is mild. Pertinent negatives include no difficulty swallowing, facial pain, fever, oral bleeding, sinus pressure or thermal sensitivity. She has tried nothing for the symptoms.     Vitals:    08/02/19 1313   BP: 96/62   Pulse: 74   Resp: 18   Temp: 97.9 °F (36.6 °C)     Review of Systems   Constitutional: Positive for activity change. Negative for diaphoresis, fatigue and fever.   HENT: Positive for dental problem and mouth sores. Negative for ear pain, hearing loss, sinus pressure and tinnitus.         Pain along gum line    Eyes: Negative.    Respiratory: Positive for shortness of breath. Negative for cough and wheezing.    Cardiovascular: Negative.  Negative for chest pain, palpitations and syncope.   Gastrointestinal:  Negative.  Negative for abdominal pain, anal bleeding, constipation, diarrhea, nausea, rectal pain and vomiting.   Endocrine: Negative.    Genitourinary: Negative.  Negative for dysuria and hematuria.   Musculoskeletal: Positive for arthralgias, back pain, gait problem and myalgias.   Skin: Negative.  Negative for color change and rash.   Allergic/Immunologic: Negative.    Neurological: Positive for dizziness and headaches. Negative for speech difficulty, weakness, light-headedness and numbness.   Hematological: Negative.    Psychiatric/Behavioral: Negative.        Past Medical History:   Diagnosis Date    Anticoagulant long-term use     Arthritis     Atrial fibrillation     Breast cancer 1994    breast , left    Cardiomyopathy - EF 35-40% 5/11/2016    CHF (congestive heart failure)     Chronic bronchitis     COPD (chronic obstructive pulmonary disease)     Coronary artery disease without angina pectoris 8/26/2015    Depression     Diabetes with neurologic complications     Diverticulitis     Diverticulosis     Encounter for blood transfusion     after mastectomy x 20 years ago    GERD (gastroesophageal reflux disease)     H/O aortic valve replacement     Hiatal hernia     History of colonic diverticulitis     Hypertension     Hypothyroid     Insomnia     Irritable bowel syndrome     Obesity     Pacemaker 08/2014    Schatzki's ring     mild    Sleep apnea     uses cpap with oxygen     Syncope and collapse     Systolic congestive heart failure, NYHA class 3 5/23/2016    Type II or unspecified type diabetes mellitus without mention of complication, not stated as uncontrolled     No medications at present.     Urinary incontinence        Objective:      Physical Exam   Constitutional: She is oriented to person, place, and time. She appears well-developed and well-nourished.   HENT:   Head: Normocephalic and atraumatic.   Right Ear: Hearing, tympanic membrane, external ear and ear canal  normal.   Left Ear: Hearing, tympanic membrane, external ear and ear canal normal.   Nose: Nose normal. No mucosal edema, rhinorrhea or sinus tenderness. Right sinus exhibits no maxillary sinus tenderness and no frontal sinus tenderness. Left sinus exhibits no maxillary sinus tenderness and no frontal sinus tenderness.   Mouth/Throat: Uvula is midline and mucous membranes are normal. Posterior oropharyngeal erythema present. No oropharyngeal exudate or posterior oropharyngeal edema.       Along right gum line. 3-4 blisters - pain with pressure    Eyes: Pupils are equal, round, and reactive to light. Conjunctivae and EOM are normal.   Neck: Normal range of motion. Neck supple.   Cardiovascular: Normal rate, regular rhythm and intact distal pulses. Exam reveals no friction rub.   Murmur heard.   Systolic murmur is present with a grade of 1/6.  + 1  swelling to lower legs    Pulmonary/Chest: Effort normal and breath sounds normal. No stridor. No respiratory distress. She has no wheezes. She has no rales.   Abdominal: Soft. Bowel sounds are normal.   Musculoskeletal: She exhibits edema. She exhibits no tenderness.   Walking with walker   + back pain    Neurological: She is alert and oriented to person, place, and time. No cranial nerve deficit. Coordination normal.   Skin: Skin is warm and dry.   Psychiatric: She has a normal mood and affect. Her behavior is normal. Judgment and thought content normal.   Nursing note and vitals reviewed.      Assessment:       1. Mouth ulcers    2. Aortic atherosclerosis    3. AICD (automatic cardioverter/defibrillator) present - Bi-V    4. Pulmonary heart disease    5. Dilated cardiomyopathy    6. Chronic combined systolic and diastolic congestive heart failure, NYHA class 3    7. Essential hypertension    8. Dizziness        Plan:       Mouth ulcers  -     triamcinolone acetonide 0.1% (KENALOG) 0.1 % paste; Place onto teeth 2 (two) times daily.  Dispense: 5 g; Refill: 0    Aortic  atherosclerosis  AICD (automatic cardioverter/defibrillator) present - Bi-V  Pulmonary heart disease  Dilated cardiomyopathy  Chronic combined systolic and diastolic congestive heart failure, NYHA class 3  Essential hypertension  Lopressor 25mg BID ,    Aldactone 25mg PO QD,  demadex 20 mg QD      Dizziness   Patient has been very dizzy since adding lisinopril 2.5 from hospital stay. She lives alone and BP has been lower at home also.   I discussed with her that she needs to stop this so that her lower BP and dizziness does not cause her to fall  This was added to help with Heart failure.   Will forward note to Dr Anderson.        Jorje if nay changes

## 2019-08-02 NOTE — Clinical Note
Patient is not able to tolerate the additional Lisinopril 2.5 mg - BP running low 90/50s and she is dizzy- I instructed her to stop it - she lives alone and concerned for falling

## 2019-08-07 ENCOUNTER — TELEPHONE (OUTPATIENT)
Dept: CARDIOLOGY | Facility: CLINIC | Age: 84
End: 2019-08-07

## 2019-08-07 ENCOUNTER — LAB VISIT (OUTPATIENT)
Dept: LAB | Facility: HOSPITAL | Age: 84
End: 2019-08-07
Attending: INTERNAL MEDICINE
Payer: MEDICARE

## 2019-08-07 ENCOUNTER — OFFICE VISIT (OUTPATIENT)
Dept: ENDOCRINOLOGY | Facility: CLINIC | Age: 84
End: 2019-08-07
Payer: MEDICARE

## 2019-08-07 VITALS
HEART RATE: 71 BPM | WEIGHT: 236.75 LBS | HEIGHT: 62 IN | SYSTOLIC BLOOD PRESSURE: 130 MMHG | DIASTOLIC BLOOD PRESSURE: 64 MMHG | BODY MASS INDEX: 43.57 KG/M2

## 2019-08-07 DIAGNOSIS — M81.0 OSTEOPOROSIS, UNSPECIFIED OSTEOPOROSIS TYPE, UNSPECIFIED PATHOLOGICAL FRACTURE PRESENCE: ICD-10-CM

## 2019-08-07 DIAGNOSIS — E11.9 TYPE 2 DIABETES MELLITUS TREATED WITHOUT INSULIN: ICD-10-CM

## 2019-08-07 DIAGNOSIS — E03.9 HYPOTHYROIDISM, UNSPECIFIED TYPE: ICD-10-CM

## 2019-08-07 DIAGNOSIS — E11.9 TYPE 2 DIABETES MELLITUS TREATED WITHOUT INSULIN: Primary | ICD-10-CM

## 2019-08-07 LAB
25(OH)D3+25(OH)D2 SERPL-MCNC: 24 NG/ML (ref 30–96)
ALBUMIN SERPL BCP-MCNC: 3.4 G/DL (ref 3.5–5.2)
ALP SERPL-CCNC: 81 U/L (ref 55–135)
ALT SERPL W/O P-5'-P-CCNC: 17 U/L (ref 10–44)
ANION GAP SERPL CALC-SCNC: 11 MMOL/L (ref 8–16)
AST SERPL-CCNC: 23 U/L (ref 10–40)
BILIRUB SERPL-MCNC: 0.3 MG/DL (ref 0.1–1)
BUN SERPL-MCNC: 27 MG/DL (ref 8–23)
CALCIUM SERPL-MCNC: 9.7 MG/DL (ref 8.7–10.5)
CHLORIDE SERPL-SCNC: 103 MMOL/L (ref 95–110)
CO2 SERPL-SCNC: 25 MMOL/L (ref 23–29)
CREAT SERPL-MCNC: 1.2 MG/DL (ref 0.5–1.4)
EST. GFR  (AFRICAN AMERICAN): 49 ML/MIN/1.73 M^2
EST. GFR  (NON AFRICAN AMERICAN): 42.5 ML/MIN/1.73 M^2
ESTIMATED AVG GLUCOSE: 134 MG/DL (ref 68–131)
GLUCOSE SERPL-MCNC: 117 MG/DL (ref 70–110)
HBA1C MFR BLD HPLC: 6.3 % (ref 4–5.6)
PHOSPHATE SERPL-MCNC: 2.8 MG/DL (ref 2.7–4.5)
POTASSIUM SERPL-SCNC: 4.8 MMOL/L (ref 3.5–5.1)
PROT SERPL-MCNC: 7.4 G/DL (ref 6–8.4)
PTH-INTACT SERPL-MCNC: 251 PG/ML (ref 9–77)
SODIUM SERPL-SCNC: 139 MMOL/L (ref 136–145)
TSH SERPL DL<=0.005 MIU/L-ACNC: 1.39 UIU/ML (ref 0.4–4)

## 2019-08-07 PROCEDURE — 99999 PR PBB SHADOW E&M-EST. PATIENT-LVL III: CPT | Mod: PBBFAC,HCNC,, | Performed by: INTERNAL MEDICINE

## 2019-08-07 PROCEDURE — 3075F SYST BP GE 130 - 139MM HG: CPT | Mod: HCNC,CPTII,S$GLB, | Performed by: INTERNAL MEDICINE

## 2019-08-07 PROCEDURE — 99499 UNLISTED E&M SERVICE: CPT | Mod: HCNC,S$GLB,, | Performed by: INTERNAL MEDICINE

## 2019-08-07 PROCEDURE — 3078F PR MOST RECENT DIASTOLIC BLOOD PRESSURE < 80 MM HG: ICD-10-PCS | Mod: HCNC,CPTII,S$GLB, | Performed by: INTERNAL MEDICINE

## 2019-08-07 PROCEDURE — 83036 HEMOGLOBIN GLYCOSYLATED A1C: CPT | Mod: HCNC

## 2019-08-07 PROCEDURE — 82306 VITAMIN D 25 HYDROXY: CPT | Mod: HCNC

## 2019-08-07 PROCEDURE — 99499 RISK ADDL DX/OHS AUDIT: ICD-10-PCS | Mod: HCNC,S$GLB,, | Performed by: INTERNAL MEDICINE

## 2019-08-07 PROCEDURE — 1101F PR PT FALLS ASSESS DOC 0-1 FALLS W/OUT INJ PAST YR: ICD-10-PCS | Mod: HCNC,CPTII,S$GLB, | Performed by: INTERNAL MEDICINE

## 2019-08-07 PROCEDURE — 36415 COLL VENOUS BLD VENIPUNCTURE: CPT | Mod: HCNC,PO

## 2019-08-07 PROCEDURE — 1101F PT FALLS ASSESS-DOCD LE1/YR: CPT | Mod: HCNC,CPTII,S$GLB, | Performed by: INTERNAL MEDICINE

## 2019-08-07 PROCEDURE — 3078F DIAST BP <80 MM HG: CPT | Mod: HCNC,CPTII,S$GLB, | Performed by: INTERNAL MEDICINE

## 2019-08-07 PROCEDURE — 3075F PR MOST RECENT SYSTOLIC BLOOD PRESS GE 130-139MM HG: ICD-10-PCS | Mod: HCNC,CPTII,S$GLB, | Performed by: INTERNAL MEDICINE

## 2019-08-07 PROCEDURE — 99204 PR OFFICE/OUTPT VISIT, NEW, LEVL IV, 45-59 MIN: ICD-10-PCS | Mod: HCNC,S$GLB,, | Performed by: INTERNAL MEDICINE

## 2019-08-07 PROCEDURE — 83970 ASSAY OF PARATHORMONE: CPT | Mod: HCNC

## 2019-08-07 PROCEDURE — 99999 PR PBB SHADOW E&M-EST. PATIENT-LVL III: ICD-10-PCS | Mod: PBBFAC,HCNC,, | Performed by: INTERNAL MEDICINE

## 2019-08-07 PROCEDURE — 84443 ASSAY THYROID STIM HORMONE: CPT | Mod: HCNC

## 2019-08-07 PROCEDURE — 84100 ASSAY OF PHOSPHORUS: CPT | Mod: HCNC

## 2019-08-07 PROCEDURE — 99204 OFFICE O/P NEW MOD 45 MIN: CPT | Mod: HCNC,S$GLB,, | Performed by: INTERNAL MEDICINE

## 2019-08-07 PROCEDURE — 80053 COMPREHEN METABOLIC PANEL: CPT | Mod: HCNC

## 2019-08-07 NOTE — LETTER
August 7, 2019      Mikel Xiong DO  [address] [facility] [address] [place] [address]           [place] [facility] Endocrinology  [address] [facility] [address] [place] [address]  Phone: [phone]  Fax: [phone]          Patient: Miesha Obando   MR Number: [id number]   YOB: 1935   Date of Visit: 8/7/2019       Dear Dr. Mikel Xiong:    Thank you for referring Miesha Obando to me for evaluation. Attached you will find relevant portions of my assessment and plan of care.    If you have questions, please do not hesitate to call me. I look forward to following Miesha Obando along with you.    Sincerely,    Shaan Huff DO    Enclosure  CC:  No Recipients    If you would like to receive this communication electronically, please contact [email or web address] or [phone] to request more information on [facility] access.    For providers and/or their staff who would like to refer a patient to [facility], please contact us through our one-stop-shop provider referral line, [facility] , at [phone].    If you feel you have received this communication in error or would no longer like to receive these types of communications, please e-mail [email or web address]

## 2019-08-07 NOTE — TELEPHONE ENCOUNTER
Spoke with home health nurse Lanesha, in the interim she discovered pt has been taking metoprolol 50mg bid instead of 25mg BID; pt is going to take correct dose from now on. Advised nurse pt would have to discuss sleep problems with PCP; nurse expressed understanding

## 2019-08-07 NOTE — PROGRESS NOTES
CHIEF COMPLAINT: DM 2/Hypothyroid  83 year old being seen as a new patient.DM 2 since early 62. Was on metformin. AM BG readings . Has had steroids in the past due to COPD. Last steroid course 12/2018. Occasional paresthesias- states mild. States has some difficulty regulating diet. Does tend to eat out. On synthroid 100 mcg. On fosamax for osteoporosis- started 9/25/18. Not taking Ca + D.       PAST MEDICAL HISTORY/PAST SURGICAL HISTORY:  Reviewed in [facility]    SOCIAL HISTORY: Quit smoking in 1980's. No alcohol    FAMILY HISTORY:  + gestational DM. No known thyroid disease    MEDICATIONS/ALLERGIES: The patient's MedCard has been updated and reviewed.      ROS:   Constitutional: No recent significant weight change  Eyes: No recent visual changes  ENT: No dysphagia  Cardiovascular: Denies current anginal symptoms  Respiratory: Denies current respiratory difficulty  Gastrointestinal: Denies recent bowel disturbances  GenitoUrinary - No dysuria  Skin: No new skin rash  Neurologic: No focal neurologic complaints  Remainder ROS negative        PE:    GENERAL: Well developed, well nourished.  PSYCH:  appropriate mood and affect  EYES:  PERRL, EOM intact.  ENT: Nares patent, oropharynx clear, mucosa pink,   NECK: Supple, trachea midline, No palpable thyroid nodules.   CHEST: Resp even and unlabored, CTA bilateral.  CARDIAC: RRR, S1, S2 heard, no murmurs, rubs, S3, or S4  ABDOMEN: Soft, non-tender, non-distended;  No organomegaly  VASCULAR:  DP pulses +2/4 bilaterally, no edema  NEURO: Gait steady, CN II-VII grossly intact  SKIN: No areas of breakdown, no acanthosis nigracans.  FEET: Normal monofilament. No cuts or abrasions. 2 + pedal pulses.     LABS   Results for SHIVAM PALACIOS (MRN [id number]) as of 8/7/2019 14:07   Ref. Range 8/2/2019 14:21   Sodium Latest Ref Range: 136 - 145 mmol/L 140   Potassium Latest Ref Range: 3.5 - 5.1 mmol/L 5.0   Chloride Latest Ref Range: 95 - 110 mmol/L 105   CO2 Latest Ref Range: 23 - 29  mmol/L 26   Anion Gap Latest Ref Range: 8 - 16 mmol/L 9   BUN, Bld Latest Ref Range: 8 - 23 mg/dL 46 (H)   Creatinine Latest Ref Range: 0.5 - 1.4 mg/dL 1.3   eGFR if non African American Latest Ref Range: >60 mL/min/1.73 m^2 38.6 (A)   eGFR if African American Latest Ref Range: >60 mL/min/1.73 m^2 44.5 (A)   Glucose Latest Ref Range: 70 - 110 mg/dL 163 (H)   Calcium Latest Ref Range: 8.7 - 10.5 mg/dL 9.0     Results for SHIVAM PALACIOS (MRN [id number]) as of 8/7/2019 14:07   Ref. Range 1/21/2019 07:55 5/20/2019 15:33   Hemoglobin A1C External Latest Ref Range: 4.0 - 5.6 % 6.6 (H) 6.9 (H)   Estimated Avg Glucose Latest Ref Range: 68 - 131 mg/dL 143 (H) 151 (H)   TSH Latest Ref Range: 0.400 - 4.000 uIU/mL 1.969    Results for SHIVAM PALACIOS (MRN [id number]) as of 8/7/2019 14:07   Ref. Range 1/21/2019 07:49   Microalbum.,U,Random Latest Units: ug/mL 14.0   Creatinine, Random Ur Latest Ref Range: 15.0 - 325.0 mg/dL 61.0   MICROALB/CREAT RATIO Latest Ref Range: 0.0 - 30.0 ug/mg 23.0     Results for SHIVAM PALACIOS (MRN [id number]) as of 8/7/2019 14:07   Ref. Range 1/21/2019 07:55   TSH Latest Ref Range: 0.400 - 4.000 uIU/mL 1.969       ASSESSMENT/PLAN:  1. DM 2- has been diet controlled. A1c recently increased. Will repeat A1c. Has some difficulty with diet and has difficulty making good food decisions when eating out. Will send to DM educations. Could consider continuing with diet control. Could also consider GLP-1with CV benefit  as she does have CAD. If stable can f/u with PCP    2. Hypothyroid- Check TSH.     3. Osteoporosis- discussed taking ca + D. Discussed how to take fosamax. Can treat for 5 years and consider drug holiday. Will do a secondary W/u as seen below.     FOLLOWUP   CMP, A1c, TSH, PTH, Phos Vit D- today  DM Education- coordinate with cardiology appt.

## 2019-08-07 NOTE — TELEPHONE ENCOUNTER
----- Message from Zaina Wei sent at 8/7/2019 10:16 AM CDT -----  Contact: Home Health- Lanesha   Type: Patient Call Back    Who called:Lanesha     What is the request in detail: patient has concerns because her morning BP is about 90/60. Yesterday when PT OT came 97/56 but when she came this morning it was normal. She says patient is concerned about other readings and would like to notify the office. She also says she is having trouble sleeping she sometime has trouble going to bed and has trouble staying asleep as well.     Can the clinic reply by [employer]? Call     Would the patient rather a call back or a response via [facility]?  Call     Best call back number: [phone]

## 2019-08-09 ENCOUNTER — TELEPHONE (OUTPATIENT)
Dept: ENDOCRINOLOGY | Facility: CLINIC | Age: 84
End: 2019-08-09

## 2019-08-09 NOTE — TELEPHONE ENCOUNTER
----- Message from Drea Mata sent at 8/9/2019  3:27 PM CDT -----  Type:  Test Results    Who Called:  patient  Name of Test (Lab/Mammo/Etc):  Blood labs  Date of Test:  08 07 19  Ordering Provider:  Dr Shaan Huff  Where the test was performed:  [facility] [place] [facility]  Best Call Back Number:  [phone]  Additional Information: please call patient with her a1c results

## 2019-08-11 ENCOUNTER — TELEPHONE (OUTPATIENT)
Dept: ENDOCRINOLOGY | Facility: CLINIC | Age: 84
End: 2019-08-11

## 2019-08-11 DIAGNOSIS — R79.89 ELEVATED PARATHYROID HORMONE: Primary | ICD-10-CM

## 2019-08-11 DIAGNOSIS — E55.9 VITAMIN D DEFICIENCY: ICD-10-CM

## 2019-08-11 RX ORDER — ERGOCALCIFEROL 1.25 MG/1
50000 CAPSULE ORAL
Qty: 12 CAPSULE | Refills: 0 | Status: SHIPPED | OUTPATIENT
Start: 2019-08-11 | End: 2019-11-20 | Stop reason: SDUPTHER

## 2019-08-11 NOTE — TELEPHONE ENCOUNTER
Let her know that TSH WNL  PTH is elevated. Will need to monitor  Vit D is low.   Take ergo once weekly for 3 months  Then check CMP, PTH, Phos, Vit D in 3 months

## 2019-08-13 ENCOUNTER — TELEPHONE (OUTPATIENT)
Dept: GASTROENTEROLOGY | Facility: CLINIC | Age: 84
End: 2019-08-13

## 2019-08-13 NOTE — TELEPHONE ENCOUNTER
----- Message from Chao Acosta sent at 8/13/2019  2:27 PM CDT -----  Contact: pt  Pt is wanting to schedule a procedure to stretch her esophagus. Please call to advise    Call Back #: [phone]  Thanks

## 2019-08-16 DIAGNOSIS — M79.2 NEURITIS: ICD-10-CM

## 2019-08-19 RX ORDER — GABAPENTIN 300 MG/1
CAPSULE ORAL
Qty: 30 CAPSULE | Refills: 0 | Status: SHIPPED | OUTPATIENT
Start: 2019-08-19 | End: 2019-09-16 | Stop reason: SDUPTHER

## 2019-08-22 DIAGNOSIS — M81.0 OSTEOPOROSIS, UNSPECIFIED OSTEOPOROSIS TYPE, UNSPECIFIED PATHOLOGICAL FRACTURE PRESENCE: Primary | ICD-10-CM

## 2019-08-22 NOTE — TELEPHONE ENCOUNTER
----- Message from Vanessa Navarro sent at 8/22/2019  3:17 PM CDT -----  Type:  RX Refill Request    Who Called:  Patient   Refill or New Rx:   Refill   RX Name and Strength:  alendronate (FOSAMAX) 70 MG tablet  How is the patient currently taking it? (ex. 1XDay): 1 x every 7 days   Is this a 30 day or 90 day RX:  30  Preferred Pharmacy with phone number:   [id number] [address] & [address] [address]  Phone: [phone] Fax: [phone]    Local or Mail Order:  Local   Ordering Provider:  Dewey Rebollar Call Back Number:  [phone]   Additional Information: patient normally gets this medication sent to [facility] however this time she would just [facility]

## 2019-08-23 NOTE — PROGRESS NOTES
Refill Authorization Note     is requesting a refill authorization.    Brief assessment and rationale for refill: ROUTE: op   Name and strength of medication: alendronate (FOSAMAX) 70 MG tablet       Medication Therapy Plan: OSTEO-lco(LOV); Outside of protocol, Route to you     Medication reconciliation completed: No              How patient will take medication: t1t po qw          Comments:    Recommended value: eGFR:  >35mL/min (12 months)   Lab Results   Component Value Date    EGFRNONAA 42.5 (A) 08/07/2019    EGFRNONAA 38.6 (A) 08/02/2019    EGFRNONAA 35 (A) 06/22/2019     Lab Results   Component Value Date    ESTGFRAFRICA 49.0 (A) 08/07/2019    ESTGFRAFRICA 44.5 (A) 08/02/2019    ESTGFRAFRICA 41 (A) 06/22/2019        Dexa Scan: (within past 2 years): YES  Date of Scan:    Results for orders placed during the hospital encounter of 08/21/18   DXA Bone Density Spine And Hip 8/21/2018     Next Dexa Scan Due: 08/2020     Vitamin D & Calcium: Results on record anytime frame   Lab Results   Component Value Date    FHPKTCHL64GS 24 (L) 08/07/2019    Lab Results   Component Value Date    CALCIUM 9.7 08/07/2019    CALCIUM 9.0 08/02/2019    CALCIUM 9.6 06/22/2019        APPOINTMENTS (past 12m or future 3m authorizing provider)    Date Provider   LAST VISIT  1/25/2019 ZOEY España MD   NEXT VISIT  9/16/2019 ZOEY España MD

## 2019-08-23 NOTE — TELEPHONE ENCOUNTER
Pt scheduled for 10/8 with Dr Olivas. Instructions mailed to pt. Ana Quiñonez request sent to Coumadin clinic.

## 2019-08-27 ENCOUNTER — TELEPHONE (OUTPATIENT)
Dept: FAMILY MEDICINE | Facility: CLINIC | Age: 84
End: 2019-08-27

## 2019-08-27 DIAGNOSIS — M81.0 OSTEOPOROSIS, UNSPECIFIED OSTEOPOROSIS TYPE, UNSPECIFIED PATHOLOGICAL FRACTURE PRESENCE: ICD-10-CM

## 2019-08-27 RX ORDER — ALENDRONATE SODIUM 70 MG/1
70 TABLET ORAL
Qty: 12 TABLET | Refills: 1 | Status: SHIPPED | OUTPATIENT
Start: 2019-08-27 | End: 2019-08-27 | Stop reason: SDUPTHER

## 2019-08-27 RX ORDER — METOPROLOL TARTRATE 25 MG/1
25 TABLET, FILM COATED ORAL 2 TIMES DAILY
Qty: 180 TABLET | Refills: 0 | Status: CANCELLED | OUTPATIENT
Start: 2019-08-27 | End: 2020-08-26

## 2019-08-27 NOTE — TELEPHONE ENCOUNTER
----- Message from Lydia Gao sent at 8/27/2019  9:24 AM CDT -----  Contact: Miesha zuniga  Type:  RX Refill Request    Who Called:  Miesha  Refill or New Rx:  refill  RX Name and Strength:  alendronate (FOSAMAX) 70 MG tablet  How is the patient currently taking it? (ex. 1XDay):  Once a week  Is this a 30 day or 90 day RX:  30  Preferred Pharmacy with phone number:      [facility] - [place], [place] - [address]  [address] [facility] [address]  Phone: [phone] Fax: [phone]      Local or Mail Order:  Mail order  Ordering Provider:  Patria Rebollar Call Back Number:  [phone]  Additional Information:  Pls call pt if any issues w/ refill

## 2019-08-29 NOTE — PROGRESS NOTES
Refill Authorization Note     is requesting a refill authorization.    Brief assessment and rationale for refill: ROUTE: op (alendronate) // DEFER; not previously prescribed by you (metoprolol)  Name and strength of medication: metoprolol tartrate (LOPRESSOR) 25 MG tablet / alendronate (FOSAMAX) 70 MG tablet       Medication Therapy Plan: metoprolol historically prescribed by [facility], not previously prescribed by you; Alendronate last sent to local pharmacy, mailorder is requesting now; Defer to you     Medication reconciliation completed: No              How patient will take medication: t1t po bid / t1t po qw           Comments:    Recommended value: eGFR:  >35mL/min (12 months)   Lab Results   Component Value Date    EGFRNONAA 42.5 (A) 08/07/2019    EGFRNONAA 38.6 (A) 08/02/2019    EGFRNONAA 35 (A) 06/22/2019     Lab Results   Component Value Date    ESTGFRAFRICA 49.0 (A) 08/07/2019    ESTGFRAFRICA 44.5 (A) 08/02/2019    ESTGFRAFRICA 41 (A) 06/22/2019        Dexa Scan: (within past 2 years): YES  Date of Scan:    Results for orders placed during the hospital encounter of 08/21/18   DXA Bone Density Spine And Hip 8/21/2018     Next Dexa Scan Due: 08/2020     Vitamin D & Calcium: Results on record anytime frame   Lab Results   Component Value Date    OONBYDUY17EA 24 (L) 08/07/2019    Lab Results   Component Value Date    CALCIUM 9.7 08/07/2019    CALCIUM 9.0 08/02/2019    CALCIUM 9.6 06/22/2019        Blood Pressure Readings: <139/89mmHg (12 months) Heart Rate: > 50bpm (BB/NDHP-CCBS)   BP Readings from Last 3 Encounters:   08/07/19 130/64   08/02/19 96/62   07/09/19 119/64    Pulse Readings from Last 3 Encounters:   08/07/19 71   08/02/19 74   07/26/19 69        Digital Hypertension Data: (values will display if enrolled)   Last 5 Patient Entered Readings                                      Current 30 Day Average:      There is no flowsheet data to display.           APPOINTMENTS(past 12m or future 3m  authorizing provider)    Date Provider   LAST VISIT  1/25/2019 ZOEY España MD   NEXT VISIT  9/16/2019 ZOEY España MD

## 2019-08-30 RX ORDER — METOPROLOL TARTRATE 25 MG/1
25 TABLET, FILM COATED ORAL 2 TIMES DAILY
Qty: 60 TABLET | Refills: 0 | Status: SHIPPED | OUTPATIENT
Start: 2019-08-30 | End: 2019-08-30 | Stop reason: SDUPTHER

## 2019-08-30 RX ORDER — METOPROLOL TARTRATE 25 MG/1
25 TABLET, FILM COATED ORAL 2 TIMES DAILY
Qty: 180 TABLET | Refills: 3 | Status: SHIPPED | OUTPATIENT
Start: 2019-08-30 | End: 2019-09-04 | Stop reason: ALTCHOICE

## 2019-08-30 NOTE — TELEPHONE ENCOUNTER
----- Message from Brenda Farley sent at 8/30/2019  9:02 AM CDT -----  Contact: patient  Type:  RX Refill Request    Who Called:  patient  Refill or New Rx:  refill  RX Name and Strength:  metoprolol tartrate (LOPRESSOR) 25 MG tablet  How is the patient currently taking it? (ex. 1XDay):  Take 1 tablet (25 mg total) by mouth 2 (two) times daily. - Oral  Is this a 30 day or 90 day RX:  90  Preferred Pharmacy with phone number:    [id number] - [place] [address] [address] [address]  Phone: [phone] Fax: [phone]    [facility] - [facility] [address] [facility]  [address] [facility] [address]  Phone: [phone] Fax: [phone]      Local or Mail Order:    Ordering Provider:  Janet Rebollar Call Back Number:  [phone]  Additional Information:  Patient states that [facility] faxed a refill request to the office last week with no response. Patient states that she is completely out of her med. She needs a short script to be sent to [facility] and the full script to be sent to [facility]. Please give call back

## 2019-09-02 RX ORDER — ALENDRONATE SODIUM 70 MG/1
70 TABLET ORAL
Qty: 12 TABLET | Refills: 1 | Status: SHIPPED | OUTPATIENT
Start: 2019-09-02 | End: 2021-08-24

## 2019-09-03 ENCOUNTER — TELEPHONE (OUTPATIENT)
Dept: CARDIOLOGY | Facility: CLINIC | Age: 84
End: 2019-09-03

## 2019-09-03 ENCOUNTER — PATIENT OUTREACH (OUTPATIENT)
Dept: ADMINISTRATIVE | Facility: HOSPITAL | Age: 84
End: 2019-09-03

## 2019-09-03 RX ORDER — METOPROLOL TARTRATE 25 MG/1
TABLET, FILM COATED ORAL
Qty: 180 TABLET | Refills: 0 | Status: SHIPPED | OUTPATIENT
Start: 2019-09-03 | End: 2019-09-04 | Stop reason: ALTCHOICE

## 2019-09-03 NOTE — TELEPHONE ENCOUNTER
----- Message from Minnie Light sent at 9/3/2019 11:42 AM CDT -----  Contact: Patient  Type: Needs Medical Advice    Who Called:  Patient  Best Call Back Number: [phone] (home)   Additional Information: patient said that the wrong Rx was sent to the pharm would like a call back to discuss and get corrected for Metoprolol she needs a new Rx per pharmacy

## 2019-09-03 NOTE — TELEPHONE ENCOUNTER
Please advise: pt was switched to metoprolol tartrate and says it makes her dizzy and would like to switch back to the long acting

## 2019-09-04 ENCOUNTER — TELEPHONE (OUTPATIENT)
Dept: CARDIOLOGY | Facility: CLINIC | Age: 84
End: 2019-09-04

## 2019-09-04 DIAGNOSIS — M81.0 OSTEOPOROSIS, UNSPECIFIED OSTEOPOROSIS TYPE, UNSPECIFIED PATHOLOGICAL FRACTURE PRESENCE: ICD-10-CM

## 2019-09-04 RX ORDER — METOPROLOL SUCCINATE 25 MG/1
25 TABLET, EXTENDED RELEASE ORAL DAILY
Qty: 90 TABLET | Refills: 3 | Status: SHIPPED | OUTPATIENT
Start: 2019-09-04 | End: 2020-06-08 | Stop reason: SDUPTHER

## 2019-09-04 NOTE — TELEPHONE ENCOUNTER
Patient to have EGD on 10/18, and need to hold plavix, coumadin, and aspirin for 5 days prior to visit      ----- Message from Betty Baugh RN sent at 9/4/2019  1:33 PM CDT -----  Regarding: Anticoagulation   Pt scheduled for EGD with Dr. Olivas on 10/8/19. Pt will need to hold Plavix - 5 days and Coumadin 4 days prior. Please advise.   Thank you.

## 2019-09-05 ENCOUNTER — TELEPHONE (OUTPATIENT)
Dept: CARDIOLOGY | Facility: CLINIC | Age: 84
End: 2019-09-05

## 2019-09-05 NOTE — TELEPHONE ENCOUNTER
Pt informed of recommendation to hold off on proceeding with EGD due to recent stent placement. Pt rescheduled to 1/7/20. New conformation letter and instructions mailed to pt. Pt verbalized understanding.

## 2019-09-05 NOTE — TELEPHONE ENCOUNTER
Pt had drug eluting stent placed on 6/2019. Plavix is recommended for 6 months post procedure. She can hold medication for procedure 12/2019.

## 2019-09-06 RX ORDER — ALENDRONATE SODIUM 70 MG/1
70 TABLET ORAL
Qty: 12 TABLET | Refills: 1 | OUTPATIENT
Start: 2019-09-06 | End: 2020-09-05

## 2019-09-06 NOTE — PROGRESS NOTES
Quick DC. Duplicate Request  Refill Authorization Note     is requesting a refill authorization.    Brief assessment and rationale for refill: Quick DC: rts (3/20)  Name and strength of medication: alendronate (FOSAMAX) 70 MG tablet       Medication Therapy Plan: last escripted to [facility] 9/1/2019 for 6 ms, refill too soon quick dc    Medication reconciliation completed: No              How patient will take medication: t1t po qw          Comments:   Last Prescribed Info:   Ordering User:  ZOEY España MD            Diagnosis Association: Osteoporosis, unspecified osteoporosis type, unspecified pathological fracture presence (M81.0)      Original Order:  alendronate (FOSAMAX) 70 MG tablet [[id number]]      Pharmacy:  [facility] - [place] [address] [facility]          alendronate (FOSAMAX) 70 MG tablet 12 tablet 1 9/2/2019 9/1/2020    Sig - Route: Take 1 tablet (70 mg total) by mouth every 7 days. - Oral    Sent to pharmacy as: alendronate (FOSAMAX) 70 MG tablet    E-Prescribing Status: Receipt confirmed by pharmacy (9/2/2019  1:45 PM CDT)

## 2019-09-15 DIAGNOSIS — M79.2 NEURITIS: ICD-10-CM

## 2019-09-16 ENCOUNTER — OFFICE VISIT (OUTPATIENT)
Dept: FAMILY MEDICINE | Facility: CLINIC | Age: 84
End: 2019-09-16
Payer: MEDICARE

## 2019-09-16 VITALS
DIASTOLIC BLOOD PRESSURE: 84 MMHG | WEIGHT: 235.88 LBS | SYSTOLIC BLOOD PRESSURE: 136 MMHG | BODY MASS INDEX: 43.41 KG/M2 | TEMPERATURE: 98 F | OXYGEN SATURATION: 96 % | HEART RATE: 69 BPM | HEIGHT: 62 IN

## 2019-09-16 DIAGNOSIS — M79.2 NEURITIS: ICD-10-CM

## 2019-09-16 DIAGNOSIS — E11.49 TYPE II DIABETES MELLITUS WITH NEUROLOGICAL MANIFESTATIONS: ICD-10-CM

## 2019-09-16 DIAGNOSIS — I10 ESSENTIAL HYPERTENSION: Primary | ICD-10-CM

## 2019-09-16 DIAGNOSIS — M79.662 PAIN IN BOTH LOWER LEGS: ICD-10-CM

## 2019-09-16 DIAGNOSIS — K21.9 GASTROESOPHAGEAL REFLUX DISEASE, ESOPHAGITIS PRESENCE NOT SPECIFIED: ICD-10-CM

## 2019-09-16 DIAGNOSIS — M79.661 PAIN IN BOTH LOWER LEGS: ICD-10-CM

## 2019-09-16 PROBLEM — R11.2 NAUSEA & VOMITING: Status: RESOLVED | Noted: 2019-06-15 | Resolved: 2019-09-16

## 2019-09-16 PROBLEM — R13.10 DYSPHAGIA: Status: RESOLVED | Noted: 2018-02-08 | Resolved: 2019-09-16

## 2019-09-16 PROBLEM — E87.5 HYPERKALEMIA: Status: RESOLVED | Noted: 2019-06-19 | Resolved: 2019-09-16

## 2019-09-16 PROCEDURE — 90662 IIV NO PRSV INCREASED AG IM: CPT | Mod: HCNC,S$GLB,, | Performed by: FAMILY MEDICINE

## 2019-09-16 PROCEDURE — 99999 PR PBB SHADOW E&M-EST. PATIENT-LVL III: CPT | Mod: PBBFAC,HCNC,, | Performed by: FAMILY MEDICINE

## 2019-09-16 PROCEDURE — G0008 FLU VACCINE - HIGH DOSE (65+) PRESERVATIVE FREE IM: ICD-10-PCS | Mod: HCNC,S$GLB,, | Performed by: FAMILY MEDICINE

## 2019-09-16 PROCEDURE — 3075F PR MOST RECENT SYSTOLIC BLOOD PRESS GE 130-139MM HG: ICD-10-PCS | Mod: HCNC,CPTII,S$GLB, | Performed by: FAMILY MEDICINE

## 2019-09-16 PROCEDURE — 99999 PR PBB SHADOW E&M-EST. PATIENT-LVL III: ICD-10-PCS | Mod: PBBFAC,HCNC,, | Performed by: FAMILY MEDICINE

## 2019-09-16 PROCEDURE — 1101F PT FALLS ASSESS-DOCD LE1/YR: CPT | Mod: HCNC,CPTII,S$GLB, | Performed by: FAMILY MEDICINE

## 2019-09-16 PROCEDURE — 3079F DIAST BP 80-89 MM HG: CPT | Mod: HCNC,CPTII,S$GLB, | Performed by: FAMILY MEDICINE

## 2019-09-16 PROCEDURE — G0008 ADMIN INFLUENZA VIRUS VAC: HCPCS | Mod: HCNC,S$GLB,, | Performed by: FAMILY MEDICINE

## 2019-09-16 PROCEDURE — 1101F PR PT FALLS ASSESS DOC 0-1 FALLS W/OUT INJ PAST YR: ICD-10-PCS | Mod: HCNC,CPTII,S$GLB, | Performed by: FAMILY MEDICINE

## 2019-09-16 PROCEDURE — 99214 PR OFFICE/OUTPT VISIT, EST, LEVL IV, 30-39 MIN: ICD-10-PCS | Mod: 25,HCNC,S$GLB, | Performed by: FAMILY MEDICINE

## 2019-09-16 PROCEDURE — 99214 OFFICE O/P EST MOD 30 MIN: CPT | Mod: 25,HCNC,S$GLB, | Performed by: FAMILY MEDICINE

## 2019-09-16 PROCEDURE — 3075F SYST BP GE 130 - 139MM HG: CPT | Mod: HCNC,CPTII,S$GLB, | Performed by: FAMILY MEDICINE

## 2019-09-16 PROCEDURE — 3079F PR MOST RECENT DIASTOLIC BLOOD PRESSURE 80-89 MM HG: ICD-10-PCS | Mod: HCNC,CPTII,S$GLB, | Performed by: FAMILY MEDICINE

## 2019-09-16 PROCEDURE — 90662 FLU VACCINE - HIGH DOSE (65+) PRESERVATIVE FREE IM: ICD-10-PCS | Mod: HCNC,S$GLB,, | Performed by: FAMILY MEDICINE

## 2019-09-16 RX ORDER — GABAPENTIN 300 MG/1
300 CAPSULE ORAL 2 TIMES DAILY
Qty: 60 CAPSULE | Refills: 2 | Status: SHIPPED | OUTPATIENT
Start: 2019-09-16 | End: 2020-01-14

## 2019-09-16 NOTE — PROGRESS NOTES
Subjective:       Patient ID: Miesha Obando is a 83 y.o. female.    Chief Complaint: Follow-up (2 month)    Here for f/u htn and diabetes. Doing well overall. Had labs last month.    Hypertension   This is a chronic problem. The current episode started more than 1 year ago. The problem is controlled. Pertinent negatives include no chest pain, palpitations or shortness of breath.   Hyperlipidemia   This is a chronic problem. The current episode started more than 1 year ago. The problem is controlled. Pertinent negatives include no chest pain or shortness of breath.   Diabetes   She presents for her follow-up diabetic visit. She has type 2 diabetes mellitus. Her disease course has been stable. Pertinent negatives for hypoglycemia include no nervousness/anxiousness. Pertinent negatives for diabetes include no chest pain and no fatigue.     Review of Systems   Constitutional: Negative for chills, fatigue and fever.   Respiratory: Negative for cough, chest tightness and shortness of breath.    Cardiovascular: Negative for chest pain, palpitations and leg swelling.   Endocrine: Negative for cold intolerance and heat intolerance.   Skin: Negative for rash.   Psychiatric/Behavioral: Negative for dysphoric mood. The patient is not nervous/anxious.        Objective:      Physical Exam   Constitutional: She appears well-developed and well-nourished.   HENT:   Head: Normocephalic and atraumatic.   Cardiovascular: Normal rate, regular rhythm and normal heart sounds.   Pulmonary/Chest: Effort normal and breath sounds normal.   Psychiatric: She has a normal mood and affect.   Nursing note and vitals reviewed.      Assessment:       1. Essential hypertension    2. Neuritis    3. Gastroesophageal reflux disease, esophagitis presence not specified    4. Pain in both lower legs    5. Type II diabetes mellitus with neurological manifestations        Plan:       Essential hypertension    Neuritis  -     gabapentin (NEURONTIN) 300 MG  capsule; Take 1 capsule (300 mg total) by mouth 2 (two) times daily.  Dispense: 60 capsule; Refill: 2    Gastroesophageal reflux disease, esophagitis presence not specified    Pain in both lower legs    Type II diabetes mellitus with neurological manifestations    Other orders  -     Influenza - High Dose (65+) (PF) (IM)  -     ranitidine (ZANTAC) 150 MG tablet; Take 1 tablet (150 mg total) by mouth daily as needed for Heartburn.  Dispense: 90 tablet; Refill: 1      Increase gabapentin from qhs to bid  Dm II stable  htn stable  gerd stable but needs prn med after certain food    Follow up in about 5 months (around 2/16/2020), or if symptoms worsen or fail to improve.

## 2019-09-19 RX ORDER — GABAPENTIN 300 MG/1
CAPSULE ORAL
Qty: 30 CAPSULE | Refills: 0 | Status: SHIPPED | OUTPATIENT
Start: 2019-09-19 | End: 2019-11-13 | Stop reason: SDUPTHER

## 2019-09-28 DIAGNOSIS — N18.9 CHRONIC KIDNEY DISEASE, UNSPECIFIED CKD STAGE: ICD-10-CM

## 2019-10-08 ENCOUNTER — TELEPHONE (OUTPATIENT)
Dept: FAMILY MEDICINE | Facility: CLINIC | Age: 84
End: 2019-10-08

## 2019-10-08 NOTE — TELEPHONE ENCOUNTER
----- Message from Rachel Lance sent at 10/8/2019  8:18 AM CDT -----  Type:  RX Refill Request    Who Called: pt   New Rx:   RX Name and Strength:   Asking  For   Acid  Reflux  meds   Preferred Pharmacy with phone number:    [facility] #[id number] - [place] [address] [address] [address]  Phone: [phone] Fax: [phone]  Best Call Back Number:  [phone]  Additional Information:   Please call  For details

## 2019-10-11 ENCOUNTER — TELEPHONE (OUTPATIENT)
Dept: FAMILY MEDICINE | Facility: CLINIC | Age: 84
End: 2019-10-11

## 2019-10-11 DIAGNOSIS — R13.10 DYSPHAGIA, UNSPECIFIED TYPE: Primary | ICD-10-CM

## 2019-10-11 NOTE — TELEPHONE ENCOUNTER
Patient states that she is having issues with reflux. States her esophagus goes into spasm and she vomits her food. States she is only taking the omeprazole OTC. Advised patient per last office note to take the zantac 150 in addition to the omeprazole. Go to ED over the weekend if symptoms do not improve. Advised to follow up with GI.

## 2019-10-11 NOTE — TELEPHONE ENCOUNTER
----- Message from Carol Stokes sent at 10/11/2019  2:15 PM CDT -----  Contact: self  Type: Needs Medical Advice    Who Called:  self  Symptoms (please be specific):  Reflux pain  How long has patient had these symptoms:  week  Pharmacy name and phone #:    Best Call Back Number: [phone] (home)   Additional Information: Patient has left previous messages regarding the acid reflux pain. She has been having severe pain when she eats in the middle of her chest. It causing some vomiting. Patient would like advice or a prescription. Thanks!

## 2019-10-23 RX ORDER — CLOPIDOGREL BISULFATE 75 MG/1
75 TABLET ORAL DAILY
Qty: 90 TABLET | Refills: 0 | Status: SHIPPED | OUTPATIENT
Start: 2019-10-23 | End: 2020-01-13

## 2019-10-23 NOTE — TELEPHONE ENCOUNTER
----- Message from Vicenta Jay sent at 10/23/2019 10:51 AM CDT -----  Type:  RX Refill Request    Who Called:  Patient   Refill or New Rx:  refill  RX Name and Strength:  clopidogrel (PLAVIX) 75 mg tablet  How is the patient currently taking it? (ex. 1XDay):  1 x day  Is this a 30 day or 90 day RX:  90  Preferred Pharmacy with phone number:    [id number] [address] & [address] [address]  Phone: [phone] Fax: [phone]  Local or Mail Order:  Local  Ordering Provider:    Best Call Back Number:  [phone]  Additional Information:  Patient is out of medication has not taken since last week .

## 2019-10-31 ENCOUNTER — OFFICE VISIT (OUTPATIENT)
Dept: CARDIOLOGY | Facility: CLINIC | Age: 84
End: 2019-10-31
Payer: MEDICARE

## 2019-10-31 VITALS — WEIGHT: 236.13 LBS | HEIGHT: 62 IN | BODY MASS INDEX: 43.45 KG/M2

## 2019-10-31 DIAGNOSIS — Z79.01 LONG TERM CURRENT USE OF ANTICOAGULANT THERAPY: ICD-10-CM

## 2019-10-31 DIAGNOSIS — Z95.5 S/P DRUG ELUTING CORONARY STENT PLACEMENT: ICD-10-CM

## 2019-10-31 DIAGNOSIS — Z95.2 S/P TAVR (TRANSCATHETER AORTIC VALVE REPLACEMENT): ICD-10-CM

## 2019-10-31 DIAGNOSIS — Z95.810 AICD (AUTOMATIC CARDIOVERTER/DEFIBRILLATOR) PRESENT: ICD-10-CM

## 2019-10-31 DIAGNOSIS — I35.0 NONRHEUMATIC AORTIC VALVE STENOSIS: ICD-10-CM

## 2019-10-31 DIAGNOSIS — I25.10 CORONARY ARTERY DISEASE INVOLVING NATIVE CORONARY ARTERY OF NATIVE HEART WITHOUT ANGINA PECTORIS: Primary | ICD-10-CM

## 2019-10-31 DIAGNOSIS — I10 ESSENTIAL HYPERTENSION: ICD-10-CM

## 2019-10-31 DIAGNOSIS — I48.20 CHRONIC A-FIB: ICD-10-CM

## 2019-10-31 PROCEDURE — 1101F PR PT FALLS ASSESS DOC 0-1 FALLS W/OUT INJ PAST YR: ICD-10-PCS | Mod: HCNC,CPTII,S$GLB, | Performed by: INTERNAL MEDICINE

## 2019-10-31 PROCEDURE — 3074F PR MOST RECENT SYSTOLIC BLOOD PRESSURE < 130 MM HG: ICD-10-PCS | Mod: HCNC,CPTII,S$GLB, | Performed by: INTERNAL MEDICINE

## 2019-10-31 PROCEDURE — 99214 OFFICE O/P EST MOD 30 MIN: CPT | Mod: HCNC,S$GLB,, | Performed by: INTERNAL MEDICINE

## 2019-10-31 PROCEDURE — 1101F PT FALLS ASSESS-DOCD LE1/YR: CPT | Mod: HCNC,CPTII,S$GLB, | Performed by: INTERNAL MEDICINE

## 2019-10-31 PROCEDURE — 99999 PR PBB SHADOW E&M-EST. PATIENT-LVL III: ICD-10-PCS | Mod: PBBFAC,HCNC,, | Performed by: INTERNAL MEDICINE

## 2019-10-31 PROCEDURE — 3078F DIAST BP <80 MM HG: CPT | Mod: HCNC,CPTII,S$GLB, | Performed by: INTERNAL MEDICINE

## 2019-10-31 PROCEDURE — 99999 PR PBB SHADOW E&M-EST. PATIENT-LVL III: CPT | Mod: PBBFAC,HCNC,, | Performed by: INTERNAL MEDICINE

## 2019-10-31 PROCEDURE — 3078F PR MOST RECENT DIASTOLIC BLOOD PRESSURE < 80 MM HG: ICD-10-PCS | Mod: HCNC,CPTII,S$GLB, | Performed by: INTERNAL MEDICINE

## 2019-10-31 PROCEDURE — 3074F SYST BP LT 130 MM HG: CPT | Mod: HCNC,CPTII,S$GLB, | Performed by: INTERNAL MEDICINE

## 2019-10-31 PROCEDURE — 99214 PR OFFICE/OUTPT VISIT, EST, LEVL IV, 30-39 MIN: ICD-10-PCS | Mod: HCNC,S$GLB,, | Performed by: INTERNAL MEDICINE

## 2019-10-31 NOTE — PROGRESS NOTES
Subjective:    Patient ID:  Miesha Obando is a 84 y.o. female who presents for follow-up of Aortic Stenosis (3 mo f/u); Hyperlipidemia; Hypertension; and Cardiomyopathy      Pt here for f/u. Since last visit she has no new cardiac complaints. She will have occasional transient chest pains but not same as previous angina.      Review of Systems   Constitution: Negative for weight gain and weight loss.   HENT: Negative.    Eyes: Negative.    Cardiovascular: Negative for chest pain, claudication, cyanosis, dyspnea on exertion, irregular heartbeat, leg swelling, near-syncope, orthopnea (no PND), palpitations and syncope.   Respiratory: Negative for cough, hemoptysis, shortness of breath and snoring.    Endocrine: Negative.    Skin: Negative.    Musculoskeletal: Negative for joint pain, muscle cramps, muscle weakness and myalgias.   Gastrointestinal: Negative for diarrhea, hematemesis, nausea and vomiting.   Genitourinary: Negative.    Neurological: Negative for dizziness, focal weakness, light-headedness, loss of balance, numbness, paresthesias and seizures.   Psychiatric/Behavioral: The patient has insomnia.         Objective:    Physical Exam   Constitutional: She is oriented to person, place, and time. She appears well-developed and well-nourished.   Eyes: Pupils are equal, round, and reactive to light.   Neck: Normal range of motion. No thyromegaly present.   Cardiovascular: Normal rate, regular rhythm, S1 normal, S2 normal, intact distal pulses and normal pulses.  No extrasystoles are present. PMI is not displaced. Exam reveals no friction rub.   Murmur heard.   Medium-pitched systolic murmur is present with a grade of 1/6 at the upper right sternal border.  Pulmonary/Chest: Effort normal and breath sounds normal. She has no wheezes. She has no rales. She exhibits no tenderness.   Abdominal: Soft. Bowel sounds are normal. She exhibits no distension and no mass. There is no tenderness.   Musculoskeletal: Normal  range of motion. She exhibits edema (1+).   Neurological: She is alert and oriented to person, place, and time.   Skin: Skin is warm and dry.   Vitals reviewed.      Test(s) Reviewed  I have reviewed the following in detail:  [] Stress test   [x] Angiography   [x] Echocardiogram   [] Labs   [] Other:         Assessment:       1. Coronary artery disease involving native coronary artery of native heart without angina pectoris    2. Nonrheumatic aortic valve stenosis    3. 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 S/P drug eluting coronary stent placement - RCA 2015; LCX 06/2019    5. AICD (automatic cardioverter/defibrillator) present - Bi-V    6. Essential hypertension    7. Long term current use of anticoagulant therapy    8. Chronic a-fib         Plan:       Pt doing well  Cardiac status stable  Continue present Rx  F/u 6 months  If she still needs EGD she may hold plavix in January

## 2019-11-09 RX ORDER — WARFARIN 2.5 MG/1
TABLET ORAL
Qty: 90 TABLET | Refills: 0 | Status: SHIPPED | OUTPATIENT
Start: 2019-11-09 | End: 2020-02-10

## 2019-11-11 RX ORDER — FUROSEMIDE 20 MG/1
TABLET ORAL
Qty: 90 TABLET | Refills: 0 | OUTPATIENT
Start: 2019-11-11

## 2019-11-11 RX ORDER — LEVOTHYROXINE SODIUM 100 UG/1
TABLET ORAL
Qty: 90 TABLET | Refills: 2 | Status: SHIPPED | OUTPATIENT
Start: 2019-11-11 | End: 2020-05-19

## 2019-11-11 NOTE — PROGRESS NOTES
Refill Authorization Note     is requesting a refill authorization.    Brief assessment and rationale for refill: QUICK DC: previously denied (furosemide)// APPROVE: prr  Name and strength of medication: furosemide (LASIX) 20 MG tablet // levothyroxine (SYNTHROID) 100 MCG tablet       Medication Therapy Plan: TSH wnl; Lasix previously denied 2/2 patient taking torsemide; QUICK DC Lasix    Medication reconciliation completed: No                         Comments: Revised Refill Auth Note.       Requested Prescriptions   Signed Prescriptions Disp Refills    levothyroxine (SYNTHROID) 100 MCG tablet 90 tablet 2     Sig: TAKE 1 TABLET EVERY DAY       Endocrinology:  Hypothyroid Agents Failed - 11/11/2019  1:53 PM        Failed - If TSH was abnormal but FT4 was WNL, okay to refill. FT4 is not required if TSH is WNL.        Failed - T4 free in normal range and within 360 days     No results found for: EXTFREET4, T4FREE, FREET4, F6LRGAWJVMJP, T4FT4           Passed - Patient is at least 18 years old        Passed - Office visit in past 12 months or future 90 days     Recent Outpatient Visits            1 week ago Coronary artery disease involving native coronary artery of native heart without angina pectoris    [facility] Eddie Gonzales MD    1 month ago Essential hypertension    [facility] ZOEY España MD    3 months ago Type 2 diabetes mellitus treated without insulin    [place] - Endocrinology Shaan Huff DO    3 months ago Mouth ulcers    [facility] Mila Brewster NP    3 months ago Chest wall pain    [facility] Approach Miah Bender MD          Future Appointments              Tomorrow [facility]    In 3 months ZOEY España MD [facility]    In 5 months Eddie Gonzales MD [facility]                Passed - TSH in  normal range and within 360 days     TSH   Date Value Ref Range Status   08/07/2019 1.393 0.400 - 4.000 uIU/mL Final   01/21/2019 1.969 0.400 - 4.000 uIU/mL Final   04/18/2017 3.014 0.400 - 4.000 uIU/mL Final            Refused Prescriptions Disp Refills    furosemide (LASIX) 20 MG tablet [Pharmacy Med Name: FUROSEMIDE 20 MG Tablet] 90 tablet 0     Sig: TAKE 1 TABLET BY MOUTH EVERY DAY AS NEEDED       Cardiovascular:  Diuretics - Loop Failed - 11/11/2019  1:53 PM        Failed - eGFR in normal range and within 180 days     eGFR if non    Date Value Ref Range Status   08/07/2019 42.5 (A) >60 mL/min/1.73 m^2 Final     Comment:     Calculation used to obtain the estimated glomerular filtration  rate (eGFR) is the CKD-EPI equation.      08/02/2019 38.6 (A) >60 mL/min/1.73 m^2 Final     Comment:     Calculation used to obtain the estimated glomerular filtration  rate (eGFR) is the CKD-EPI equation.      06/22/2019 35 (A) >60 mL/min/1.73 m^2 Final     Comment:     Calculation used to obtain the estimated glomerular filtration  rate (eGFR) is the CKD-EPI equation.                 Passed - Patient is at least 18 years old        Passed - Last BP in normal range within 360 days     BP Readings from Last 3 Encounters:   10/31/19 (P) 112/62   09/16/19 136/84   08/07/19 130/64              Passed - Office visit in past 12 months or future 90 days     Recent Outpatient Visits            1 week ago Coronary artery disease involving native coronary artery of native heart without angina pectoris    [facility] Card[facility] Eddie Gonzales MD    1 month ago Essential hypertension    [facility] ZOEY España MD    3 months ago Type 2 diabetes mellitus treated without insulin    [facility] Endocrinology Shaan Huff DO    3 months ago Mouth ulcers    [facility] Mila Brewster NP    3 months ago Chest wall pain    [facility] Approach Miah COOL  MD Yulia          Future Appointments              Tomorrow [facility] [place]    In 3 months KEON. MD [facility] - Family Medicine, [place]    In 5 months Eddie Gonzales MD [place] - Cardiology, [place]                Passed - K in normal range and within 180 days     Potassium   Date Value Ref Range Status   08/07/2019 4.8 3.5 - 5.1 mmol/L Final   08/02/2019 5.0 3.5 - 5.1 mmol/L Final   06/22/2019 4.7 3.5 - 5.1 mmol/L Final              Passed - Na in normal range and within 180 days     Sodium   Date Value Ref Range Status   08/07/2019 139 136 - 145 mmol/L Final   08/02/2019 140 136 - 145 mmol/L Final   06/22/2019 138 136 - 145 mmol/L Final              Passed - Cr is 1.4 or below and within 180 days     Creatinine   Date Value Ref Range Status   08/07/2019 1.2 0.5 - 1.4 mg/dL Final   08/02/2019 1.3 0.5 - 1.4 mg/dL Final   06/22/2019 1.40 0.50 - 1.40 mg/dL Final

## 2019-11-11 NOTE — PROGRESS NOTES
Refill Authorization Note     is requesting a refill authorization.    Brief assessment and rationale for refill: APPROVE: prr  Name and strength of medication: furosemide (LASIX) 20 MG tablet // levothyroxine (SYNTHROID) 100 MCG tablet       Medication Therapy Plan: TSH wnl    Medication reconciliation completed: No                         Comments:   Requested Prescriptions   Pending Prescriptions Disp Refills    levothyroxine (SYNTHROID) 100 MCG tablet [Pharmacy Med Name: LEVOTHYROXINE SODIUM 100 MCG Tablet] 90 tablet 2     Sig: TAKE 1 TABLET EVERY DAY       Endocrinology:  Hypothyroid Agents Failed - 11/9/2019  1:14 PM        Failed - If TSH was abnormal but FT4 was WNL, okay to refill. FT4 is not required if TSH is WNL.        Failed - T4 free in normal range and within 360 days     No results found for: EXTFREET4, T4FREE, FREET4, P1SFWPYOLMUT, T4FT4           Passed - Patient is at least 18 years old        Passed - Office visit in past 12 months or future 90 days     Recent Outpatient Visits            1 week ago Coronary artery disease involving native coronary artery of native heart without angina pectoris    [facility] Cardiology Eddie Gonzales MD    1 month ago Essential hypertension    [facility] ZOEY España MD    3 months ago Type 2 diabetes mellitus treated without insulin    [facility] Endocrinology Shaan Huff DO    3 months ago Mouth ulcers    [facility] Mila Brewster NP    3 months ago Chest wall pain    [facility] Miah Bender MD          Future Appointments              Tomorrow [facility] LABORATORY [facility] - Lab, [facility]    In 3 months ZOEY España MD [facility]    In 5 months [facility]                Passed - TSH in normal range and within 360 days     TSH   Date Value Ref Range Status   08/07/2019  1.393 0.400 - 4.000 uIU/mL Final   01/21/2019 1.969 0.400 - 4.000 uIU/mL Final   04/18/2017 3.014 0.400 - 4.000 uIU/mL Final             furosemide (LASIX) 20 MG tablet [Pharmacy Med Name: FUROSEMIDE 20 MG Tablet] 90 tablet 0     Sig: TAKE 1 TABLET BY MOUTH EVERY DAY AS NEEDED       Cardiovascular:  Diuretics - Loop Failed - 11/9/2019  1:14 PM        Failed - eGFR in normal range and within 180 days     eGFR if non    Date Value Ref Range Status   08/07/2019 42.5 (A) >60 mL/min/1.73 m^2 Final     Comment:     Calculation used to obtain the estimated glomerular filtration  rate (eGFR) is the CKD-EPI equation.      08/02/2019 38.6 (A) >60 mL/min/1.73 m^2 Final     Comment:     Calculation used to obtain the estimated glomerular filtration  rate (eGFR) is the CKD-EPI equation.      06/22/2019 35 (A) >60 mL/min/1.73 m^2 Final     Comment:     Calculation used to obtain the estimated glomerular filtration  rate (eGFR) is the CKD-EPI equation.                 Passed - Patient is at least 18 years old        Passed - Last BP in normal range within 360 days     BP Readings from Last 3 Encounters:   10/31/19 (P) 112/62   09/16/19 136/84   08/07/19 130/64              Passed - Office visit in past 12 months or future 90 days     Recent Outpatient Visits            1 week ago Coronary artery disease involving native coronary artery of native heart without angina pectoris    [facility] Cardiology Eddie Gonzales MD    1 month ago Essential hypertension    [facility] ZOEY España MD    3 months ago Type 2 diabetes mellitus treated without insulin    [facility] Endocrinology Shaan Huff DO    3 months ago Mouth ulcers    [facility] Mila Brewster NP    3 months ago Chest wall pain    [facility] Miah Bender MD          Future Appointments              Tomorrow [facility] LABORATORY [facility] - Lab, [facility]    In 3  months W. Eddie España MD [place] [facility] [place]    In 5 months Eddie Gonzales [place] [facility]                Passed - K in normal range and within 180 days     Potassium   Date Value Ref Range Status   08/07/2019 4.8 3.5 - 5.1 mmol/L Final   08/02/2019 5.0 3.5 - 5.1 mmol/L Final   06/22/2019 4.7 3.5 - 5.1 mmol/L Final              Passed - Na in normal range and within 180 days     Sodium   Date Value Ref Range Status   08/07/2019 139 136 - 145 mmol/L Final   08/02/2019 140 136 - 145 mmol/L Final   06/22/2019 138 136 - 145 mmol/L Final              Passed - Cr is 1.4 or below and within 180 days     Creatinine   Date Value Ref Range Status   08/07/2019 1.2 0.5 - 1.4 mg/dL Final   08/02/2019 1.3 0.5 - 1.4 mg/dL Final   06/22/2019 1.40 0.50 - 1.40 mg/dL Final

## 2019-11-12 ENCOUNTER — LAB VISIT (OUTPATIENT)
Dept: LAB | Facility: HOSPITAL | Age: 84
End: 2019-11-12
Attending: INTERNAL MEDICINE
Payer: MEDICARE

## 2019-11-12 DIAGNOSIS — E55.9 VITAMIN D DEFICIENCY: ICD-10-CM

## 2019-11-12 DIAGNOSIS — R79.89 ELEVATED PARATHYROID HORMONE: ICD-10-CM

## 2019-11-12 LAB
25(OH)D3+25(OH)D2 SERPL-MCNC: 27 NG/ML (ref 30–96)
ALBUMIN SERPL BCP-MCNC: 3.4 G/DL (ref 3.5–5.2)
ALP SERPL-CCNC: 64 U/L (ref 55–135)
ALT SERPL W/O P-5'-P-CCNC: 14 U/L (ref 10–44)
ANION GAP SERPL CALC-SCNC: 10 MMOL/L (ref 8–16)
AST SERPL-CCNC: 22 U/L (ref 10–40)
BILIRUB SERPL-MCNC: 0.5 MG/DL (ref 0.1–1)
BUN SERPL-MCNC: 29 MG/DL (ref 8–23)
CALCIUM SERPL-MCNC: 9.5 MG/DL (ref 8.7–10.5)
CHLORIDE SERPL-SCNC: 107 MMOL/L (ref 95–110)
CO2 SERPL-SCNC: 26 MMOL/L (ref 23–29)
CREAT SERPL-MCNC: 1.4 MG/DL (ref 0.5–1.4)
EST. GFR  (AFRICAN AMERICAN): 40.4 ML/MIN/1.73 M^2
EST. GFR  (NON AFRICAN AMERICAN): 35 ML/MIN/1.73 M^2
GLUCOSE SERPL-MCNC: 134 MG/DL (ref 70–110)
PHOSPHATE SERPL-MCNC: 2.8 MG/DL (ref 2.7–4.5)
POTASSIUM SERPL-SCNC: 4.6 MMOL/L (ref 3.5–5.1)
PROT SERPL-MCNC: 7.5 G/DL (ref 6–8.4)
PTH-INTACT SERPL-MCNC: 261 PG/ML (ref 9–77)
SODIUM SERPL-SCNC: 143 MMOL/L (ref 136–145)

## 2019-11-12 PROCEDURE — 84100 ASSAY OF PHOSPHORUS: CPT | Mod: HCNC

## 2019-11-12 PROCEDURE — 36415 COLL VENOUS BLD VENIPUNCTURE: CPT | Mod: HCNC,PO

## 2019-11-12 PROCEDURE — 83970 ASSAY OF PARATHORMONE: CPT | Mod: HCNC

## 2019-11-12 PROCEDURE — 80053 COMPREHEN METABOLIC PANEL: CPT | Mod: HCNC

## 2019-11-12 PROCEDURE — 82306 VITAMIN D 25 HYDROXY: CPT | Mod: HCNC

## 2019-11-13 DIAGNOSIS — M79.2 NEURITIS: ICD-10-CM

## 2019-11-13 NOTE — TELEPHONE ENCOUNTER
Refill Routing Note    Medication(s) are appropriate for refill Outside of protocol      Requested Prescriptions   Pending Prescriptions Disp Refills    gabapentin (NEURONTIN) 300 MG capsule 30 capsule 0     Sig: TAKE 1 CAPSULE(300 MG) BY MOUTH EVERY EVENING       Anticonvulsants Protocol Passed - 11/13/2019  2:48 PM        Passed - Visit with Authorizing provider in past 9 months or upcoming 90 days        Passed - No active pregnancy on record

## 2019-11-14 RX ORDER — GABAPENTIN 300 MG/1
CAPSULE ORAL
Qty: 90 CAPSULE | Refills: 0 | Status: SHIPPED | OUTPATIENT
Start: 2019-11-14 | End: 2020-04-08

## 2019-11-23 ENCOUNTER — CLINICAL SUPPORT (OUTPATIENT)
Dept: CARDIOLOGY | Facility: CLINIC | Age: 84
End: 2019-11-23
Payer: MEDICARE

## 2019-11-23 PROCEDURE — 93294 CARDIAC DEVICE CHECK - REMOTE: ICD-10-PCS | Mod: ,,, | Performed by: INTERNAL MEDICINE

## 2019-11-23 PROCEDURE — 93296 REM INTERROG EVL PM/IDS: CPT | Mod: PBBFAC,HCNC,PO | Performed by: INTERNAL MEDICINE

## 2019-11-23 PROCEDURE — 93294 REM INTERROG EVL PM/LDLS PM: CPT | Mod: ,,, | Performed by: INTERNAL MEDICINE

## 2019-11-23 RX ORDER — ERGOCALCIFEROL 1.25 MG/1
CAPSULE ORAL
Qty: 12 CAPSULE | Refills: 0 | Status: SHIPPED | OUTPATIENT
Start: 2019-11-23 | End: 2020-02-10

## 2019-12-05 ENCOUNTER — OFFICE VISIT (OUTPATIENT)
Dept: PODIATRY | Facility: CLINIC | Age: 84
End: 2019-12-05
Payer: MEDICARE

## 2019-12-05 VITALS
BODY MASS INDEX: 43.43 KG/M2 | WEIGHT: 236 LBS | DIASTOLIC BLOOD PRESSURE: 61 MMHG | HEIGHT: 62 IN | SYSTOLIC BLOOD PRESSURE: 131 MMHG | HEART RATE: 69 BPM

## 2019-12-05 DIAGNOSIS — B35.1 ONYCHOMYCOSIS DUE TO DERMATOPHYTE: Primary | ICD-10-CM

## 2019-12-05 DIAGNOSIS — I73.9 PERIPHERAL VASCULAR DISEASE: ICD-10-CM

## 2019-12-05 DIAGNOSIS — E11.49 TYPE II DIABETES MELLITUS WITH NEUROLOGICAL MANIFESTATIONS: ICD-10-CM

## 2019-12-05 PROCEDURE — 3075F SYST BP GE 130 - 139MM HG: CPT | Mod: HCNC,CPTII,S$GLB, | Performed by: PODIATRIST

## 2019-12-05 PROCEDURE — 99213 PR OFFICE/OUTPT VISIT, EST, LEVL III, 20-29 MIN: ICD-10-PCS | Mod: 25,HCNC,S$GLB, | Performed by: PODIATRIST

## 2019-12-05 PROCEDURE — 99999 PR PBB SHADOW E&M-EST. PATIENT-LVL III: CPT | Mod: PBBFAC,HCNC,, | Performed by: PODIATRIST

## 2019-12-05 PROCEDURE — 99999 PR PBB SHADOW E&M-EST. PATIENT-LVL III: ICD-10-PCS | Mod: PBBFAC,HCNC,, | Performed by: PODIATRIST

## 2019-12-05 PROCEDURE — 3075F PR MOST RECENT SYSTOLIC BLOOD PRESS GE 130-139MM HG: ICD-10-PCS | Mod: HCNC,CPTII,S$GLB, | Performed by: PODIATRIST

## 2019-12-05 PROCEDURE — 11721 PR DEBRIDEMENT OF NAILS, 6 OR MORE: ICD-10-PCS | Mod: Q9,HCNC,S$GLB, | Performed by: PODIATRIST

## 2019-12-05 PROCEDURE — 3078F DIAST BP <80 MM HG: CPT | Mod: HCNC,CPTII,S$GLB, | Performed by: PODIATRIST

## 2019-12-05 PROCEDURE — 1125F AMNT PAIN NOTED PAIN PRSNT: CPT | Mod: HCNC,S$GLB,, | Performed by: PODIATRIST

## 2019-12-05 PROCEDURE — 1101F PT FALLS ASSESS-DOCD LE1/YR: CPT | Mod: HCNC,CPTII,S$GLB, | Performed by: PODIATRIST

## 2019-12-05 PROCEDURE — 1159F MED LIST DOCD IN RCRD: CPT | Mod: HCNC,S$GLB,, | Performed by: PODIATRIST

## 2019-12-05 PROCEDURE — 1101F PR PT FALLS ASSESS DOC 0-1 FALLS W/OUT INJ PAST YR: ICD-10-PCS | Mod: HCNC,CPTII,S$GLB, | Performed by: PODIATRIST

## 2019-12-05 PROCEDURE — 99213 OFFICE O/P EST LOW 20 MIN: CPT | Mod: 25,HCNC,S$GLB, | Performed by: PODIATRIST

## 2019-12-05 PROCEDURE — 11721 DEBRIDE NAIL 6 OR MORE: CPT | Mod: Q9,HCNC,S$GLB, | Performed by: PODIATRIST

## 2019-12-05 PROCEDURE — 3078F PR MOST RECENT DIASTOLIC BLOOD PRESSURE < 80 MM HG: ICD-10-PCS | Mod: HCNC,CPTII,S$GLB, | Performed by: PODIATRIST

## 2019-12-05 PROCEDURE — 1159F PR MEDICATION LIST DOCUMENTED IN MEDICAL RECORD: ICD-10-PCS | Mod: HCNC,S$GLB,, | Performed by: PODIATRIST

## 2019-12-05 PROCEDURE — 1125F PR PAIN SEVERITY QUANTIFIED, PAIN PRESENT: ICD-10-PCS | Mod: HCNC,S$GLB,, | Performed by: PODIATRIST

## 2019-12-05 NOTE — PATIENT INSTRUCTIONS
- Check feet daily for wounds and areas of irritation.    - Keep regularly scheduled appointments with primary care, ophthalmology, and podiatry.    - Maintain blood glucose control via taking medications as prescribed, diabetic diet, and exercise.    - Apply lotion to feet and ankles daily but not between toes.    - Keep feet clean and dry, especially between toes.    - Elevate feet as much as possible throughout the day.    - Wear supportive/accommodative shoes at all times when ambulating.    - Supplement with alpha lipoic acid (600 mg once daily) and vitamin B complex (as directed) to reduce and repair nerve damage.    - Apply lidocaine cream or gel to toes as needed for pain relief.    - Remove foot pads in 3 days or sooner if they become soiled or wet, then notify clinic for follow up appointment if you felt they improved pain and want them placed on insoles.    - Notify clinic immediately of any new or worsening conditions.

## 2019-12-06 ENCOUNTER — PATIENT OUTREACH (OUTPATIENT)
Dept: ADMINISTRATIVE | Facility: HOSPITAL | Age: 84
End: 2019-12-06

## 2019-12-09 ENCOUNTER — TELEPHONE (OUTPATIENT)
Dept: PODIATRY | Facility: CLINIC | Age: 84
End: 2019-12-09

## 2019-12-09 NOTE — TELEPHONE ENCOUNTER
Patient stated that her foot is much improved with the pads that were given to her have helped her - greatly improved.  Stated that she understands she can get permanent ones.  Please advise.

## 2019-12-09 NOTE — TELEPHONE ENCOUNTER
----- Message from Lydia Gao sent at 12/9/2019  9:50 AM CST -----  Contact: Miesha zuniga  Type: Needs Medical Advice    Who Called:  Miesha  Best Call Back Number: [phone]  Additional Information: Pls call pt regarding an update on her condition/pad that was put on her foot

## 2019-12-10 NOTE — TELEPHONE ENCOUNTER
Patient informed of Dr Dodd's response as below and she verbalized understanding.  Appointment made.      Carly Dodd DPM  You 14 hours ago (6:18 PM)      Told her that if she liked them, she could make a follow up appointment sooner than her 9 week nail care appointment.  Please call patient to schedule.  She must bring cushioned insoles with her for me to place metatarsal pads.

## 2019-12-16 NOTE — PROGRESS NOTES
Subjective:      Patient ID: Miesha Obando is a 84 y.o. female.    Chief Complaint: Nail Care (Dr España 2019 6.3 8/2019 ) and Toe Pain (2nd and 3rd toe tingle and hurt at night)      HPI:  Miesha is a 84 y.o. female who presents to the clinic for evaluation and treatment of high risk feet. Miesha has a past medical history of Anticoagulant long-term use, Arthritis, Atrial fibrillation, Breast cancer (1994), Cardiomyopathy - EF 35-40% (5/11/2016), CHF (congestive heart failure), Chronic bronchitis, COPD (chronic obstructive pulmonary disease), Coronary artery disease without angina pectoris (8/26/2015), Depression, Diabetes with neurologic complications, Diverticulitis, Diverticulosis, Encounter for blood transfusion, GERD (gastroesophageal reflux disease), H/O aortic valve replacement, Hiatal hernia, History of colonic diverticulitis, Hypertension, Hypothyroid, Insomnia, Irritable bowel syndrome, Obesity, Pacemaker (08/2014), Schatzki's ring, Sleep apnea, Syncope and collapse, Systolic congestive heart failure, NYHA class 3 (5/23/2016), Type II or unspecified type diabetes mellitus without mention of complication, not stated as uncontrolled, and Urinary incontinence. The patient's chief complaint is long, thick toenails. This patient has documented high risk feet requiring routine maintenance secondary to diabetes mellitis and those secondary complications of diabetes, as mentioned..  She states that she is not interested in treating the toenail fungus with medication.  She also complains of having dropped a can on her left foot approximately a week ago with bruising still noticeable.  She denies doing anything to help alleviate pain other than resting and slightly elevating her foot. She does remark of a tightness in both calves and decreased sensation both lower legs.  Patient denies any other pedal complaints at this time.    PCP:  ZOEY España MD   Date Last Seen by PCP: 9/16/19    Current shoe gear:   Affected Foot: Casual shoes     Unaffected Foot: Casual shoes    Review of Systems   Constitutional: Negative for appetite change, fever, chills, fatigue and unexpected weight change.   Respiratory: Negative for cough, wheezing, and shortness of breath.   Cardiovascular: Negative for chest pain, claudication, cyanosis.  Positive for leg swelling.  Endocrine:  Negative for intolerance to cold, intolerance to heat, polydipsia, polyphagia, and polyuria.    Gastrointestinal: Negative for nausea, vomiting, diarrhea, and constipation.   Musculoskeletal:  Positive for back pain, arthritis, joint pain, joint swelling, myalgias, and stiffness.   Skin: Negative for rash, itching, poor wound healing, suspicious lesion.  Positive for nail bed changes, discoloration, unusual hair distribution.   Neurological: Negative for loss of balance, numbness.  Positive for sensory change, paresthesias.  Hematological: Negative for adenopathy, bleeding, and bruising easily.   Psychiatric/Behavioral: The patient is not nervous/anxious.  Negative for altered mental status.    Hemoglobin A1C   Date Value Ref Range Status   08/07/2019 6.3 (H) 4.0 - 5.6 % Final     Comment:     ADA Screening Guidelines:  5.7-6.4%  Consistent with prediabetes  >or=6.5%  Consistent with diabetes  High levels of fetal hemoglobin interfere with the HbA1C  assay. Heterozygous hemoglobin variants (HbS, HgC, etc)do  not significantly interfere with this assay.   However, presence of multiple variants may affect accuracy.     05/20/2019 6.9 (H) 4.0 - 5.6 % Final     Comment:     ADA Screening Guidelines:  5.7-6.4%  Consistent with prediabetes  >or=6.5%  Consistent with diabetes  High levels of fetal hemoglobin interfere with the HbA1C  assay. Heterozygous hemoglobin variants (HbS, HgC, etc)do  not significantly interfere with this assay.   However, presence of multiple variants may affect accuracy.     01/21/2019 6.6 (H) 4.0 - 5.6 % Final     Comment:     ADA Screening  Guidelines:  5.7-6.4%  Consistent with prediabetes  >or=6.5%  Consistent with diabetes  High levels of fetal hemoglobin interfere with the HbA1C  assay. Heterozygous hemoglobin variants (HbS, HgC, etc)do  not significantly interfere with this assay.   However, presence of multiple variants may affect accuracy.         Past Medical History:   Diagnosis Date    Anticoagulant long-term use     Arthritis     Atrial fibrillation     Breast cancer 1994    breast , left    Cardiomyopathy - EF 35-40% 5/11/2016    CHF (congestive heart failure)     Chronic bronchitis     COPD (chronic obstructive pulmonary disease)     Coronary artery disease without angina pectoris 8/26/2015    Depression     Diabetes with neurologic complications     Diverticulitis     Diverticulosis     Encounter for blood transfusion     after mastectomy x 20 years ago    GERD (gastroesophageal reflux disease)     H/O aortic valve replacement     Hiatal hernia     History of colonic diverticulitis     Hypertension     Hypothyroid     Insomnia     Irritable bowel syndrome     Obesity     Pacemaker 08/2014    Schatzki's ring     mild    Sleep apnea     uses cpap with oxygen     Syncope and collapse     Systolic congestive heart failure, NYHA class 3 5/23/2016    Type II or unspecified type diabetes mellitus without mention of complication, not stated as uncontrolled     No medications at present.     Urinary incontinence      Past Surgical History:   Procedure Laterality Date    ADENOIDECTOMY      APPENDECTOMY      BACK SURGERY      Discectomy, lumbar    BREAST SURGERY  1994    left side , mastectomy    CARDIAC PACEMAKER PLACEMENT      CARDIAC VALVE SURGERY      aortic valve replacement    CHOLECYSTECTOMY      COLONOSCOPY  2/2014    repeat in 10 years for screening    CORONARY ANGIOGRAPHY Bilateral 6/18/2019    Procedure: ANGIOGRAM, CORONARY ARTERY;  Surgeon: Dayron Macias MD;  Location: [facility];  Service:  Cardiology;  Laterality: Bilateral;    CORONARY STENT PLACEMENT      x2    EYE SURGERY      bilateral PHACO and IOL    HYSTERECTOMY      ovaries spared    LEFT HEART CATHETERIZATION Right 2019    Procedure: LHC/ Coronary Angio;  Surgeon: Dayron Macias MD;  Location: [facility];  Service: Cardiology;  Laterality: Right;    LOOP RECORDER      TOE SURGERY Right     TONSILLECTOMY      UPPER GASTROINTESTINAL ENDOSCOPY       Family History   Problem Relation Age of Onset    Parkinsonism Mother     Emphysema Father     Heart disease Brother     Heart attack Brother     Heart failure Brother     Heart disease Brother     Heart failure Brother     Heart disease Brother     Heart failure Brother     Arrhythmia Brother     Anesthesia problems Neg Hx     Clotting disorder Neg Hx      Social History     Socioeconomic History    Marital status:      Spouse name: Not on file    Number of children: Not on file    Years of education: Not on file    Highest education level: Not on file   Occupational History    Not on file   Social Needs    Financial resource strain: Not on file    Food insecurity:     Worry: Not on file     Inability: Not on file    Transportation needs:     Medical: Not on file     Non-medical: Not on file   Tobacco Use    Smoking status: Former Smoker     Packs/day: 1.00     Years: 30.00     Pack years: 30.00     Start date: 1958     Last attempt to quit: 1988     Years since quittin.9    Smokeless tobacco: Never Used   Substance and Sexual Activity    Alcohol use: Yes     Comment: 0-2 glasses of wine per month    Drug use: No    Sexual activity: Not on file   Lifestyle    Physical activity:     Days per week: Not on file     Minutes per session: Not on file    Stress: Not on file   Relationships    Social connections:     Talks on phone: Not on file     Gets together: Not on file     Attends Restoration service: Not on file     Active member of club  "or organization: Not on file     Attends meetings of clubs or organizations: Not on file     Relationship status: Not on file   Other Topics Concern    Not on file   Social History Narrative    Not on file           Objective:        /61   Pulse 69   Ht 5' 2" (1.575 m)   Wt 107 kg (236 lb)   LMP  (LMP Unknown)   BMI 43.16 kg/m²     Physical Exam   Constitutional: Patient is oriented to person, place, and time. Patient appears well-developed and well-nourished. No acute distress.     Psychiatric: Patient has a normal mood and affect. Patient's speech is normal and behavior is normal. Judgment is normal. Cognition and memory are normal.     Bilateral pedal exam was performed today.  Vascular: Pedal pulses palpable 1/4 DP & PT.  CFT is > 3 seconds to the hallux.  Skin temperature is cool to cool proximal tibia to distal toes without localized increase in calor noted.  No erythema noted to the foot or ankle.  +1/4 pitting edema noted to B/L LE.  Resolving ecchymosis noted to the dorsal aspect of the left forefoot.  Hair growth decreased distally to the LE.  Telangectasias and reticular veins present B/L LE with hemosiderin deposition.  Positive Homans sign B/L LE.     Musculoskeletal: Ankle joint ROM is decreased. Subtalar joint ROM is decreased.  Midtarsal joint ROM is decreased.  1st ray ROM is decreased.  1st  MTPJ ROM is decreased.  Ankle joint dorsiflexion is restricted with the knee extended and flexed per Silfverskiold exam.    Muscle strength is 5/5 for all LE muscle groups tested.    Neurological: Protective sensation is intact to 10/10 sites on the bilateral foot via Grants-Giuliano monofilament.    Proprioception is Intact to the foot.  Vibratory sensation is Decreased to the foot.   Light touch sensation is Intact to the foot.   Achilles DTR and Chaddock STR are Intact to bilateral lower extremities.   Negative Babinski sign bilateral lower extremities.  Negative clonus sign bilateral lower " extremities.  Tenderness upon squeeze of B/L calf.  Pain upon palpation noted to area of ecchymosis left dorsal forefoot.    Dermatological: Toenails 1-5 bilateral are elongated, thickened, dystrophic, brittle, discolored, and mycotic with subungal debris present.  Webspaces 1-4 bilateral are clean, dry, and intact.  Skin turgor is increased.  Skin texture shiny and atrophic.  No dry, flaky skin noted to the LE.  No open wounds or suspicious pigmented lesions appreciable to the foot or ankle.    Nursing note and vitals reviewed.          Assessment:       Encounter Diagnoses   Name Primary?    Onychomycosis due to dermatophyte Yes    Type II diabetes mellitus with neurological manifestations     Peripheral vascular disease          Plan:       Miesha was seen today for nail care and toe pain.    Diagnoses and all orders for this visit:    Onychomycosis due to dermatophyte    Type II diabetes mellitus with neurological manifestations    Peripheral vascular disease      I counseled the patient on her conditions, their implications and medical management.    - Educated patient on proper diabetic foot care and supportive, accommodative shoe gear.    - With the patient's permission, toenails 1, 2, 3, 4, and 5 of the right foot and 1, 2, 3, 4, and 5 of the left foot were debrided in length and thickness via nail nippers and electric  to patient's tolerance without incident.    Patient was given the following recommendations and instructions:  Patient Instructions   - Check feet daily for wounds and areas of irritation.    - Keep regularly scheduled appointments with primary care, ophthalmology, and podiatry.    - Maintain blood glucose control via taking medications as prescribed, diabetic diet, and exercise.    - Apply lotion to feet and ankles daily but not between toes.    - Keep feet clean and dry, especially between toes.    - Elevate feet as much as possible throughout the day.    - Wear  supportive/accommodative shoes at all times when ambulating.    - Supplement with alpha lipoic acid (600 mg once daily) and vitamin B complex (as directed) to reduce and repair nerve damage.    - Apply lidocaine cream or gel to toes as needed for pain relief.    - Remove foot pads in 3 days or sooner if they become soiled or wet, then notify clinic for follow up appointment if you felt they improved pain and want them placed on insoles.    - Notify clinic immediately of any new or worsening conditions.        Carly Dodd DPM        Dictation was performed using M*Modal Fluency.  Transcription errors may be present.

## 2019-12-19 ENCOUNTER — TELEPHONE (OUTPATIENT)
Dept: FAMILY MEDICINE | Facility: CLINIC | Age: 84
End: 2019-12-19

## 2019-12-19 ENCOUNTER — NURSE TRIAGE (OUTPATIENT)
Dept: ADMINISTRATIVE | Facility: CLINIC | Age: 84
End: 2019-12-19

## 2019-12-19 NOTE — TELEPHONE ENCOUNTER
Reason for Disposition   No answer.  First attempt to contact caller.  Follow-up call scheduled within 15 minutes.   Second attempt to contact family AND no contact made. Phone number verified.    Protocols used: NO CONTACT OR DUPLICATE CONTACT CALL-A-[place]    No answer x 2 attempts to return patient's call to triage.  No contact.

## 2019-12-19 NOTE — TELEPHONE ENCOUNTER
----- Message from Judith Boland sent at 12/19/2019  8:27 AM CST -----  Contact: patient  Type: Needs Medical Advice  Who Called:  patient  Symptoms (please be specific):  Chest pains ,vomiting  How long has patient had these symptoms:  All night  Pharmacy name and phone #:  na  Best Call Back Number: [phone]  Additional Information: Patient was transferred to the oncall nurse.  Please call to advise.Thanks!

## 2019-12-19 NOTE — TELEPHONE ENCOUNTER
Reason for Disposition   SEVERE vomiting (e.g., 6 or more times/day)    Additional Information   Negative: Shock suspected (e.g., cold/pale/clammy skin, too weak to stand, low BP, rapid pulse)   Negative: Difficult to awaken or acting confused (e.g., disoriented, slurred speech)   Negative: Sounds like a life-threatening emergency to the triager    Protocols used: VOMITING-A-OH    Pt called stated yesterday she couldn't eat or drink bc it would  get stuck in her esophagus. She's been vomiting since yestwerday now  only mucus comes up. Chest pain yesterday when trying to swallow anything even water. Today she has soreness in her chest. Pt also stated her mouth is very dry. Care advice recommend pt go to Er.

## 2019-12-20 NOTE — TELEPHONE ENCOUNTER
Call placed to the patient, states she is eating now, and trying to stay hydrated. She is feeling better today and becoming more active as she is feeling better.

## 2019-12-24 RX ORDER — ALBUTEROL SULFATE 90 UG/1
AEROSOL, METERED RESPIRATORY (INHALATION)
Qty: 54 G | Refills: 0 | Status: SHIPPED | OUTPATIENT
Start: 2019-12-24

## 2019-12-25 PROBLEM — S12.9XXA CLOSED FRACTURE OF SPINOUS PROCESS OF CERVICAL VERTEBRA: Status: ACTIVE | Noted: 2019-12-25

## 2019-12-25 PROBLEM — R07.9 CHEST PAIN: Status: ACTIVE | Noted: 2019-12-25

## 2019-12-26 ENCOUNTER — TELEPHONE (OUTPATIENT)
Dept: FAMILY MEDICINE | Facility: CLINIC | Age: 84
End: 2019-12-26

## 2019-12-26 NOTE — TELEPHONE ENCOUNTER
----- Message from Watson Macias sent at 12/26/2019 12:41 PM CST -----  Type:  Patient Returning Call    Who Called:Watson Obando (Son)    Who Left Message for Patient:  Nevaeh  Does the patient know what this is regarding?:  Referral to Rehab  Best Call Back Number:  [phone]  Additional Information:

## 2019-12-26 NOTE — TELEPHONE ENCOUNTER
----- Message from Mikel Gonzalez sent at 12/26/2019  8:40 AM CST -----  Contact: Patient son/Watson Obando  Patient's son called in and mention that his mother had a fall on 12/25/19 and is not able to get around. The patient is in need of thearpy The patient was told to contact someone at the office so she can be prescribed a medication.     [phone]

## 2019-12-26 NOTE — TELEPHONE ENCOUNTER
----- Message from Mikel Gonzalez sent at 12/26/2019  8:48 AM CST -----  Contact: Watson/Son  Patient's son called in and mention that his mother had a fall on 12/25/19 and is need of physical thearpy. The patient was told to contact someone at the office so she can be prescribed a medication, and needs home health services.  can be reached at    [phone]

## 2019-12-26 NOTE — TELEPHONE ENCOUNTER
Call placed to patient, who says that the patient was in [facility] in the past.   Advised that since they already had a past relationship with that facility, they should contact that admissions person and ask for information on how to go about admission. Explained that our office will do all we can do help, but that [facility] will be the artur to rehab admission.     Advised to call back this evening with whatever he could find out as she will likely need a follow up appointment in AM for admission purposes and pain management can be discussed further at that time.     Son also requested 24 hours worth of Pain medication only if possible to help her get through the night and give the family a chance to get her in the right setting.

## 2019-12-26 NOTE — TELEPHONE ENCOUNTER
Spoke to pt and pt's son, Watson. Watson states that pt had a bad fall yesterday and fractured her c3 vertebrae. Watson states that pt cannot be alone, so he is requesting a referral to an inpatient rehab, he is also requesting refills on her pain medication and muscle relaxer. Watson sates that the ER only gave pt enough to last through the night. Please advise. PCP is out of clinic.

## 2019-12-26 NOTE — TELEPHONE ENCOUNTER
I've reviewed the notes and she has a displaced fracture of the C3 spinous process (which is the part that sticks out of the back). Thankfully it's not more seriuos, but if it were, she would still be in the hospital. She was given a limited quantity of Norco and Zanaflex. I'm concerned about the sedating side effects of a muscle relaxer- especially in someone that is in a cervical collar- for fear that she will fall again. I'm also not comfortable refilling the Norco as she was noted to have low POx in the ER and they attributed it to that. I wouldn't recommend anti-inflammatories due to her being on Coumadin and Plavix due to the risk of bleeding. Unfortunately, I've never had success in getting [facility] patients into inpatient rehab. She would qualify for rehab at a nursing home, but I've only seen that when the patient is admitted to the hospital.

## 2019-12-26 NOTE — TELEPHONE ENCOUNTER
----- Message from Kristie Sheilashani sent at 12/26/2019  8:35 AM CST -----  Contact: self  Had a bad fall and went to [facility] ER yesterday, she has a break and is in neck brace and she is requesting a call back from the doctor.  Needs a referral into a nursing home for rehab.  Call back at [phone] (home).  Thank you!

## 2019-12-27 ENCOUNTER — TELEPHONE (OUTPATIENT)
Dept: FAMILY MEDICINE | Facility: CLINIC | Age: 84
End: 2019-12-27

## 2019-12-27 DIAGNOSIS — S12.9XXS CLOSED FRACTURE OF SPINOUS PROCESS OF CERVICAL VERTEBRA, SEQUELA: Primary | ICD-10-CM

## 2019-12-27 PROCEDURE — G0180 MD CERTIFICATION HHA PATIENT: HCPCS | Mod: ,,, | Performed by: FAMILY MEDICINE

## 2019-12-27 PROCEDURE — G0180 PR HOME HEALTH MD CERTIFICATION: ICD-10-PCS | Mod: ,,, | Performed by: FAMILY MEDICINE

## 2019-12-27 NOTE — TELEPHONE ENCOUNTER
Spoke with daughter per pt( sitting in room) and informed her hat her mother called to have her meds refilled and the request had been sent to a provider.

## 2019-12-27 NOTE — TELEPHONE ENCOUNTER
----- Message from Yasmany Mclain sent at 12/27/2019 10:34 AM CST -----  Contact: christianne with [facility]   Type: Needs Medical Advice    Who Called:  christianne with [facility]   Symptoms (please be specific):    How long has patient had these symptoms:    Pharmacy name and phone #:    Best Call Back Number:   Additional Information:[place] low 80s in roomare and [place] low 90s. Pain level 9/10 when taking a deep breath.

## 2019-12-27 NOTE — TELEPHONE ENCOUNTER
----- Message from Vicenta Jay sent at 12/27/2019 11:36 AM CST -----  Type:  RX Refill Request    Who Called:  Watson Obando - son  Refill or New Rx:  Refill originally filled by ER  RX Name and Strength: HYDROcodone-acetaminophen (NORCO) 5-325 mg per tablet and tiZANidine (ZANAFLEX) 2 MG tablet  How is the patient currently taking it? (ex. 1XDay):    Is this a 30 day or 90 day RX:  30  Preferred Pharmacy with phone number:    [id number] [place] [address] AT [facility] & [address] [address]  Phone: [phone] Fax: [phone]  Local or Mail Order:  local  Ordering Provider:  ordinally prescribed during an ER visit on 12/24/19  Best Call Back Number:  [phone]  Additional Information:

## 2019-12-27 NOTE — TELEPHONE ENCOUNTER
Spoke with nurse gordon and informed her that pt pcp is not in today and the paperwork would be given to him to complete once he returns, she verbalized understanding

## 2019-12-27 NOTE — TELEPHONE ENCOUNTER
Attempted to contact nurse. Could not leave msg  Pt states that she is in pain from a fall yesterday. She hit her head and shoulder sitting on the tub getting ready to get in. Only pain on right side when she is moving on ribs. Xray done(stph) pt is requesting more meds for pain. Pt offered appt, pt declined. Please review and advise.

## 2019-12-27 NOTE — TELEPHONE ENCOUNTER
----- Message from Yasmany Mclain sent at 12/27/2019 10:34 AM CST -----  Contact: christianne with [facility]   Type: Needs Medical Advice    Who Called:  christianne with [facility]   Symptoms (please be specific):    How long has patient had these symptoms:    Pharmacy name and phone #:    Best Call Back Number:   Additional Information:[place] low 80s in roomare and o[place] low 90s. Pain level 9/10 when taking a deep breath.

## 2019-12-27 NOTE — TELEPHONE ENCOUNTER
----- Message from Zaki Le sent at 12/27/2019  1:28 PM CST -----  Type: Needs Medical Advice    Who Called:  [facility] -Malika Symptoms (please be specific): NA How long has patient had these symptoms:    Pharmacy name and phone #:    Best Call Back Number: [phone] Additional Information: The nurse asking for confirmation the office received faxed passr for a doctor to complete and admission paper work for the patient The nusefor admission for the pt. [facility] [email or web address]

## 2019-12-27 NOTE — TELEPHONE ENCOUNTER
----- Message from Zaki Le sent at 12/27/2019  2:37 PM CST -----  Type:  Patient Returning Call    Who Called: self [phone]   Who Left Message for Patient:    Does the patient know what this is regarding?:  Best Call Back Number:   Additional Information:

## 2019-12-27 NOTE — TELEPHONE ENCOUNTER
----- Message from Judith Boland sent at 12/27/2019  1:34 PM CST -----  Contact: Flory/Daughter  Type: Needs Medical Advice  Who Called:  Flory  Symptoms (please be specific):  tristan  How long has patient had these symptoms:  tristan  Pharmacy name and phone #:  tristan  Best Call Back Number: [phone] Flory  Additional Information: Flory states mom/patient is 84 and does not remember what's going on with the apt, and Flory thinks her brother Watson Obando has it. Miesha's son. His #[phone]. Please call to advise.Thanks!

## 2019-12-30 ENCOUNTER — TELEPHONE (OUTPATIENT)
Dept: FAMILY MEDICINE | Facility: CLINIC | Age: 84
End: 2019-12-30

## 2019-12-30 RX ORDER — HYDROCODONE BITARTRATE AND ACETAMINOPHEN 5; 325 MG/1; MG/1
1 TABLET ORAL EVERY 6 HOURS PRN
Qty: 8 TABLET | Refills: 0 | OUTPATIENT
Start: 2019-12-30

## 2019-12-30 RX ORDER — HYDROCODONE BITARTRATE AND ACETAMINOPHEN 5; 325 MG/1; MG/1
1 TABLET ORAL EVERY 6 HOURS PRN
Qty: 10 TABLET | Refills: 0 | Status: SHIPPED | OUTPATIENT
Start: 2019-12-30 | End: 2020-01-02 | Stop reason: SDUPTHER

## 2019-12-30 NOTE — TELEPHONE ENCOUNTER
Call placed to patient, who allowed daughter to take call.   Patient is unable to get off the couch without assistance due to fractures.   She has been out of pain medication for three days.   The family is under the impression that we are working with [facility] for admission, but we have not received any contact from that facility.     Dr España is aware that we are expecting them to reach out. IN meantime, requesting care at home for NP evaluation and assistance with admission to [facility].

## 2019-12-30 NOTE — TELEPHONE ENCOUNTER
----- Message from Liyah Mills sent at 12/30/2019  2:33 PM CST -----  Contact: Patient  Patient's daughter, Flory called to speak to [place]-     Said she spoke to someone at nursing home and paperwork was sent over Friday 12/27 and even spoke to someone about it - so they are going to re-fax everything. Said to not worry about digging through stack of papers, a new one will be on the fax      Please call at: [phone] or [phone]

## 2019-12-30 NOTE — TELEPHONE ENCOUNTER
----- Message from Thania Jain sent at 12/30/2019  3:40 PM CST -----  Contact: Malika with [facility]  Type: Needs Medical Advice    Who Called:  Malika  Best Call Back Number: [phone]   Additional Information: Malika is stating on 12/27/19 she called and faxed paperwork with additional info to be completed. Barb returned her call that afternoon they spoke about Dr. España completing the paperwork she faxed over. The family has called her today and stated that there was no contact from their facility to Dr. España's office. She states she refaxed the paperwork this afternoon for Dr. España to complete for skilled nursing admission.Please call back and and advise.

## 2019-12-30 NOTE — TELEPHONE ENCOUNTER
Patient states paperwork should have been received; she wants to be admitted to NH tomorrow; advised no paperwork yet but would continue to watch for it

## 2019-12-30 NOTE — TELEPHONE ENCOUNTER
----- Message from Princess FLAVIA Werner sent at 12/30/2019  1:02 PM CST -----  Contact: Flory Plata (Daughter)  Type: Needs Medical Advice    Who Called:  Flory Plata (Daughter)  Pharmacy name and phone #:    [id number] - [place] [address] AT [address] & [facility]  [address] [place] [address]  Phone: [phone] Fax: [phone]  Best Call Back Number: Flory   Additional Information: requesting a call back in regards to getting refill on (HYDROcodone-acetaminophen (NORCO) 5-325 mg per tablet). Patient fell and broke her neck and the daughter states she is out of Rx and is in need of getting some more for her mom to cope with the pain. Please give a call back.

## 2019-12-31 ENCOUNTER — TELEPHONE (OUTPATIENT)
Dept: FAMILY MEDICINE | Facility: CLINIC | Age: 84
End: 2019-12-31

## 2019-12-31 RX ORDER — HYDROCODONE BITARTRATE AND ACETAMINOPHEN 5; 325 MG/1; MG/1
1 TABLET ORAL EVERY 6 HOURS PRN
Qty: 10 TABLET | Refills: 0 | OUTPATIENT
Start: 2019-12-31

## 2019-12-31 NOTE — TELEPHONE ENCOUNTER
----- Message from Kristie Johnson sent at 12/31/2019 10:55 AM CST -----  Contact: Malika jacobson/[facility]  Malika requesting a new Pasrr, signed by a RN, or above with the doctor filling out first page. She is resending the blank PASRR again.   A LPN is not allowed so please get this sent back over or call Malika if any questions at [phone] or [phone].  Thanks

## 2019-12-31 NOTE — TELEPHONE ENCOUNTER
Patient had a fall on flower day and broke a bone in her neck, and needs an order for or approval to be admitted to the nursing home.     Facility name-[facility].

## 2019-12-31 NOTE — TELEPHONE ENCOUNTER
----- Message from Rachel Lance sent at 12/31/2019  7:53 AM CST -----    Type: Needs Medical Advice    Who Called:  Pt  Chuy moore  Best Call Back Number: [phone]  Additional Information:   Pt  Son  Stated  He  Is trying to  Gets  The  Pt into a   Facility and  Has  been  Trying  For a week //  Placed a call to pod and  skype // please call asap   / pt  Son  Concerned about  Mom   health

## 2020-01-02 RX ORDER — HYDROCODONE BITARTRATE AND ACETAMINOPHEN 5; 325 MG/1; MG/1
1 TABLET ORAL EVERY 6 HOURS PRN
Qty: 10 TABLET | Refills: 0 | Status: SHIPPED | OUTPATIENT
Start: 2020-01-02 | End: 2020-02-18

## 2020-01-02 NOTE — TELEPHONE ENCOUNTER
----- Message from Thania Jain sent at 1/2/2020  3:37 PM CST -----  Contact: Patient's daughter Flory  Type:  RX Refill Request    Who Called:  Flory  Refill or New Rx: refill  RX Name and Strength:  HYDROcodone-acetaminophen (NORCO) 5-325 mg per tablet  How is the patient currently taking it? (ex. 1XDay):  4xday  Is this a 30 day or 90 day RX: 10 days  Preferred Pharmacy with phone number:   [facility] #[id number] [address] & [address] [address]  Phone: [phone] Fax: [phone]  Local or Mail Order:  Local  Ordering Provider:  Patria Rebollar Call Back Number: [phone]

## 2020-01-03 ENCOUNTER — TELEPHONE (OUTPATIENT)
Dept: ENDOSCOPY | Facility: HOSPITAL | Age: 85
End: 2020-01-03

## 2020-01-03 NOTE — TELEPHONE ENCOUNTER
Please contact patient to reschedule EGD with Dr. Olivas. Patient had a recent fall, and has a broken bone in her neck, and would like to reschedule. Thank you.

## 2020-01-06 ENCOUNTER — TELEPHONE (OUTPATIENT)
Dept: FAMILY MEDICINE | Facility: CLINIC | Age: 85
End: 2020-01-06

## 2020-01-06 NOTE — TELEPHONE ENCOUNTER
----- Message from Vicenta Jay sent at 1/3/2020  3:20 PM CST -----  Type:  Same Day Appointment Request    Caller is requesting a same day appointment.  Caller declined first available appointment listed below.      Name of Caller:  Patient   When is the first available appointment?    Symptoms:  ER follow up, fell 12/25/19, broke bone in neck, seen [facility]  Best Call Back Number:  [phone]  Additional Information:   Patient is requesting to be seen today, patient requesting to see Dr. España

## 2020-01-06 NOTE — TELEPHONE ENCOUNTER
Pt has an appt scheduled for an ER follow up on 1/10. Pt would like to know if there is anyway pt could be worked in sooner? Please advise.

## 2020-01-06 NOTE — TELEPHONE ENCOUNTER
----- Message from Marychuy Garcia sent at 1/6/2020  9:22 AM CST -----  Contact: patient  Type:  Same Day Appointment Request    Caller is requesting a same day appointment.  Caller declined first available appointment listed below.      Name of Caller:  Miesha  When is the first available appointment?  1/10  Symptoms:  ER visit follow up   Best Call Back Number:    Additional Information: Would like to be seen today-please advise-thank you

## 2020-01-09 ENCOUNTER — EXTERNAL HOME HEALTH (OUTPATIENT)
Dept: HOME HEALTH SERVICES | Facility: HOSPITAL | Age: 85
End: 2020-01-09
Payer: MEDICARE

## 2020-01-09 ENCOUNTER — HOSPITAL ENCOUNTER (OUTPATIENT)
Dept: RADIOLOGY | Facility: HOSPITAL | Age: 85
Discharge: HOME | End: 2020-01-09
Attending: NURSE PRACTITIONER
Payer: MEDICARE

## 2020-01-09 ENCOUNTER — TELEPHONE (OUTPATIENT)
Dept: FAMILY MEDICINE | Facility: CLINIC | Age: 85
End: 2020-01-09

## 2020-01-09 ENCOUNTER — OFFICE VISIT (OUTPATIENT)
Dept: FAMILY MEDICINE | Facility: CLINIC | Age: 85
End: 2020-01-09
Payer: MEDICARE

## 2020-01-09 VITALS
HEIGHT: 61 IN | HEART RATE: 70 BPM | WEIGHT: 230.38 LBS | SYSTOLIC BLOOD PRESSURE: 112 MMHG | DIASTOLIC BLOOD PRESSURE: 68 MMHG | BODY MASS INDEX: 43.5 KG/M2 | TEMPERATURE: 98 F | OXYGEN SATURATION: 96 %

## 2020-01-09 DIAGNOSIS — N18.30 CKD (CHRONIC KIDNEY DISEASE), STAGE III: ICD-10-CM

## 2020-01-09 DIAGNOSIS — I50.42 CHRONIC COMBINED SYSTOLIC AND DIASTOLIC CONGESTIVE HEART FAILURE, NYHA CLASS 3: ICD-10-CM

## 2020-01-09 DIAGNOSIS — I73.9 PVD (PERIPHERAL VASCULAR DISEASE): ICD-10-CM

## 2020-01-09 DIAGNOSIS — E11.49 TYPE II DIABETES MELLITUS WITH NEUROLOGICAL MANIFESTATIONS: ICD-10-CM

## 2020-01-09 DIAGNOSIS — S12.9XXD CLOSED FRACTURE OF SPINOUS PROCESS OF CERVICAL VERTEBRA, SUBSEQUENT ENCOUNTER: ICD-10-CM

## 2020-01-09 DIAGNOSIS — S12.9XXD CLOSED FRACTURE OF SPINOUS PROCESS OF CERVICAL VERTEBRA, SUBSEQUENT ENCOUNTER: Primary | ICD-10-CM

## 2020-01-09 DIAGNOSIS — Z79.01 CURRENT USE OF LONG TERM ANTICOAGULATION: ICD-10-CM

## 2020-01-09 DIAGNOSIS — E66.01 MORBID OBESITY: ICD-10-CM

## 2020-01-09 DIAGNOSIS — R07.89 RIGHT-SIDED CHEST WALL PAIN: ICD-10-CM

## 2020-01-09 DIAGNOSIS — I70.0 AORTIC ATHEROSCLEROSIS: ICD-10-CM

## 2020-01-09 PROCEDURE — 1125F PR PAIN SEVERITY QUANTIFIED, PAIN PRESENT: ICD-10-PCS | Mod: HCNC,S$GLB,, | Performed by: NURSE PRACTITIONER

## 2020-01-09 PROCEDURE — 3074F SYST BP LT 130 MM HG: CPT | Mod: HCNC,CPTII,S$GLB, | Performed by: NURSE PRACTITIONER

## 2020-01-09 PROCEDURE — 71046 X-RAY EXAM CHEST 2 VIEWS: CPT | Mod: 26,HCNC,, | Performed by: RADIOLOGY

## 2020-01-09 PROCEDURE — 3078F DIAST BP <80 MM HG: CPT | Mod: HCNC,CPTII,S$GLB, | Performed by: NURSE PRACTITIONER

## 2020-01-09 PROCEDURE — 72050 XR CERVICAL SPINE COMPLETE 5 VIEW: ICD-10-PCS | Mod: 26,HCNC,, | Performed by: RADIOLOGY

## 2020-01-09 PROCEDURE — 3074F PR MOST RECENT SYSTOLIC BLOOD PRESSURE < 130 MM HG: ICD-10-PCS | Mod: HCNC,CPTII,S$GLB, | Performed by: NURSE PRACTITIONER

## 2020-01-09 PROCEDURE — 3078F PR MOST RECENT DIASTOLIC BLOOD PRESSURE < 80 MM HG: ICD-10-PCS | Mod: HCNC,CPTII,S$GLB, | Performed by: NURSE PRACTITIONER

## 2020-01-09 PROCEDURE — 1101F PT FALLS ASSESS-DOCD LE1/YR: CPT | Mod: HCNC,CPTII,S$GLB, | Performed by: NURSE PRACTITIONER

## 2020-01-09 PROCEDURE — 99214 PR OFFICE/OUTPT VISIT, EST, LEVL IV, 30-39 MIN: ICD-10-PCS | Mod: HCNC,S$GLB,, | Performed by: NURSE PRACTITIONER

## 2020-01-09 PROCEDURE — 72050 X-RAY EXAM NECK SPINE 4/5VWS: CPT | Mod: TC,HCNC,FY,PO

## 2020-01-09 PROCEDURE — 71046 XR CHEST PA AND LATERAL: ICD-10-PCS | Mod: 26,HCNC,, | Performed by: RADIOLOGY

## 2020-01-09 PROCEDURE — 1101F PR PT FALLS ASSESS DOC 0-1 FALLS W/OUT INJ PAST YR: ICD-10-PCS | Mod: HCNC,CPTII,S$GLB, | Performed by: NURSE PRACTITIONER

## 2020-01-09 PROCEDURE — 99499 UNLISTED E&M SERVICE: CPT | Mod: HCNC,S$GLB,, | Performed by: NURSE PRACTITIONER

## 2020-01-09 PROCEDURE — 1159F MED LIST DOCD IN RCRD: CPT | Mod: HCNC,S$GLB,, | Performed by: NURSE PRACTITIONER

## 2020-01-09 PROCEDURE — 99214 OFFICE O/P EST MOD 30 MIN: CPT | Mod: HCNC,S$GLB,, | Performed by: NURSE PRACTITIONER

## 2020-01-09 PROCEDURE — 99999 PR PBB SHADOW E&M-EST. PATIENT-LVL IV: CPT | Mod: PBBFAC,HCNC,, | Performed by: NURSE PRACTITIONER

## 2020-01-09 PROCEDURE — 99499 RISK ADDL DX/OHS AUDIT: ICD-10-PCS | Mod: HCNC,S$GLB,, | Performed by: NURSE PRACTITIONER

## 2020-01-09 PROCEDURE — 71046 X-RAY EXAM CHEST 2 VIEWS: CPT | Mod: TC,HCNC,FY,PO

## 2020-01-09 PROCEDURE — 1159F PR MEDICATION LIST DOCUMENTED IN MEDICAL RECORD: ICD-10-PCS | Mod: HCNC,S$GLB,, | Performed by: NURSE PRACTITIONER

## 2020-01-09 PROCEDURE — 72050 X-RAY EXAM NECK SPINE 4/5VWS: CPT | Mod: 26,HCNC,, | Performed by: RADIOLOGY

## 2020-01-09 PROCEDURE — 1125F AMNT PAIN NOTED PAIN PRSNT: CPT | Mod: HCNC,S$GLB,, | Performed by: NURSE PRACTITIONER

## 2020-01-09 PROCEDURE — 99999 PR PBB SHADOW E&M-EST. PATIENT-LVL IV: ICD-10-PCS | Mod: PBBFAC,HCNC,, | Performed by: NURSE PRACTITIONER

## 2020-01-09 NOTE — PROGRESS NOTES
Subjective:       Patient ID: Miesha Obando is a 84 y.o. female.    Chief Complaint: hospital follow up    HPI   Presented to [facility] ED On 12/25/19 for fall--fell backward on side of bathtub at home hitting head. CT head without acute findings. CT cervical spine revealed acute, mildly displaced C3 bifid spinous process fracture on the right. There is no involvement of the spinal canal. No additional fractures are present. Shoulder and CXR unremarkable. Dr. Mcdaniel was consulted on the case--advised patient to remain in cervical collar and follow up with him with cervical spine xray in 2 weeks--she does not have an appointment scheduled. She has remained in C collar. She reports persistent right sided rib pain, pain worsen with touch and deep breath. Mild cough, without sputum production, afebrile    CHF: without weight gain, mild edema-- improved after dose of diuretic yesterday     She currently has home/nurse aid 24/7. Planning to move to nursing home  Requesting sooner appointment with podiatry for right toe pain--felt inserts were helpful   Vitals:    01/09/20 1036   BP: 112/68   Pulse: 70   Temp: 98.2 °F (36.8 °C)     Review of Systems   Constitutional: Negative for diaphoresis and fever.   HENT: Negative for facial swelling and trouble swallowing.    Eyes: Negative for discharge and redness.   Respiratory: Positive for cough. Negative for shortness of breath.    Cardiovascular: Positive for chest pain. Negative for palpitations.   Gastrointestinal: Negative for abdominal pain and diarrhea.   Genitourinary: Negative for difficulty urinating.   Musculoskeletal: Positive for arthralgias. Negative for gait problem.   Skin: Negative for pallor and rash.   Neurological: Negative for facial asymmetry and speech difficulty.   Psychiatric/Behavioral: Negative for confusion. The patient is not nervous/anxious.        Past Medical History:   Diagnosis Date    Anticoagulant long-term use     Arthritis     Atrial  fibrillation     Breast cancer 1994    breast , left    Cardiomyopathy - EF 35-40% 5/11/2016    CHF (congestive heart failure)     Chronic bronchitis     COPD (chronic obstructive pulmonary disease)     Coronary artery disease without angina pectoris 8/26/2015    Depression     Diabetes with neurologic complications     Diverticulitis     Diverticulosis     Encounter for blood transfusion     after mastectomy x 20 years ago    GERD (gastroesophageal reflux disease)     H/O aortic valve replacement     Hiatal hernia     History of colonic diverticulitis     Hypertension     Hypothyroid     Insomnia     Irritable bowel syndrome     Obesity     Pacemaker 08/2014    Schatzki's ring     mild    Sleep apnea     uses cpap with oxygen     Syncope and collapse     Systolic congestive heart failure, NYHA class 3 5/23/2016    Type II or unspecified type diabetes mellitus without mention of complication, not stated as uncontrolled     No medications at present.     Urinary incontinence      Objective:      Physical Exam   Constitutional: She is oriented to person, place, and time. She does not have a sickly appearance. No distress.   HENT:   Head: Normocephalic.   Right Ear: Hearing normal.   Left Ear: Hearing normal.   Nose: Nose normal.   Eyes: Conjunctivae and lids are normal.   Neck: No JVD present. No tracheal deviation present.   Cardiovascular: Normal rate and normal heart sounds.   +1 pit edema jinny   Pulmonary/Chest: Effort normal and breath sounds normal. She exhibits no crepitus, no edema and no swelling.   Chest wall tenderness along right ribs    Abdominal: She exhibits no distension. There is no tenderness.   Musculoskeletal:   C collar  Right toes without discoloration, swelling, wound, deformity    Neurological: She is alert and oriented to person, place, and time.   Skin: She is not diaphoretic. No pallor.   Psychiatric: She has a normal mood and affect. Her speech is normal and  behavior is normal. Judgment and thought content normal. Cognition and memory are normal.   Nursing note and vitals reviewed.      Assessment:       1. Closed fracture of spinous process of cervical vertebra, subsequent encounter    2. Right-sided chest wall pain    3. CKD (chronic kidney disease), stage III    4. Current use of long term anticoagulation    5. Morbid obesity    6. Chronic combined systolic and diastolic congestive heart failure, NYHA class 3    7. Aortic atherosclerosis    8. PVD (peripheral vascular disease)    9. Type II diabetes mellitus with neurological manifestations        Plan:       Closed fracture of spinous process of cervical vertebra, subsequent encounter  -     Ambulatory referral to Neurosurgery  -     X-Ray Cervical Spine Complete 5 view; Future; Expected date: 01/09/2020    Right-sided chest wall pain  -     X-Ray Chest PA And Lateral; Future; Expected date: 01/09/2020    CKD (chronic kidney disease), stage III    Current use of long term anticoagulation    Morbid obesity    Chronic combined systolic and diastolic congestive heart failure, NYHA class 3    Aortic atherosclerosis    PVD (peripheral vascular disease)    Type II diabetes mellitus with neurological manifestations      Message sent by my staff to neurosurgery to schedule appointment  xrays today  Splinting, deep breathing exercises/incentive spirometry.   Extensive education given on safety, return precautions  FU with neurosurgery next available, with PCP as scheduled next month, sooner with me as needed       Medication List with Changes/Refills   Current Medications    ACCU-CHEK FASTCLIX MISC    TEST TWICE DAILY    ACCU-CHEK SMARTVIEW TEST STRIP STRP    TEST  TWICE DAILY    ACETAMINOPHEN (TYLENOL) 325 MG TABLET    Take 2 tablets (650 mg total) by mouth every 6 (six) hours as needed for Pain or Temperature greater than.    ALBUTEROL (PROVENTIL/VENTOLIN HFA) 90 MCG/ACTUATION INHALER    INHALE 1 PUFF EVERY 4 HOURS AS  NEEDED    ALENDRONATE (FOSAMAX) 70 MG TABLET    Take 1 tablet (70 mg total) by mouth every 7 days.    ASPIRIN (ECOTRIN) 81 MG EC TABLET    Take 81 mg by mouth once daily.    AZELASTINE (ASTELIN) 137 MCG (0.1 %) NASAL SPRAY    1 spray (137 mcg total) by Nasal route 2 (two) times daily.    B COMPLEX VITAMINS TABLET    Take 1 tablet by mouth once daily.      BD ALCOHOL SWABS PADM    USE TWICE DAILY    CLOPIDOGREL (PLAVIX) 75 MG TABLET    Take 1 tablet (75 mg total) by mouth once daily.    DICLOFENAC SODIUM (VOLTAREN) 1 % GEL    Apply 2 g topically daily as needed. Apply to right shoulder    DIPHENHYDRAMINE-ACETAMINOPHEN (TYLENOL PM)  MG TAB    Take 1 tablet by mouth nightly as needed.    ERGOCALCIFEROL (ERGOCALCIFEROL) 50,000 UNIT CAP    TAKE 1 CAPSULE BY MOUTH EVERY 7 DAYS    FISH OIL-OMEGA-3 FATTY ACIDS 300-1,000 MG CAPSULE    Take 2 g by mouth once daily.    FLUTICASONE-VILANTEROL (BREO ELLIPTA) 200-25 MCG/DOSE DSDV DISKUS INHALER    Inhale 1 puff into the lungs once daily.    GABAPENTIN (NEURONTIN) 300 MG CAPSULE    Take 1 capsule (300 mg total) by mouth 2 (two) times daily.    GABAPENTIN (NEURONTIN) 300 MG CAPSULE    TAKE 1 CAPSULE(300 MG) BY MOUTH EVERY EVENING    HYDROCODONE-ACETAMINOPHEN (NORCO) 5-325 MG PER TABLET    Take 1 tablet by mouth every 6 (six) hours as needed for Pain.    LEVOTHYROXINE (SYNTHROID) 100 MCG TABLET    TAKE 1 TABLET EVERY DAY    METOPROLOL SUCCINATE (TOPROL-XL) 25 MG 24 HR TABLET    Take 1 tablet (25 mg total) by mouth once daily.    MOMETASONE (NASONEX) 50 MCG/ACTUATION NASAL SPRAY    1 spray by Nasal route 2 (two) times daily.    NITROGLYCERIN (NITROSTAT) 0.4 MG SL TABLET    Place 1 tablet (0.4 mg total) under the tongue every 5 (five) minutes as needed for Chest pain.    OMEPRAZOLE (PRILOSEC OTC) 20 MG TABLET    Take 1 tablet (20 mg total) by mouth once daily.    RANITIDINE (ZANTAC) 150 MG TABLET    Take 1 tablet (150 mg total) by mouth daily as needed for Heartburn.     SPIRONOLACTONE (ALDACTONE) 25 MG TABLET    TAKE 1 TABLET EVERY DAY    TORSEMIDE (DEMADEX) 20 MG TAB    TAKE 1 TABLET(20 MG) BY MOUTH EVERY DAY    WARFARIN (COUMADIN) 2.5 MG TABLET    TAKE 1 TABLET EVERY DAY

## 2020-01-09 NOTE — TELEPHONE ENCOUNTER
Hi we need to schedule mrs. Whiteside with neurosurgery. We have the referral in but we just cant schedule from down in family medicine. If you could schedule her with the soonest appointment. I appreciate it.

## 2020-01-13 ENCOUNTER — TELEPHONE (OUTPATIENT)
Dept: FAMILY MEDICINE | Facility: CLINIC | Age: 85
End: 2020-01-13

## 2020-01-13 ENCOUNTER — TELEPHONE (OUTPATIENT)
Dept: NEUROSURGERY | Facility: CLINIC | Age: 85
End: 2020-01-13

## 2020-01-13 DIAGNOSIS — M79.2 NEURITIS: ICD-10-CM

## 2020-01-13 DIAGNOSIS — S12.9XXD CLOSED FRACTURE OF SPINOUS PROCESS OF CERVICAL VERTEBRA, SUBSEQUENT ENCOUNTER: Primary | ICD-10-CM

## 2020-01-13 RX ORDER — CLOPIDOGREL BISULFATE 75 MG/1
TABLET ORAL
Qty: 90 TABLET | Refills: 0 | Status: SHIPPED | OUTPATIENT
Start: 2020-01-13 | End: 2020-01-16 | Stop reason: SDUPTHER

## 2020-01-13 RX ORDER — CLOPIDOGREL BISULFATE 75 MG/1
TABLET ORAL
Qty: 90 TABLET | Refills: 0 | Status: SHIPPED | OUTPATIENT
Start: 2020-01-13 | End: 2020-06-30

## 2020-01-13 NOTE — TELEPHONE ENCOUNTER
Pt states that she read on her last AVS that she is diagnosed with CKD stage 3. Pt states she has never been told this and would like to know more. Please advise.

## 2020-01-13 NOTE — TELEPHONE ENCOUNTER
----- Message from Bayron Jay sent at 1/13/2020 10:17 AM CST -----  Contact: patient  Type: Needs Medical Advice    Who Called: Patient  Symptoms (please be specific):    How long has patient had these symptoms:    Pharmacy name and phone #:    Best Call Back Number: [phone]  Additional Information: requesting a call back to schedule appointment,has referral in epic

## 2020-01-13 NOTE — TELEPHONE ENCOUNTER
Spoke with patient regarding scheduled appointment with Dr. Mcdaniel . Date, time, and location confirmed.

## 2020-01-13 NOTE — TELEPHONE ENCOUNTER
----- Message from Bayron Jay sent at 1/13/2020 10:19 AM CST -----  Contact: patient  Type: Needs Medical Advice    Who Called:  patient  Symptoms (please be specific):    How long has patient had these symptoms:    Pharmacy name and phone #:   Best Call Back Number: [phone]  Additional Information: requesting a call back have questions?

## 2020-01-14 RX ORDER — GABAPENTIN 300 MG/1
CAPSULE ORAL
Qty: 60 CAPSULE | Refills: 2 | Status: SHIPPED | OUTPATIENT
Start: 2020-01-14 | End: 2020-01-16 | Stop reason: SDUPTHER

## 2020-01-14 NOTE — PROGRESS NOTES
Refill Routing Note     Medication(s) are not appropriate for processing by [facility]:    Medication Outside of Protocol    Appointments  past 12m or future 3m with PCP    Date Provider   Last Visit   9/16/2019 ZOEY España MD   Next Visit   2/18/2020 ZOEY España MD           Automatic Epic Protocol Generated Data:    Requested Prescriptions   Pending Prescriptions Disp Refills    gabapentin (NEURONTIN) 300 MG capsule [Pharmacy Med Name: GABAPENTIN 300MG CAPSULES] 60 capsule 2     Sig: TAKE 1 CAPSULE(300 MG) BY MOUTH TWICE DAILY       Anticonvulsants Protocol Passed - 1/13/2020  7:19 PM        Passed - Visit with Authorizing provider in past 9 months or upcoming 90 days        Passed - No active pregnancy on record

## 2020-01-14 NOTE — TELEPHONE ENCOUNTER
It's an estimate of filtration rate; very common and not concerning it just lets us monitor her kidneys more closely

## 2020-01-16 ENCOUNTER — TELEPHONE (OUTPATIENT)
Dept: FAMILY MEDICINE | Facility: CLINIC | Age: 85
End: 2020-01-16

## 2020-01-16 ENCOUNTER — OFFICE VISIT (OUTPATIENT)
Dept: FAMILY MEDICINE | Facility: CLINIC | Age: 85
End: 2020-01-16
Payer: MEDICARE

## 2020-01-16 VITALS
HEIGHT: 61 IN | OXYGEN SATURATION: 94 % | HEART RATE: 88 BPM | WEIGHT: 229.5 LBS | SYSTOLIC BLOOD PRESSURE: 130 MMHG | DIASTOLIC BLOOD PRESSURE: 78 MMHG | BODY MASS INDEX: 43.33 KG/M2 | TEMPERATURE: 98 F

## 2020-01-16 DIAGNOSIS — H92.02 OTALGIA OF LEFT EAR: ICD-10-CM

## 2020-01-16 DIAGNOSIS — L08.9 INFECTED ABRASION OF RIGHT LOWER EXTREMITY, INITIAL ENCOUNTER: Primary | ICD-10-CM

## 2020-01-16 DIAGNOSIS — H69.93 DYSFUNCTION OF BOTH EUSTACHIAN TUBES: ICD-10-CM

## 2020-01-16 DIAGNOSIS — S80.811A INFECTED ABRASION OF RIGHT LOWER EXTREMITY, INITIAL ENCOUNTER: Primary | ICD-10-CM

## 2020-01-16 PROCEDURE — 1159F PR MEDICATION LIST DOCUMENTED IN MEDICAL RECORD: ICD-10-PCS | Mod: HCNC,S$GLB,, | Performed by: NURSE PRACTITIONER

## 2020-01-16 PROCEDURE — 99999 PR PBB SHADOW E&M-EST. PATIENT-LVL V: ICD-10-PCS | Mod: PBBFAC,HCNC,, | Performed by: NURSE PRACTITIONER

## 2020-01-16 PROCEDURE — 1101F PR PT FALLS ASSESS DOC 0-1 FALLS W/OUT INJ PAST YR: ICD-10-PCS | Mod: HCNC,CPTII,S$GLB, | Performed by: NURSE PRACTITIONER

## 2020-01-16 PROCEDURE — 99214 OFFICE O/P EST MOD 30 MIN: CPT | Mod: HCNC,S$GLB,, | Performed by: NURSE PRACTITIONER

## 2020-01-16 PROCEDURE — 3075F PR MOST RECENT SYSTOLIC BLOOD PRESS GE 130-139MM HG: ICD-10-PCS | Mod: HCNC,CPTII,S$GLB, | Performed by: NURSE PRACTITIONER

## 2020-01-16 PROCEDURE — 1159F MED LIST DOCD IN RCRD: CPT | Mod: HCNC,S$GLB,, | Performed by: NURSE PRACTITIONER

## 2020-01-16 PROCEDURE — 3078F PR MOST RECENT DIASTOLIC BLOOD PRESSURE < 80 MM HG: ICD-10-PCS | Mod: HCNC,CPTII,S$GLB, | Performed by: NURSE PRACTITIONER

## 2020-01-16 PROCEDURE — 1101F PT FALLS ASSESS-DOCD LE1/YR: CPT | Mod: HCNC,CPTII,S$GLB, | Performed by: NURSE PRACTITIONER

## 2020-01-16 PROCEDURE — 3075F SYST BP GE 130 - 139MM HG: CPT | Mod: HCNC,CPTII,S$GLB, | Performed by: NURSE PRACTITIONER

## 2020-01-16 PROCEDURE — 1126F AMNT PAIN NOTED NONE PRSNT: CPT | Mod: HCNC,S$GLB,, | Performed by: NURSE PRACTITIONER

## 2020-01-16 PROCEDURE — 99214 PR OFFICE/OUTPT VISIT, EST, LEVL IV, 30-39 MIN: ICD-10-PCS | Mod: HCNC,S$GLB,, | Performed by: NURSE PRACTITIONER

## 2020-01-16 PROCEDURE — 99999 PR PBB SHADOW E&M-EST. PATIENT-LVL V: CPT | Mod: PBBFAC,HCNC,, | Performed by: NURSE PRACTITIONER

## 2020-01-16 PROCEDURE — 1126F PR PAIN SEVERITY QUANTIFIED, NO PAIN PRESENT: ICD-10-PCS | Mod: HCNC,S$GLB,, | Performed by: NURSE PRACTITIONER

## 2020-01-16 PROCEDURE — 3078F DIAST BP <80 MM HG: CPT | Mod: HCNC,CPTII,S$GLB, | Performed by: NURSE PRACTITIONER

## 2020-01-16 RX ORDER — FLUNISOLIDE 0.25 MG/ML
1 SOLUTION NASAL 2 TIMES DAILY
Qty: 25 ML | Refills: 1 | Status: SHIPPED | OUTPATIENT
Start: 2020-01-16 | End: 2020-02-15

## 2020-01-16 RX ORDER — MUPIROCIN 20 MG/G
OINTMENT TOPICAL 2 TIMES DAILY
Qty: 15 G | Refills: 1 | Status: SHIPPED | OUTPATIENT
Start: 2020-01-16 | End: 2020-01-30

## 2020-01-16 RX ORDER — AZELASTINE 1 MG/ML
1 SPRAY, METERED NASAL 2 TIMES DAILY
Qty: 30 ML | Refills: 0 | Status: SHIPPED | OUTPATIENT
Start: 2020-01-16 | End: 2020-03-05 | Stop reason: SDUPTHER

## 2020-01-16 NOTE — PATIENT INSTRUCTIONS
Your symptoms and exam today are consistent with small scratch that looks like it may be becoming infected.    Topical mupirocin ointment/cream twice a day (If neosporin, not more than 1 week).    You can use a warm compresses a couple of times a day, too..    If you are not better in 2 days, if worse or you have concerns or questions, please do not hesitate to call.  You can reach us at [phone] Monday through Thursday (except holidays) 8:30 a.m. to 5 p.m.  Or call KEON España MD's nurse.   NOTE:  I do not work on Fridays.  If you have concerns on Fridays, call your primary care office.    Evenings and weekends [facility] is also available if symptoms worsen or fail to improve.  When you call the number, a registered nurse answers and takes your information.  The nurse can look at your medical record and make recommendations or call the on call physician, if warranted.      Urgent Cares associated with [facility] are open M-F evenings and on the weekends, as well.    [facility] Address: [address] [place] [address] [address], [place] [address] Phone: [phone]    [facility] Address:  Address: [address] [place] [address] [place] [address] Phone: [phone]      Earache, No Infection (Adult)  Earaches can happen without an infection. This occurs when air and fluid build up behind the eardrum causing a feeling of fullness and discomfort and reduced hearing. This is called otitis media with effusion (OME) or serous otitis media. It means there is fluid in the middle ear. It is not the same as acute otitis media, which is typically from infection.  OME can happen when you have a cold if congestion blocks the passage that drains the middle ear. This passage is called the eustachian tube. OME may also occur with nasal allergies or after a bacterial middle ear infection.    The pain or discomfort may come and go. You may hear clicking or popping sounds when you chew or swallow. You may feel  that your balance is off. Or you may hear ringing in the ear.  It often takes from several weeks up to 3 months for the fluid to clear on its own. Oral pain relievers and ear drops help if there is pain. Decongestants and antihistamines sometimes help. Antibiotics don't help since there is no infection. Your doctor may prescribe a nasal spray to help reduce swelling in the nose and eustachian tube. This can allow the ear to drain.  If your OME doesn't improve after 3 months, surgery may be used to drain the fluid and insert a small tube in the eardrum to allow continued drainage.  Because the middle ear fluid can become infected, it is important to watch for signs of an ear infection which may develop later. These signs include increased ear pain, fever, or drainage from the ear.  Home care  The following guidelines will help you care for yourself at home:  · You may use over-the-counter medicine as directed to control pain, unless another medicine was prescribed. If you have chronic liver or kidney disease or ever had a stomach ulcer or GI bleeding, talk with your doctor before using these medicines. Aspirin should never be used in anyone under 18 years of age who is ill with a fever. It may cause severe liver damage.  · You may use over-the-counter decongestants such as phenylephrine or pseudoephedrine. But they are not always helpful. Don't use nasal spray decongestants more than 3 days. Longer use can make congestion worse. Prescription nasal sprays from your doctor don't typically have those restrictions.  · Antihistamines may help if you are also having allergy symptoms.  · You may use medicines such as guaifenesin to thin mucus and promote drainage.  Follow-up care  Follow up with your healthcare provider or as advised if you are not feeling better after 3 days.  When to seek medical advice  Call your healthcare provider right away if any of the following occur:  · Your ear pain gets worse or does not start  to improve   · Fever of 100.4°F (38°C) or higher, or as directed by your healthcare provider  · Fluid or blood draining from the ear  · Headache or sinus pain  · Stiff neck  · Unusual drowsiness or confusion  Date Last Reviewed: 10/1/2016  © 5208-9839 [employer]. [address] [place] [address]. All rights reserved. This information is not intended as a substitute for professional medical care. Always follow your healthcare professional's instructions.          Thank you for using the [facility]!    MADHU Gaming, CNP, FNP-BC  [facility]

## 2020-01-16 NOTE — TELEPHONE ENCOUNTER
----- Message from Rachel Lance sent at 1/16/2020 12:58 PM CST -----      Pt is calling to  Order  Some  Blood wk // please call  [phone] pt is coming in today

## 2020-01-16 NOTE — Clinical Note
W Eddie España MD,  I saw your patient today in Primary Care  If you have any questions, please do not hesitate to contact me.  Thank you!  AVIS Gaming, [facility]

## 2020-01-16 NOTE — TELEPHONE ENCOUNTER
----- Message from Vanessa Navarro sent at 1/16/2020 10:46 AM CST -----  Type: Needs Medical Advice    Who Called:  [facility]     Best Call Back Number:  [phone]  Additional Information: patient has dime size scab on right thigh wanted your office to be aware of this

## 2020-01-16 NOTE — PROGRESS NOTES
Miesha Obando is a 84 y.o. female patient of KEON España MD who presents to the clinic today for   Chief Complaint   Patient presents with    Sore     LEFT LEG UPPER    .    HPI    Pt, who is known to me, reports a new problem to me:  Concern over a dark colored lesion on the back of her right leg that is surrounded by a reddened skin. .    These symptoms began in the last few days and status is worsening.     Pt denies the following symptoms:  Known injury    Aggravating factors include none .    Relieving factors include none tried .    OTC Medications tried are none.    Prescription medications taken for symptoms are none.    Pertinent medical history:  Diabetes.  She also had a fall on Jennifer Day and fractured 1 of her cervical vertebrae.  Since then she has been wearing a cervical collar.    ROS    Constitutional:  No fever.    Skin:  See chief complaint/HPI.    HEENT:  Some dizziness and pressure in her ears.    Heart/Lungs:  No complaints    GI/:  No sxs.    MS:  No new problems in bones, joints or muscles.      Past Medical History:   Diagnosis Date    Anticoagulant long-term use     Arthritis     Atrial fibrillation     Breast cancer 1994    breast , left    Cardiomyopathy - EF 35-40% 5/11/2016    CHF (congestive heart failure)     Chronic bronchitis     COPD (chronic obstructive pulmonary disease)     Coronary artery disease without angina pectoris 8/26/2015    Depression     Diabetes with neurologic complications     Diverticulitis     Diverticulosis     Encounter for blood transfusion     after mastectomy x 20 years ago    GERD (gastroesophageal reflux disease)     H/O aortic valve replacement     Hiatal hernia     History of colonic diverticulitis     Hypertension     Hypothyroid     Insomnia     Irritable bowel syndrome     Obesity     Pacemaker 08/2014    Schatzki's ring     mild    Sleep apnea     uses cpap with oxygen     Syncope and collapse     Systolic  congestive heart failure, NYHA class 3 5/23/2016    Type II or unspecified type diabetes mellitus without mention of complication, not stated as uncontrolled     No medications at present.     Urinary incontinence        Current Outpatient Medications:     ACCU-CHEK FASTCLIX Misc, TEST TWICE DAILY, Disp: 200 each, Rfl: 11    ACCU-CHEK SMARTVIEW TEST STRIP Strp, TEST  TWICE DAILY, Disp: 200 strip, Rfl: 11    acetaminophen (TYLENOL) 325 MG tablet, Take 2 tablets (650 mg total) by mouth every 6 (six) hours as needed for Pain or Temperature greater than., Disp: , Rfl: 0    albuterol (PROVENTIL/VENTOLIN HFA) 90 mcg/actuation inhaler, INHALE 1 PUFF EVERY 4 HOURS AS NEEDED, Disp: 54 g, Rfl: 0    alendronate (FOSAMAX) 70 MG tablet, Take 1 tablet (70 mg total) by mouth every 7 days., Disp: 12 tablet, Rfl: 1    azelastine (ASTELIN) 137 mcg (0.1 %) nasal spray, 1 spray (137 mcg total) by Nasal route 2 (two) times daily., Disp: 30 mL, Rfl: 0    b complex vitamins tablet, Take 1 tablet by mouth once daily.  , Disp: , Rfl:     BD ALCOHOL SWABS PadM, USE TWICE DAILY, Disp: 200 each, Rfl: 11    clopidogrel (PLAVIX) 75 mg tablet, TAKE 1 TABLET(75 MG) BY MOUTH EVERY DAY, Disp: 90 tablet, Rfl: 0    diclofenac sodium (VOLTAREN) 1 % Gel, Apply 2 g topically daily as needed. Apply to right shoulder, Disp: 100 g, Rfl: 1    diphenhydramine-acetaminophen (TYLENOL PM)  mg Tab, Take 1 tablet by mouth nightly as needed., Disp: , Rfl:     ergocalciferol (ERGOCALCIFEROL) 50,000 unit Cap, TAKE 1 CAPSULE BY MOUTH EVERY 7 DAYS, Disp: 12 capsule, Rfl: 0    fish oil-omega-3 fatty acids 300-1,000 mg capsule, Take 2 g by mouth once daily., Disp: , Rfl:     gabapentin (NEURONTIN) 300 MG capsule, TAKE 1 CAPSULE(300 MG) BY MOUTH EVERY EVENING, Disp: 90 capsule, Rfl: 0    HYDROcodone-acetaminophen (NORCO) 5-325 mg per tablet, Take 1 tablet by mouth every 6 (six) hours as needed for Pain., Disp: 10 tablet, Rfl: 0    levothyroxine  (SYNTHROID) 100 MCG tablet, TAKE 1 TABLET EVERY DAY, Disp: 90 tablet, Rfl: 2    metoprolol succinate (TOPROL-XL) 25 MG 24 hr tablet, Take 1 tablet (25 mg total) by mouth once daily., Disp: 90 tablet, Rfl: 3    mometasone (NASONEX) 50 mcg/actuation nasal spray, 1 spray by Nasal route 2 (two) times daily., Disp: 17 g, Rfl: 1    nitroGLYCERIN (NITROSTAT) 0.4 MG SL tablet, Place 1 tablet (0.4 mg total) under the tongue every 5 (five) minutes as needed for Chest pain., Disp: 20 tablet, Rfl: 0    omeprazole (PRILOSEC OTC) 20 MG tablet, Take 1 tablet (20 mg total) by mouth once daily., Disp: 30 tablet, Rfl: 11    ranitidine (ZANTAC) 150 MG tablet, Take 1 tablet (150 mg total) by mouth daily as needed for Heartburn., Disp: 90 tablet, Rfl: 1    spironolactone (ALDACTONE) 25 MG tablet, TAKE 1 TABLET EVERY DAY, Disp: 90 tablet, Rfl: 3    torsemide (DEMADEX) 20 MG Tab, TAKE 1 TABLET(20 MG) BY MOUTH EVERY DAY, Disp: 90 tablet, Rfl: 1    warfarin (COUMADIN) 2.5 MG tablet, TAKE 1 TABLET EVERY DAY, Disp: 90 tablet, Rfl: 0    fluticasone-vilanterol (BREO ELLIPTA) 200-25 mcg/dose DsDv diskus inhaler, Inhale 1 puff into the lungs once daily. (Patient not taking: Reported on 1/16/2020), Disp: 3 each, Rfl: 3    Pt and is not a smoker.    PHYSICAL EXAM    Alert, coop 84 y.o. female patient in no acute distress, is not ill-appearing.    Vitals:    01/16/20 1636   BP: 130/78   Pulse: 88   Temp: 98 °F (36.7 °C)     VS reviewed.  VS stable.  CC, nursing note, medications & PMH all reviewed today.    Head:  Normocephalic, atraumatic.    EENT:  Ext nose/ears normal.               TMs with diffuse cones of light bilat, no erythema, no effusions bilat.               Pharynx not injected.  Tonsils not enlarged, no exudate.               No cervical lymphadenopathy noted.               No sinus tenderness to palp.               Eye lids without lesions or edema, no discharge present.      Resp:  Respirations even, unlabored    Heart:   Regular rate.  BP in the normal range.    MS:  Ambulates independently using a walker.     NEURO:  Alert and oriented x 4.  Responds appropriately during interaction.                 Skin:  Warm, dry, color good.            A/an dark, small lesion is located on the post of the right upper thigh.  The lesion edges area erythematous.           Size approx 0.75 x 0.33 cm..           No pustules or vesicles noted.    Psych:  Responds appropriately throughout the visit.               Relaxed.  Well-groomed    Infected abrasion of right lower extremity, initial encounter    Otalgia of left ear    Dysfunction of both eustachian tubes  -     azelastine (ASTELIN) 137 mcg (0.1 %) nasal spray; 1 spray (137 mcg total) by Nasal route 2 (two) times daily.  Dispense: 30 mL; Refill: 0    Other orders  -     mupirocin (BACTROBAN) 2 % ointment; Apply topically 2 (two) times daily. for 14 days  Dispense: 15 g; Refill: 1  -     flunisolide 25 mcg, 0.025%, (NASALIDE) 25 mcg (0.025 %) Spry; 1 spray by Nasal route 2 (two) times daily.  Dispense: 25 mL; Refill: 1        Pt today presents with a small, scabbed lesion on the post prox thigh rash with erythematous wound margins consistent with scratch, possibly from a fingernail.  Because there is some edema and erythema around the wound edges, script for mupirocin given with instructions to use it twice a day until the wound is healed.  HEENT exam reveals eustachian tube dysfunction.  Encourage patient is to use her flunisolide and Astelin nasal sprays.  We also discussed the etiology of this problem.    Of note:  The patient is still living in her home, however she does have a live-in 24 hr visiting angels caretaker.  She is having home health come to her house as well.  Although, she has had some concerns about the care that is being provided.  She is seeking placement in the [facility] nursing home, where she went for cardiac rehab after her stent placements.  As she is no longer able to care  for herself at home alone.  She does have family members who are in contact with her and visit her as.    Pt advised to perform comfort measures recommended on patient instruction sheet .     If worse or concerns, the patient is advised to call here, the PCP office, [facility] or go to URGENT CARE/ER.  Explained exam findings, diagnosis and treatment plan to patient and Liz, her caregiver.  Questions answered and patient states understanding.

## 2020-01-20 ENCOUNTER — TELEPHONE (OUTPATIENT)
Dept: FAMILY MEDICINE | Facility: CLINIC | Age: 85
End: 2020-01-20

## 2020-01-20 DIAGNOSIS — S81.802S WOUND OF LEFT LOWER EXTREMITY, SEQUELA: Primary | ICD-10-CM

## 2020-01-20 NOTE — TELEPHONE ENCOUNTER
Pt states she has a wound on her leg, states she saw Fern Chavez last week and went to the ER yesterday. Pt was advised by the ER to get a referral to wound care. Referral pended. Please advise.

## 2020-01-20 NOTE — TELEPHONE ENCOUNTER
----- Message from Rubens Andre sent at 1/20/2020  8:13 AM CST -----  Contact: pt  Type: Needs Medical Advice    Who Called:  pt    Best Call Back Number: [phone]  Additional Information: Needs a referral for a wound care specialist. Please call to advise.

## 2020-01-24 ENCOUNTER — TELEPHONE (OUTPATIENT)
Dept: FAMILY MEDICINE | Facility: CLINIC | Age: 85
End: 2020-01-24

## 2020-01-24 NOTE — TELEPHONE ENCOUNTER
----- Message from Mya Pederson sent at 1/24/2020  8:56 AM CST -----  Contact: [facility]  Type: Needs Medical Advice    Who Called:  Di Rebollar Call Back Number: [phone]  Additional Information: Requesting a call back regarding nurse is requesting 3 additional week of home therapy for pt. To improve pt mobility and strength.   Please Advise ---Thank you

## 2020-01-27 ENCOUNTER — OFFICE VISIT (OUTPATIENT)
Dept: NEUROSURGERY | Facility: CLINIC | Age: 85
End: 2020-01-27
Payer: MEDICARE

## 2020-01-27 ENCOUNTER — HOSPITAL ENCOUNTER (OUTPATIENT)
Dept: RADIOLOGY | Facility: HOSPITAL | Age: 85
Discharge: HOME | End: 2020-01-27
Attending: PHYSICIAN ASSISTANT
Payer: MEDICARE

## 2020-01-27 ENCOUNTER — HOSPITAL ENCOUNTER (OUTPATIENT)
Dept: RADIOLOGY | Facility: HOSPITAL | Age: 85
Discharge: HOME | End: 2020-01-27
Attending: NEUROLOGICAL SURGERY
Payer: MEDICARE

## 2020-01-27 VITALS — WEIGHT: 225 LBS | HEIGHT: 63 IN | BODY MASS INDEX: 39.87 KG/M2

## 2020-01-27 DIAGNOSIS — S12.9XXA CLOSED FRACTURE OF SPINOUS PROCESS OF CERVICAL VERTEBRA, INITIAL ENCOUNTER: ICD-10-CM

## 2020-01-27 DIAGNOSIS — S12.9XXA CLOSED FRACTURE OF SPINOUS PROCESS OF CERVICAL VERTEBRA, INITIAL ENCOUNTER: Primary | ICD-10-CM

## 2020-01-27 DIAGNOSIS — S12.9XXD CLOSED FRACTURE OF SPINOUS PROCESS OF CERVICAL VERTEBRA, SUBSEQUENT ENCOUNTER: ICD-10-CM

## 2020-01-27 PROCEDURE — 72040 XR CERVICAL SPINE FLEXION  AND EXTENSION ONLY: ICD-10-PCS | Mod: 26,HCNC,, | Performed by: RADIOLOGY

## 2020-01-27 PROCEDURE — 1159F PR MEDICATION LIST DOCUMENTED IN MEDICAL RECORD: ICD-10-PCS | Mod: HCNC,S$GLB,, | Performed by: NEUROLOGICAL SURGERY

## 2020-01-27 PROCEDURE — 1125F AMNT PAIN NOTED PAIN PRSNT: CPT | Mod: HCNC,S$GLB,, | Performed by: NEUROLOGICAL SURGERY

## 2020-01-27 PROCEDURE — 72040 X-RAY EXAM NECK SPINE 2-3 VW: CPT | Mod: TC,HCNC,FY,PO

## 2020-01-27 PROCEDURE — 1101F PT FALLS ASSESS-DOCD LE1/YR: CPT | Mod: HCNC,CPTII,S$GLB, | Performed by: NEUROLOGICAL SURGERY

## 2020-01-27 PROCEDURE — 99999 PR PBB SHADOW E&M-EST. PATIENT-LVL III: ICD-10-PCS | Mod: PBBFAC,HCNC,, | Performed by: NEUROLOGICAL SURGERY

## 2020-01-27 PROCEDURE — 72040 X-RAY EXAM NECK SPINE 2-3 VW: CPT | Mod: 26,HCNC,, | Performed by: RADIOLOGY

## 2020-01-27 PROCEDURE — 1101F PR PT FALLS ASSESS DOC 0-1 FALLS W/OUT INJ PAST YR: ICD-10-PCS | Mod: HCNC,CPTII,S$GLB, | Performed by: NEUROLOGICAL SURGERY

## 2020-01-27 PROCEDURE — 1159F MED LIST DOCD IN RCRD: CPT | Mod: HCNC,S$GLB,, | Performed by: NEUROLOGICAL SURGERY

## 2020-01-27 PROCEDURE — 99204 PR OFFICE/OUTPT VISIT, NEW, LEVL IV, 45-59 MIN: ICD-10-PCS | Mod: HCNC,S$GLB,, | Performed by: NEUROLOGICAL SURGERY

## 2020-01-27 PROCEDURE — 1125F PR PAIN SEVERITY QUANTIFIED, PAIN PRESENT: ICD-10-PCS | Mod: HCNC,S$GLB,, | Performed by: NEUROLOGICAL SURGERY

## 2020-01-27 PROCEDURE — 99204 OFFICE O/P NEW MOD 45 MIN: CPT | Mod: HCNC,S$GLB,, | Performed by: NEUROLOGICAL SURGERY

## 2020-01-27 PROCEDURE — 72040 XR CERVICAL SPINE AP LATERAL: ICD-10-PCS | Mod: 26,HCNC,, | Performed by: RADIOLOGY

## 2020-01-27 PROCEDURE — 99999 PR PBB SHADOW E&M-EST. PATIENT-LVL III: CPT | Mod: PBBFAC,HCNC,, | Performed by: NEUROLOGICAL SURGERY

## 2020-01-27 NOTE — LETTER
January 27, 2020      Betsy Tolliver, NP  [address] [facility] [address] [place] [address]           [facility]  [address] [place] [address]  Phone: [phone]  Fax: [phone]          Patient: Miesha Obando   MR Number: [id number]   YOB: 1935   Date of Visit: 1/27/2020       Dear Betsy Tolliver:    Thank you for referring Miesha Obando to me for evaluation. Attached you will find relevant portions of my assessment and plan of care.    If you have questions, please do not hesitate to call me. I look forward to following Miesha Obando along with you.    Sincerely,    Isha Mcdaniel MD    Enclosure  CC:  No Recipients    If you would like to receive this communication electronically, please contact [email or web address] or [phone] to request more information on [employer] access.    For providers and/or their staff who would like to refer a patient to [facility], please contact us through our one-stop-shop provider referral line, [facility], at [phone].    If you feel you have received this communication in error or would no longer like to receive these types of communications, please e-mail [email or web address]

## 2020-01-27 NOTE — TELEPHONE ENCOUNTER
Spoke with Di and informed her per Dr. España that she can extend the pt's PT. Di verbalized understanding.

## 2020-01-27 NOTE — PROGRESS NOTES
Neurosurgery History and Physical    Patient ID: Miesha Obando is a 84 y.o. female.    Chief Complaint   Patient presents with    Neck Pain     Pt f/u from hospital for cervical fracture. No h/o fracture prior to this encounter. She has been mostly compliant with wearing the cervical collar, but notes that it is causing a significant amount of discomfort. She states the pain is left of midline and into her occipital region, but denies any extension to upper extremities.        Review of Systems   Constitutional: Negative.    HENT: Negative.    Eyes: Negative.    Respiratory: Negative.    Cardiovascular: Negative.    Gastrointestinal: Negative.    Endocrine: Negative.    Genitourinary: Negative.    Musculoskeletal: Positive for arthralgias, gait problem and myalgias.   Skin: Negative.    Allergic/Immunologic: Negative.    Neurological: Positive for numbness.   Hematological: Negative.    Psychiatric/Behavioral: Negative.        Past Medical History:   Diagnosis Date    Anticoagulant long-term use     Arthritis     Atrial fibrillation     Breast cancer 1994    breast , left    Cardiomyopathy - EF 35-40% 5/11/2016    CHF (congestive heart failure)     Chronic bronchitis     COPD (chronic obstructive pulmonary disease)     Coronary artery disease without angina pectoris 8/26/2015    Depression     Diabetes mellitus, type 2     Diabetes with neurologic complications     Diverticulitis     Diverticulosis     Encounter for blood transfusion     after mastectomy x 20 years ago    GERD (gastroesophageal reflux disease)     H/O aortic valve replacement     Hiatal hernia     History of colonic diverticulitis     Hypertension     Hypothyroid     Insomnia     Irritable bowel syndrome     Obesity     Pacemaker 08/2014    Schatzki's ring     mild    Sleep apnea     uses cpap with oxygen     Syncope and collapse     Systolic congestive heart failure, NYHA class 3 5/23/2016    Type II or unspecified  type diabetes mellitus without mention of complication, not stated as uncontrolled     No medications at present.     Urinary incontinence      Social History     Socioeconomic History    Marital status:      Spouse name: Not on file    Number of children: Not on file    Years of education: Not on file    Highest education level: Not on file   Occupational History    Not on file   Social Needs    Financial resource strain: Not on file    Food insecurity:     Worry: Not on file     Inability: Not on file    Transportation needs:     Medical: Not on file     Non-medical: Not on file   Tobacco Use    Smoking status: Former Smoker     Packs/day: 1.00     Years: 30.00     Pack years: 30.00     Start date: 1958     Last attempt to quit: 1988     Years since quittin.0    Smokeless tobacco: Never Used   Substance and Sexual Activity    Alcohol use: Yes     Comment: 0-2 glasses of wine per month    Drug use: No    Sexual activity: Not on file   Lifestyle    Physical activity:     Days per week: Not on file     Minutes per session: Not on file    Stress: Not on file   Relationships    Social connections:     Talks on phone: Not on file     Gets together: Not on file     Attends Zoroastrian service: Not on file     Active member of club or organization: Not on file     Attends meetings of clubs or organizations: Not on file     Relationship status: Not on file   Other Topics Concern    Not on file   Social History Narrative    Not on file     Family History   Problem Relation Age of Onset    Parkinsonism Mother     Emphysema Father     Heart disease Brother     Heart attack Brother     Heart failure Brother     Heart disease Brother     Heart failure Brother     Heart disease Brother     Heart failure Brother     Arrhythmia Brother     Anesthesia problems Neg Hx     Clotting disorder Neg Hx      Review of patient's allergies indicates:   Allergen Reactions    Statins-hmg-coa  "reductase inhibitors      "bad leg cramps"    Sulfa (sulfonamide antibiotics) Hives    Doxycycline      Stomach issues    Adhesive Rash     Use paper tape    Amoxicillin-pot clavula (bulk) Nausea And Vomiting       Current Outpatient Medications:     ACCU-CHEK FASTCLIX Misc, TEST TWICE DAILY, Disp: 200 each, Rfl: 11    ACCU-CHEK SMARTVIEW TEST STRIP Strp, TEST  TWICE DAILY, Disp: 200 strip, Rfl: 11    acetaminophen (TYLENOL) 325 MG tablet, Take 2 tablets (650 mg total) by mouth every 6 (six) hours as needed for Pain or Temperature greater than., Disp: , Rfl: 0    albuterol (PROVENTIL/VENTOLIN HFA) 90 mcg/actuation inhaler, INHALE 1 PUFF EVERY 4 HOURS AS NEEDED, Disp: 54 g, Rfl: 0    alendronate (FOSAMAX) 70 MG tablet, Take 1 tablet (70 mg total) by mouth every 7 days., Disp: 12 tablet, Rfl: 1    azelastine (ASTELIN) 137 mcg (0.1 %) nasal spray, 1 spray (137 mcg total) by Nasal route 2 (two) times daily., Disp: 30 mL, Rfl: 0    b complex vitamins tablet, Take 1 tablet by mouth once daily.  , Disp: , Rfl:     BD ALCOHOL SWABS PadM, USE TWICE DAILY, Disp: 200 each, Rfl: 11    clopidogrel (PLAVIX) 75 mg tablet, TAKE 1 TABLET(75 MG) BY MOUTH EVERY DAY, Disp: 90 tablet, Rfl: 0    diclofenac sodium (VOLTAREN) 1 % Gel, Apply 2 g topically daily as needed. Apply to right shoulder, Disp: 100 g, Rfl: 1    diphenhydramine-acetaminophen (TYLENOL PM)  mg Tab, Take 1 tablet by mouth nightly as needed., Disp: , Rfl:     ergocalciferol (ERGOCALCIFEROL) 50,000 unit Cap, TAKE 1 CAPSULE BY MOUTH EVERY 7 DAYS, Disp: 12 capsule, Rfl: 0    fish oil-omega-3 fatty acids 300-1,000 mg capsule, Take 2 g by mouth once daily., Disp: , Rfl:     flunisolide 25 mcg, 0.025%, (NASALIDE) 25 mcg (0.025 %) Spry, 1 spray by Nasal route 2 (two) times daily., Disp: 25 mL, Rfl: 1    fluticasone-vilanterol (BREO ELLIPTA) 200-25 mcg/dose DsDv diskus inhaler, Inhale 1 puff into the lungs once daily., Disp: 3 each, Rfl: 3    gabapentin " "(NEURONTIN) 300 MG capsule, TAKE 1 CAPSULE(300 MG) BY MOUTH EVERY EVENING, Disp: 90 capsule, Rfl: 0    HYDROcodone-acetaminophen (NORCO) 5-325 mg per tablet, Take 1 tablet by mouth every 6 (six) hours as needed for Pain., Disp: 10 tablet, Rfl: 0    levothyroxine (SYNTHROID) 100 MCG tablet, TAKE 1 TABLET EVERY DAY, Disp: 90 tablet, Rfl: 2    metoprolol succinate (TOPROL-XL) 25 MG 24 hr tablet, Take 1 tablet (25 mg total) by mouth once daily., Disp: 90 tablet, Rfl: 3    mupirocin (BACTROBAN) 2 % ointment, Apply topically 2 (two) times daily. for 14 days, Disp: 15 g, Rfl: 1    omeprazole (PRILOSEC OTC) 20 MG tablet, Take 1 tablet (20 mg total) by mouth once daily., Disp: 30 tablet, Rfl: 11    ranitidine (ZANTAC) 150 MG tablet, Take 1 tablet (150 mg total) by mouth daily as needed for Heartburn., Disp: 90 tablet, Rfl: 1    spironolactone (ALDACTONE) 25 MG tablet, TAKE 1 TABLET EVERY DAY, Disp: 90 tablet, Rfl: 3    torsemide (DEMADEX) 20 MG Tab, TAKE 1 TABLET(20 MG) BY MOUTH EVERY DAY, Disp: 90 tablet, Rfl: 1    warfarin (COUMADIN) 2.5 MG tablet, TAKE 1 TABLET EVERY DAY, Disp: 90 tablet, Rfl: 0    nitroGLYCERIN (NITROSTAT) 0.4 MG SL tablet, Place 1 tablet (0.4 mg total) under the tongue every 5 (five) minutes as needed for Chest pain., Disp: 20 tablet, Rfl: 0  No current facility-administered medications for this visit.   Height 5' 3" (1.6 m), weight 102.1 kg (225 lb).      Neurologic Exam     Mental Status   Oriented to person, place, and time.   Attention: normal. Concentration: normal.   Speech: speech is normal   Level of consciousness: alert  Knowledge: good.     Cranial Nerves     CN II   Visual acuity: normal    CN III, IV, VI   Pupils are equal, round, and reactive to light.  Extraocular motions are normal.     CN V   Facial sensation intact.     CN VII   Facial expression full, symmetric.     CN VIII   Hearing: intact    CN IX, X   Palate: symmetric    CN XI   CN XI normal.     CN XII   CN XII normal. "     Motor Exam   Muscle bulk: normal  Overall muscle tone: normal  Right arm pronator drift: absent  Left arm pronator drift: absent    Strength   Right deltoid: 5/5  Left deltoid: 5/5  Right biceps: 5/5  Left biceps: 5/5  Right triceps: 5/5  Left triceps: 5/5  Right wrist flexion: 5/5  Left wrist flexion: 5/5  Right wrist extension: 5/5  Left wrist extension: 5/5  Right interossei: 5/5  Left interossei: 5/5  Right iliopsoas: 5/5  Left iliopsoas: 5/5  Right quadriceps: 5/5  Left quadriceps: 5/5  Right hamstrin/5  Left hamstrin/5  Right anterior tibial: 5/5  Left anterior tibial: 5/5  Right posterior tibial: 5/5  Left posterior tibial: 5/5  Right peroneal: 5/5  Left peroneal: 5/5  Right gastroc: 5/5  Left gastroc: 5/5    Sensory Exam   Light touch normal.     Gait, Coordination, and Reflexes     Gait  Gait: normal    Coordination   Romberg: negative  Finger to nose coordination: normal    Tremor   Resting tremor: absent    Reflexes   Right brachioradialis: 2+  Left brachioradialis: 2+  Right biceps: 2+  Left biceps: 2+  Right triceps: 2+  Left triceps: 2+  Right patellar: 2+  Left patellar: 2+  Right achilles: 0  Left achilles: 0  Right plantar: normal  Left plantar: normal  Right Vargas: absent  Left Vargas: absent  Right ankle clonus: absent  Left ankle clonus: absent      Physical Exam   Constitutional: She is oriented to person, place, and time.   Eyes: Pupils are equal, round, and reactive to light. EOM are normal.   Neurological: She is oriented to person, place, and time. She has a normal Finger-Nose-Finger Test and a normal Romberg Test. Gait normal.   Reflex Scores:       Tricep reflexes are 2+ on the right side and 2+ on the left side.       Bicep reflexes are 2+ on the right side and 2+ on the left side.       Brachioradialis reflexes are 2+ on the right side and 2+ on the left side.       Patellar reflexes are 2+ on the right side and 2+ on the left side.       Achilles reflexes are 0 on the  "right side and 0 on the left side.  Psychiatric: Her speech is normal.       Vital Signs  Pain Score:   6  Height and Weight  Height: 5' 3" (160 cm)  Weight: 102.1 kg (225 lb)  BSA (Calculated - sq m): 2.13 sq meters  BMI (Calculated): 39.9  Weight in (lb) to have BMI = 25: 140.8]    Provider dictation:  I reviewed the imaging. There is an acute C3 spinous process fracture and a chronic C7 spinous process fracture. There are calcified disc herniations at C3-C4 and C4-C5. There is no significant malalignment on standing XR.     Miesha Obando is a 84 year old male who presents for neurosurgical evaluation. Patient reports a fall in her bathtub on 12/25/2019 where she fell backward from sitting. She was seen in the ED and CT scan cervical spine showed a spinous process fracture at C3. She was placed in a cervical collar at that time. She has been using a walker to ambulate for the past few years due to balance issues. She will have intermittent numbness/pain in her hands, arms, and feet for the past few years. Denies any worsening clumsiness with her hands or weakness. She states that if she removes the collar she feels heaviness in the neck. She denies any significant neck pain since the fall.     On exam patient has tenderness to palpation over C7, T1. Full strength and no sensory deficits. 2+ reflexes throughout, except absent B/L achilles reflex. No signs of myelopathy.    Most likely stable fracture. Will get flexion-extension XR and if stable, may remove C collar. No routine neurosurgical follow up indicated.     Visit Diagnosis:  Closed fracture of spinous process of cervical vertebra, initial encounter  -     X-Ray Cervical Spine AP Lat with Flexion  Extension; Future; Expected date: 01/27/2020      "

## 2020-01-28 ENCOUNTER — TELEPHONE (OUTPATIENT)
Dept: HOME HEALTH SERVICES | Facility: HOSPITAL | Age: 85
End: 2020-01-28

## 2020-01-29 ENCOUNTER — TELEPHONE (OUTPATIENT)
Dept: FAMILY MEDICINE | Facility: CLINIC | Age: 85
End: 2020-01-29

## 2020-01-29 DIAGNOSIS — M79.662 PAIN IN BOTH LOWER LEGS: ICD-10-CM

## 2020-01-29 DIAGNOSIS — M51.36 DDD (DEGENERATIVE DISC DISEASE), LUMBAR: ICD-10-CM

## 2020-01-29 DIAGNOSIS — S12.9XXD CLOSED FRACTURE OF SPINOUS PROCESS OF CERVICAL VERTEBRA, SUBSEQUENT ENCOUNTER: ICD-10-CM

## 2020-01-29 DIAGNOSIS — E66.01 MORBID OBESITY: Primary | ICD-10-CM

## 2020-01-29 DIAGNOSIS — M79.661 PAIN IN BOTH LOWER LEGS: ICD-10-CM

## 2020-01-29 NOTE — TELEPHONE ENCOUNTER
----- Message from Drea Mata sent at 1/29/2020  8:39 AM CST -----  Type:  Patient Returning Call    Who Called:  patient  Who Left Message for Patient:  Nurse Rosalie  Does the patient know what this is regarding?:  Yes  Best Call Back Number:  [phone]  Additional Information:  Please call patient

## 2020-01-29 NOTE — TELEPHONE ENCOUNTER
----- Message from Rubens Andre sent at 1/28/2020  3:55 PM CST -----  Contact: pt  Type: Needs Medical Advice    Who Called:  pt    Best Call Back Number: [phone]  Additional Information: Pt would like to have referral for a chair that she can use in her bathtub. States that her insurance company won't cover it unless she has a referral.  Please call to advise.

## 2020-01-29 NOTE — TELEPHONE ENCOUNTER
Attempted to contact pt. KAILEE to return call to clinic.   PROVIDER:[TOKEN:[[id number]],FOLLOWUP:[1-3 Days]],PROVIDER:[TOKEN:[[id number]]]

## 2020-02-06 ENCOUNTER — OFFICE VISIT (OUTPATIENT)
Dept: PODIATRY | Facility: CLINIC | Age: 85
End: 2020-02-06
Payer: MEDICARE

## 2020-02-06 VITALS
DIASTOLIC BLOOD PRESSURE: 68 MMHG | SYSTOLIC BLOOD PRESSURE: 138 MMHG | HEART RATE: 70 BPM | BODY MASS INDEX: 39.87 KG/M2 | HEIGHT: 63 IN | WEIGHT: 225 LBS

## 2020-02-06 DIAGNOSIS — I87.2 EDEMA OF BOTH LOWER EXTREMITIES DUE TO PERIPHERAL VENOUS INSUFFICIENCY: ICD-10-CM

## 2020-02-06 DIAGNOSIS — E08.42 DIABETIC POLYNEUROPATHY ASSOCIATED WITH DIABETES MELLITUS DUE TO UNDERLYING CONDITION: ICD-10-CM

## 2020-02-06 DIAGNOSIS — E11.49 TYPE II DIABETES MELLITUS WITH NEUROLOGICAL MANIFESTATIONS: ICD-10-CM

## 2020-02-06 DIAGNOSIS — I73.9 PERIPHERAL VASCULAR DISEASE: ICD-10-CM

## 2020-02-06 DIAGNOSIS — B35.1 ONYCHOMYCOSIS DUE TO DERMATOPHYTE: Primary | ICD-10-CM

## 2020-02-06 DIAGNOSIS — M79.662 PAIN IN BOTH LOWER LEGS: ICD-10-CM

## 2020-02-06 DIAGNOSIS — M79.661 PAIN IN BOTH LOWER LEGS: ICD-10-CM

## 2020-02-06 PROCEDURE — 3075F SYST BP GE 130 - 139MM HG: CPT | Mod: HCNC,CPTII,S$GLB, | Performed by: PODIATRIST

## 2020-02-06 PROCEDURE — 1159F MED LIST DOCD IN RCRD: CPT | Mod: HCNC,S$GLB,, | Performed by: PODIATRIST

## 2020-02-06 PROCEDURE — 99999 PR PBB SHADOW E&M-EST. PATIENT-LVL III: CPT | Mod: PBBFAC,HCNC,, | Performed by: PODIATRIST

## 2020-02-06 PROCEDURE — 1101F PR PT FALLS ASSESS DOC 0-1 FALLS W/OUT INJ PAST YR: ICD-10-PCS | Mod: HCNC,CPTII,S$GLB, | Performed by: PODIATRIST

## 2020-02-06 PROCEDURE — 99999 PR PBB SHADOW E&M-EST. PATIENT-LVL III: ICD-10-PCS | Mod: PBBFAC,HCNC,, | Performed by: PODIATRIST

## 2020-02-06 PROCEDURE — 11721 PR DEBRIDEMENT OF NAILS, 6 OR MORE: ICD-10-PCS | Mod: Q9,HCNC,S$GLB, | Performed by: PODIATRIST

## 2020-02-06 PROCEDURE — 3078F DIAST BP <80 MM HG: CPT | Mod: HCNC,CPTII,S$GLB, | Performed by: PODIATRIST

## 2020-02-06 PROCEDURE — 1125F AMNT PAIN NOTED PAIN PRSNT: CPT | Mod: HCNC,S$GLB,, | Performed by: PODIATRIST

## 2020-02-06 PROCEDURE — 3075F PR MOST RECENT SYSTOLIC BLOOD PRESS GE 130-139MM HG: ICD-10-PCS | Mod: HCNC,CPTII,S$GLB, | Performed by: PODIATRIST

## 2020-02-06 PROCEDURE — 11721 DEBRIDE NAIL 6 OR MORE: CPT | Mod: Q9,HCNC,S$GLB, | Performed by: PODIATRIST

## 2020-02-06 PROCEDURE — 1159F PR MEDICATION LIST DOCUMENTED IN MEDICAL RECORD: ICD-10-PCS | Mod: HCNC,S$GLB,, | Performed by: PODIATRIST

## 2020-02-06 PROCEDURE — 99213 PR OFFICE/OUTPT VISIT, EST, LEVL III, 20-29 MIN: ICD-10-PCS | Mod: 25,HCNC,S$GLB, | Performed by: PODIATRIST

## 2020-02-06 PROCEDURE — 99213 OFFICE O/P EST LOW 20 MIN: CPT | Mod: 25,HCNC,S$GLB, | Performed by: PODIATRIST

## 2020-02-06 PROCEDURE — 1125F PR PAIN SEVERITY QUANTIFIED, PAIN PRESENT: ICD-10-PCS | Mod: HCNC,S$GLB,, | Performed by: PODIATRIST

## 2020-02-06 PROCEDURE — 3078F PR MOST RECENT DIASTOLIC BLOOD PRESSURE < 80 MM HG: ICD-10-PCS | Mod: HCNC,CPTII,S$GLB, | Performed by: PODIATRIST

## 2020-02-06 PROCEDURE — 1101F PT FALLS ASSESS-DOCD LE1/YR: CPT | Mod: HCNC,CPTII,S$GLB, | Performed by: PODIATRIST

## 2020-02-06 RX ORDER — METOPROLOL TARTRATE 25 MG/1
TABLET, FILM COATED ORAL
COMMUNITY
Start: 2020-01-28 | End: 2020-05-20

## 2020-02-10 RX ORDER — ERGOCALCIFEROL 1.25 MG/1
CAPSULE ORAL
Qty: 12 CAPSULE | Refills: 0 | Status: SHIPPED | OUTPATIENT
Start: 2020-02-10 | End: 2020-05-08

## 2020-02-10 RX ORDER — WARFARIN 2.5 MG/1
TABLET ORAL
Qty: 90 TABLET | Refills: 0 | Status: SHIPPED | OUTPATIENT
Start: 2020-02-10 | End: 2020-05-11

## 2020-02-17 PROBLEM — E08.42 DIABETIC POLYNEUROPATHY ASSOCIATED WITH DIABETES MELLITUS DUE TO UNDERLYING CONDITION: Status: ACTIVE | Noted: 2020-02-17

## 2020-02-17 NOTE — PROGRESS NOTES
Subjective:      Patient ID: Miesha Obando is a 84 y.o. female.    Chief Complaint: Nail Care (Dr España 2020 6.3 8/2019) and Diabetes Mellitus      HPI:  Miesha is a 84 y.o. female who presents to the clinic for evaluation and treatment of high risk feet. Miesha has a past medical history of Anticoagulant long-term use, Arthritis, Atrial fibrillation, Breast cancer (1994), Cardiomyopathy - EF 35-40% (5/11/2016), CHF (congestive heart failure), Chronic bronchitis, COPD (chronic obstructive pulmonary disease), Coronary artery disease without angina pectoris (8/26/2015), Depression, Diabetes mellitus, type 2, Diabetes with neurologic complications, Diverticulitis, Diverticulosis, Encounter for blood transfusion, GERD (gastroesophageal reflux disease), H/O aortic valve replacement, Hiatal hernia, History of colonic diverticulitis, Hypertension, Hypothyroid, Insomnia, Irritable bowel syndrome, Obesity, Pacemaker (08/2014), Schatzki's ring, Sleep apnea, Syncope and collapse, Systolic congestive heart failure, NYHA class 3 (5/23/2016), Type II or unspecified type diabetes mellitus without mention of complication, not stated as uncontrolled, and Urinary incontinence. The patient's chief complaint is long, thick toenails. This patient has documented high risk feet requiring routine maintenance secondary to diabetes mellitis and those secondary complications of diabetes, as mentioned..  She states that her neuropathy and arthritic pain have worsened recently due to cold, rainy weather and poor diet.  She rates her pain today as 8/10 on the pain scale.  She describes pain is achy.  Patient denies any other pedal complaints at this time.    PCP:  ZOEY España MD   Date Last Seen by PCP: 1/16/20 Fern Chavez NP    Current shoe gear:  Affected Foot: Casual shoes     Unaffected Foot: Casual shoes    Review of Systems   Constitutional: Negative for appetite change, fever, chills, fatigue and unexpected weight  change.   Cardiovascular: Negative for chest pain, claudication, cyanosis.  Positive for leg swelling.  Musculoskeletal:  Positive for back pain, arthritis, joint pain, joint swelling, myalgias, and stiffness.   Skin: Negative for rash, itching, poor wound healing, suspicious lesion.  Positive for nail bed changes, discoloration, unusual hair distribution.   Neurological: Negative for loss of balance, numbness.  Positive for sensory change, paresthesias.  Hematological: Negative for adenopathy, bleeding, and bruising easily.   Psychiatric/Behavioral: The patient is not nervous/anxious.  Negative for altered mental status.    Hemoglobin A1C   Date Value Ref Range Status   08/07/2019 6.3 (H) 4.0 - 5.6 % Final     Comment:     ADA Screening Guidelines:  5.7-6.4%  Consistent with prediabetes  >or=6.5%  Consistent with diabetes  High levels of fetal hemoglobin interfere with the HbA1C  assay. Heterozygous hemoglobin variants (HbS, HgC, etc)do  not significantly interfere with this assay.   However, presence of multiple variants may affect accuracy.     05/20/2019 6.9 (H) 4.0 - 5.6 % Final     Comment:     ADA Screening Guidelines:  5.7-6.4%  Consistent with prediabetes  >or=6.5%  Consistent with diabetes  High levels of fetal hemoglobin interfere with the HbA1C  assay. Heterozygous hemoglobin variants (HbS, HgC, etc)do  not significantly interfere with this assay.   However, presence of multiple variants may affect accuracy.     01/21/2019 6.6 (H) 4.0 - 5.6 % Final     Comment:     ADA Screening Guidelines:  5.7-6.4%  Consistent with prediabetes  >or=6.5%  Consistent with diabetes  High levels of fetal hemoglobin interfere with the HbA1C  assay. Heterozygous hemoglobin variants (HbS, HgC, etc)do  not significantly interfere with this assay.   However, presence of multiple variants may affect accuracy.         Past Medical History:   Diagnosis Date    Anticoagulant long-term use     Arthritis     Atrial fibrillation      Breast cancer 1994    breast , left    Cardiomyopathy - EF 35-40% 5/11/2016    CHF (congestive heart failure)     Chronic bronchitis     COPD (chronic obstructive pulmonary disease)     Coronary artery disease without angina pectoris 8/26/2015    Depression     Diabetes mellitus, type 2     Diabetes with neurologic complications     Diverticulitis     Diverticulosis     Encounter for blood transfusion     after mastectomy x 20 years ago    GERD (gastroesophageal reflux disease)     H/O aortic valve replacement     Hiatal hernia     History of colonic diverticulitis     Hypertension     Hypothyroid     Insomnia     Irritable bowel syndrome     Obesity     Pacemaker 08/2014    Schatzki's ring     mild    Sleep apnea     uses cpap with oxygen     Syncope and collapse     Systolic congestive heart failure, NYHA class 3 5/23/2016    Type II or unspecified type diabetes mellitus without mention of complication, not stated as uncontrolled     No medications at present.     Urinary incontinence      Past Surgical History:   Procedure Laterality Date    ADENOIDECTOMY      APPENDECTOMY      BACK SURGERY      Discectomy, lumbar    BREAST SURGERY  1994    left side , mastectomy    CARDIAC PACEMAKER PLACEMENT      CARDIAC VALVE SURGERY      aortic valve replacement    CHOLECYSTECTOMY      COLONOSCOPY  2/2014    repeat in 10 years for screening    CORONARY ANGIOGRAPHY Bilateral 6/18/2019    Procedure: ANGIOGRAM, CORONARY ARTERY;  Surgeon: Dayron Macias MD;  Location: [facility];  Service: Cardiology;  Laterality: Bilateral;    CORONARY STENT PLACEMENT      x2    EYE SURGERY      bilateral PHACO and IOL    HYSTERECTOMY      ovaries spared    LEFT HEART CATHETERIZATION Right 6/18/2019    Procedure: C/ Coronary Angio;  Surgeon: Dayron Macias MD;  [facility];  Service: Cardiology;  Laterality: Right;    LOOP RECORDER      TOE SURGERY Right     TONSILLECTOMY      UPPER  "GASTROINTESTINAL ENDOSCOPY  2012     Family History   Problem Relation Age of Onset    Parkinsonism Mother     Emphysema Father     Heart disease Brother     Heart attack Brother     Heart failure Brother     Heart disease Brother     Heart failure Brother     Heart disease Brother     Heart failure Brother     Arrhythmia Brother     Anesthesia problems Neg Hx     Clotting disorder Neg Hx      Social History     Socioeconomic History    Marital status:      Spouse name: Not on file    Number of children: Not on file    Years of education: Not on file    Highest education level: Not on file   Occupational History    Not on file   Social Needs    Financial resource strain: Not on file    Food insecurity:     Worry: Not on file     Inability: Not on file    Transportation needs:     Medical: Not on file     Non-medical: Not on file   Tobacco Use    Smoking status: Former Smoker     Packs/day: 1.00     Years: 30.00     Pack years: 30.00     Start date: 1958     Last attempt to quit: 1988     Years since quittin.1    Smokeless tobacco: Never Used   Substance and Sexual Activity    Alcohol use: Yes     Comment: 0-2 glasses of wine per month    Drug use: No    Sexual activity: Not on file   Lifestyle    Physical activity:     Days per week: Not on file     Minutes per session: Not on file    Stress: Not on file   Relationships    Social connections:     Talks on phone: Not on file     Gets together: Not on file     Attends Buddhist service: Not on file     Active member of club or organization: Not on file     Attends meetings of clubs or organizations: Not on file     Relationship status: Not on file   Other Topics Concern    Not on file   Social History Narrative    Not on file           Objective:        /68 (BP Location: Right arm, Patient Position: Sitting)   Pulse 70   Ht 5' 3" (1.6 m)   Wt 102.1 kg (225 lb)   LMP  (LMP Unknown)   BMI 39.86 kg/m² "     Physical Exam   Constitutional: Patient is oriented to person, place, and time. Patient appears well-developed and well-nourished. No acute distress.     Psychiatric: Patient has a normal mood and affect. Patient's speech is normal and behavior is normal. Judgment is normal. Cognition and memory are normal.     Bilateral pedal exam was performed today.  Vascular: Pedal pulses palpable 1/4 DP & PT.  CFT is > 3 seconds to the hallux.  Skin temperature is cool to cool proximal tibia to distal toes without localized increase in calor noted.  No erythema or ecchymosis noted to the foot or ankle.  +1/4 pitting edema noted to the LE.  Hair growth decreased distally to the LE.  Telangectasias and reticular veins present to the LE with hemosiderin deposition.       Musculoskeletal: Ankle and pedal joint ROM are decreased.  Ankle joint dorsiflexion is restricted with the knee extended and flexed per Silfverskiold exam.  Muscle strength is 5/5 for all LE muscle groups tested.    Neurological:  Epicritic sensation is Decreased to the foot.   Chaddock STR is Intact to the LE.  No tenderness to palpation noted to the foot or ankle.    Dermatological: Toenails 1-5 bilateral are elongated, thickened, dystrophic, brittle, discolored, and mycotic with subungal debris present.  Webspaces 1-4 bilateral are clean, dry, and intact.  Skin turgor is increased.  Skin texture shiny and atrophic.  No dry, flaky skin noted to the LE.  No open wounds or suspicious pigmented lesions appreciable to the foot or ankle.    Nursing note and vitals reviewed.          Assessment:       Encounter Diagnoses   Name Primary?    Onychomycosis due to dermatophyte Yes    Diabetic polyneuropathy associated with diabetes mellitus due to underlying condition     Type II diabetes mellitus with neurological manifestations     Peripheral vascular disease     Edema of both lower extremities due to peripheral venous insufficiency     Pain in both lower legs           Plan:       Miesha was seen today for nail care and diabetes mellitus.    Diagnoses and all orders for this visit:    Onychomycosis due to dermatophyte    Diabetic polyneuropathy associated with diabetes mellitus due to underlying condition    Type II diabetes mellitus with neurological manifestations    Peripheral vascular disease    Edema of both lower extremities due to peripheral venous insufficiency    Pain in both lower legs      I counseled the patient on her conditions, their implications and medical management.    - Educated patient again on proper diabetic foot care and supportive, accommodative shoe gear.    - With the patient's permission, toenails 1, 2, 3, 4, and 5 of the right foot and 1, 2, 3, 4, and 5 of the left foot were debrided in length and thickness via nail nippers and electric  to patient's tolerance without incident.    Patient was given the following recommendations and instructions:  Patient Instructions   - Check feet daily for wounds and areas of irritation.    - Keep regularly scheduled appointments with primary care, ophthalmology, and podiatry.    - Maintain blood glucose control via taking medications as prescribed, diabetic diet, and exercise.    - Apply lotion to feet and ankles daily but not between toes.    - Keep feet clean and dry, especially between toes.    - Elevate feet as much as possible throughout the day.    - Wear supportive/accommodative shoes at all times when ambulating.    - Supplement with alpha lipoic acid (600 mg once daily) and vitamin B complex (as directed) to reduce and repair nerve damage.    - Apply lidocaine cream or gel to toes as needed for pain relief.    - Remove foot pads in 3 days or sooner if they become soiled or wet, then notify clinic for follow up appointment if you felt they improved pain and want them placed on insoles.    - Notify clinic immediately of any new or worsening conditions.        Carly Dodd, JENNI         Dictation was performed using M*Modal Fluency.  Transcription errors may be present.

## 2020-02-18 ENCOUNTER — OFFICE VISIT (OUTPATIENT)
Dept: FAMILY MEDICINE | Facility: CLINIC | Age: 85
End: 2020-02-18
Payer: MEDICARE

## 2020-02-18 ENCOUNTER — LAB VISIT (OUTPATIENT)
Dept: LAB | Facility: HOSPITAL | Age: 85
End: 2020-02-18
Attending: FAMILY MEDICINE
Payer: MEDICARE

## 2020-02-18 VITALS
BODY MASS INDEX: 39.33 KG/M2 | DIASTOLIC BLOOD PRESSURE: 86 MMHG | OXYGEN SATURATION: 94 % | HEART RATE: 80 BPM | WEIGHT: 222 LBS | SYSTOLIC BLOOD PRESSURE: 134 MMHG

## 2020-02-18 DIAGNOSIS — I10 ESSENTIAL HYPERTENSION: ICD-10-CM

## 2020-02-18 DIAGNOSIS — E11.49 TYPE II DIABETES MELLITUS WITH NEUROLOGICAL MANIFESTATIONS: ICD-10-CM

## 2020-02-18 DIAGNOSIS — M54.12 CERVICAL RADICULOPATHY: ICD-10-CM

## 2020-02-18 DIAGNOSIS — I48.0 PAROXYSMAL A-FIB: ICD-10-CM

## 2020-02-18 DIAGNOSIS — M46.92 UNSPECIFIED INFLAMMATORY SPONDYLOPATHY, CERVICAL REGION: ICD-10-CM

## 2020-02-18 DIAGNOSIS — N18.4 CKD (CHRONIC KIDNEY DISEASE), STAGE IV: ICD-10-CM

## 2020-02-18 DIAGNOSIS — I48.20 CHRONIC A-FIB: ICD-10-CM

## 2020-02-18 DIAGNOSIS — Z00.00 WELLNESS EXAMINATION: Primary | ICD-10-CM

## 2020-02-18 DIAGNOSIS — J44.9 CHRONIC OBSTRUCTIVE PULMONARY DISEASE, UNSPECIFIED COPD TYPE: ICD-10-CM

## 2020-02-18 PROBLEM — I87.2 EDEMA OF BOTH LOWER EXTREMITIES DUE TO PERIPHERAL VENOUS INSUFFICIENCY: Status: RESOLVED | Noted: 2019-06-13 | Resolved: 2020-02-18

## 2020-02-18 PROBLEM — N18.30 CHRONIC KIDNEY DISEASE, STAGE III (MODERATE): Status: RESOLVED | Noted: 2018-08-15 | Resolved: 2020-02-18

## 2020-02-18 PROBLEM — R07.9 CHEST PAIN: Status: RESOLVED | Noted: 2019-12-25 | Resolved: 2020-02-18

## 2020-02-18 PROBLEM — I20.0 UNSTABLE ANGINA: Status: RESOLVED | Noted: 2019-06-15 | Resolved: 2020-02-18

## 2020-02-18 LAB
ALBUMIN SERPL BCP-MCNC: 3.4 G/DL (ref 3.5–5.2)
ALP SERPL-CCNC: 95 U/L (ref 55–135)
ALT SERPL W/O P-5'-P-CCNC: 15 U/L (ref 10–44)
ANION GAP SERPL CALC-SCNC: 9 MMOL/L (ref 8–16)
AST SERPL-CCNC: 21 U/L (ref 10–40)
BASOPHILS # BLD AUTO: 0.06 K/UL (ref 0–0.2)
BASOPHILS NFR BLD: 0.7 % (ref 0–1.9)
BILIRUB SERPL-MCNC: 0.2 MG/DL (ref 0.1–1)
BUN SERPL-MCNC: 26 MG/DL (ref 8–23)
CALCIUM SERPL-MCNC: 9.5 MG/DL (ref 8.7–10.5)
CHLORIDE SERPL-SCNC: 107 MMOL/L (ref 95–110)
CHOLEST SERPL-MCNC: 172 MG/DL (ref 120–199)
CHOLEST/HDLC SERPL: 3.8 {RATIO} (ref 2–5)
CO2 SERPL-SCNC: 27 MMOL/L (ref 23–29)
CREAT SERPL-MCNC: 1.2 MG/DL (ref 0.5–1.4)
DIFFERENTIAL METHOD: ABNORMAL
EOSINOPHIL # BLD AUTO: 0.1 K/UL (ref 0–0.5)
EOSINOPHIL NFR BLD: 1.1 % (ref 0–8)
ERYTHROCYTE [DISTWIDTH] IN BLOOD BY AUTOMATED COUNT: 13.9 % (ref 11.5–14.5)
EST. GFR  (AFRICAN AMERICAN): 48.6 ML/MIN/1.73 M^2
EST. GFR  (NON AFRICAN AMERICAN): 42.2 ML/MIN/1.73 M^2
GLUCOSE SERPL-MCNC: 125 MG/DL (ref 70–110)
HCT VFR BLD AUTO: 42.4 % (ref 37–48.5)
HDLC SERPL-MCNC: 45 MG/DL (ref 40–75)
HDLC SERPL: 26.2 % (ref 20–50)
HGB BLD-MCNC: 13 G/DL (ref 12–16)
IMM GRANULOCYTES # BLD AUTO: 0.04 K/UL (ref 0–0.04)
IMM GRANULOCYTES NFR BLD AUTO: 0.4 % (ref 0–0.5)
LDLC SERPL CALC-MCNC: 84.6 MG/DL (ref 63–159)
LYMPHOCYTES # BLD AUTO: 2.1 K/UL (ref 1–4.8)
LYMPHOCYTES NFR BLD: 22.8 % (ref 18–48)
MCH RBC QN AUTO: 30.9 PG (ref 27–31)
MCHC RBC AUTO-ENTMCNC: 30.7 G/DL (ref 32–36)
MCV RBC AUTO: 101 FL (ref 82–98)
MONOCYTES # BLD AUTO: 1 K/UL (ref 0.3–1)
MONOCYTES NFR BLD: 10.6 % (ref 4–15)
NEUTROPHILS # BLD AUTO: 5.8 K/UL (ref 1.8–7.7)
NEUTROPHILS NFR BLD: 64.4 % (ref 38–73)
NONHDLC SERPL-MCNC: 127 MG/DL
NRBC BLD-RTO: 0 /100 WBC
PLATELET # BLD AUTO: 294 K/UL (ref 150–350)
PMV BLD AUTO: 10.6 FL (ref 9.2–12.9)
POTASSIUM SERPL-SCNC: 4.8 MMOL/L (ref 3.5–5.1)
PROT SERPL-MCNC: 7.5 G/DL (ref 6–8.4)
RBC # BLD AUTO: 4.21 M/UL (ref 4–5.4)
SODIUM SERPL-SCNC: 143 MMOL/L (ref 136–145)
TRIGL SERPL-MCNC: 212 MG/DL (ref 30–150)
TSH SERPL DL<=0.005 MIU/L-ACNC: 1.63 UIU/ML (ref 0.4–4)
WBC # BLD AUTO: 9.06 K/UL (ref 3.9–12.7)

## 2020-02-18 PROCEDURE — 99999 PR PBB SHADOW E&M-EST. PATIENT-LVL IV: ICD-10-PCS | Mod: PBBFAC,HCNC,, | Performed by: FAMILY MEDICINE

## 2020-02-18 PROCEDURE — 3079F PR MOST RECENT DIASTOLIC BLOOD PRESSURE 80-89 MM HG: ICD-10-PCS | Mod: HCNC,CPTII,S$GLB, | Performed by: FAMILY MEDICINE

## 2020-02-18 PROCEDURE — 99499 RISK ADDL DX/OHS AUDIT: ICD-10-PCS | Mod: HCNC,S$GLB,, | Performed by: FAMILY MEDICINE

## 2020-02-18 PROCEDURE — 3079F DIAST BP 80-89 MM HG: CPT | Mod: HCNC,CPTII,S$GLB, | Performed by: FAMILY MEDICINE

## 2020-02-18 PROCEDURE — 80053 COMPREHEN METABOLIC PANEL: CPT | Mod: HCNC

## 2020-02-18 PROCEDURE — 36415 COLL VENOUS BLD VENIPUNCTURE: CPT | Mod: HCNC,PO

## 2020-02-18 PROCEDURE — 3075F PR MOST RECENT SYSTOLIC BLOOD PRESS GE 130-139MM HG: ICD-10-PCS | Mod: HCNC,CPTII,S$GLB, | Performed by: FAMILY MEDICINE

## 2020-02-18 PROCEDURE — 85025 COMPLETE CBC W/AUTO DIFF WBC: CPT | Mod: HCNC

## 2020-02-18 PROCEDURE — 84443 ASSAY THYROID STIM HORMONE: CPT | Mod: HCNC

## 2020-02-18 PROCEDURE — 99397 PR PREVENTIVE VISIT,EST,65 & OVER: ICD-10-PCS | Mod: HCNC,S$GLB,, | Performed by: FAMILY MEDICINE

## 2020-02-18 PROCEDURE — 99999 PR PBB SHADOW E&M-EST. PATIENT-LVL IV: CPT | Mod: PBBFAC,HCNC,, | Performed by: FAMILY MEDICINE

## 2020-02-18 PROCEDURE — 3075F SYST BP GE 130 - 139MM HG: CPT | Mod: HCNC,CPTII,S$GLB, | Performed by: FAMILY MEDICINE

## 2020-02-18 PROCEDURE — 99397 PER PM REEVAL EST PAT 65+ YR: CPT | Mod: HCNC,S$GLB,, | Performed by: FAMILY MEDICINE

## 2020-02-18 PROCEDURE — 83036 HEMOGLOBIN GLYCOSYLATED A1C: CPT | Mod: HCNC

## 2020-02-18 PROCEDURE — 80061 LIPID PANEL: CPT | Mod: HCNC

## 2020-02-18 PROCEDURE — 99499 UNLISTED E&M SERVICE: CPT | Mod: HCNC,S$GLB,, | Performed by: FAMILY MEDICINE

## 2020-02-18 RX ORDER — DICLOFENAC SODIUM 10 MG/G
2 GEL TOPICAL DAILY PRN
Qty: 100 G | Refills: 1 | Status: SHIPPED | OUTPATIENT
Start: 2020-02-18 | End: 2020-08-10 | Stop reason: SDUPTHER

## 2020-02-18 NOTE — PROGRESS NOTES
Subjective:       Patient ID: Miesha Obando is a 84 y.o. female.    Chief Complaint: Follow-up (6 month)    Here for wellness and f/u htn. Doing well overall. Due for labs.    Hypertension   This is a chronic problem. The current episode started more than 1 year ago. The problem is controlled. Pertinent negatives include no chest pain, palpitations or shortness of breath.   Diabetes   She presents for her follow-up diabetic visit. She has type 2 diabetes mellitus. Her disease course has been stable. Pertinent negatives for hypoglycemia include no nervousness/anxiousness. Pertinent negatives for diabetes include no chest pain.     Review of Systems   Constitutional: Negative for chills and fever.   Respiratory: Negative for cough, chest tightness and shortness of breath.    Cardiovascular: Negative for chest pain, palpitations and leg swelling.   Endocrine: Negative for cold intolerance and heat intolerance.   Psychiatric/Behavioral: Negative for decreased concentration. The patient is not nervous/anxious.        Objective:      Physical Exam   Constitutional: She appears well-developed and well-nourished.   HENT:   Head: Normocephalic and atraumatic.   Cardiovascular: Normal rate, regular rhythm and normal heart sounds.   Pulmonary/Chest: Effort normal and breath sounds normal.   Psychiatric: She has a normal mood and affect.   Nursing note and vitals reviewed.      Assessment:       1. Wellness examination    2. Essential hypertension    3. Cervical radiculopathy    4. Type II diabetes mellitus with neurological manifestations    5. Unspecified inflammatory spondylopathy, cervical region    6. Chronic a-fib    7. Chronic obstructive pulmonary disease, unspecified COPD type    8. CKD (chronic kidney disease), stage IV    9. Paroxysmal A-fib        Plan:       Wellness examination    Essential hypertension  -     CBC auto differential; Future; Expected date: 02/18/2020  -     Comprehensive metabolic panel; Future;  Expected date: 02/18/2020  -     Lipid panel; Future; Expected date: 02/18/2020  -     TSH; Future; Expected date: 02/18/2020    Cervical radiculopathy    Type II diabetes mellitus with neurological manifestations  -     Hemoglobin A1c; Future; Expected date: 02/18/2020    Unspecified inflammatory spondylopathy, cervical region    Chronic a-fib    Chronic obstructive pulmonary disease, unspecified COPD type    CKD (chronic kidney disease), stage IV    Paroxysmal A-fib    Other orders  -     diclofenac sodium (VOLTAREN) 1 % Gel; Apply 2 g topically daily as needed. Apply to right shoulder  Dispense: 100 g; Refill: 1      htn stable  Update hga1c for dm II  ckd stable  Copd stable  afib stable and follows with cards still  Pain stable with gabapentin  Will monitor chronic medical issues and continue current plan of care.      Follow up in about 6 months (around 8/18/2020), or if symptoms worsen or fail to improve.

## 2020-02-19 LAB
ESTIMATED AVG GLUCOSE: 131 MG/DL (ref 68–131)
HBA1C MFR BLD HPLC: 6.2 % (ref 4–5.6)

## 2020-02-21 ENCOUNTER — CLINICAL SUPPORT (OUTPATIENT)
Dept: CARDIOLOGY | Facility: CLINIC | Age: 85
End: 2020-02-21
Payer: MEDICARE

## 2020-02-21 ENCOUNTER — TELEPHONE (OUTPATIENT)
Dept: FAMILY MEDICINE | Facility: CLINIC | Age: 85
End: 2020-02-21

## 2020-02-21 PROCEDURE — 93296 REM INTERROG EVL PM/IDS: CPT | Mod: PBBFAC,HCNC,PO | Performed by: INTERNAL MEDICINE

## 2020-02-21 PROCEDURE — 93294 CARDIAC DEVICE CHECK - REMOTE: ICD-10-PCS | Mod: ,,, | Performed by: INTERNAL MEDICINE

## 2020-02-21 PROCEDURE — 93294 REM INTERROG EVL PM/LDLS PM: CPT | Mod: ,,, | Performed by: INTERNAL MEDICINE

## 2020-02-21 NOTE — TELEPHONE ENCOUNTER
----- Message from Rubens Andre sent at 2/21/2020  8:48 AM CST -----  Contact: pt  Type:  Test Results    Who Called:  pt  Name of Test (Lab/Mammo/Etc):  Blood work  Date of Test:  02/18/2020  Ordering Provider:  ZOEY VALLE  Where the test was performed:  [facility]  Best Call Back Number:  [phone]  Additional Information:

## 2020-02-24 ENCOUNTER — TELEPHONE (OUTPATIENT)
Dept: FAMILY MEDICINE | Facility: CLINIC | Age: 85
End: 2020-02-24

## 2020-02-24 NOTE — TELEPHONE ENCOUNTER
----- Message from Stanley Del Real sent at 2/24/2020 11:09 AM CST -----  Contact: Patient  Type: Needs Medical Advice    Who Called:  Patient  Pharmacy name and phone #:    [facility] - [place], [place] - [address]  [address] [address]  Phone: [phone] Fax: [phone]  Best Call Back Number: [phone]  Additional Information: Patient states pharmacy has been attempting to reach office and is awaiting a return fax. Fax number: [phone]. Please call to confirm. Thanks!

## 2020-02-25 NOTE — TELEPHONE ENCOUNTER
Tried to call the patient. No answer. Left voicemail to call back. Patient can no be seen today. Patient was previously offered a nerve block, but patient denied. This needs to be approved by the insurance company.    25-Feb-2020 16:00 25-Feb-2020 17:06

## 2020-02-29 ENCOUNTER — NURSE TRIAGE (OUTPATIENT)
Dept: ADMINISTRATIVE | Facility: CLINIC | Age: 85
End: 2020-02-29

## 2020-02-29 NOTE — TELEPHONE ENCOUNTER
Pt blood sugar is 69 and dizzy and weak. Pt was walking without her walker and was off balance. Pt drank some juice and CBG increased to 93. Pt still reports dizziness. Pt has stuffed feeling in ears  Pt reports some confusion- advised to call 911    Reason for Disposition   Acting confused (e.g., disoriented, slurred speech)    Additional Information   Negative: Unconscious or difficult to awaken   Negative: Seizure occurs    Protocols used: DIABETES - LOW BLOOD SUGAR-A-

## 2020-03-01 NOTE — TELEPHONE ENCOUNTER
"Patient did not stop her aspirin. I believe her last dose was yesterday. She was unsure on her fish oil. Said it had "been spotty". Does she need to be rescheduled?  " The DP pulses are 2+ bilaterally. The radial pulses are +2 bilaterally.

## 2020-03-02 NOTE — TELEPHONE ENCOUNTER
Called patient and she stated her blood sugar came up after eating. Pt stated her blood sugar is better today.

## 2020-03-05 ENCOUNTER — OFFICE VISIT (OUTPATIENT)
Dept: FAMILY MEDICINE | Facility: CLINIC | Age: 85
End: 2020-03-05
Payer: MEDICARE

## 2020-03-05 VITALS
OXYGEN SATURATION: 96 % | BODY MASS INDEX: 40.86 KG/M2 | DIASTOLIC BLOOD PRESSURE: 80 MMHG | HEART RATE: 70 BPM | WEIGHT: 230.63 LBS | SYSTOLIC BLOOD PRESSURE: 130 MMHG | HEIGHT: 63 IN

## 2020-03-05 DIAGNOSIS — M54.2 NECK PAIN: Primary | ICD-10-CM

## 2020-03-05 DIAGNOSIS — H69.93 DYSFUNCTION OF BOTH EUSTACHIAN TUBES: ICD-10-CM

## 2020-03-05 PROCEDURE — 3075F SYST BP GE 130 - 139MM HG: CPT | Mod: HCNC,CPTII,S$GLB, | Performed by: NURSE PRACTITIONER

## 2020-03-05 PROCEDURE — 1101F PR PT FALLS ASSESS DOC 0-1 FALLS W/OUT INJ PAST YR: ICD-10-PCS | Mod: HCNC,CPTII,S$GLB, | Performed by: NURSE PRACTITIONER

## 2020-03-05 PROCEDURE — 1159F PR MEDICATION LIST DOCUMENTED IN MEDICAL RECORD: ICD-10-PCS | Mod: HCNC,S$GLB,, | Performed by: NURSE PRACTITIONER

## 2020-03-05 PROCEDURE — 99499 RISK ADDL DX/OHS AUDIT: ICD-10-PCS | Mod: HCNC,S$GLB,, | Performed by: NURSE PRACTITIONER

## 2020-03-05 PROCEDURE — 99999 PR PBB SHADOW E&M-EST. PATIENT-LVL V: CPT | Mod: PBBFAC,HCNC,, | Performed by: NURSE PRACTITIONER

## 2020-03-05 PROCEDURE — 1101F PT FALLS ASSESS-DOCD LE1/YR: CPT | Mod: HCNC,CPTII,S$GLB, | Performed by: NURSE PRACTITIONER

## 2020-03-05 PROCEDURE — 99214 PR OFFICE/OUTPT VISIT, EST, LEVL IV, 30-39 MIN: ICD-10-PCS | Mod: HCNC,S$GLB,, | Performed by: NURSE PRACTITIONER

## 2020-03-05 PROCEDURE — 3079F DIAST BP 80-89 MM HG: CPT | Mod: HCNC,CPTII,S$GLB, | Performed by: NURSE PRACTITIONER

## 2020-03-05 PROCEDURE — 1125F PR PAIN SEVERITY QUANTIFIED, PAIN PRESENT: ICD-10-PCS | Mod: HCNC,S$GLB,, | Performed by: NURSE PRACTITIONER

## 2020-03-05 PROCEDURE — 3075F PR MOST RECENT SYSTOLIC BLOOD PRESS GE 130-139MM HG: ICD-10-PCS | Mod: HCNC,CPTII,S$GLB, | Performed by: NURSE PRACTITIONER

## 2020-03-05 PROCEDURE — 3079F PR MOST RECENT DIASTOLIC BLOOD PRESSURE 80-89 MM HG: ICD-10-PCS | Mod: HCNC,CPTII,S$GLB, | Performed by: NURSE PRACTITIONER

## 2020-03-05 PROCEDURE — 1159F MED LIST DOCD IN RCRD: CPT | Mod: HCNC,S$GLB,, | Performed by: NURSE PRACTITIONER

## 2020-03-05 PROCEDURE — 99499 UNLISTED E&M SERVICE: CPT | Mod: HCNC,S$GLB,, | Performed by: NURSE PRACTITIONER

## 2020-03-05 PROCEDURE — 1125F AMNT PAIN NOTED PAIN PRSNT: CPT | Mod: HCNC,S$GLB,, | Performed by: NURSE PRACTITIONER

## 2020-03-05 PROCEDURE — 99999 PR PBB SHADOW E&M-EST. PATIENT-LVL V: ICD-10-PCS | Mod: PBBFAC,HCNC,, | Performed by: NURSE PRACTITIONER

## 2020-03-05 PROCEDURE — 99214 OFFICE O/P EST MOD 30 MIN: CPT | Mod: HCNC,S$GLB,, | Performed by: NURSE PRACTITIONER

## 2020-03-05 RX ORDER — LEVOCETIRIZINE DIHYDROCHLORIDE 5 MG/1
5 TABLET, FILM COATED ORAL NIGHTLY
Qty: 14 TABLET | Refills: 0 | Status: SHIPPED | OUTPATIENT
Start: 2020-03-05 | End: 2020-03-08

## 2020-03-05 RX ORDER — FAMOTIDINE 40 MG/1
40 TABLET, FILM COATED ORAL DAILY
Qty: 30 TABLET | Refills: 11 | Status: SHIPPED | OUTPATIENT
Start: 2020-03-05 | End: 2021-08-24

## 2020-03-05 RX ORDER — AZELASTINE 1 MG/ML
1 SPRAY, METERED NASAL 2 TIMES DAILY
Qty: 30 ML | Refills: 0 | Status: SHIPPED | OUTPATIENT
Start: 2020-03-05 | End: 2021-08-24

## 2020-03-05 NOTE — PROGRESS NOTES
Subjective:       Patient ID: Miesha Obando is a 84 y.o. female.    Chief Complaint: Dizziness    Patient says that she checked her sugar on Saturday morning and it was 69. She says she sometimes feels fatigued in the mornings and has difficulty feeling alert. She says that particular morning she did not feel great but didn't feel significantly different than usual.  Normally morning glucose runs around 90-95. She says her ears do feel slogged up, sometimes notices some pain in the right ear. She does report some decreased hearing. Has been having some sinus drainage, using nasal sprays as prescribed.  She has been having some increased neck pain since a fall with fracture in December, has poor rom, no recent PT.  Lab Results       Component                Value               Date                       HGBA1C                   6.2 (H)             02/18/2020            She is not currently on any medications for her diabetes.   Past Medical History:  No date: Anticoagulant long-term use  No date: Arthritis  No date: Atrial fibrillation  1994: Breast cancer      Comment:  breast , left  5/11/2016: Cardiomyopathy - EF 35-40%  No date: CHF (congestive heart failure)  No date: Chronic bronchitis  No date: COPD (chronic obstructive pulmonary disease)  8/26/2015: Coronary artery disease without angina pectoris  No date: Depression  No date: Diabetes mellitus, type 2  No date: Diabetes with neurologic complications  No date: Diverticulitis  No date: Diverticulosis  No date: Encounter for blood transfusion      Comment:  after mastectomy x 20 years ago  No date: GERD (gastroesophageal reflux disease)  No date: H/O aortic valve replacement  No date: Hiatal hernia  No date: History of colonic diverticulitis  No date: Hypertension  No date: Hypothyroid  No date: Insomnia  No date: Irritable bowel syndrome  No date: Obesity  08/2014: Pacemaker  No date: Schatzki's ring      Comment:  mild  No date: Sleep apnea      Comment:   uses cpap with oxygen   No date: Syncope and collapse  5/23/2016: Systolic congestive heart failure, NYHA class 3  No date: Type II or unspecified type diabetes mellitus without   mention of complication, not stated as uncontrolled      Comment:  No medications at present.   No date: Urinary incontinence    Past Surgical History:  No date: ADENOIDECTOMY  No date: APPENDECTOMY  No date: BACK SURGERY      Comment:  Discectomy, lumbar  1994: BREAST SURGERY      Comment:  left side , mastectomy  No date: CARDIAC PACEMAKER PLACEMENT  No date: CARDIAC VALVE SURGERY      Comment:  aortic valve replacement  No date: CHOLECYSTECTOMY  2/2014: COLONOSCOPY      Comment:  repeat in 10 years for screening  6/18/2019: CORONARY ANGIOGRAPHY; Bilateral      Comment:  Procedure: ANGIOGRAM, CORONARY ARTERY;  Surgeon: Dayron Macias MD;  Location: [facility];  Service: Cardiology;                 Laterality: Bilateral;  No date: CORONARY STENT PLACEMENT      Comment:  x2  No date: EYE SURGERY      Comment:  bilateral PHACO and IOL  No date: HYSTERECTOMY      Comment:  ovaries spared  6/18/2019: LEFT HEART CATHETERIZATION; Right      Comment:  Procedure: LHC/ Coronary Angio;  Surgeon: Dayron Macias MD;  Location: [facility];  Service: Cardiology;                 Laterality: Right;  No date: LOOP RECORDER  No date: TOE SURGERY; Right  No date: TONSILLECTOMY  2012: UPPER GASTROINTESTINAL ENDOSCOPY    Review of patient's family history indicates:  Problem: Parkinsonism      Relation: Mother          Age of Onset: (Not Specified)  Problem: Emphysema      Relation: Father          Age of Onset: (Not Specified)  Problem: Heart disease      Relation: Brother          Age of Onset: (Not Specified)  Problem: Heart attack      Relation: Brother          Age of Onset: (Not Specified)  Problem: Heart failure      Relation: Brother          Age of Onset: (Not Specified)  Problem: Heart disease      Relation: Brother           Age of Onset: (Not Specified)  Problem: Heart failure      Relation: Brother          Age of Onset: (Not Specified)  Problem: Heart disease      Relation: Brother          Age of Onset: (Not Specified)  Problem: Heart failure      Relation: Brother          Age of Onset: (Not Specified)  Problem: Arrhythmia      Relation: Brother          Age of Onset: (Not Specified)  Problem: Anesthesia problems      Relation: Neg Hx          Age of Onset: (Not Specified)  Problem: Clotting disorder      Relation: Neg Hx          Age of Onset: (Not Specified)      Social History    Socioeconomic History      Marital status:       Spouse name: Not on file      Number of children: Not on file      Years of education: Not on file      Highest education level: Not on file    Occupational History      Not on file    Social Needs      Financial resource strain: Not on file      Food insecurity:        Worry: Not on file        Inability: Not on file      Transportation needs:        Medical: Not on file        Non-medical: Not on file    Tobacco Use      Smoking status: Former Smoker        Packs/day: 1.00        Years: 30.00        Pack years: 30        Start date: 1958        Quit date: 1988        Years since quittin.1      Smokeless tobacco: Never Used    Substance and Sexual Activity      Alcohol use: Yes        Comment: 0-2 glasses of wine per month      Drug use: No      Sexual activity: Not on file    Lifestyle      Physical activity:        Days per week: Not on file        Minutes per session: Not on file      Stress: Not on file    Relationships      Social connections:        Talks on phone: Not on file        Gets together: Not on file        Attends [employer] service: Not on file        Active member of club or organization: Not on file        Attends meetings of clubs or organizations: Not on file        Relationship status: Not on file    Other Topics      Concerns:        Not on file    Social  History Narrative      Not on file      Current Outpatient Medications:  ACCU-CHEK FASTCLIX Misc, TEST TWICE DAILY, Disp: 200 each, Rfl: 11  ACCU-CHEK SMARTVIEW TEST STRIP Strp, TEST  TWICE DAILY, Disp: 200 strip, Rfl: 11  acetaminophen (TYLENOL) 325 MG tablet, Take 2 tablets (650 mg total) by mouth every 6 (six) hours as needed for Pain or Temperature greater than., Disp: , Rfl: 0  albuterol (PROVENTIL/VENTOLIN HFA) 90 mcg/actuation inhaler, INHALE 1 PUFF EVERY 4 HOURS AS NEEDED, Disp: 54 g, Rfl: 0  alendronate (FOSAMAX) 70 MG tablet, Take 1 tablet (70 mg total) by mouth every 7 days., Disp: 12 tablet, Rfl: 1  azelastine (ASTELIN) 137 mcg (0.1 %) nasal spray, 1 spray (137 mcg total) by Nasal route 2 (two) times daily., Disp: 30 mL, Rfl: 0  b complex vitamins tablet, Take 1 tablet by mouth once daily.  , Disp: , Rfl:   BD ALCOHOL SWABS PadM, USE TWICE DAILY, Disp: 200 each, Rfl: 11  clopidogrel (PLAVIX) 75 mg tablet, TAKE 1 TABLET(75 MG) BY MOUTH EVERY DAY, Disp: 90 tablet, Rfl: 0  diclofenac sodium (VOLTAREN) 1 % Gel, Apply 2 g topically daily as needed. Apply to right shoulder, Disp: 100 g, Rfl: 1  diphenhydramine-acetaminophen (TYLENOL PM)  mg Tab, Take 1 tablet by mouth nightly as needed., Disp: , Rfl:   ergocalciferol (ERGOCALCIFEROL) 50,000 unit Cap, TAKE 1 CAPSULE BY MOUTH EVERY 7 DAYS, Disp: 12 capsule, Rfl: 0  fish oil-omega-3 fatty acids 300-1,000 mg capsule, Take 2 g by mouth once daily., Disp: , Rfl:   gabapentin (NEURONTIN) 300 MG capsule, TAKE 1 CAPSULE(300 MG) BY MOUTH EVERY EVENING, Disp: 90 capsule, Rfl: 0  levothyroxine (SYNTHROID) 100 MCG tablet, TAKE 1 TABLET EVERY DAY, Disp: 90 tablet, Rfl: 2  metoprolol succinate (TOPROL-XL) 25 MG 24 hr tablet, Take 1 tablet (25 mg total) by mouth once daily., Disp: 90 tablet, Rfl: 3  metoprolol tartrate (LOPRESSOR) 25 MG tablet, , Disp: , Rfl:   ranitidine (ZANTAC) 150 MG tablet, Take 1 tablet (150 mg total) by mouth daily as needed for Heartburn., Disp:  "90 tablet, Rfl: 1  spironolactone (ALDACTONE) 25 MG tablet, TAKE 1 TABLET EVERY DAY, Disp: 90 tablet, Rfl: 3  torsemide (DEMADEX) 20 MG Tab, TAKE 1 TABLET(20 MG) BY MOUTH EVERY DAY, Disp: 90 tablet, Rfl: 1  warfarin (COUMADIN) 2.5 MG tablet, TAKE 1 TABLET EVERY DAY, Disp: 90 tablet, Rfl: 0  nitroGLYCERIN (NITROSTAT) 0.4 MG SL tablet, Place 1 tablet (0.4 mg total) under the tongue every 5 (five) minutes as needed for Chest pain., Disp: 20 tablet, Rfl: 0  omeprazole (PRILOSEC OTC) 20 MG tablet, Take 1 tablet (20 mg total) by mouth once daily., Disp: 30 tablet, Rfl: 11    No current facility-administered medications for this visit.       Review of patient's allergies indicates:   -- Statins-hmg-coa reductase inhibitors     --  "bad leg cramps"   -- Sulfa (sulfonamide antibiotics) -- Hives   -- Doxycycline     --  Stomach issues   -- Adhesive -- Rash    --  Use paper tape   -- Amoxicillin-pot clavula (bulk) -- Nausea And Vomiting    Review of Systems   Constitutional: Positive for fatigue. Negative for fever.   HENT: Positive for ear pain, hearing loss, postnasal drip, rhinorrhea and sneezing.    Respiratory: Positive for cough (dry). Negative for wheezing.    Cardiovascular: Negative.    Gastrointestinal: Negative.    Genitourinary: Negative.  Negative for frequency and urgency.   Neurological: Positive for dizziness. Negative for seizures, numbness and headaches.       Objective:      Physical Exam   Constitutional: She is oriented to person, place, and time.   HENT:   Right Ear: Tympanic membrane is not erythematous. A middle ear effusion is present.   Left Ear: Tympanic membrane is not erythematous. A middle ear effusion is present.   Eyes: Pupils are equal, round, and reactive to light.   Neck: Normal range of motion.   Cardiovascular: Normal rate and regular rhythm. Exam reveals no friction rub.   No murmur heard.  Pulmonary/Chest: Effort normal and breath sounds normal.   Abdominal: Soft. Bowel sounds are normal. " She exhibits no distension. There is no tenderness.   Neurological: She is alert and oriented to person, place, and time.   Skin: No erythema.       Assessment:       1. Neck pain    2. Dysfunction of both eustachian tubes        Plan:     Discussed small frequent meals, reviewed signs of hypoglycemia and blood sugar ranges. Not on any diabetic medication currently.   1. Dysfunction of both eustachian tubes  Follow up if not resolving.   - azelastine (ASTELIN) 137 mcg (0.1 %) nasal spray; 1 spray (137 mcg total) by Nasal route 2 (two) times daily.  Dispense: 30 mL; Refill: 0    2. Neck pain  Follow up if not resolving.   - Ambulatory referral/consult to Physical/Occupational Therapy; Future

## 2020-03-08 RX ORDER — LEVOCETIRIZINE DIHYDROCHLORIDE 5 MG/1
TABLET, FILM COATED ORAL
Qty: 90 TABLET | Refills: 0 | Status: SHIPPED | OUTPATIENT
Start: 2020-03-08 | End: 2020-06-02

## 2020-03-11 NOTE — TELEPHONE ENCOUNTER
----- Message from Pipe Johnson sent at 3/11/2020  3:26 PM CDT -----  Contact: Peggy  Type:  Pharmacy Calling to Clarify an RX    Name of Caller:  Peggy  Pharmacy Name:  [facility]  Prescription Name:  ACCU-CHEK [employer]ivaPlus TEST STRIP  (Not in chart)    What do they need to clarify?:  Need Rx to Fill at [facility]  Best Call Back Number:  [phone]  Additional Information:  No reference

## 2020-03-13 DIAGNOSIS — N18.9 CHRONIC KIDNEY DISEASE, UNSPECIFIED CKD STAGE: ICD-10-CM

## 2020-03-13 RX ORDER — LANCETS
EACH MISCELLANEOUS
Qty: 200 EACH | Refills: 3 | Status: SHIPPED | OUTPATIENT
Start: 2020-03-13

## 2020-03-13 NOTE — PROGRESS NOTES
Refill Authorization Note     is requesting a refill authorization.    Brief assessment and rationale for refill: APPROVE: prr  Name and strength of medication: test strips       Medication Therapy Plan: will pend lancet to promote greater pt adherence; approve     Medication reconciliation completed: No                         Comments:   Requested Prescriptions   Pending Prescriptions Disp Refills    blood sugar diagnostic (ACCU-CHEK MARLON PLUS TEST STRP) Strp 200 each 3     Si each by Misc.(Non-Drug; Combo Route) route 2 (two) times daily.       Endocrinology: Diabetes - Supplies Passed - 3/11/2020  3:37 PM        Passed - Patient is at least 18 years old        Passed - Office visit in past 12 months or future 90 days.     Recent Outpatient Visits            1 week ago Neck pain    [facility] Marychuy Alves NP    3 weeks ago Wellness examination    [facility] ZOEY España MD    1 month ago Onychomycosis due to dermatophyte    [facility] Carly Dodd DPM    1 month ago Closed fracture of spinous process of cervical vertebra, initial encounter    [facility] Isha Mcdaniel MD    1 month ago Leg wound, right, initial encounter    [facility] Deuce Cox MD          Future Appointments              In 4 days Drea Anguiano, PT [facility]    In 3 weeks ANNUAL WELLNESS VISIT-NURSE PRACTITIONER, [place] [facility]    In 3 weeks [facility]odd DPM [facility]    In 1 month Eddie Gonzales [facility]    In 2 months HOME MONITOR DEVICE CHECK, [facility]    In 5 months ZOEY España MD [facility]                Passed - HBA1C is 7.9 or below and within 180 days     Hemoglobin A1C   Date Value Ref Range Status    02/18/2020 6.2 (H) 4.0 - 5.6 % Final     Comment:     ADA Screening Guidelines:  5.7-6.4%  Consistent with prediabetes  >or=6.5%  Consistent with diabetes  High levels of fetal hemoglobin interfere with the HbA1C  assay. Heterozygous hemoglobin variants (HbS, HgC, etc)do  not significantly interfere with this assay.   However, presence of multiple variants may affect accuracy.     08/07/2019 6.3 (H) 4.0 - 5.6 % Final     Comment:     ADA Screening Guidelines:  5.7-6.4%  Consistent with prediabetes  >or=6.5%  Consistent with diabetes  High levels of fetal hemoglobin interfere with the HbA1C  assay. Heterozygous hemoglobin variants (HbS, HgC, etc)do  not significantly interfere with this assay.   However, presence of multiple variants may affect accuracy.     05/20/2019 6.9 (H) 4.0 - 5.6 % Final     Comment:     ADA Screening Guidelines:  5.7-6.4%  Consistent with prediabetes  >or=6.5%  Consistent with diabetes  High levels of fetal hemoglobin interfere with the HbA1C  assay. Heterozygous hemoglobin variants (HbS, HgC, etc)do  not significantly interfere with this assay.   However, presence of multiple variants may affect accuracy.               lancets Misc 200 each 3     Sig: To check BG 2 (two) times daily, to use with insurance preferred meter       There is no refill protocol information for this order         Appointments  past 12m or future 3m with PCP    Date Provider   Last Visit   2/18/2020 ZOEY España MD   Next Visit   8/18/2020 ZOEY España MD   .  ED visits in past 90 days: 3       Note composed:2:58 PM 03/13/2020

## 2020-03-20 ENCOUNTER — TELEPHONE (OUTPATIENT)
Dept: FAMILY MEDICINE | Facility: CLINIC | Age: 85
End: 2020-03-20

## 2020-03-20 NOTE — TELEPHONE ENCOUNTER
----- Message from Watson Macias sent at 3/20/2020 11:18 AM CDT -----  Type: Needs Medical Advice    Who Called:  Patient  Symptoms (please be specific):  Right foot-swollen, pain  How long has patient had these symptoms:  2 days  Pharmacy name and phone #:   [id number] - [place] [address] AT [facility] & [address] [address]  Phone: [phone] Fax: [phone]    Best Call Back Number: [phone]  Additional Information: Please call to advise

## 2020-03-20 NOTE — TELEPHONE ENCOUNTER
Painful swollen right foot for 2 days. No trauma noted. Numb and swollen. Can't walk on it. Patient is also coughing & sneezing. No fever. Patient unable to do virtual visit. Please advise?

## 2020-03-20 NOTE — TELEPHONE ENCOUNTER
Advise rest, ice, elevation and tylenol for now; can call Monday with update; can go to urgent care if needed but advise to really try to hold off if she can for now

## 2020-03-25 ENCOUNTER — NURSE TRIAGE (OUTPATIENT)
Dept: ADMINISTRATIVE | Facility: CLINIC | Age: 85
End: 2020-03-25

## 2020-03-25 ENCOUNTER — TELEPHONE (OUTPATIENT)
Dept: FAMILY MEDICINE | Facility: CLINIC | Age: 85
End: 2020-03-25

## 2020-03-25 NOTE — TELEPHONE ENCOUNTER
Pt calling, initial complain was that she woke up with one of her legs significantly more swollen than usual, and also reports intermittent 5/10 stabbing CP. Per triage protocol, advised that she go to ED now. She voiced concerns about current COVID-19 situation and does not want to go to ED. Informed her that I would send message to her PCP office. Pt asked about using nitroglycerin and I advised that she use it as directed. She verbalized understanding.    Reason for Disposition   Intermittent chest pain and pain has been increasing in severity or frequency    Additional Information   Negative: Severe difficulty breathing (e.g., struggling for each breath, speaks in single words)   Negative: Passed out (i.e., fainted, collapsed and was not responding)   Negative: Chest pain lasting longer than 5 minutes and ANY of the following:* Over 50 years old* Over 30 years old and at least one cardiac risk factor (i.e., high blood pressure, diabetes, high cholesterol, obesity, smoker or strong family history of heart disease)* Pain is crushing, pressure-like, or heavy * Took nitroglycerin and chest pain was not relieved* History of heart disease (i.e., angina, heart attack, bypass surgery, angioplasty, CHF)   Negative: Visible sweat on face or sweat dripping down face   Negative: Sounds like a life-threatening emergency to the triager   Negative: SEVERE chest pain   Negative: Pain also present in shoulder(s) or arm(s) or jaw   Negative: Difficulty breathing   Negative: Cocaine use within last 3 days   Negative: History of prior 'blood clot' in leg or lungs (i.e., deep vein thrombosis, pulmonary embolism)   Negative: Recent illness requiring prolonged bed rest (i.e., immobilization)   Negative: Hip or leg fracture in past 2 months (e.g, or had cast on leg or ankle)   Negative: Major surgery in the past month   Negative: Recent long-distance travel with prolonged time in car, bus, plane, or train (i.e., within  past 2 weeks; 6 or more hours duration)   Negative: Heart beating irregularly or very rapidly   Negative: Chest pain lasting longer than 5 minutes    Protocols used: CHEST PAIN-A-OH

## 2020-03-26 NOTE — TELEPHONE ENCOUNTER
Spoke with patient in response to triage note. Patient states she is feeling better and thinks it was gas. Advised to go to ED if symptoms return.

## 2020-04-08 DIAGNOSIS — M79.2 NEURITIS: ICD-10-CM

## 2020-04-08 RX ORDER — GABAPENTIN 300 MG/1
CAPSULE ORAL
Qty: 60 CAPSULE | Refills: 2 | Status: SHIPPED | OUTPATIENT
Start: 2020-04-08

## 2020-04-08 NOTE — PROGRESS NOTES
Refill Routing Note     Medication(s) are not appropriate for processing by [facility]:    Medication Outside of Protocol    Appointments  past 12m or future 3m with PCP    Date Provider   Last Visit   2/18/2020 ZOEY España MD   Next Visit   8/18/2020 ZOEY España MD           Automatic Epic Protocol Generated Data:    Requested Prescriptions   Pending Prescriptions Disp Refills    gabapentin (NEURONTIN) 300 MG capsule [Pharmacy Med Name: GABAPENTIN 300MG CAPSULES] 60 capsule      Sig: TAKE 1 CAPSULE(300 MG) BY MOUTH TWICE DAILY       Anticonvulsants Protocol Passed - 4/8/2020  5:40 AM        Passed - Visit with Authorizing provider in past 9 months or upcoming 90 days        Passed - No active pregnancy on record           Note composed:8:04 AM 04/08/2020

## 2020-04-10 ENCOUNTER — NURSE TRIAGE (OUTPATIENT)
Dept: ADMINISTRATIVE | Facility: CLINIC | Age: 85
End: 2020-04-10

## 2020-04-11 NOTE — TELEPHONE ENCOUNTER
Reason for Disposition   Second attempt to contact family AND no contact made.  Phone number verified.    Additional Information   Negative: Caller is angry or rude (e.g., hangs up, verbally abusive, yelling)   Negative: Caller hangs up   Negative: Caller has already spoken with the PCP and has no further questions.   Negative: Caller has already spoken with another triager and has no further questions.   Negative: Caller has already spoken with another triager or PCP AND has further questions AND triager able to answer questions.   Negative: Wrong number reached.  Phone number verified.   Negative: Message left with person in household.   Negative: Message left on unidentified voice mail.  Phone number verified.   Negative: Message left on identified voice mail   Negative: No answer.  First attempt to contact caller.  Follow-up call scheduled within 15 minutes.   Negative: Busy signal.  First attempt to contact caller.  Follow-up call scheduled within 15 minutes.    Protocols used: NO CONTACT OR DUPLICATE CONTACT CALL-A-  No  set up x 2 at 834pm at [phone]

## 2020-05-06 ENCOUNTER — TELEPHONE (OUTPATIENT)
Dept: FAMILY MEDICINE | Facility: CLINIC | Age: 85
End: 2020-05-06

## 2020-05-06 NOTE — TELEPHONE ENCOUNTER
----- Message from Kristie Johnson sent at 5/6/2020 11:45 AM CDT -----  Contact: self  Patient is requesting a call back and asking for orders to be put in for her to tested for the covid19.  She is feeling weak, stomach pains and on her left side glands tender to the touch.  Call back to advise at [phone] (home).  Thanks

## 2020-05-07 ENCOUNTER — OFFICE VISIT (OUTPATIENT)
Dept: FAMILY MEDICINE | Facility: CLINIC | Age: 85
End: 2020-05-07
Payer: MEDICARE

## 2020-05-07 ENCOUNTER — TELEPHONE (OUTPATIENT)
Dept: FAMILY MEDICINE | Facility: CLINIC | Age: 85
End: 2020-05-07

## 2020-05-07 DIAGNOSIS — I27.9 PULMONARY HEART DISEASE: ICD-10-CM

## 2020-05-07 DIAGNOSIS — Z79.01 LONG TERM CURRENT USE OF ANTICOAGULANT THERAPY: ICD-10-CM

## 2020-05-07 DIAGNOSIS — N18.30 CKD (CHRONIC KIDNEY DISEASE), STAGE III: ICD-10-CM

## 2020-05-07 DIAGNOSIS — I42.0 DILATED CARDIOMYOPATHY: Primary | ICD-10-CM

## 2020-05-07 DIAGNOSIS — I48.20 CHRONIC A-FIB: ICD-10-CM

## 2020-05-07 DIAGNOSIS — I10 ESSENTIAL HYPERTENSION: ICD-10-CM

## 2020-05-07 PROCEDURE — 99441 PR PHYSICIAN TELEPHONE EVALUATION 5-10 MIN: ICD-10-PCS | Mod: HCNC,95,, | Performed by: NURSE PRACTITIONER

## 2020-05-07 PROCEDURE — 1159F MED LIST DOCD IN RCRD: CPT | Mod: HCNC,95,, | Performed by: NURSE PRACTITIONER

## 2020-05-07 PROCEDURE — 1101F PR PT FALLS ASSESS DOC 0-1 FALLS W/OUT INJ PAST YR: ICD-10-PCS | Mod: HCNC,CPTII,95, | Performed by: NURSE PRACTITIONER

## 2020-05-07 PROCEDURE — 1159F PR MEDICATION LIST DOCUMENTED IN MEDICAL RECORD: ICD-10-PCS | Mod: HCNC,95,, | Performed by: NURSE PRACTITIONER

## 2020-05-07 PROCEDURE — 1101F PT FALLS ASSESS-DOCD LE1/YR: CPT | Mod: HCNC,CPTII,95, | Performed by: NURSE PRACTITIONER

## 2020-05-07 PROCEDURE — 99441 PR PHYSICIAN TELEPHONE EVALUATION 5-10 MIN: CPT | Mod: HCNC,95,, | Performed by: NURSE PRACTITIONER

## 2020-05-07 NOTE — PROGRESS NOTES
Established Patient - Audio Only Telehealth Visit    The patient location is: [facility]  The chief complaint leading to consultation is: fatigue, abdominal pain   Visit type: Virtual visit with audio only (telephone)  Total time spent with patient: 10min       The reason for the audio only service rather than synchronous audio and video virtual visit was related to technical difficulties or patient preference/necessity.     Each patient to whom I provide medical services by telemedicine is:  (1) informed of the relationship between the physician and patient and the respective role of any other health care provider with respect to management of the patient; and (2) notified that they may decline to receive medical services by telemedicine and may withdraw from such care at any time. Patient verbally consented to receive this service via voice-only telephone call.    HPI:   Patient reports she has been feeling bad the last few days--Mild abdominal pain and nausea, fatigue. She felt she was breathing heavy last night--used her breathing treatment last night which helped. She slept great last night--the best she has slept in weeks. She woke asymptomatic today--feels great. She attributes symptoms to stress/anxiety 2/2 current Covid 19 pandemic and quarantine     She is followed by pulmonology Dr. Lombardo   She suffers from cardiomyopathy--EF 35% echo 06/2019. She reports swelling to lower extremities intermittently--she uses torsemide PRN. Without edema today. Monitors weight daily--weight is stable.      220lb this morning    Assessment and plan:    Anxiety---educated on coping mechanisms, stress reduction techniques  Chronic diseases below--stable  Dilated Cardiomyopathy--continue daily weights. Parameters given  Pulmonary heart disease  Hypertension  Chronic Afib on long term anticoagulation  CKD III     Return precautions given  Follow up if symptoms return                This service was not originating from a  related E/M service provided within the previous 7 days nor will  to an E/M service or procedure within the next 24 hours or my soonest available appointment.  Prevailing standard of care was able to be met in this audio-only visit.

## 2020-05-08 RX ORDER — ERGOCALCIFEROL 1.25 MG/1
CAPSULE ORAL
Qty: 12 CAPSULE | Refills: 0 | Status: SHIPPED | OUTPATIENT
Start: 2020-05-08

## 2020-05-11 RX ORDER — WARFARIN 2.5 MG/1
TABLET ORAL
Qty: 90 TABLET | Refills: 0 | Status: SHIPPED | OUTPATIENT
Start: 2020-05-11 | End: 2020-07-06

## 2020-05-15 ENCOUNTER — NURSE TRIAGE (OUTPATIENT)
Dept: ADMINISTRATIVE | Facility: CLINIC | Age: 85
End: 2020-05-15

## 2020-05-15 NOTE — TELEPHONE ENCOUNTER
If available can do visit but likely will need urgent care; does she really need visit? Would not want her exposed to covid by going to urgent care

## 2020-05-15 NOTE — TELEPHONE ENCOUNTER
Spoke with pt:neck pain with headache. possible feels is related to lack of sleep. Taking gabapentin and tylenol.no tylenol for pain today.      Last night and night before in a pattern not sleeping at night and hungry in middle of night- feels not eating regularly. Does have cough- not constant. Did break neck on Christmas day with a fall. Was in neck brace for 6 weeks.     Has been upset lately. Couple of days ago - got alert on computer. And felt was a scheme and having to working on this. Stiffness of neck worsened- after removing brace to neck.    BP while on phone with triage nurse:  99/66, HR 71. Normal -110/ 60s-70s.    Pain to neck 7/10--normally would be  3/10.  Re check: 1456: /60 ,HR 70.    protocol instructions given and pt verbalizes understanding.     Reason for Disposition   High-risk adult (e.g., history of cancer, HIV, or IV drug abuse)    Additional Information   Negative: Shock suspected (e.g., cold/pale/clammy skin, too weak to stand, low BP, rapid pulse)   Negative: Similar pain previously and it was from 'heart attack'   Negative: Similar pain previously from 'angina' and not relieved by nitroglycerin   Negative: Difficult to awaken or acting confused (e.g., disoriented, slurred speech)   Negative: Sounds like a life-threatening emergency to the triager   Negative: Difficulty breathing or unusual sweating (e.g., sweating without exertion)   Negative: Chest pain lasting longer than 5 minutes   Negative: Stiff neck (can't touch chin to chest) and has headache   Negative: Stiff neck (can't touch chin to chest) and fever   Negative: Weakness of an arm or hand   Negative: Problems with bowel or bladder control   Negative: Patient sounds very sick or weak to the triager   Negative: Fever > 103 F (39.4 C)   Negative: Fever > 100.0 F (37.8 C) and IVDA (intravenous drug abuse)   Negative: Fever > 100.0 F (37.8 C) and has diabetes mellitus or a weak immune system (e.g., HIV  positive, cancer chemotherapy, organ transplant, splenectomy, chronic steroids)   Negative: SEVERE pain (e.g., excruciating, unable to do any normal activities)   Negative: Head is twisting to one side (or ask 'is it turning against your will?')   Negative: Numbness in an arm or hand (i.e., loss of sensation)   Negative: Painful rash with multiple small blisters grouped together (i.e., dermatomal distribution or 'band' or 'stripe')    Protocols used: NECK PAIN OR [id number]

## 2020-05-18 NOTE — TELEPHONE ENCOUNTER
Care Due:                  Date            Visit Type   Department     Provider  --------------------------------------------------------------------------------                                ESTABLISHED                              PATIENT      [facility]  Last Visit: 02-      SHORT        BARRIE VALLE                              ESTABLISHED                              PATIENT      [facility]  Next Visit: 08-      SHORT        BARRIE VALLE                                                            Last  Test          Frequency    Reason                     Performed    Due Date  --------------------------------------------------------------------------------    Office Visit  12 months..  acetaminophen,             Not Found    Overdue                             azelastine, famotidine,                             metoprolol...............    eGFR........  12 months..  alendronate, famotidine..  Not Found    Overdue    Vitamin D...  12 months..  alendronate..............  Not Found    Overdue    Powered by [employer]. Reference number: [id number]. 5/18/2020 2:31:24 PM CDT

## 2020-05-19 RX ORDER — LEVOTHYROXINE SODIUM 100 UG/1
TABLET ORAL
Qty: 90 TABLET | Refills: 2 | Status: SHIPPED | OUTPATIENT
Start: 2020-05-19 | End: 2020-06-24

## 2020-05-20 ENCOUNTER — OFFICE VISIT (OUTPATIENT)
Dept: FAMILY MEDICINE | Facility: CLINIC | Age: 85
End: 2020-05-20
Payer: MEDICARE

## 2020-05-20 VITALS
TEMPERATURE: 98 F | WEIGHT: 235 LBS | OXYGEN SATURATION: 96 % | DIASTOLIC BLOOD PRESSURE: 84 MMHG | WEIGHT: 235 LBS | SYSTOLIC BLOOD PRESSURE: 134 MMHG | HEART RATE: 70 BPM | BODY MASS INDEX: 41.64 KG/M2 | BODY MASS INDEX: 41.64 KG/M2 | SYSTOLIC BLOOD PRESSURE: 134 MMHG | DIASTOLIC BLOOD PRESSURE: 84 MMHG | HEIGHT: 63 IN | HEIGHT: 63 IN

## 2020-05-20 DIAGNOSIS — I10 ESSENTIAL HYPERTENSION: ICD-10-CM

## 2020-05-20 DIAGNOSIS — I65.23 BILATERAL CAROTID ARTERY STENOSIS: ICD-10-CM

## 2020-05-20 DIAGNOSIS — E08.42 DIABETIC POLYNEUROPATHY ASSOCIATED WITH DIABETES MELLITUS DUE TO UNDERLYING CONDITION: ICD-10-CM

## 2020-05-20 DIAGNOSIS — Z99.89 DEPENDENCE ON OTHER ENABLING MACHINES AND DEVICES: ICD-10-CM

## 2020-05-20 DIAGNOSIS — J42 CHRONIC BRONCHITIS, UNSPECIFIED CHRONIC BRONCHITIS TYPE: ICD-10-CM

## 2020-05-20 DIAGNOSIS — I70.209 ATHEROSCLEROSIS OF ARTERIES OF EXTREMITIES: ICD-10-CM

## 2020-05-20 DIAGNOSIS — R26.9 ABNORMALITY OF GAIT AND MOBILITY: ICD-10-CM

## 2020-05-20 DIAGNOSIS — I48.0 PAROXYSMAL A-FIB: ICD-10-CM

## 2020-05-20 DIAGNOSIS — S12.200S: Primary | ICD-10-CM

## 2020-05-20 DIAGNOSIS — I50.42 CHRONIC COMBINED SYSTOLIC AND DIASTOLIC CONGESTIVE HEART FAILURE, NYHA CLASS 3: ICD-10-CM

## 2020-05-20 DIAGNOSIS — I42.0 DILATED CARDIOMYOPATHY: ICD-10-CM

## 2020-05-20 DIAGNOSIS — I25.10 CORONARY ARTERY DISEASE INVOLVING NATIVE CORONARY ARTERY OF NATIVE HEART WITHOUT ANGINA PECTORIS: ICD-10-CM

## 2020-05-20 DIAGNOSIS — I27.9 PULMONARY HEART DISEASE: ICD-10-CM

## 2020-05-20 DIAGNOSIS — I48.20 CHRONIC A-FIB: ICD-10-CM

## 2020-05-20 DIAGNOSIS — Z00.00 ENCOUNTER FOR PREVENTIVE HEALTH EXAMINATION: Primary | ICD-10-CM

## 2020-05-20 DIAGNOSIS — I70.0 AORTIC ATHEROSCLEROSIS: ICD-10-CM

## 2020-05-20 PROCEDURE — 1101F PT FALLS ASSESS-DOCD LE1/YR: CPT | Mod: HCNC,CPTII,S$GLB, | Performed by: NURSE PRACTITIONER

## 2020-05-20 PROCEDURE — 1159F PR MEDICATION LIST DOCUMENTED IN MEDICAL RECORD: ICD-10-PCS | Mod: HCNC,S$GLB,, | Performed by: NURSE PRACTITIONER

## 2020-05-20 PROCEDURE — 99499 RISK ADDL DX/OHS AUDIT: ICD-10-PCS | Mod: HCNC,S$GLB,, | Performed by: NURSE PRACTITIONER

## 2020-05-20 PROCEDURE — 1101F PR PT FALLS ASSESS DOC 0-1 FALLS W/OUT INJ PAST YR: ICD-10-PCS | Mod: HCNC,CPTII,S$GLB, | Performed by: NURSE PRACTITIONER

## 2020-05-20 PROCEDURE — 99213 PR OFFICE/OUTPT VISIT, EST, LEVL III, 20-29 MIN: ICD-10-PCS | Mod: HCNC,S$GLB,, | Performed by: NURSE PRACTITIONER

## 2020-05-20 PROCEDURE — 99499 UNLISTED E&M SERVICE: CPT | Mod: HCNC,S$GLB,, | Performed by: NURSE PRACTITIONER

## 2020-05-20 PROCEDURE — 3079F PR MOST RECENT DIASTOLIC BLOOD PRESSURE 80-89 MM HG: ICD-10-PCS | Mod: HCNC,CPTII,S$GLB, | Performed by: NURSE PRACTITIONER

## 2020-05-20 PROCEDURE — 3075F SYST BP GE 130 - 139MM HG: CPT | Mod: HCNC,CPTII,S$GLB, | Performed by: NURSE PRACTITIONER

## 2020-05-20 PROCEDURE — 1159F MED LIST DOCD IN RCRD: CPT | Mod: HCNC,S$GLB,, | Performed by: NURSE PRACTITIONER

## 2020-05-20 PROCEDURE — 3079F DIAST BP 80-89 MM HG: CPT | Mod: HCNC,CPTII,S$GLB, | Performed by: NURSE PRACTITIONER

## 2020-05-20 PROCEDURE — 99213 OFFICE O/P EST LOW 20 MIN: CPT | Mod: HCNC,S$GLB,, | Performed by: NURSE PRACTITIONER

## 2020-05-20 PROCEDURE — G0439 PR MEDICARE ANNUAL WELLNESS SUBSEQUENT VISIT: ICD-10-PCS | Mod: HCNC,S$GLB,, | Performed by: NURSE PRACTITIONER

## 2020-05-20 PROCEDURE — 99999 PR PBB SHADOW E&M-EST. PATIENT-LVL V: CPT | Mod: PBBFAC,HCNC,, | Performed by: NURSE PRACTITIONER

## 2020-05-20 PROCEDURE — 1125F PR PAIN SEVERITY QUANTIFIED, PAIN PRESENT: ICD-10-PCS | Mod: HCNC,S$GLB,, | Performed by: NURSE PRACTITIONER

## 2020-05-20 PROCEDURE — 3075F PR MOST RECENT SYSTOLIC BLOOD PRESS GE 130-139MM HG: ICD-10-PCS | Mod: HCNC,CPTII,S$GLB, | Performed by: NURSE PRACTITIONER

## 2020-05-20 PROCEDURE — G0439 PPPS, SUBSEQ VISIT: HCPCS | Mod: HCNC,S$GLB,, | Performed by: NURSE PRACTITIONER

## 2020-05-20 PROCEDURE — 99999 PR PBB SHADOW E&M-EST. PATIENT-LVL III: CPT | Mod: PBBFAC,HCNC,, | Performed by: NURSE PRACTITIONER

## 2020-05-20 PROCEDURE — 99999 PR PBB SHADOW E&M-EST. PATIENT-LVL V: ICD-10-PCS | Mod: PBBFAC,HCNC,, | Performed by: NURSE PRACTITIONER

## 2020-05-20 PROCEDURE — 99999 PR PBB SHADOW E&M-EST. PATIENT-LVL III: ICD-10-PCS | Mod: PBBFAC,HCNC,, | Performed by: NURSE PRACTITIONER

## 2020-05-20 PROCEDURE — 1125F AMNT PAIN NOTED PAIN PRSNT: CPT | Mod: HCNC,S$GLB,, | Performed by: NURSE PRACTITIONER

## 2020-05-20 NOTE — PATIENT INSTRUCTIONS
Counseling and Referral of Other Preventative  (Italic type indicates deductible and co-insurance are waived)    Patient Name: Miesha Obando  Today's Date: 5/20/2020    Health Maintenance       Date Due Completion Date    Shingles Vaccine (2 of 3) 02/23/2015 12/29/2014    Foot Exam 08/07/2020 8/7/2019    Override on 1/11/2018: Done (Kyle.  will scan report)    Override on 11/16/2016: Done (Patient sees Dr. Wright, podiatry)    Hemoglobin A1c 08/18/2020 2/18/2020    Eye Exam 08/29/2020 8/29/2019    Override on 12/13/2016: Done (Patient sees outside opthalmologist)    Override on 5/14/2014: Done    Override on 12/17/1997: Done    Lipid Panel 02/18/2021 2/18/2020    DEXA SCAN 08/21/2021 8/21/2018    TETANUS VACCINE 08/25/2024 8/25/2014        No orders of the defined types were placed in this encounter.    The following information is provided to all patients.  This information is to help you find resources for any of the problems found today that may be affecting your health:                Living healthy guide: www.[facility].[place].gov      Understanding Diabetes: www.diabetes.org      Eating healthy: www.cdc.gov/healthyweight      CDC home safety checklist: www.cdc.gov/steadi/patient.html      Agency on Aging: www.goea.[place].[facility]      Alcoholics anonymous (AA): www.aa.org      Physical Activity: www.jack.nih.gov/ig4vsyy      Tobacco use: www.quitwithusla.org

## 2020-05-20 NOTE — PROGRESS NOTES
Refill Authorization Note    is requesting a refill authorization.    Brief assessment and rationale for refill: APPROVE: prr                Medication reconciliation completed: No                         Comments:      Requested Prescriptions   Pending Prescriptions Disp Refills    levothyroxine (SYNTHROID) 100 MCG tablet [Pharmacy Med Name: LEVOTHYROXINE SODIUM 100 MCG Tablet] 90 tablet 2     Sig: TAKE 1 TABLET EVERY DAY       Endocrinology:  Hypothyroid Agents Failed - 5/19/2020 11:09 PM        Failed - Manual Review: FT4 is not required if last TSH is WNL.        Failed - T4 free within 1080 days     No results found for: EXTFREET4, T4FREE, FREET4, I1WHIPUYCOUU, T4FT4           Passed - Patient is at least 18 years old        Passed - Office visit in past 12 months or future 90 days.     Recent Outpatient Visits            1 week ago Dilated cardiomyopathy    [facility] Betsy Tolliver NP    2 months ago Neck pain    [facility] Marychuy Alves NP    3 months ago Wellness examination    [facility] ZOEY España MD    3 months ago Onychomycosis due to dermatophyte    [facility] Podiatry Carly Dodd DPM    3 months ago Closed fracture of spinous process of cervical vertebra, initial encounter    [facility] Neurosurgery Isha Mcdaniel MD          Future Appointments              Tomorrow Marychuy Alves NP [facility]    Tomorrow ANNUAL WELLNESS VISIT-NURSE PRACTITIONER, [place] 3 [facility]    In 1 month [facility] [place] [facility]    In 3 months ZOEY España MD [facility]                Passed - TSH in normal range and within 360 days     TSH   Date Value Ref Range Status   02/18/2020 1.630 0.400 - 4.000 uIU/mL Final   08/07/2019 1.393 0.400 - 4.000 uIU/mL Final   01/21/2019 1.969 0.400 - 4.000 uIU/mL Final                Appointments  past 12m or future 3m with PCP    Date Provider   Last Visit   2/18/2020 ZOEY España MD   Next Visit   8/18/2020 ZOEY España MD   ED visits in past 90 days: 0     Note composed:11:12 PM 05/19/2020

## 2020-05-20 NOTE — PROGRESS NOTES
"Miesha Obando presented for a  Medicare AWV and comprehensive Health Risk Assessment today. The following components were reviewed and updated:    · Medical history  · Family History  · Social history  · Allergies and Current Medications  · Health Risk Assessment  · Health Maintenance  · Care Team     ** See Completed Assessments for Annual Wellness Visit within the encounter summary.**       The following assessments were completed:  · Living Situation  · CAGE  · Depression Screening  · Timed Get Up and Go  · Whisper Test  · Cognitive Function Screening          · Nutrition Screening  · ADL Screening  · PAQ Screening    Vitals:    05/20/20 1441   BP: 134/84   BP Location: Right arm   Patient Position: Sitting   BP Method: Large (Manual)   Weight: 106.6 kg (235 lb 0.2 oz)   Height: 5' 3" (1.6 m)     Body mass index is 41.63 kg/m².  Physical Exam   Constitutional:   In wheelchair   HENT:   Head: Normocephalic.   Cardiovascular: Normal rate.   Pulmonary/Chest: Effort normal.   Skin: Skin is warm.   Psychiatric: She has a normal mood and affect.   Vitals reviewed.        Diagnoses and health risks identified today and associated recommendations/orders:    1. Encounter for preventive health examination  Reviewed health maintenance and provided recommendations   Will defer shingrix vaccine     2. Dependence on other enabling machines and devices  Continue to monitor  Followed by KEON España MD .      3. Abnormality of gait and mobility  Continue to monitor  Followed by KEON España MD .      4. Diabetic polyneuropathy associated with diabetes mellitus due to underlying condition  Continue to monitor  Followed by KEON España MD   Last a1c 6.2.      5. Pulmonary heart disease  Continue to monitor  Followed by Janet .      6. Chronic bronchitis, unspecified chronic bronchitis type  Continue to monitor  Followed by KEON España MD .      7. Aortic atherosclerosis  Continue to monitor  Followed by " Janet.      8. Essential hypertension  Continue to monitor  Followed by KEON España MD .      9. Atherosclerosis of arteries of extremities  Continue to monitor  Followed by Janet.      10. Coronary artery disease involving native coronary artery of native heart without angina pectoris  Continue to monitor  Followed by Janet   No CP.      11. Dilated cardiomyopathy  Continue to monitor  Followed by Lecce.      12. Chronic a-fib  Continue to monitor  Followed by Lecce.      13. Chronic combined systolic and diastolic congestive heart failure, NYHA class 3  Continue to monitor  Followed by Lecce.      14. Bilateral carotid artery stenosis  Continue to monitor  Followed by Lecce.      15. Paroxysmal A-fib  Continue to monitor  Followed by Janet.    Taking warfarin      Provided Miesha with a 5-10 year written screening schedule and personal prevention plan. Recommendations were developed using the USPSTF age appropriate recommendations. Education, counseling, and referrals were provided as needed. After Visit Summary printed and given to patient which includes a list of additional screenings\tests needed.    Follow up in about 1 year (around 5/20/2021).    Yanelis Joshua NP  I offered to discuss end of life issues, including information on how to make advance directives that the patient could use to name someone who would make medical decisions on their behalf if they became too ill to make themselves.    ___Patient declined  _X_Patient is interested, I provided paper work and offered to discuss.

## 2020-05-20 NOTE — PROGRESS NOTES
Subjective:       Patient ID: Miesha Obando is a 84 y.o. female.    Chief Complaint: Headache (symptoms started 2 months ago) and Neck Pain    Not taking torsemide daily, only taking occasionally. She did take it today. Missed her cardiology appointment this week. Has not rescheduled.   Continuing to have neck pain, headaches, and dizziness from her fall with cervical spine fracture in [facility].   She thinks she is taking her other medications correctly.   Past Medical History:  No date: Anticoagulant long-term use  No date: Arthritis  No date: Atrial fibrillation  1994: Breast cancer      Comment:  breast , left  5/11/2016: Cardiomyopathy - EF 35-40%  No date: CHF (congestive heart failure)  No date: Chronic bronchitis  No date: COPD (chronic obstructive pulmonary disease)  8/26/2015: Coronary artery disease without angina pectoris  No date: Depression  No date: Diabetes mellitus, type 2  No date: Diabetes with neurologic complications  No date: Diverticulitis  No date: Diverticulosis  No date: Encounter for blood transfusion      Comment:  after mastectomy x 20 years ago  No date: GERD (gastroesophageal reflux disease)  No date: H/O aortic valve replacement  No date: Hiatal hernia  No date: History of colonic diverticulitis  No date: Hypertension  No date: Hypothyroid  No date: Insomnia  No date: Irritable bowel syndrome  No date: Obesity  08/2014: Pacemaker  No date: Schatzki's ring      Comment:  mild  No date: Sleep apnea      Comment:  uses cpap with oxygen   No date: Syncope and collapse  5/23/2016: Systolic congestive heart failure, NYHA class 3  No date: Type II or unspecified type diabetes mellitus without   mention of complication, not stated as uncontrolled      Comment:  No medications at present.   No date: Urinary incontinence    Past Surgical History:  No date: ADENOIDECTOMY  No date: APPENDECTOMY  No date: BACK SURGERY      Comment:  Discectomy, lumbar  1994: BREAST SURGERY      Comment:  left  side , mastectomy  No date: CARDIAC PACEMAKER PLACEMENT  No date: CARDIAC VALVE SURGERY      Comment:  aortic valve replacement  No date: CHOLECYSTECTOMY  2/2014: COLONOSCOPY      Comment:  repeat in 10 years for screening  6/18/2019: CORONARY ANGIOGRAPHY; Bilateral      Comment:  Procedure: ANGIOGRAM, CORONARY ARTERY;  Surgeon: Dayron Macias MD;  Location: [facility] CATH;  Service: Cardiology;                 Laterality: Bilateral;  No date: CORONARY STENT PLACEMENT      Comment:  x2  No date: EYE SURGERY      Comment:  bilateral PHACO and IOL  No date: HYSTERECTOMY      Comment:  ovaries spared  6/18/2019: LEFT HEART CATHETERIZATION; Right      Comment:  Procedure: LHC/ Coronary Angio;  Surgeon: Dayron Macias MD;  [facility];  Service: Cardiology;                 Laterality: Right;  No date: LOOP RECORDER  No date: TOE SURGERY; Right  No date: TONSILLECTOMY  2012: UPPER GASTROINTESTINAL ENDOSCOPY    Review of patient's family history indicates:  Problem: Parkinsonism      Relation: Mother          Age of Onset: (Not Specified)  Problem: Emphysema      Relation: Father          Age of Onset: (Not Specified)  Problem: Heart disease      Relation: Brother          Age of Onset: (Not Specified)  Problem: Heart attack      Relation: Brother          Age of Onset: (Not Specified)  Problem: Heart failure      Relation: Brother          Age of Onset: (Not Specified)  Problem: Heart disease      Relation: Brother          Age of Onset: (Not Specified)  Problem: Heart failure      Relation: Brother          Age of Onset: (Not Specified)  Problem: Heart disease      Relation: Brother          Age of Onset: (Not Specified)  Problem: Heart failure      Relation: Brother          Age of Onset: (Not Specified)  Problem: Arrhythmia      Relation: Brother          Age of Onset: (Not Specified)  Problem: Anesthesia problems      Relation: Neg Hx          Age of Onset: (Not Specified)  Problem:  Clotting disorder      Relation: Neg Hx          Age of Onset: (Not Specified)      Social History    Socioeconomic History      Marital status:       Spouse name: Not on file      Number of children: Not on file      Years of education: Not on file      Highest education level: Not on file    Occupational History      Not on file    Social Needs      Financial resource strain: Not on file      Food insecurity:        Worry: Not on file        Inability: Not on file      Transportation needs:        Medical: Not on file        Non-medical: Not on file    Tobacco Use      Smoking status: Former Smoker        Packs/day: 1.00        Years: 30.00        Pack years: 30        Start date: 1958        Quit date: 1988        Years since quittin.3      Smokeless tobacco: Never Used    Substance and Sexual Activity      Alcohol use: Yes        Comment: 0-2 glasses of wine per month      Drug use: No      Sexual activity: Not on file    Lifestyle      Physical activity:        Days per week: Not on file        Minutes per session: Not on file      Stress: Not on file    Relationships      Social connections:        Talks on phone: Not on file        Gets together: Not on file        Attends Restorationist service: Not on file        Active member of club or organization: Not on file        Attends meetings of clubs or organizations: Not on file        Relationship status: Not on file    Other Topics      Concerns:        Not on file    Social History Narrative      Not on file      Current Outpatient Medications:  ACCU-CHEK FASTCLIX Misc, TEST TWICE DAILY, Disp: 200 each, Rfl: 11  ACCU-CHEK SMARTVIEW TEST STRIP Strp, TEST  TWICE DAILY, Disp: 200 strip, Rfl: 11  acetaminophen (TYLENOL) 325 MG tablet, Take 2 tablets (650 mg total) by mouth every 6 (six) hours as needed for Pain or Temperature greater than., Disp: , Rfl: 0  albuterol (PROVENTIL/VENTOLIN HFA) 90 mcg/actuation inhaler, INHALE 1 PUFF EVERY 4 HOURS  AS NEEDED, Disp: 54 g, Rfl: 0  alendronate (FOSAMAX) 70 MG tablet, Take 1 tablet (70 mg total) by mouth every 7 days., Disp: 12 tablet, Rfl: 1  azelastine (ASTELIN) 137 mcg (0.1 %) nasal spray, 1 spray (137 mcg total) by Nasal route 2 (two) times daily., Disp: 30 mL, Rfl: 0  b complex vitamins tablet, Take 1 tablet by mouth once daily.  , Disp: , Rfl:   BD ALCOHOL SWABS PadM, USE TWICE DAILY, Disp: 200 each, Rfl: 11  blood sugar diagnostic (ACCU-CHEK MARLON PLUS TEST STRP) Strp, 1 each by Misc.(Non-Drug; Combo Route) route 2 (two) times daily., Disp: 200 each, Rfl: 3  clopidogrel (PLAVIX) 75 mg tablet, TAKE 1 TABLET(75 MG) BY MOUTH EVERY DAY, Disp: 90 tablet, Rfl: 0  diclofenac sodium (VOLTAREN) 1 % Gel, Apply 2 g topically daily as needed. Apply to right shoulder, Disp: 100 g, Rfl: 1  diphenhydramine-acetaminophen (TYLENOL PM)  mg Tab, Take 1 tablet by mouth nightly as needed., Disp: , Rfl:   ergocalciferol (ERGOCALCIFEROL) 50,000 unit Cap, TAKE 1 CAPSULE BY MOUTH EVERY 7 DAYS, Disp: 12 capsule, Rfl: 0  famotidine (PEPCID) 40 MG tablet, Take 1 tablet (40 mg total) by mouth once daily., Disp: 30 tablet, Rfl: 11  fish oil-omega-3 fatty acids 300-1,000 mg capsule, Take 2 g by mouth once daily., Disp: , Rfl:   gabapentin (NEURONTIN) 300 MG capsule, TAKE 1 CAPSULE(300 MG) BY MOUTH TWICE DAILY, Disp: 60 capsule, Rfl: 2  lancets [facility], To check BG 2 (two) times daily, to use with insurance preferred meter, Disp: 200 each, Rfl: 3  levocetirizine (XYZAL) 5 MG tablet, TAKE 1 TABLET(5 MG) BY MOUTH EVERY EVENING, Disp: 90 tablet, Rfl: 0  levothyroxine (SYNTHROID) 100 MCG tablet, TAKE 1 TABLET EVERY DAY, Disp: 90 tablet, Rfl: 2  metoprolol succinate (TOPROL-XL) 25 MG 24 hr tablet, Take 1 tablet (25 mg total) by mouth once daily., Disp: 90 tablet, Rfl: 3  spironolactone (ALDACTONE) 25 MG tablet, TAKE 1 TABLET EVERY DAY, Disp: 90 tablet, Rfl: 3  torsemide (DEMADEX) 20 MG Tab, TAKE 1 TABLET(20 MG) BY MOUTH EVERY DAY, Disp: 90  "tablet, Rfl: 1  warfarin (COUMADIN) 2.5 MG tablet, TAKE 1 TABLET EVERY DAY, Disp: 90 tablet, Rfl: 0  nitroGLYCERIN (NITROSTAT) 0.4 MG SL tablet, Place 1 tablet (0.4 mg total) under the tongue every 5 (five) minutes as needed for Chest pain., Disp: 20 tablet, Rfl: 0  omeprazole (PRILOSEC OTC) 20 MG tablet, Take 1 tablet (20 mg total) by mouth once daily., Disp: 30 tablet, Rfl: 11  traMADoL (ULTRAM) 50 mg tablet, Take 1 tablet (50 mg total) by mouth every 12 (twelve) hours as needed for Pain., Disp: 30 tablet, Rfl: 0    No current facility-administered medications for this visit.       Review of patient's allergies indicates:   -- Statins-hmg-coa reductase inhibitors     --  "bad leg cramps"   -- Sulfa (sulfonamide antibiotics) -- Hives   -- Doxycycline     --  Stomach issues   -- Adhesive -- Rash    --  Use paper tape   -- Amoxicillin-pot clavula (bulk) -- Nausea And Vomiting    Review of Systems   Constitutional: Negative for chills and fever.   HENT: Positive for postnasal drip and rhinorrhea.         Not taking anything for her sinuses   Respiratory: Positive for shortness of breath (chronic, ongoing issue, not recently worsening).    Cardiovascular: Positive for leg swelling (chronic, worse since sitting alot).   Musculoskeletal: Positive for arthralgias, neck pain and neck stiffness.   Skin: Negative for rash.   Neurological: Positive for dizziness, weakness, numbness and headaches.       Objective:      Physical Exam   Constitutional: She is oriented to person, place, and time. No distress.   Cardiovascular: Normal rate and regular rhythm. Exam reveals no friction rub.   No murmur heard.  Pulmonary/Chest: Effort normal and breath sounds normal. No stridor. No respiratory distress.   Musculoskeletal: She exhibits no edema.        Cervical back: She exhibits decreased range of motion, tenderness, bony tenderness and pain.   Neurological: She is alert and oriented to person, place, and time.   Skin: She is not " diaphoretic.       Assessment:       1. Closed displaced fracture of third cervical vertebra, unspecified fracture morphology, sequela        Plan:     Cardiology appointment rescheduled.   1. Closed displaced fracture of third cervical vertebra, unspecified fracture morphology, sequela  CT scheduled tomorrow, follow up with pain management scheduled Friday.  - CT Cervical Spine Without Contrast; Future  - Ambulatory referral/consult to Pain Clinic; Future

## 2020-05-21 ENCOUNTER — CLINICAL SUPPORT (OUTPATIENT)
Dept: CARDIOLOGY | Facility: CLINIC | Age: 85
End: 2020-05-21
Payer: MEDICARE

## 2020-05-21 PROCEDURE — 93294 CARDIAC DEVICE CHECK - REMOTE: ICD-10-PCS | Mod: HCNC,S$GLB,, | Performed by: INTERNAL MEDICINE

## 2020-05-21 PROCEDURE — 93296 REM INTERROG EVL PM/IDS: CPT | Mod: HCNC,S$GLB,, | Performed by: INTERNAL MEDICINE

## 2020-05-21 PROCEDURE — 93294 REM INTERROG EVL PM/LDLS PM: CPT | Mod: HCNC,S$GLB,, | Performed by: INTERNAL MEDICINE

## 2020-05-21 PROCEDURE — 93296 CARDIAC DEVICE CHECK - REMOTE: ICD-10-PCS | Mod: HCNC,S$GLB,, | Performed by: INTERNAL MEDICINE

## 2020-05-22 ENCOUNTER — HOSPITAL ENCOUNTER (OUTPATIENT)
Dept: RADIOLOGY | Facility: HOSPITAL | Age: 85
Discharge: HOME | End: 2020-05-22
Attending: ANESTHESIOLOGY
Payer: MEDICARE

## 2020-05-22 ENCOUNTER — OFFICE VISIT (OUTPATIENT)
Dept: PAIN MEDICINE | Facility: CLINIC | Age: 85
End: 2020-05-22
Payer: MEDICARE

## 2020-05-22 VITALS
SYSTOLIC BLOOD PRESSURE: 113 MMHG | RESPIRATION RATE: 20 BRPM | DIASTOLIC BLOOD PRESSURE: 67 MMHG | HEART RATE: 70 BPM | TEMPERATURE: 98 F

## 2020-05-22 DIAGNOSIS — S12.200S: ICD-10-CM

## 2020-05-22 DIAGNOSIS — S12.200S: Primary | ICD-10-CM

## 2020-05-22 DIAGNOSIS — S22.020A CLOSED WEDGE COMPRESSION FRACTURE OF SECOND THORACIC VERTEBRA, INITIAL ENCOUNTER: ICD-10-CM

## 2020-05-22 DIAGNOSIS — M54.2 NECK PAIN: ICD-10-CM

## 2020-05-22 PROBLEM — S12.200A: Status: ACTIVE | Noted: 2020-05-22

## 2020-05-22 PROCEDURE — 3078F PR MOST RECENT DIASTOLIC BLOOD PRESSURE < 80 MM HG: ICD-10-PCS | Mod: HCNC,CPTII,S$GLB, | Performed by: ANESTHESIOLOGY

## 2020-05-22 PROCEDURE — 99204 OFFICE O/P NEW MOD 45 MIN: CPT | Mod: HCNC,S$GLB,, | Performed by: ANESTHESIOLOGY

## 2020-05-22 PROCEDURE — 1101F PR PT FALLS ASSESS DOC 0-1 FALLS W/OUT INJ PAST YR: ICD-10-PCS | Mod: HCNC,CPTII,S$GLB, | Performed by: ANESTHESIOLOGY

## 2020-05-22 PROCEDURE — 99999 PR PBB SHADOW E&M-EST. PATIENT-LVL IV: ICD-10-PCS | Mod: PBBFAC,HCNC,, | Performed by: ANESTHESIOLOGY

## 2020-05-22 PROCEDURE — 3078F DIAST BP <80 MM HG: CPT | Mod: HCNC,CPTII,S$GLB, | Performed by: ANESTHESIOLOGY

## 2020-05-22 PROCEDURE — 1159F PR MEDICATION LIST DOCUMENTED IN MEDICAL RECORD: ICD-10-PCS | Mod: HCNC,S$GLB,, | Performed by: ANESTHESIOLOGY

## 2020-05-22 PROCEDURE — 99204 PR OFFICE/OUTPT VISIT, NEW, LEVL IV, 45-59 MIN: ICD-10-PCS | Mod: HCNC,S$GLB,, | Performed by: ANESTHESIOLOGY

## 2020-05-22 PROCEDURE — 72052 X-RAY EXAM NECK SPINE 6/>VWS: CPT | Mod: 26,HCNC,, | Performed by: RADIOLOGY

## 2020-05-22 PROCEDURE — 72052 X-RAY EXAM NECK SPINE 6/>VWS: CPT | Mod: TC,HCNC,FY,PO

## 2020-05-22 PROCEDURE — 3074F SYST BP LT 130 MM HG: CPT | Mod: HCNC,CPTII,S$GLB, | Performed by: ANESTHESIOLOGY

## 2020-05-22 PROCEDURE — 99999 PR PBB SHADOW E&M-EST. PATIENT-LVL IV: CPT | Mod: PBBFAC,HCNC,, | Performed by: ANESTHESIOLOGY

## 2020-05-22 PROCEDURE — 1125F AMNT PAIN NOTED PAIN PRSNT: CPT | Mod: HCNC,S$GLB,, | Performed by: ANESTHESIOLOGY

## 2020-05-22 PROCEDURE — 1159F MED LIST DOCD IN RCRD: CPT | Mod: HCNC,S$GLB,, | Performed by: ANESTHESIOLOGY

## 2020-05-22 PROCEDURE — 3074F PR MOST RECENT SYSTOLIC BLOOD PRESSURE < 130 MM HG: ICD-10-PCS | Mod: HCNC,CPTII,S$GLB, | Performed by: ANESTHESIOLOGY

## 2020-05-22 PROCEDURE — 72052 XR CERVICAL SPINE 5 VIEW WITH FLEX AND EXT: ICD-10-PCS | Mod: 26,HCNC,, | Performed by: RADIOLOGY

## 2020-05-22 PROCEDURE — 1125F PR PAIN SEVERITY QUANTIFIED, PAIN PRESENT: ICD-10-PCS | Mod: HCNC,S$GLB,, | Performed by: ANESTHESIOLOGY

## 2020-05-22 PROCEDURE — 1101F PT FALLS ASSESS-DOCD LE1/YR: CPT | Mod: HCNC,CPTII,S$GLB, | Performed by: ANESTHESIOLOGY

## 2020-05-22 RX ORDER — TRAMADOL HYDROCHLORIDE 50 MG/1
50 TABLET ORAL EVERY 12 HOURS PRN
Qty: 30 TABLET | Refills: 0 | Status: ON HOLD | OUTPATIENT
Start: 2020-05-22 | End: 2020-09-04 | Stop reason: SDUPTHER

## 2020-05-22 NOTE — LETTER
May 22, 2020      Marychuy Alves NP  [address] [facility] [address] [place] [address]           [facility]  [address] [place] [address]  Phone: [phone]  Fax: [phone]          Patient: Miesha Obando   MR Number: [id number]   YOB: 1935   Date of Visit: 5/22/2020       Dear Marychuy Alves:    Thank you for referring Miesha Obando to me for evaluation. Attached you will find relevant portions of my assessment and plan of care.    If you have questions, please do not hesitate to call me. I look forward to following Miesha Obando along with you.    Sincerely,    Fausto Ho MD    Enclosure  CC:  No Recipients    If you would like to receive this communication electronically, please contact [email or web address] or [phone] to request more information on [employer] Link access.    For providers and/or their staff who would like to refer a patient to [facility], please contact us through our one-stop-shop provider referral line, [facility], at [phone].    If you feel you have received this communication in error or would no longer like to receive these types of communications, please e-mail [email or web address]

## 2020-05-22 NOTE — PROGRESS NOTES
[facility] New Patient Evaluation    Referred by: Marychuy Alves NP  Reason for referral: neck pain    CC:   Chief Complaint   Patient presents with    Neck Pain      No flowsheet data found.    HPI:   Miesha Obando is a 84 y.o. female who complains of neck pain    Onset: 5 months  Inciting Event: fell in december  Progression: since onset, pain is gradually worsening  Current Pain Score: 7/10  Timing: constant  Quality: sharp and buring  Radiation: yes, down to left shoulder  Associated numbness or weakness: yes numbness, weakness of left hand  Exacerbated by: standing, walking, coughing/sneezing  Allievated by: rest, medications  Is Pain Level Acceptable?: No    Previous Therapies:  PT/OT:   HEP:   Interventions:   Surgery:  Medications:   - NSAIDS:   - MSK Relaxants:   - TCAs:   - SNRIs:   - Topicals:   - Anticonvulsants: gabapentin  - Opioids:     History:    Current Outpatient Medications:     ACCU-CHEK FASTCLIX Misc, TEST TWICE DAILY, Disp: 200 each, Rfl: 11    ACCU-CHEK SMARTVIEW TEST STRIP Strp, TEST  TWICE DAILY, Disp: 200 strip, Rfl: 11    acetaminophen (TYLENOL) 325 MG tablet, Take 2 tablets (650 mg total) by mouth every 6 (six) hours as needed for Pain or Temperature greater than., Disp: , Rfl: 0    albuterol (PROVENTIL/VENTOLIN HFA) 90 mcg/actuation inhaler, INHALE 1 PUFF EVERY 4 HOURS AS NEEDED, Disp: 54 g, Rfl: 0    alendronate (FOSAMAX) 70 MG tablet, Take 1 tablet (70 mg total) by mouth every 7 days., Disp: 12 tablet, Rfl: 1    azelastine (ASTELIN) 137 mcg (0.1 %) nasal spray, 1 spray (137 mcg total) by Nasal route 2 (two) times daily., Disp: 30 mL, Rfl: 0    b complex vitamins tablet, Take 1 tablet by mouth once daily.  , Disp: , Rfl:     BD ALCOHOL SWABS PadM, USE TWICE DAILY, Disp: 200 each, Rfl: 11    blood sugar diagnostic (ACCU-CHEK MARLON PLUS TEST STRP) Strp, 1 each by Misc.(Non-Drug; Combo Route) route 2 (two) times daily., Disp: 200 each, Rfl: 3    clopidogrel (PLAVIX)  75 mg tablet, TAKE 1 TABLET(75 MG) BY MOUTH EVERY DAY, Disp: 90 tablet, Rfl: 0    diclofenac sodium (VOLTAREN) 1 % Gel, Apply 2 g topically daily as needed. Apply to right shoulder, Disp: 100 g, Rfl: 1    diphenhydramine-acetaminophen (TYLENOL PM)  mg Tab, Take 1 tablet by mouth nightly as needed., Disp: , Rfl:     ergocalciferol (ERGOCALCIFEROL) 50,000 unit Cap, TAKE 1 CAPSULE BY MOUTH EVERY 7 DAYS, Disp: 12 capsule, Rfl: 0    famotidine (PEPCID) 40 MG tablet, Take 1 tablet (40 mg total) by mouth once daily., Disp: 30 tablet, Rfl: 11    fish oil-omega-3 fatty acids 300-1,000 mg capsule, Take 2 g by mouth once daily., Disp: , Rfl:     gabapentin (NEURONTIN) 300 MG capsule, TAKE 1 CAPSULE(300 MG) BY MOUTH TWICE DAILY, Disp: 60 capsule, Rfl: 2    lancets Mis, To check BG 2 (two) times daily, to use with insurance preferred meter, Disp: 200 each, Rfl: 3    levocetirizine (XYZAL) 5 MG tablet, TAKE 1 TABLET(5 MG) BY MOUTH EVERY EVENING, Disp: 90 tablet, Rfl: 0    levothyroxine (SYNTHROID) 100 MCG tablet, TAKE 1 TABLET EVERY DAY, Disp: 90 tablet, Rfl: 2    metoprolol succinate (TOPROL-XL) 25 MG 24 hr tablet, Take 1 tablet (25 mg total) by mouth once daily., Disp: 90 tablet, Rfl: 3    nitroGLYCERIN (NITROSTAT) 0.4 MG SL tablet, Place 1 tablet (0.4 mg total) under the tongue every 5 (five) minutes as needed for Chest pain., Disp: 20 tablet, Rfl: 0    omeprazole (PRILOSEC OTC) 20 MG tablet, Take 1 tablet (20 mg total) by mouth once daily., Disp: 30 tablet, Rfl: 11    spironolactone (ALDACTONE) 25 MG tablet, TAKE 1 TABLET EVERY DAY, Disp: 90 tablet, Rfl: 3    torsemide (DEMADEX) 20 MG Tab, TAKE 1 TABLET(20 MG) BY MOUTH EVERY DAY, Disp: 90 tablet, Rfl: 1    traMADoL (ULTRAM) 50 mg tablet, Take 1 tablet (50 mg total) by mouth every 12 (twelve) hours as needed for Pain., Disp: 30 tablet, Rfl: 0    warfarin (COUMADIN) 2.5 MG tablet, TAKE 1 TABLET EVERY DAY, Disp: 90 tablet, Rfl: 0    Past Medical History:    Diagnosis Date    Anticoagulant long-term use     Arthritis     Atrial fibrillation     Breast cancer 1994    breast , left    Cardiomyopathy - EF 35-40% 5/11/2016    CHF (congestive heart failure)     Chronic bronchitis     COPD (chronic obstructive pulmonary disease)     Coronary artery disease without angina pectoris 8/26/2015    Depression     Diabetes mellitus, type 2     Diabetes with neurologic complications     Diverticulitis     Diverticulosis     Encounter for blood transfusion     after mastectomy x 20 years ago    GERD (gastroesophageal reflux disease)     H/O aortic valve replacement     Hiatal hernia     History of colonic diverticulitis     Hypertension     Hypothyroid     Insomnia     Irritable bowel syndrome     Obesity     Pacemaker 08/2014    Schatzki's ring     mild    Sleep apnea     uses cpap with oxygen     Syncope and collapse     Systolic congestive heart failure, NYHA class 3 5/23/2016    Type II or unspecified type diabetes mellitus without mention of complication, not stated as uncontrolled     No medications at present.     Urinary incontinence        Past Surgical History:   Procedure Laterality Date    ADENOIDECTOMY      APPENDECTOMY      BACK SURGERY      Discectomy, lumbar    BREAST SURGERY  1994    left side , mastectomy    CARDIAC PACEMAKER PLACEMENT      CARDIAC VALVE SURGERY      aortic valve replacement    CHOLECYSTECTOMY      COLONOSCOPY  2/2014    repeat in 10 years for screening    CORONARY ANGIOGRAPHY Bilateral 6/18/2019    Procedure: ANGIOGRAM, CORONARY ARTERY;  Surgeon: Dayron Macias MD;  Location: [facility];  Service: Cardiology;  Laterality: Bilateral;    CORONARY STENT PLACEMENT      x2    EYE SURGERY      bilateral PHACO and IOL    HYSTERECTOMY      ovaries spared    LEFT HEART CATHETERIZATION Right 6/18/2019    Procedure: C/ Coronary Angio;  Surgeon: Dayron Macias MD;  Location: [facility];  Service: Cardiology;   Laterality: Right;    LOOP RECORDER      TOE SURGERY Right     TONSILLECTOMY      UPPER GASTROINTESTINAL ENDOSCOPY  2012       Family History   Problem Relation Age of Onset    Parkinsonism Mother     Emphysema Father     Heart disease Brother     Heart attack Brother     Heart failure Brother     Heart disease Brother     Heart failure Brother     Heart disease Brother     Heart failure Brother     Arrhythmia Brother     Anesthesia problems Neg Hx     Clotting disorder Neg Hx        Social History     Socioeconomic History    Marital status:      Spouse name: Not on file    Number of children: Not on file    Years of education: Not on file    Highest education level: Not on file   Occupational History    Not on file   Social Needs    Financial resource strain: Not on file    Food insecurity:     Worry: Not on file     Inability: Not on file    Transportation needs:     Medical: Not on file     Non-medical: Not on file   Tobacco Use    Smoking status: Former Smoker     Packs/day: 1.00     Years: 30.00     Pack years: 30.00     Start date: 1958     Last attempt to quit: 1988     Years since quittin.3    Smokeless tobacco: Never Used   Substance and Sexual Activity    Alcohol use: Yes     Comment: 0-2 glasses of wine per month    Drug use: No    Sexual activity: Not on file   Lifestyle    Physical activity:     Days per week: Not on file     Minutes per session: Not on file    Stress: Not on file   Relationships    Social connections:     Talks on phone: Not on file     Gets together: Not on file     Attends Yazidism service: Not on file     Active member of club or organization: Not on file     Attends meetings of clubs or organizations: Not on file     Relationship status: Not on file   Other Topics Concern    Not on file   Social History Narrative    Not on file       Review of patient's allergies indicates:   Allergen Reactions    Statins-hmg-coa reductase  "inhibitors      "bad leg cramps"    Sulfa (sulfonamide antibiotics) Hives    Doxycycline      Stomach issues    Adhesive Rash     Use paper tape    Amoxicillin-pot clavula (bulk) Nausea And Vomiting       Review of Systems:  General ROS: negative for - fever  Psychological ROS: negative for - hostility  Hematological and Lymphatic ROS: positive for - bruising  Endocrine ROS: negative for - unexpected weight changes  Respiratory ROS: no cough, shortness of breath, or wheezing  Cardiovascular ROS: no chest pain or dyspnea on exertion  Gastrointestinal ROS: no abdominal pain, change in bowel habits, or black or bloody stools  Musculoskeletal ROS: positive for - muscular weakness  Neurological ROS: positive for - numbness/tingling  Dermatological ROS: negative for rash    Physical Exam:  Vitals:    05/22/20 1416   BP: 113/67   Pulse: 70   Resp: 20   Temp: 97.9 °F (36.6 °C)   PainSc:   6   PainLoc: Neck     There is no height or weight on file to calculate BMI.     Gen: NAD  Psych: mood appropriate for given condition  CV: 2+ radial pulse  HEENT: anicteric   Respiratory: non labored  Abd: soft nt, nd  Skin: intact  Sensation: intact to lt touch bilaterally in c4-t1   Reflexes: 0/0 b/l Bicep, tricep, BR and patella Vargas negative  ROM: Cervical ROM full, shoulder, elbow and wrist ROM full  Tone:  Normal at elbow, wrist and shoulder   Inspection: no atrophy of bicep, FDI or APB noted  Palpation: tender midline cervical/thoracic    Motor:    Right Left   C4 Shoulder Abduction  5  5   C5 Elbow Flexion    5  5   C6 Wrist Extension  5  5   C7 Elbow Extension   5  5   C8/T1 Hand Intrinsics   5  5   C8 First Dorsal Interosseus  5  5   C8 Abductor Pollicus Brevis  5  5       Imaging:  CT cervical spine 5/21/20  FINDINGS:  There is redemonstration of fracture of the right aspect of the bifid C3 spinous process.  There is similar displacement of the fracture fragment.  There is mild superior endplate height loss at the T2 " vertebral body which does appear new from the previous examination.  There is multilevel disc space narrowing and marginal osteophytosis..  There is multilevel uncovertebral hypertrophy.  There is facet arthropathy throughout the cervical spine.  There is partially calcified disc bulge/protrusion at the C3-C4 level.  There is no significant osseous spinal canal narrowing identified.  There is osseous neural foraminal narrowing noted at C3-C4 bilaterally on the left at C4-C5, bilaterally at C6-C7.  The atlantoaxial articulation demonstrates degenerative change but otherwise appears intact.  The prevertebral soft tissues appear within normal limits.  The visualized lung apices demonstrate no acute process.  Atherosclerotic calcifications are noted.    Cervical xray 1/27/20  FINDINGS:  Vertebral body alignment is normal without change with flexion or extension.    Labs:  BMP  Lab Results   Component Value Date     02/18/2020    K 4.8 02/18/2020     02/18/2020    CO2 27 02/18/2020    BUN 26 (H) 02/18/2020    CREATININE 1.2 02/18/2020    CALCIUM 9.5 02/18/2020    ANIONGAP 9 02/18/2020    ESTGFRAFRICA 48.6 (A) 02/18/2020    EGFRNONAA 42.2 (A) 02/18/2020     Lab Results   Component Value Date    ALT 15 02/18/2020    AST 21 02/18/2020    ALKPHOS 95 02/18/2020    BILITOT 0.2 02/18/2020       Assessment:   Problem List Items Addressed This Visit        Neuro    Closed displaced fracture of third cervical vertebra - Primary    Relevant Orders    X-Ray Cervical Spine 5 View With Flex And Ext    Closed wedge compression fracture of second thoracic vertebra    Relevant Medications    traMADoL (ULTRAM) 50 mg tablet       Orthopedic    Neck pain          84 y.o. year old female with PMH HTN, CAD s/p LILLIAM placement 6/2019 on coumdain and plavix, CKD, DM II presents to the office with neck pain.  Her pain started in December after a fall.  She was evaluated at [facility] by neurosurgery and no surgical intervention  necessary.  Today she reports continued neck pain, constant, sharp, burning, 7/10.  She has some weakness and numbness in the left hand.  No acute bowel/bladder changes or gait changes.  Her pain is worse with sneezing, coughing, walking, and relieved with rest and medications.  She takes tylenol and gabapentin.  On exam she has 5/5 strength, tenderness to palpation over her midline lower cervical/upper thoracic spine.  She had a repeat cervical CT on 5/21/20 that shows redemonstration of fracture of the right aspect of the bifid C3 spinous process and mild superior endplate height loss at the T2 vertebral body which does appear new from the previous examination.     She has seen neurosurgery at the end of January and there was no routine neurosurgical follow up warranted.  The only thing that has changed is that she has now developed neck pain with a mild T2 compression fracture on CT.  She does not have myelopathic findings on exam today and no thoracic radicular complaints.  She has significant cormorbidities requiring anticoagulation with plavix and coumadin.  We will start tramadol 50mg po bid prn for severe pain.  I will defer MRI of her T2 compression fracture because she is not complaining of any thoracic radicular complaints and an MRI won't change my current management.   If she has any changes with flexion/extension on xray today then I will refer her back to neurosurgery.   Follow up in 1 month.     Treatment Plan:   Procedures: none at this time  Medications: continue gabapentin and tylenol. Start tramadol 50mg po bid prn for severe pain  Labs: Reviewed and medications are appropriately dosed for current hepatorenal function.  Imaging: Xray cervical spine with flex/ex    : Reviewed and consistent with medication use as prescribed.    Fausto Ho M.D.  Interventional Pain Medicine / Anesthesiology

## 2020-06-03 ENCOUNTER — TELEPHONE (OUTPATIENT)
Dept: CARDIOLOGY | Facility: CLINIC | Age: 85
End: 2020-06-03

## 2020-06-03 DIAGNOSIS — I48.20 CHRONIC A-FIB: ICD-10-CM

## 2020-06-03 DIAGNOSIS — Z95.0 PACEMAKER: Primary | ICD-10-CM

## 2020-06-08 DIAGNOSIS — I10 ESSENTIAL HYPERTENSION: Primary | ICD-10-CM

## 2020-06-08 RX ORDER — METOPROLOL SUCCINATE 25 MG/1
25 TABLET, EXTENDED RELEASE ORAL DAILY
Qty: 90 TABLET | Refills: 3 | Status: SHIPPED | OUTPATIENT
Start: 2020-06-08 | End: 2020-11-11

## 2020-06-08 NOTE — TELEPHONE ENCOUNTER
----- Message from Chao Acosta sent at 6/8/2020  9:59 AM CDT -----  Contact: Abelino with [facility]  Type:  RX Refill Request    Who Called:  Abelino  Refill or New Rx:  refill  RX Name and Strength:  metoprolol succinate (TOPROL-XL) 25 MG 24 hr tablet  How is the patient currently taking it? (ex. 1XDay):  1 x da  Is this a 30 day or 90 day RX:  90  Preferred Pharmacy with phone number:    [id number] [place] [address] AT [facility] & [address] [address]  Phone: [phone] Fax: [phone]    Local or Mail Order:    Ordering Provider:    Best Call Back Number:  [phone]  Additional Information:  escribe request was sent last week with no response

## 2020-06-09 ENCOUNTER — OFFICE VISIT (OUTPATIENT)
Dept: NEUROSURGERY | Facility: CLINIC | Age: 85
End: 2020-06-09
Payer: MEDICARE

## 2020-06-09 VITALS — DIASTOLIC BLOOD PRESSURE: 74 MMHG | SYSTOLIC BLOOD PRESSURE: 122 MMHG | TEMPERATURE: 99 F

## 2020-06-09 DIAGNOSIS — S22.000A COMPRESSION FRACTURE OF BODY OF THORACIC VERTEBRA: ICD-10-CM

## 2020-06-09 DIAGNOSIS — R25.1 OCCASIONAL TREMORS: Primary | ICD-10-CM

## 2020-06-09 PROCEDURE — 1101F PT FALLS ASSESS-DOCD LE1/YR: CPT | Mod: HCNC,CPTII,S$GLB, | Performed by: NEUROLOGICAL SURGERY

## 2020-06-09 PROCEDURE — 3078F DIAST BP <80 MM HG: CPT | Mod: HCNC,CPTII,S$GLB, | Performed by: NEUROLOGICAL SURGERY

## 2020-06-09 PROCEDURE — 1159F MED LIST DOCD IN RCRD: CPT | Mod: HCNC,S$GLB,, | Performed by: NEUROLOGICAL SURGERY

## 2020-06-09 PROCEDURE — 1101F PR PT FALLS ASSESS DOC 0-1 FALLS W/OUT INJ PAST YR: ICD-10-PCS | Mod: HCNC,CPTII,S$GLB, | Performed by: NEUROLOGICAL SURGERY

## 2020-06-09 PROCEDURE — 3074F PR MOST RECENT SYSTOLIC BLOOD PRESSURE < 130 MM HG: ICD-10-PCS | Mod: HCNC,CPTII,S$GLB, | Performed by: NEUROLOGICAL SURGERY

## 2020-06-09 PROCEDURE — 3078F PR MOST RECENT DIASTOLIC BLOOD PRESSURE < 80 MM HG: ICD-10-PCS | Mod: HCNC,CPTII,S$GLB, | Performed by: NEUROLOGICAL SURGERY

## 2020-06-09 PROCEDURE — 1159F PR MEDICATION LIST DOCUMENTED IN MEDICAL RECORD: ICD-10-PCS | Mod: HCNC,S$GLB,, | Performed by: NEUROLOGICAL SURGERY

## 2020-06-09 PROCEDURE — 1125F PR PAIN SEVERITY QUANTIFIED, PAIN PRESENT: ICD-10-PCS | Mod: HCNC,S$GLB,, | Performed by: NEUROLOGICAL SURGERY

## 2020-06-09 PROCEDURE — 1125F AMNT PAIN NOTED PAIN PRSNT: CPT | Mod: HCNC,S$GLB,, | Performed by: NEUROLOGICAL SURGERY

## 2020-06-09 PROCEDURE — 99214 PR OFFICE/OUTPT VISIT, EST, LEVL IV, 30-39 MIN: ICD-10-PCS | Mod: HCNC,S$GLB,, | Performed by: NEUROLOGICAL SURGERY

## 2020-06-09 PROCEDURE — 99999 PR PBB SHADOW E&M-EST. PATIENT-LVL IV: ICD-10-PCS | Mod: PBBFAC,HCNC,, | Performed by: NEUROLOGICAL SURGERY

## 2020-06-09 PROCEDURE — 99214 OFFICE O/P EST MOD 30 MIN: CPT | Mod: HCNC,S$GLB,, | Performed by: NEUROLOGICAL SURGERY

## 2020-06-09 PROCEDURE — 99999 PR PBB SHADOW E&M-EST. PATIENT-LVL IV: CPT | Mod: PBBFAC,HCNC,, | Performed by: NEUROLOGICAL SURGERY

## 2020-06-09 PROCEDURE — 3074F SYST BP LT 130 MM HG: CPT | Mod: HCNC,CPTII,S$GLB, | Performed by: NEUROLOGICAL SURGERY

## 2020-06-09 NOTE — PROGRESS NOTES
"Neurosurgery History and Physical    Patient ID: Miesha Obando is a 84 y.o. female.    Chief Complaint   Patient presents with    Follow-up     Patient here for follow up of recent T2 compression fracture found on CT ordered by Dr. Ho. Patient said her head and neck "feel heavy". Also said she has been having recent headaches. She denies any radiating pain. Patient said she has had an unsteady gait and would like to be referred to someone for work up for Parkinsons.       Review of Systems   Constitutional: Negative.    HENT: Negative.    Eyes: Negative.    Respiratory: Negative.    Cardiovascular: Negative.    Gastrointestinal: Negative.    Endocrine: Negative.    Genitourinary: Negative.    Musculoskeletal: Positive for arthralgias, gait problem, myalgias and neck pain.   Skin: Negative.    Allergic/Immunologic: Negative.    Neurological: Negative for numbness.   Hematological: Negative.    Psychiatric/Behavioral: Negative.        Past Medical History:   Diagnosis Date    Anticoagulant long-term use     Arthritis     Atrial fibrillation     Breast cancer 1994    breast , left    Cardiomyopathy - EF 35-40% 5/11/2016    CHF (congestive heart failure)     Chronic bronchitis     COPD (chronic obstructive pulmonary disease)     Coronary artery disease without angina pectoris 8/26/2015    Depression     Diabetes mellitus, type 2     Diabetes with neurologic complications     Diverticulitis     Diverticulosis     Encounter for blood transfusion     after mastectomy x 20 years ago    GERD (gastroesophageal reflux disease)     H/O aortic valve replacement     Hiatal hernia     History of colonic diverticulitis     Hypertension     Hypothyroid     Insomnia     Irritable bowel syndrome     Obesity     Pacemaker 08/2014    Schatzki's ring     mild    Sleep apnea     uses cpap with oxygen     Syncope and collapse     Systolic congestive heart failure, NYHA class 3 5/23/2016    Type II or " unspecified type diabetes mellitus without mention of complication, not stated as uncontrolled     No medications at present.     Urinary incontinence      Social History     Socioeconomic History    Marital status:      Spouse name: Not on file    Number of children: Not on file    Years of education: Not on file    Highest education level: Not on file   Occupational History    Not on file   Social Needs    Financial resource strain: Not on file    Food insecurity:     Worry: Not on file     Inability: Not on file    Transportation needs:     Medical: Not on file     Non-medical: Not on file   Tobacco Use    Smoking status: Former Smoker     Packs/day: 1.00     Years: 30.00     Pack years: 30.00     Start date: 1958     Last attempt to quit: 1988     Years since quittin.4    Smokeless tobacco: Never Used   Substance and Sexual Activity    Alcohol use: Yes     Comment: 0-2 glasses of wine per month    Drug use: No    Sexual activity: Not on file   Lifestyle    Physical activity:     Days per week: Not on file     Minutes per session: Not on file    Stress: Not on file   Relationships    Social connections:     Talks on phone: Not on file     Gets together: Not on file     Attends Christian service: Not on file     Active member of club or organization: Not on file     Attends meetings of clubs or organizations: Not on file     Relationship status: Not on file   Other Topics Concern    Not on file   Social History Narrative    Not on file     Family History   Problem Relation Age of Onset    Parkinsonism Mother     Emphysema Father     Heart disease Brother     Heart attack Brother     Heart failure Brother     Heart disease Brother     Heart failure Brother     Heart disease Brother     Heart failure Brother     Arrhythmia Brother     Anesthesia problems Neg Hx     Clotting disorder Neg Hx      Review of patient's allergies indicates:   Allergen Reactions     "Statins-hmg-coa reductase inhibitors      "bad leg cramps"    Sulfa (sulfonamide antibiotics) Hives    Doxycycline      Stomach issues    Adhesive Rash     Use paper tape    Amoxicillin-pot clavula (bulk) Nausea And Vomiting       Current Outpatient Medications:     ACCU-CHEK FASTCLIX Misc, TEST TWICE DAILY, Disp: 200 each, Rfl: 11    ACCU-CHEK SMARTVIEW TEST STRIP Strp, TEST  TWICE DAILY, Disp: 200 strip, Rfl: 11    acetaminophen (TYLENOL) 325 MG tablet, Take 2 tablets (650 mg total) by mouth every 6 (six) hours as needed for Pain or Temperature greater than., Disp: , Rfl: 0    albuterol (PROVENTIL/VENTOLIN HFA) 90 mcg/actuation inhaler, INHALE 1 PUFF EVERY 4 HOURS AS NEEDED, Disp: 54 g, Rfl: 0    alendronate (FOSAMAX) 70 MG tablet, Take 1 tablet (70 mg total) by mouth every 7 days., Disp: 12 tablet, Rfl: 1    azelastine (ASTELIN) 137 mcg (0.1 %) nasal spray, 1 spray (137 mcg total) by Nasal route 2 (two) times daily., Disp: 30 mL, Rfl: 0    b complex vitamins tablet, Take 1 tablet by mouth once daily.  , Disp: , Rfl:     BD ALCOHOL SWABS PadM, USE TWICE DAILY, Disp: 200 each, Rfl: 11    blood sugar diagnostic (ACCU-CHEK MARLON PLUS TEST STRP) Strp, 1 each by Misc.(Non-Drug; Combo Route) route 2 (two) times daily., Disp: 200 each, Rfl: 3    clopidogrel (PLAVIX) 75 mg tablet, TAKE 1 TABLET(75 MG) BY MOUTH EVERY DAY, Disp: 90 tablet, Rfl: 0    diclofenac sodium (VOLTAREN) 1 % Gel, Apply 2 g topically daily as needed. Apply to right shoulder, Disp: 100 g, Rfl: 1    diphenhydramine-acetaminophen (TYLENOL PM)  mg Tab, Take 1 tablet by mouth nightly as needed., Disp: , Rfl:     ergocalciferol (ERGOCALCIFEROL) 50,000 unit Cap, TAKE 1 CAPSULE BY MOUTH EVERY 7 DAYS, Disp: 12 capsule, Rfl: 0    famotidine (PEPCID) 40 MG tablet, Take 1 tablet (40 mg total) by mouth once daily., Disp: 30 tablet, Rfl: 11    fish oil-omega-3 fatty acids 300-1,000 mg capsule, Take 2 g by mouth once daily., Disp: , Rfl:    "  gabapentin (NEURONTIN) 300 MG capsule, TAKE 1 CAPSULE(300 MG) BY MOUTH TWICE DAILY, Disp: 60 capsule, Rfl: 2    lancets Misc, To check BG 2 (two) times daily, to use with insurance preferred meter, Disp: 200 each, Rfl: 3    levocetirizine (XYZAL) 5 MG tablet, TAKE 1 TABLET(5 MG) BY MOUTH EVERY EVENING, Disp: 90 tablet, Rfl: 0    levothyroxine (SYNTHROID) 100 MCG tablet, TAKE 1 TABLET EVERY DAY, Disp: 90 tablet, Rfl: 2    metoprolol succinate (TOPROL-XL) 25 MG 24 hr tablet, Take 1 tablet (25 mg total) by mouth once daily., Disp: 90 tablet, Rfl: 3    spironolactone (ALDACTONE) 25 MG tablet, TAKE 1 TABLET EVERY DAY, Disp: 90 tablet, Rfl: 3    torsemide (DEMADEX) 20 MG Tab, TAKE 1 TABLET(20 MG) BY MOUTH EVERY DAY, Disp: 90 tablet, Rfl: 1    traMADoL (ULTRAM) 50 mg tablet, Take 1 tablet (50 mg total) by mouth every 12 (twelve) hours as needed for Pain., Disp: 30 tablet, Rfl: 0    warfarin (COUMADIN) 2.5 MG tablet, TAKE 1 TABLET EVERY DAY, Disp: 90 tablet, Rfl: 0    nitroGLYCERIN (NITROSTAT) 0.4 MG SL tablet, Place 1 tablet (0.4 mg total) under the tongue every 5 (five) minutes as needed for Chest pain., Disp: 20 tablet, Rfl: 0    omeprazole (PRILOSEC OTC) 20 MG tablet, Take 1 tablet (20 mg total) by mouth once daily., Disp: 30 tablet, Rfl: 11  Blood pressure 122/74, temperature 98.9 °F (37.2 °C).      Neurologic Exam     Mental Status   Oriented to person, place, and time.   Attention: normal. Concentration: normal.   Speech: speech is normal   Level of consciousness: alert  Knowledge: good.     Cranial Nerves     CN II   Visual acuity: normal    CN III, IV, VI   Pupils are equal, round, and reactive to light.  Extraocular motions are normal.     CN V   Facial sensation intact.     CN VII   Facial expression full, symmetric.     CN VIII   Hearing: intact    CN IX, X   Palate: symmetric    CN XI   CN XI normal.     CN XII   CN XII normal.     Motor Exam   Muscle bulk: normal  Overall muscle tone: normal  Right  arm pronator drift: absent  Left arm pronator drift: absent    Strength   Right deltoid: 5/5  Left deltoid: 5/5  Right biceps: 5/5  Left biceps: 5/5  Right triceps: 5/5  Left triceps: 5/5  Right wrist flexion: 5/5  Left wrist flexion: 5/5  Right wrist extension: 5/5  Left wrist extension: 5/5  Right interossei: 5/5  Left interossei: 5/5  Right iliopsoas: 5/5  Left iliopsoas: 5/5  Right quadriceps: 5/5  Left quadriceps: 5/5  Right hamstrin/5  Left hamstrin/5  Right anterior tibial: 5/5  Left anterior tibial: 5/5  Right posterior tibial: 5/5  Left posterior tibial: 5/5  Right peroneal: 5/5  Left peroneal: 5/5  Right gastroc: 5/5  Left gastroc: 5/5    Sensory Exam   Light touch normal.     Gait, Coordination, and Reflexes     Gait  Gait: normal    Coordination   Romberg: negative  Finger to nose coordination: normal    Tremor   Resting tremor: absent    Reflexes   Right brachioradialis: 2+  Left brachioradialis: 2+  Right biceps: 2+  Left biceps: 2+  Right triceps: 2+  Left triceps: 2+  Right patellar: 1+  Left patellar: 1+  Right achilles: 1+  Left achilles: 1+  Right plantar: normal  Left plantar: normal  Right Vargas: absent  Left Vargas: absent  Right ankle clonus: absent  Left ankle clonus: absent      Physical Exam   Constitutional: She is oriented to person, place, and time.   Eyes: Pupils are equal, round, and reactive to light. EOM are normal.   Neurological: She is oriented to person, place, and time. She has a normal Finger-Nose-Finger Test and a normal Romberg Test. Gait normal.   Reflex Scores:       Tricep reflexes are 2+ on the right side and 2+ on the left side.       Bicep reflexes are 2+ on the right side and 2+ on the left side.       Brachioradialis reflexes are 2+ on the right side and 2+ on the left side.       Patellar reflexes are 1+ on the right side and 1+ on the left side.       Achilles reflexes are 1+ on the right side and 1+ on the left side.  Psychiatric: Her speech is normal.        Vital Signs  Temp: 98.9 °F (37.2 °C)  BP: 122/74  Pain Score:   6]    Provider dictation:  I reviewed the imaging. There is a new mild T1 superior end plate deformity.    Miesha Obando is a 84 year old female who presents for neurosurgical evaluation referred back by Dr. Ho for new cervical fractures. Patient was seen previously s/p a fall in her bathtub on 12/25/2019 where she fell backward from sitting. She was seen in the ED and CT scan cervical spine showed a spinous process fracture at C3. Patient denies any recent falls. She complains of posterior neck pain. Denies any new upper extremity pain or numbness.     On exam patient has full strength and no sensory deficits. No signs of myelopathy. She has a resting tremor. There is point tenderness over T2.    No instability or fracture progression on XR. Fracture is stable. Recommend soft collar for comfort. Will need re-evaluation for osteoporosis given her new spontaneous fracture and prior severe osteoporosis on DEXA scan. She is requesting referral to Neurology for her tremors and gait difficulty, which could be parkinsonian. I explained that her chronic neck and back pain are related to her positive sagittal balance and thoracic kyphosis and reversal of cervical lordosis. She is not a candidate for corrective surgery for these issues and the primary strategy should be symptomatic treatment with Pain Management and PT. F/U with Dr Ho.        Visit Diagnosis:  Occasional tremors  -     Ambulatory referral/consult to Neurology; Future; Expected date: 06/16/2020    Compression fracture of body of thoracic vertebra

## 2020-06-11 ENCOUNTER — TELEPHONE (OUTPATIENT)
Dept: FAMILY MEDICINE | Facility: CLINIC | Age: 85
End: 2020-06-11

## 2020-06-11 NOTE — TELEPHONE ENCOUNTER
Pt stated that her mother had  Parkinson's and is concerned that she may have it also. He symptoms are worsen of the shaking in her hands and headaches. Please advise.

## 2020-06-11 NOTE — TELEPHONE ENCOUNTER
----- Message from Caren Quintanilla MA sent at 6/11/2020  1:01 PM CDT -----  Contact: patient  Type: Needs Medical Advice  Who Called:  Miesha Rebollar Call Back Number:  or   Additional Information: patient would like a referral to neurology.  Please call to discuss

## 2020-06-22 ENCOUNTER — OFFICE VISIT (OUTPATIENT)
Dept: CARDIOLOGY | Facility: CLINIC | Age: 85
End: 2020-06-22
Payer: MEDICARE

## 2020-06-22 VITALS
WEIGHT: 226.44 LBS | SYSTOLIC BLOOD PRESSURE: 115 MMHG | HEIGHT: 62 IN | HEART RATE: 82 BPM | DIASTOLIC BLOOD PRESSURE: 86 MMHG | BODY MASS INDEX: 41.67 KG/M2

## 2020-06-22 DIAGNOSIS — Z95.2 S/P TAVR (TRANSCATHETER AORTIC VALVE REPLACEMENT): ICD-10-CM

## 2020-06-22 DIAGNOSIS — I48.20 CHRONIC A-FIB: ICD-10-CM

## 2020-06-22 DIAGNOSIS — I25.10 CORONARY ARTERY DISEASE INVOLVING NATIVE CORONARY ARTERY OF NATIVE HEART WITHOUT ANGINA PECTORIS: ICD-10-CM

## 2020-06-22 DIAGNOSIS — Z95.810 AICD (AUTOMATIC CARDIOVERTER/DEFIBRILLATOR) PRESENT: ICD-10-CM

## 2020-06-22 DIAGNOSIS — I50.42 CHRONIC COMBINED SYSTOLIC AND DIASTOLIC CONGESTIVE HEART FAILURE, NYHA CLASS 3: ICD-10-CM

## 2020-06-22 DIAGNOSIS — I42.0 DILATED CARDIOMYOPATHY: ICD-10-CM

## 2020-06-22 DIAGNOSIS — I35.0 NONRHEUMATIC AORTIC VALVE STENOSIS: ICD-10-CM

## 2020-06-22 DIAGNOSIS — Z95.5 S/P DRUG ELUTING CORONARY STENT PLACEMENT: ICD-10-CM

## 2020-06-22 PROCEDURE — 99999 PR PBB SHADOW E&M-EST. PATIENT-LVL III: CPT | Mod: PBBFAC,HCNC,, | Performed by: INTERNAL MEDICINE

## 2020-06-22 PROCEDURE — 3079F PR MOST RECENT DIASTOLIC BLOOD PRESSURE 80-89 MM HG: ICD-10-PCS | Mod: HCNC,CPTII,S$GLB, | Performed by: INTERNAL MEDICINE

## 2020-06-22 PROCEDURE — 1126F PR PAIN SEVERITY QUANTIFIED, NO PAIN PRESENT: ICD-10-PCS | Mod: HCNC,S$GLB,, | Performed by: INTERNAL MEDICINE

## 2020-06-22 PROCEDURE — 99214 OFFICE O/P EST MOD 30 MIN: CPT | Mod: HCNC,S$GLB,, | Performed by: INTERNAL MEDICINE

## 2020-06-22 PROCEDURE — 1101F PR PT FALLS ASSESS DOC 0-1 FALLS W/OUT INJ PAST YR: ICD-10-PCS | Mod: HCNC,CPTII,S$GLB, | Performed by: INTERNAL MEDICINE

## 2020-06-22 PROCEDURE — 1101F PT FALLS ASSESS-DOCD LE1/YR: CPT | Mod: HCNC,CPTII,S$GLB, | Performed by: INTERNAL MEDICINE

## 2020-06-22 PROCEDURE — 1159F PR MEDICATION LIST DOCUMENTED IN MEDICAL RECORD: ICD-10-PCS | Mod: HCNC,S$GLB,, | Performed by: INTERNAL MEDICINE

## 2020-06-22 PROCEDURE — 1126F AMNT PAIN NOTED NONE PRSNT: CPT | Mod: HCNC,S$GLB,, | Performed by: INTERNAL MEDICINE

## 2020-06-22 PROCEDURE — 1159F MED LIST DOCD IN RCRD: CPT | Mod: HCNC,S$GLB,, | Performed by: INTERNAL MEDICINE

## 2020-06-22 PROCEDURE — 99999 PR PBB SHADOW E&M-EST. PATIENT-LVL III: ICD-10-PCS | Mod: PBBFAC,HCNC,, | Performed by: INTERNAL MEDICINE

## 2020-06-22 PROCEDURE — 3074F SYST BP LT 130 MM HG: CPT | Mod: HCNC,CPTII,S$GLB, | Performed by: INTERNAL MEDICINE

## 2020-06-22 PROCEDURE — 99214 PR OFFICE/OUTPT VISIT, EST, LEVL IV, 30-39 MIN: ICD-10-PCS | Mod: HCNC,S$GLB,, | Performed by: INTERNAL MEDICINE

## 2020-06-22 PROCEDURE — 3079F DIAST BP 80-89 MM HG: CPT | Mod: HCNC,CPTII,S$GLB, | Performed by: INTERNAL MEDICINE

## 2020-06-22 PROCEDURE — 3074F PR MOST RECENT SYSTOLIC BLOOD PRESSURE < 130 MM HG: ICD-10-PCS | Mod: HCNC,CPTII,S$GLB, | Performed by: INTERNAL MEDICINE

## 2020-06-22 NOTE — PROGRESS NOTES
Subjective:    Patient ID:  Miesha Obando is a 84 y.o. female who presents for follow-up of Coronary Artery Disease (6 month follow up), Hyperlipidemia, Hypertension, and Aortic Stenosis      Pt here for f/u. Since last visit she has a fall with neck injury due to imbalance. She is concerned about possible Parkinson's. She reports bouts of nausea and vomiting the past few weeks. She reports no new cardiac symptoms or issues. She is due for device check in 2 weeks.      Review of Systems   Constitution: Negative for weight gain and weight loss.   HENT: Negative.    Eyes: Negative.    Cardiovascular: Negative for chest pain, claudication, cyanosis, dyspnea on exertion, irregular heartbeat, leg swelling, near-syncope, orthopnea (no PND) and palpitations.   Respiratory: Negative for cough, hemoptysis, shortness of breath and snoring.    Endocrine: Negative.    Skin: Negative.    Musculoskeletal: Positive for falls, joint swelling and neck pain. Negative for joint pain, muscle cramps, muscle weakness and myalgias.   Gastrointestinal: Positive for nausea and vomiting. Negative for diarrhea and hematemesis.   Genitourinary: Negative.    Neurological: Positive for loss of balance. Negative for dizziness, focal weakness, light-headedness, numbness, paresthesias and seizures.   Psychiatric/Behavioral: Negative.         Objective:    Physical Exam   Constitutional: She is oriented to person, place, and time. She appears well-developed and well-nourished.   Eyes: Pupils are equal, round, and reactive to light.   Neck: Normal range of motion. No thyromegaly present.   Cardiovascular: Normal rate, S1 normal, S2 normal, intact distal pulses and normal pulses. An irregularly irregular rhythm present.  No extrasystoles are present. PMI is not displaced. Exam reveals no friction rub.   Murmur heard.   Medium-pitched systolic murmur is present with a grade of 1/6 at the upper right sternal border.  Pulmonary/Chest: Effort normal and  breath sounds normal. She has no wheezes. She has no rales. She exhibits no tenderness.   Abdominal: Soft. Bowel sounds are normal. She exhibits no distension and no mass. There is no abdominal tenderness.   Musculoskeletal: Normal range of motion.         General: No edema.   Neurological: She is alert and oriented to person, place, and time.   Skin: Skin is warm and dry.   Vitals reviewed.        Assessment:       1. Nonrheumatic aortic valve stenosis    2. S/P TAVR (transcatheter aortic valve replacement) - 23mm Taylor 09/2015    3. Coronary artery disease involving native coronary artery of native heart without angina pectoris    4. S/P drug eluting coronary stent placement - RCA 2015; LCX 06/2019    5. Chronic combined systolic and diastolic congestive heart failure, NYHA class 3    6. Cardiomyopathy - EF 30-35%    7. AICD (automatic cardioverter/defibrillator) present - Bi-V    8. Chronic a-fib         Plan:       Cardiac status appears stable  We discussed her seeing PCP as concerns her nausea and vomiting and her concerns about Parkinson's  Continue present Rx  F/u 6 months or sooner if needed

## 2020-06-23 ENCOUNTER — OFFICE VISIT (OUTPATIENT)
Dept: PAIN MEDICINE | Facility: CLINIC | Age: 85
End: 2020-06-23
Payer: MEDICARE

## 2020-06-23 VITALS
OXYGEN SATURATION: 98 % | RESPIRATION RATE: 20 BRPM | SYSTOLIC BLOOD PRESSURE: 146 MMHG | DIASTOLIC BLOOD PRESSURE: 62 MMHG | TEMPERATURE: 98 F | WEIGHT: 228 LBS | BODY MASS INDEX: 41.7 KG/M2 | HEART RATE: 72 BPM

## 2020-06-23 DIAGNOSIS — S12.200S: Primary | ICD-10-CM

## 2020-06-23 PROCEDURE — 99999 PR PBB SHADOW E&M-EST. PATIENT-LVL V: CPT | Mod: PBBFAC,HCNC,, | Performed by: NURSE PRACTITIONER

## 2020-06-23 PROCEDURE — 99999 PR PBB SHADOW E&M-EST. PATIENT-LVL V: ICD-10-PCS | Mod: PBBFAC,HCNC,, | Performed by: NURSE PRACTITIONER

## 2020-06-23 PROCEDURE — 99212 OFFICE O/P EST SF 10 MIN: CPT | Mod: HCNC,S$GLB,, | Performed by: NURSE PRACTITIONER

## 2020-06-23 PROCEDURE — 99212 PR OFFICE/OUTPT VISIT, EST, LEVL II, 10-19 MIN: ICD-10-PCS | Mod: HCNC,S$GLB,, | Performed by: NURSE PRACTITIONER

## 2020-06-23 NOTE — PROGRESS NOTES
[facility] New Patient Evaluation    Referred by: Marychuy Alves NP  Reason for referral: neck pain    CC:   Chief Complaint   Patient presents with    Low-back Pain    Neck Pain    Shoulder Pain      Last 3 PDI Scores 6/23/2020   Pain Disability Index (PDI) 18        Interval HPI 6/23/20:    6/23/20 - Mrs Obando returns to clinic today for follow up cervical pain.  She has a history of fall in December with C3 fracture and redemonstration C3 fracture upon follow up in May.  She reports treating osteoporosis for 2 years, takes fosamax daily per her PCP. She has been to neurosurgery on 6/9/20 with no surgical recommendation at this time, the placed an order for her to be evaluated by neurology for tremors and gait difficulty.  She was given a cervical collar for comfort.  She reports her pain rating is 4/10, aching and increases with sneezing and coughing, she has more pain at the end of the day.  She is taking tramadol 50mg prn severe pain at night which helps her to sleep.  She reports she cannot tolerate taking it daily as it makes her constipated, she is using a stool softener.  She denies any weakness or numbness, no bowel or bladder dysfunction.        HPI:   Miesha Obando is a 84 y.o. female who complains of neck pain    Onset: 5 months  Inciting Event: fell in december  Progression: since onset, pain is gradually worsening  Current Pain Score: 7/10  Timing: constant  Quality: sharp and buring  Radiation: yes, down to left shoulder  Associated numbness or weakness: yes numbness, weakness of left hand  Exacerbated by: standing, walking, coughing/sneezing  Allievated by: rest, medications  Is Pain Level Acceptable?: No    Previous Therapies:  PT/OT:   HEP:   Interventions:   Surgery:  Medications:   - NSAIDS:   - MSK Relaxants:   - TCAs:   - SNRIs:   - Topicals:   - Anticonvulsants: gabapentin  - Opioids:     History:    Current Outpatient Medications:     ACCU-CHEK FASTCLIX Misc, TEST TWICE  DAILY, Disp: 200 each, Rfl: 11    ACCU-CHEK SMARTVIEW TEST STRIP Strp, TEST  TWICE DAILY, Disp: 200 strip, Rfl: 11    acetaminophen (TYLENOL) 325 MG tablet, Take 2 tablets (650 mg total) by mouth every 6 (six) hours as needed for Pain or Temperature greater than., Disp: , Rfl: 0    albuterol (PROVENTIL/VENTOLIN HFA) 90 mcg/actuation inhaler, INHALE 1 PUFF EVERY 4 HOURS AS NEEDED, Disp: 54 g, Rfl: 0    alendronate (FOSAMAX) 70 MG tablet, Take 1 tablet (70 mg total) by mouth every 7 days., Disp: 12 tablet, Rfl: 1    azelastine (ASTELIN) 137 mcg (0.1 %) nasal spray, 1 spray (137 mcg total) by Nasal route 2 (two) times daily., Disp: 30 mL, Rfl: 0    b complex vitamins tablet, Take 1 tablet by mouth once daily.  , Disp: , Rfl:     BD ALCOHOL SWABS PadM, USE TWICE DAILY, Disp: 200 each, Rfl: 11    blood sugar diagnostic (ACCU-CHEK MARLON PLUS TEST STRP) Strp, 1 each by Misc.(Non-Drug; Combo Route) route 2 (two) times daily., Disp: 200 each, Rfl: 3    clopidogrel (PLAVIX) 75 mg tablet, TAKE 1 TABLET(75 MG) BY MOUTH EVERY DAY, Disp: 90 tablet, Rfl: 0    diclofenac sodium (VOLTAREN) 1 % Gel, Apply 2 g topically daily as needed. Apply to right shoulder, Disp: 100 g, Rfl: 1    diphenhydramine-acetaminophen (TYLENOL PM)  mg Tab, Take 1 tablet by mouth nightly as needed., Disp: , Rfl:     ergocalciferol (ERGOCALCIFEROL) 50,000 unit Cap, TAKE 1 CAPSULE BY MOUTH EVERY 7 DAYS, Disp: 12 capsule, Rfl: 0    famotidine (PEPCID) 40 MG tablet, Take 1 tablet (40 mg total) by mouth once daily., Disp: 30 tablet, Rfl: 11    fish oil-omega-3 fatty acids 300-1,000 mg capsule, Take 2 g by mouth once daily., Disp: , Rfl:     gabapentin (NEURONTIN) 300 MG capsule, TAKE 1 CAPSULE(300 MG) BY MOUTH TWICE DAILY, Disp: 60 capsule, Rfl: 2    lancets Misc, To check BG 2 (two) times daily, to use with insurance preferred meter, Disp: 200 each, Rfl: 3    levocetirizine (XYZAL) 5 MG tablet, TAKE 1 TABLET(5 MG) BY MOUTH EVERY EVENING,  Disp: 90 tablet, Rfl: 0    levothyroxine (SYNTHROID) 100 MCG tablet, TAKE 1 TABLET EVERY DAY, Disp: 90 tablet, Rfl: 2    metoprolol succinate (TOPROL-XL) 25 MG 24 hr tablet, Take 1 tablet (25 mg total) by mouth once daily., Disp: 90 tablet, Rfl: 3    metoprolol tartrate (LOPRESSOR) 25 MG tablet, TAKE 1 TABLET TWICE DAILY, Disp: 180 tablet, Rfl: 3    nitroGLYCERIN (NITROSTAT) 0.4 MG SL tablet, Place 1 tablet (0.4 mg total) under the tongue every 5 (five) minutes as needed for Chest pain., Disp: 20 tablet, Rfl: 0    spironolactone (ALDACTONE) 25 MG tablet, TAKE 1 TABLET EVERY DAY, Disp: 90 tablet, Rfl: 3    torsemide (DEMADEX) 20 MG Tab, TAKE 1 TABLET(20 MG) BY MOUTH EVERY DAY, Disp: 90 tablet, Rfl: 1    traMADoL (ULTRAM) 50 mg tablet, Take 1 tablet (50 mg total) by mouth every 12 (twelve) hours as needed for Pain., Disp: 30 tablet, Rfl: 0    warfarin (COUMADIN) 2.5 MG tablet, TAKE 1 TABLET EVERY DAY, Disp: 90 tablet, Rfl: 0    Past Medical History:   Diagnosis Date    Anticoagulant long-term use     Arthritis     Atrial fibrillation     Breast cancer 1994    breast , left    Cardiomyopathy - EF 35-40% 5/11/2016    CHF (congestive heart failure)     Chronic bronchitis     COPD (chronic obstructive pulmonary disease)     Coronary artery disease without angina pectoris 8/26/2015    Depression     Diabetes mellitus, type 2     Diabetes with neurologic complications     Diverticulitis     Diverticulosis     Encounter for blood transfusion     after mastectomy x 20 years ago    GERD (gastroesophageal reflux disease)     H/O aortic valve replacement     Hiatal hernia     History of colonic diverticulitis     Hypertension     Hypothyroid     Insomnia     Irritable bowel syndrome     Obesity     Pacemaker 08/2014    Schatzki's ring     mild    Sleep apnea     uses cpap with oxygen     Syncope and collapse     Systolic congestive heart failure, NYHA class 3 5/23/2016    Type II or  unspecified type diabetes mellitus without mention of complication, not stated as uncontrolled     No medications at present.     Urinary incontinence        Past Surgical History:   Procedure Laterality Date    ADENOIDECTOMY      APPENDECTOMY      BACK SURGERY      Discectomy, lumbar    BREAST SURGERY  1994    left side , mastectomy    CARDIAC PACEMAKER PLACEMENT      CARDIAC VALVE SURGERY      aortic valve replacement    CHOLECYSTECTOMY      COLONOSCOPY  2/2014    repeat in 10 years for screening    CORONARY ANGIOGRAPHY Bilateral 6/18/2019    Procedure: ANGIOGRAM, CORONARY ARTERY;  Surgeon: Dayron Macias MD;  Location: [facility] C[facility];  Service: Cardiology;  Laterality: Bilateral;    CORONARY STENT PLACEMENT      x2    EYE SURGERY      bilateral PHACO and IOL    HYSTERECTOMY      ovaries spared    LEFT HEART CATHETERIZATION Right 6/18/2019    Procedure: LHC/ Coronary Angio;  Surgeon: Dayron Macias MD;  Location: [facility] CATH;  Service: Cardiology;  Laterality: Right;    LOOP RECORDER      TOE SURGERY Right     TONSILLECTOMY      UPPER GASTROINTESTINAL ENDOSCOPY  2012       Family History   Problem Relation Age of Onset    Parkinsonism Mother     Emphysema Father     Heart disease Brother     Heart attack Brother     Heart failure Brother     Heart disease Brother     Heart failure Brother     Heart disease Brother     Heart failure Brother     Arrhythmia Brother     Anesthesia problems Neg Hx     Clotting disorder Neg Hx        Social History     Socioeconomic History    Marital status:      Spouse name: Not on file    Number of children: Not on file    Years of education: Not on file    Highest education level: Not on file   Occupational History    Not on file   Social Needs    Financial resource strain: Not on file    Food insecurity     Worry: Not on file     Inability: Not on file    Transportation needs     Medical: Not on file     Non-medical: Not on file   Tobacco Use  "   Smoking status: Former Smoker     Packs/day: 1.00     Years: 30.00     Pack years: 30.00     Start date: 1958     Quit date: 1988     Years since quittin.4    Smokeless tobacco: Never Used   Substance and Sexual Activity    Alcohol use: Yes     Comment: 0-2 glasses of wine per month    Drug use: No    Sexual activity: Not on file   Lifestyle    Physical activity     Days per week: Not on file     Minutes per session: Not on file    Stress: Not on file   Relationships    Social connections     Talks on phone: Not on file     Gets together: Not on file     Attends Sikh service: Not on file     Active member of club or organization: Not on file     Attends meetings of clubs or organizations: Not on file     Relationship status: Not on file   Other Topics Concern    Not on file   Social History Narrative    Not on file       Review of patient's allergies indicates:   Allergen Reactions    Statins-hmg-coa reductase inhibitors      "bad leg cramps"    Sulfa (sulfonamide antibiotics) Hives    Doxycycline      Stomach issues    Adhesive Rash     Use paper tape    Amoxicillin-pot clavula (bulk) Nausea And Vomiting       Review of Systems:  General ROS: negative for - fever  Psychological ROS: negative for - hostility  Hematological and Lymphatic ROS: positive for - bruising  Endocrine ROS: negative for - unexpected weight changes  Respiratory ROS: no cough, shortness of breath, or wheezing  Cardiovascular ROS: no chest pain or dyspnea on exertion  Gastrointestinal ROS: no abdominal pain, change in bowel habits, or black or bloody stools  Musculoskeletal ROS: positive for - muscular weakness  Neurological ROS: positive for - numbness/tingling  Dermatological ROS: negative for rash    Physical Exam:  Vitals:    20 1500   BP: (!) 146/62   Pulse: 72   Resp: 20   Temp: 98.4 °F (36.9 °C)   TempSrc: Temporal   SpO2: 98%   Weight: 103.4 kg (228 lb)   PainSc:   4   PainLoc: Back     Body " mass index is 41.7 kg/m².     Gen: NAD  Gait: steady, ambulating with assistance of walker  Psych: mood appropriate for given condition  CV: 2+ radial pulse  HEENT: anicteric   Respiratory: non labored  Abd: soft nt, nd  Skin: intact  Sensation: intact to lt touch bilaterally in c4-t1, mildly decreased left C5 and C7   Reflexes: 0 left 2+ right Bicep, 0 b/l tricep, BR and patella Vargas negative  ROM: Cervical ROM full, shoulder, elbow and wrist ROM full  Tone:  Normal at elbow, wrist and shoulder   Inspection: no atrophy of bicep, FDI or APB noted  Palpation: tender midline cervical/thoracic    Motor:    Right Left   C4 Shoulder Abduction  5  5   C5 Elbow Flexion    5  5   C6 Wrist Extension  5  5   C7 Elbow Extension   5  5   C8/T1 Hand Intrinsics   5  5   C8 First Dorsal Interosseus  5  5   C8 Abductor Pollicus Brevis  5  5       Imaging:  CT cervical spine 5/21/20  FINDINGS:  There is redemonstration of fracture of the right aspect of the bifid C3 spinous process.  There is similar displacement of the fracture fragment.  There is mild superior endplate height loss at the T2 vertebral body which does appear new from the previous examination.  There is multilevel disc space narrowing and marginal osteophytosis..  There is multilevel uncovertebral hypertrophy.  There is facet arthropathy throughout the cervical spine.  There is partially calcified disc bulge/protrusion at the C3-C4 level.  There is no significant osseous spinal canal narrowing identified.  There is osseous neural foraminal narrowing noted at C3-C4 bilaterally on the left at C4-C5, bilaterally at C6-C7.  The atlantoaxial articulation demonstrates degenerative change but otherwise appears intact.  The prevertebral soft tissues appear within normal limits.  The visualized lung apices demonstrate no acute process.  Atherosclerotic calcifications are noted.    Cervical xray 1/27/20  FINDINGS:  Vertebral body alignment is normal without change with  flexion or extension.    Labs:  BMP  Lab Results   Component Value Date     02/18/2020    K 4.8 02/18/2020     02/18/2020    CO2 27 02/18/2020    BUN 26 (H) 02/18/2020    CREATININE 1.2 02/18/2020    CALCIUM 9.5 02/18/2020    ANIONGAP 9 02/18/2020    ESTGFRAFRICA 48.6 (A) 02/18/2020    EGFRNONAA 42.2 (A) 02/18/2020     Lab Results   Component Value Date    ALT 15 02/18/2020    AST 21 02/18/2020    ALKPHOS 95 02/18/2020    BILITOT 0.2 02/18/2020     Lab Results   Component Value Date    WBC 9.06 02/18/2020    HGB 13.0 02/18/2020    HCT 42.4 02/18/2020     (H) 02/18/2020     02/18/2020         Assessment:   Problem List Items Addressed This Visit        Neuro    Closed displaced fracture of third cervical vertebra - Primary          84 y.o. year old female with PMH HTN, CAD s/p LILLIAM placement 6/2019 on coumdain and plavix, CKD, DM II presents to the office with neck pain.  Her pain started in December after a fall.  She was evaluated at [facility] by neurosurgery and no surgical intervention necessary.  Today she reports continued neck pain, constant, sharp, burning, 7/10.  She has some weakness and numbness in the left hand.  No acute bowel/bladder changes or gait changes.  Her pain is worse with sneezing, coughing, walking, and relieved with rest and medications.  She takes tylenol and gabapentin.  On exam she has 5/5 strength, tenderness to palpation over her midline lower cervical/upper thoracic spine.  She had a repeat cervical CT on 5/21/20 that shows redemonstration of fracture of the right aspect of the bifid C3 spinous process and mild superior endplate height loss at the T2 vertebral body which does appear new from the previous examination.     She has seen neurosurgery at the end of January and there was no routine neurosurgical follow up warranted.  The only thing that has changed is that she has now developed neck pain with a mild T2 compression fracture on CT.  She does not have  myelopathic findings on exam today and no thoracic radicular complaints.  She has significant cormorbidities requiring anticoagulation with plavix and coumadin.  We will start tramadol 50mg po bid prn for severe pain.  I will defer MRI of her T2 compression fracture because she is not complaining of any thoracic radicular complaints and an MRI won't change my current management.   If she has any changes with flexion/extension on xray today then I will refer her back to neurosurgery.   Follow up in 1 month.     6/23/20 - Mrs Obando returns to clinic today for follow up cervical pain.  She has a history of fall in December with C3 fracture and redemonstration C3 fracture upon follow up in May.  She reports treating osteoporosis for 2 years, takes fosamax daily per her PCP.  She has been to neurosurgery on 6/9/20 with no surgical recommendation at this time, the placed an order for her to be evaluated by neurology for tremors and gait difficulty.  She was given a cervical collar for comfort.  She reports her pain rating is 4/10, aching and increases with sneezing and coughing, she has more pain at the end of the day.  She is taking tramadol 50mg prn severe pain at night which helps her to sleep.  She reports she cannot tolerate taking it daily as it makes her constipated, she is using a stool softener.  She denies any weakness or numbness, no bowel or bladder dysfunction.  On exam, she continues to have 5/5 b/l strength, sensation intact C4-T1 with mild sensation decrease on the left about C5 and C7 dermatomes.  She is stiff with cervical motion and +TTP.   I have given her some robaxin to trail for the neck stiffness.  She will follow up in 3 months or sooner prn.      Treatment Plan:   Procedures: none at this time  Medications: continue gabapentin and tylenol. Continue tramadol 50mg po bid prn for severe pain, last refill 5/22/20 #30  Labs: Reviewed and medications are appropriately dosed for current hepatorenal  function.  Imaging: none    : Reviewed and consistent with medication use as prescribed.    Attending Physician:  Fausto Ho M.D.  Interventional Pain Medicine / Anesthesiology

## 2020-06-24 RX ORDER — LEVOTHYROXINE SODIUM 100 UG/1
TABLET ORAL
Qty: 90 TABLET | Refills: 2 | Status: SHIPPED | OUTPATIENT
Start: 2020-06-24

## 2020-06-25 ENCOUNTER — TELEPHONE (OUTPATIENT)
Dept: PAIN MEDICINE | Facility: CLINIC | Age: 85
End: 2020-06-25

## 2020-06-25 NOTE — PROGRESS NOTES
Refill Authorization Note     is requesting a refill authorization.    Brief assessment and rationale for refill: APPROVE: prr     Medication-related problems identified: Therapeutic duplication    Medication Therapy Plan: TSH wnl    Medication reconciliation completed: No                         Comments:   Automatic Epic Protocol Generated Data:    Requested Prescriptions   Signed Prescriptions Disp Refills    levothyroxine (SYNTHROID) 100 MCG tablet 90 tablet 2     Sig: TAKE 1 TABLET EVERY DAY       Endocrinology:  Hypothyroid Agents Failed - 6/24/2020  4:57 PM        Failed - Manual Review: FT4 is not required if last TSH is WNL.        Failed - T4 free within 1080 days     No results found for: EXTFREET4, T4FREE, FREET4, [id number], T4FT4           Passed - Patient is at least 18 years old        Passed - Office visit in past 12 months or future 90 days.     Recent Outpatient Visits            Yesterday Closed displaced fracture of third cervical vertebra, unspecified fracture morphology, sequela    [facility] Stephanie Pabon, MADHU    2 days ago Nonrheumatic aortic valve stenosis    [facility] Eddie Gonzales MD    2 weeks ago Occasional tremors    [facility] Isha Mcdaniel MD    1 month ago Closed displaced fracture of third cervical vertebra, unspecified fracture morphology, sequela    [facility] Fausto Ho MD    1 month ago Closed displaced fracture of third cervical vertebra, unspecified fracture morphology, sequela    [facility] Marychuy Alves NP          Future Appointments              In 1 week Drea Anguiano, EUFEMIA [facility]    [facility] 2 [facility]    In 1 month ZOEY España MD [facility]    In 1 month [facility]ITOR DEVICE [facility]    In 2 months  [facility] - [facility], [facility]    In 5 months MD Eduardo [facility] - Cardiology, [facility]                Passed - TSH in normal range and within 360 days     TSH   Date Value Ref Range Status   02/18/2020 1.630 0.400 - 4.000 uIU/mL Final   08/07/2019 1.393 0.400 - 4.000 uIU/mL Final   01/21/2019 1.969 0.400 - 4.000 uIU/mL Final                    Appointments  past 12m or future 3m with PCP    Date Provider   Last Visit   2/18/2020 ZOEY España MD   Next Visit   8/18/2020 ZOEY España MD   ED visits in past 90 days: 0     Note composed:7:24 PM 06/24/2020

## 2020-06-25 NOTE — TELEPHONE ENCOUNTER
----- Message from Lanette Hedrick sent at 6/25/2020  9:01 AM CDT -----  Contact: Patient [phone] (home)  Type:  Patient Returning Call    Who Called:  Patient [phone] (home)     Who Left Message for Patient:  Stephanie  Does not know what this was about. Please call to advise.

## 2020-06-26 ENCOUNTER — TELEPHONE (OUTPATIENT)
Dept: NEUROLOGY | Facility: CLINIC | Age: 85
End: 2020-06-26

## 2020-06-26 NOTE — TELEPHONE ENCOUNTER
----- Message from Vicenta Jay sent at 6/26/2020  2:07 PM CDT -----  Contact: self  Type: Needs Medical Advice  Who Called: Patient   Best Call Back Number: [phone]  Additional Information: patient referred by Barb Mendoza for diagnosis of Parkinsons, No appointments available please contact to schedule.

## 2020-06-26 NOTE — TELEPHONE ENCOUNTER
Spoke with Mrs Obando on phone, we will hold on PT for now due to her increased risk of cervical spine fracture.  Will start trial of Robaxin 500mg po QHS.  She will call prn needs.

## 2020-06-29 NOTE — TELEPHONE ENCOUNTER
Spoke with Ms. Topher, she scheduled an appt for 8/24/2020 at 3 pm. Ms. Obando was informed she will be placed on the waiting list. appt letter to be mailed.

## 2020-07-09 ENCOUNTER — TELEPHONE (OUTPATIENT)
Dept: CARDIOLOGY | Facility: CLINIC | Age: 85
End: 2020-07-09

## 2020-07-09 NOTE — TELEPHONE ENCOUNTER
Pt called pacemaker clinic to reschedule pacemaker apt.  States she hasn't been feeling well, no sleep, no appetite, light headed, dizzy, chest pain and a little off balance. She doesn't feel comfortable to get behind the wheel.  Highly recommend pt to call EMS, she refuses. She states she want to eat something, and possibly nap to see if she feels better.  She want to avoid going to the hospital due to COVID. Advised her if symptoms worsen to call EMS, she verbalized understanding.    Pt has home monitoring, will continue to monitor from there.  Pacemaker apt rescheduled to 8/13/2020.  Highly recommended for her to make 1 yr f/u.  Pt was due for pacemaker check 5/2020.  She verbalized understanding.

## 2020-07-27 ENCOUNTER — TELEPHONE (OUTPATIENT)
Dept: CARDIOLOGY | Facility: CLINIC | Age: 85
End: 2020-07-27

## 2020-07-27 NOTE — TELEPHONE ENCOUNTER
Wrong number      ----- Message from Princess FLAVIA Werner sent at 7/27/2020  9:04 AM CDT -----  Regarding: medical clearnce  Contact: Nevaeh jacobson/Dr Bell  Type: Needs Medical Advice  Who Called: Nevaeh jacobson/Dr Bell  Best Call Back Number: [phone]  Fax number   Additional Information: calling to advise patient has appt with Dr. Bell this morning for 9:10am and the office has not received medical clearance for the patient. Please advise.

## 2020-07-30 ENCOUNTER — TELEPHONE (OUTPATIENT)
Dept: CARDIOLOGY | Facility: CLINIC | Age: 85
End: 2020-07-30

## 2020-07-30 NOTE — TELEPHONE ENCOUNTER
Please advise: pt having tooth extraction and needs holding instructions for Plavix and Coumadin (message also sent to coumadin clinic)

## 2020-07-30 NOTE — TELEPHONE ENCOUNTER
----- Message from Zaki Le sent at 7/30/2020 11:54 AM CDT -----  Regarding: advice  Contact: self  Type: Needs Medical Advice  Who Called:  self   Symptoms (please be specific):    How long has patient had these symptoms:    Pharmacy name and phone #:   Best Call Back Number: [phone]  Additional Information: Patient called stating she is schedule for extraction. Patient advising the doctor, patient has to stop taking rx coumadin, plavix.

## 2020-08-10 ENCOUNTER — HOSPITAL ENCOUNTER (OUTPATIENT)
Dept: RADIOLOGY | Facility: HOSPITAL | Age: 85
Discharge: HOME | End: 2020-08-10
Attending: INTERNAL MEDICINE
Payer: MEDICARE

## 2020-08-10 ENCOUNTER — OFFICE VISIT (OUTPATIENT)
Dept: FAMILY MEDICINE | Facility: CLINIC | Age: 85
End: 2020-08-10
Payer: MEDICARE

## 2020-08-10 VITALS
HEIGHT: 62 IN | OXYGEN SATURATION: 95 % | DIASTOLIC BLOOD PRESSURE: 76 MMHG | WEIGHT: 222.88 LBS | HEART RATE: 70 BPM | TEMPERATURE: 97 F | BODY MASS INDEX: 41.02 KG/M2 | SYSTOLIC BLOOD PRESSURE: 118 MMHG

## 2020-08-10 DIAGNOSIS — M53.3 SACRAL BACK PAIN: Primary | ICD-10-CM

## 2020-08-10 DIAGNOSIS — K21.9 GASTROESOPHAGEAL REFLUX DISEASE, ESOPHAGITIS PRESENCE NOT SPECIFIED: ICD-10-CM

## 2020-08-10 DIAGNOSIS — M53.3 PAIN IN THE COCCYX: ICD-10-CM

## 2020-08-10 DIAGNOSIS — M54.2 CHRONIC NECK PAIN: ICD-10-CM

## 2020-08-10 DIAGNOSIS — G89.29 CHRONIC RIGHT SHOULDER PAIN: ICD-10-CM

## 2020-08-10 DIAGNOSIS — S12.9XXD CLOSED FRACTURE OF SPINOUS PROCESS OF CERVICAL VERTEBRA, SUBSEQUENT ENCOUNTER: ICD-10-CM

## 2020-08-10 DIAGNOSIS — I48.91 ATRIAL FIBRILLATION, UNSPECIFIED TYPE: ICD-10-CM

## 2020-08-10 DIAGNOSIS — Z79.899 HIGH RISK MEDICATION USE: ICD-10-CM

## 2020-08-10 DIAGNOSIS — M53.3 SACRAL BACK PAIN: ICD-10-CM

## 2020-08-10 DIAGNOSIS — Z91.81 HISTORY OF RECENT FALL: ICD-10-CM

## 2020-08-10 DIAGNOSIS — G89.29 CHRONIC NECK PAIN: ICD-10-CM

## 2020-08-10 DIAGNOSIS — M25.511 CHRONIC RIGHT SHOULDER PAIN: ICD-10-CM

## 2020-08-10 PROCEDURE — 3074F PR MOST RECENT SYSTOLIC BLOOD PRESSURE < 130 MM HG: ICD-10-PCS | Mod: HCNC,CPTII,S$GLB, | Performed by: INTERNAL MEDICINE

## 2020-08-10 PROCEDURE — 99499 UNLISTED E&M SERVICE: CPT | Mod: HCNC,S$GLB,, | Performed by: INTERNAL MEDICINE

## 2020-08-10 PROCEDURE — 1101F PT FALLS ASSESS-DOCD LE1/YR: CPT | Mod: HCNC,CPTII,S$GLB, | Performed by: INTERNAL MEDICINE

## 2020-08-10 PROCEDURE — 99214 OFFICE O/P EST MOD 30 MIN: CPT | Mod: HCNC,S$GLB,, | Performed by: INTERNAL MEDICINE

## 2020-08-10 PROCEDURE — 3074F SYST BP LT 130 MM HG: CPT | Mod: HCNC,CPTII,S$GLB, | Performed by: INTERNAL MEDICINE

## 2020-08-10 PROCEDURE — 1159F PR MEDICATION LIST DOCUMENTED IN MEDICAL RECORD: ICD-10-PCS | Mod: HCNC,S$GLB,, | Performed by: INTERNAL MEDICINE

## 2020-08-10 PROCEDURE — 99214 PR OFFICE/OUTPT VISIT, EST, LEVL IV, 30-39 MIN: ICD-10-PCS | Mod: HCNC,S$GLB,, | Performed by: INTERNAL MEDICINE

## 2020-08-10 PROCEDURE — 99999 PR PBB SHADOW E&M-EST. PATIENT-LVL V: CPT | Mod: PBBFAC,HCNC,, | Performed by: INTERNAL MEDICINE

## 2020-08-10 PROCEDURE — 3078F PR MOST RECENT DIASTOLIC BLOOD PRESSURE < 80 MM HG: ICD-10-PCS | Mod: HCNC,CPTII,S$GLB, | Performed by: INTERNAL MEDICINE

## 2020-08-10 PROCEDURE — 72220 X-RAY EXAM SACRUM TAILBONE: CPT | Mod: 26,HCNC,, | Performed by: RADIOLOGY

## 2020-08-10 PROCEDURE — 72220 XR SACRUM AND COCCYX: ICD-10-PCS | Mod: 26,HCNC,, | Performed by: RADIOLOGY

## 2020-08-10 PROCEDURE — 72220 X-RAY EXAM SACRUM TAILBONE: CPT | Mod: TC,HCNC,PN

## 2020-08-10 PROCEDURE — 1159F MED LIST DOCD IN RCRD: CPT | Mod: HCNC,S$GLB,, | Performed by: INTERNAL MEDICINE

## 2020-08-10 PROCEDURE — 1101F PR PT FALLS ASSESS DOC 0-1 FALLS W/OUT INJ PAST YR: ICD-10-PCS | Mod: HCNC,CPTII,S$GLB, | Performed by: INTERNAL MEDICINE

## 2020-08-10 PROCEDURE — 3078F DIAST BP <80 MM HG: CPT | Mod: HCNC,CPTII,S$GLB, | Performed by: INTERNAL MEDICINE

## 2020-08-10 PROCEDURE — 99999 PR PBB SHADOW E&M-EST. PATIENT-LVL V: ICD-10-PCS | Mod: PBBFAC,HCNC,, | Performed by: INTERNAL MEDICINE

## 2020-08-10 PROCEDURE — 99499 RISK ADDL DX/OHS AUDIT: ICD-10-PCS | Mod: HCNC,S$GLB,, | Performed by: INTERNAL MEDICINE

## 2020-08-10 RX ORDER — DICLOFENAC SODIUM 10 MG/G
GEL TOPICAL
Qty: 100 G | Refills: 1 | Status: SHIPPED | OUTPATIENT
Start: 2020-08-10

## 2020-08-10 NOTE — PROGRESS NOTES
Subjective:       Patient ID: Miesha Obando is a 84 y.o. female.    Chief Complaint: Neck Pain (had a fall 08/08/2020, EMS picked up off the floor, declined trip to ER), Back Pain, and Tailbone Pain (feels like something jabbing her)    HPI  Seeing patient today for Dr. España her PCP.  Patient reportedly fell at home trying the move post office box from her seat of her walker chair, slipped and fall occurred.  She fell from the chair to the floor no loss of consciousness.  She has chronic neck pain with a history of cervical fracture known uses a soft cervical brace recent CT of the neck 05/21/2020; impression by the radiologist:   1. Similar alignment of displaced fracture of the C3 bifid spinous process.  2. Mild superior endplate height loss of the T2 vertebral body new from the previous exam.  3. Multilevel degenerative changes with osseous neural foraminal narrowing at multiple levels.  Patient with no new neck pain complaints.  To keep follow-up with her pain management clinic as directed.    Recommending she get a foam donut for her wheelchair.  Apply Voltaren gel topically to areas of sacral/ coccygeal discomfort.  Will x-ray both areas for fracture.  Also use Tylenol over-the-counter for pain as well as the tramadol that she has already for pain management.  Sitting and standing worsens her pain and sitting resting an in bed helps his she stated she just got the box was there.  Total time 3:50 a.m. 5-430 5:00 p.m. greater than 50% of time spent in discussion, counseling, and review.       Review of Systems   Constitutional: Negative for appetite change and fever.   HENT: Negative for congestion, postnasal drip, rhinorrhea and sinus pressure.    Eyes: Negative for discharge and itching.   Respiratory: Negative for cough, chest tightness, shortness of breath and wheezing.    Cardiovascular: Negative for chest pain, palpitations and leg swelling.   Gastrointestinal: Negative for abdominal distention,  "abdominal pain, blood in stool, constipation, diarrhea, nausea and vomiting.   Endocrine: Negative for polydipsia, polyphagia and polyuria.   Genitourinary: Negative for dysuria and hematuria.   Musculoskeletal: Negative for arthralgias and myalgias.        Chronic neck pain; now with lower sacral and coccygeal pain due to fall from her walker chair to the floor   Skin: Negative for rash.   Allergic/Immunologic: Negative for environmental allergies and food allergies.   Neurological: Negative for tremors, seizures and syncope.   Hematological: Negative for adenopathy. Does not bruise/bleed easily.       Objective:      Vitals:    08/10/20 1540   BP: 118/76   Pulse: 70   Temp: 97.2 °F (36.2 °C)   TempSrc: Temporal   SpO2: 95%   Weight: 101.1 kg (222 lb 14.2 oz)   Height: 5' 2" (1.575 m)     Body mass index is 40.77 kg/m².  Wt Readings from Last 3 Encounters:   08/10/20 101.1 kg (222 lb 14.2 oz)   06/23/20 103.4 kg (228 lb)   06/22/20 102.7 kg (226 lb 6.6 oz)        Physical Exam  Vitals signs reviewed.   Constitutional:       Appearance: She is well-developed.   HENT:      Head: Normocephalic and atraumatic.   Neck:      Musculoskeletal: Normal range of motion and neck supple.      Thyroid: No thyromegaly.   Cardiovascular:      Rate and Rhythm: Normal rate and regular rhythm.      Heart sounds: Normal heart sounds. No murmur. No gallop.    Pulmonary:      Effort: Pulmonary effort is normal. No respiratory distress.      Breath sounds: Normal breath sounds. No wheezing or rales.   Abdominal:      General: Bowel sounds are normal. There is no distension.      Palpations: Abdomen is soft.      Tenderness: There is no abdominal tenderness. There is no guarding or rebound.      Comments: Mild epigastric tenderness to palp.    Musculoskeletal: Normal range of motion.      Comments: Sacral down tender to palp. Obese LE's w no calf tenderness to palp. Varicose and spider veins noted.    Lymphadenopathy:      Cervical: No " cervical adenopathy.   Skin:     Findings: No rash.   Neurological:      Mental Status: She is alert and oriented to person, place, and time.      Comments: Moves all 4 extremities fine.   Psychiatric:         Behavior: Behavior normal.         Thought Content: Thought content normal.         Assessment:       1. Sacral back pain    2. Pain in the coccyx    3. History of recent fall    4. Atrial fibrillation, unspecified type    5. High risk medication use    6. Chronic neck pain    7. Closed fracture of spinous process of cervical vertebra, subsequent encounter    8. Chronic right shoulder pain    9. Gastroesophageal reflux disease, esophagitis presence not specified        Plan:       Sacral back pain: cold packs applied; no strenuous activity or heavy lifting. Use voltaren 1% gel topically 2x a day as needed for sacral/coccyx pain; tylenol arthritis otc for pain as well.  Also has tramadol to use for pain. Cold applications for next 3 days then thermal heat  -     X-Ray Sacrum And Coccyx; Future; Expected date: 08/10/2020  -     diclofenac sodium (VOLTAREN) 1 % Gel; Apply to 2 gm to right shoulder and sacral areas as needed 2x a day.  Dispense: 100 g; Refill: 1    Pain in the coccyx: as above.   -     X-Ray Sacrum And Coccyx; Future; Expected date: 08/10/2020  -     diclofenac sodium (VOLTAREN) 1 % Gel; Apply to 2 gm to right shoulder and sacral areas as needed 2x a day.  Dispense: 100 g; Refill: 1    History of recent fall:  Falling from her walker chair to the floor.  No loss of consciousness    Atrial fibrillation, unspecified type: card is Dr Gonzales.  On Coumadin managed per Cardiology.  And Plavix    High risk medication use: on coumadin; on hold per cardiology; for teeth extraction already on hold.     Chronic right shoulder pain: has pain MD managing this.   -     diclofenac sodium (VOLTAREN) 1 % Gel; Apply to 2 gm to right shoulder and sacral areas as needed 2x a day.  Dispense: 100 g; Refill: 1    Chronic  neck pain:  Has pain MD managing this as well.  Patient has tramadol to use, gabapentin, and Voltaren topical, and Tylenol over-the-counter; Please see recent CT a neck results from 05/21/2020.  No new neck pain since her fall.    Closed fracture of C3 bifid spinal process.    Gastroesophageal reflux disease, esophagitis presence not specified: No bedtime snacks; weight reduction. On pepcid 40 mg a day; some belching and heartburn.

## 2020-08-10 NOTE — TELEPHONE ENCOUNTER
Pt may hold plavix as needed for dental work  Coumadin clinic will instruct Pt as concerns the coumadin dosing.

## 2020-08-10 NOTE — TELEPHONE ENCOUNTER
Spoke to pt and advised of instructions for holding Plavix and coumadin; pt read back instructions correctly

## 2020-08-10 NOTE — TELEPHONE ENCOUNTER
Patient instructed to hold coumadin x 2 days prior and to resume usual dose the evening of 8/16.  Keep same INR draw date on 8/11 for now.

## 2020-08-13 ENCOUNTER — TELEPHONE (OUTPATIENT)
Dept: FAMILY MEDICINE | Facility: CLINIC | Age: 85
End: 2020-08-13

## 2020-08-13 NOTE — TELEPHONE ENCOUNTER
----- Message from Mundo Garcia sent at 8/13/2020 10:30 AM CDT -----  Regarding: results  Contact: patient  Type:  Test Results    Who Called:  patient  Name of Test (Lab/Mammo/Etc):  x-ray  Date of Test:  8/10/20  Ordering Provider:  Laura  Where the test was performed:  [facility]  Best Call Back Number:  [phone]  Additional Information:  n/a

## 2020-08-14 ENCOUNTER — TELEPHONE (OUTPATIENT)
Dept: FAMILY MEDICINE | Facility: CLINIC | Age: 85
End: 2020-08-14

## 2020-08-14 NOTE — TELEPHONE ENCOUNTER
----- Message from Merly Medina sent at 8/14/2020 12:01 PM CDT -----  Regarding: test results  Contact: patient  Type:  Test Results    Who Called:  Patient   Name of Test (Lab/Mammo/Etc):  Labs from appointment   Date of Test:  August 10th  Ordering Provider:  Dr. Sanchez  Where the test was performed:  [place]  Best Call Back Number:  [phone] (home)     Case number [id number]

## 2020-08-14 NOTE — TELEPHONE ENCOUNTER
Reach patient by phone and discussed her sacral/coccygeal x-ray results with her; no radiographic evident acute osseous abnormality was noted.  Has follow-up apparently 08/21/2020 with Carla Le DNP.

## 2020-08-19 ENCOUNTER — TELEPHONE (OUTPATIENT)
Dept: NEUROLOGY | Facility: CLINIC | Age: 85
End: 2020-08-19

## 2020-08-19 ENCOUNTER — CLINICAL SUPPORT (OUTPATIENT)
Dept: CARDIOLOGY | Facility: CLINIC | Age: 85
End: 2020-08-19
Payer: MEDICARE

## 2020-08-19 DIAGNOSIS — Z95.0 PRESENCE OF CARDIAC PACEMAKER: ICD-10-CM

## 2020-08-19 PROCEDURE — 93294 CARDIAC DEVICE CHECK - REMOTE: ICD-10-PCS | Mod: ,,, | Performed by: INTERNAL MEDICINE

## 2020-08-19 PROCEDURE — 93296 REM INTERROG EVL PM/IDS: CPT | Mod: PBBFAC,HCNC,PO | Performed by: INTERNAL MEDICINE

## 2020-08-19 PROCEDURE — 93294 REM INTERROG EVL PM/LDLS PM: CPT | Mod: ,,, | Performed by: INTERNAL MEDICINE

## 2020-08-19 NOTE — TELEPHONE ENCOUNTER
Spoke with Ms. Obando, she was informed of the changes to Dr. Tatum schedule, there are in person and virtual days, Monday's are for virtual days. Ms. Obando verbalized understanding converting the appt to a virtual visit. Assisted pt with downloading the portal.

## 2020-08-21 ENCOUNTER — TELEPHONE (OUTPATIENT)
Dept: FAMILY MEDICINE | Facility: CLINIC | Age: 85
End: 2020-08-21

## 2020-08-21 NOTE — TELEPHONE ENCOUNTER
----- Message from Latasha Louis sent at 8/21/2020  2:09 PM CDT -----  Contact: pt  Pt calling at 2:09 pm states not feeling well and wants to reschedule her appointment she has today for Tuesday 8/25,please...[phone] (home)

## 2020-08-21 NOTE — TELEPHONE ENCOUNTER
Attempted to contact patient. Left voice mail to call back to the office.     Rescheduled for next Wednesday, Need confirmation that it will work for her

## 2020-08-21 NOTE — TELEPHONE ENCOUNTER
----- Message from Latasha Louis sent at 8/21/2020  2:09 PM CDT -----  Contact: pt  Pt calling at 2:09 pm states not feeling well and wants to reschedule her appointment she has today for Tuesday 8/25,please...[phone] (home)

## 2020-08-22 ENCOUNTER — TELEPHONE (OUTPATIENT)
Dept: FAMILY MEDICINE | Facility: CLINIC | Age: 85
End: 2020-08-22

## 2020-08-22 NOTE — PATIENT INSTRUCTIONS
Sacral back pain: cold packs applied; no strenuous activity or heavy lifting. Use voltaren 1% gel topically 2x a day as needed for sacral/coccyx pain; tylenol arthritis otc for pain as well.  Also has tramadol to use for pain. Cold applications for next 3 days then thermal heat  -     X-Ray Sacrum And Coccyx; Future; Expected date: 08/10/2020  -     diclofenac sodium (VOLTAREN) 1 % Gel; Apply to 2 gm to right shoulder and sacral areas as needed 2x a day.  Dispense: 100 g; Refill: 1    Pain in the coccyx: as above.   -     X-Ray Sacrum And Coccyx; Future; Expected date: 08/10/2020  -     diclofenac sodium (VOLTAREN) 1 % Gel; Apply to 2 gm to right shoulder and sacral areas as needed 2x a day.  Dispense: 100 g; Refill: 1    History of recent fall:  Falling from her walker chair to the floor.  No loss of consciousness    Atrial fibrillation, unspecified type: card is Dr Gonzales.  On Coumadin managed per Cardiology.  And Plavix    High risk medication use: on coumadin; on hold per cardiology; for teeth extraction already on hold.     Chronic right shoulder pain: has pain MD managing this.   -     diclofenac sodium (VOLTAREN) 1 % Gel; Apply to 2 gm to right shoulder and sacral areas as needed 2x a day.  Dispense: 100 g; Refill: 1    Chronic neck pain:  Has pain MD managing this as well.  Patient has tramadol to use, gabapentin, and Voltaren topical, and Tylenol over-the-counter; Please see recent CT a neck results from 05/21/2020.  No new neck pain since her fall.    Closed fracture of C3 bifid spinal process.    Gastroesophageal reflux disease, esophagitis presence not specified: No bedtime snacks; weight reduction. On pepcid 40 mg a day; some belching and heartburn.

## 2020-08-22 NOTE — TELEPHONE ENCOUNTER
Please notify patient that she can review her  updated after visit summary in her portal from her recent visit with me.  And that I hope she is doing better

## 2020-08-24 ENCOUNTER — TELEPHONE (OUTPATIENT)
Dept: NEUROLOGY | Facility: CLINIC | Age: 85
End: 2020-08-24

## 2020-08-24 NOTE — TELEPHONE ENCOUNTER
Spoke with Ms. Obando, she rescheduled appt for an in person appt due to technical issues. Appt moved to 11/6/2020 at 9:20. Mailed appt letter

## 2020-08-25 ENCOUNTER — TELEPHONE (OUTPATIENT)
Dept: CARDIOLOGY | Facility: CLINIC | Age: 85
End: 2020-08-25

## 2020-08-25 NOTE — TELEPHONE ENCOUNTER
Spoke with pt about rescheduling pacemaker check in clinic.  Pt stated she fell not to long ago and is still having a hard time getting around.  Will call next month to see if she can come in

## 2020-08-26 ENCOUNTER — PATIENT OUTREACH (OUTPATIENT)
Dept: ADMINISTRATIVE | Facility: OTHER | Age: 85
End: 2020-08-26

## 2020-08-26 DIAGNOSIS — E11.49 TYPE II DIABETES MELLITUS WITH NEUROLOGICAL MANIFESTATIONS: Primary | ICD-10-CM

## 2020-08-26 NOTE — PROGRESS NOTES
Health Maintenance Due   Topic Date Due    Shingles Vaccine (2 of 3) 02/23/2015    Foot Exam  08/07/2020    Hemoglobin A1c  08/18/2020    Eye Exam  08/29/2020     Updates were requested from care everywhere.  Chart was reviewed for overdue Proactive [facility] Encounters ([facility]) topics (CRS, Breast Cancer Screening, Eye exam)  Health Maintenance has been updated.  LIN[place] immunization registry triggered.  Immunizations were reconciled.  Order for HgA1c placed.

## 2020-08-31 PROBLEM — M54.6 ACUTE MIDLINE THORACIC BACK PAIN: Status: ACTIVE | Noted: 2020-08-31

## 2020-08-31 PROBLEM — I48.20 CHRONIC ATRIAL FIBRILLATION: Status: ACTIVE | Noted: 2020-02-18

## 2020-08-31 PROBLEM — N17.9 AKI (ACUTE KIDNEY INJURY): Status: ACTIVE | Noted: 2020-08-31

## 2020-08-31 PROBLEM — R29.6 RECURRENT FALLS: Status: ACTIVE | Noted: 2020-08-31

## 2020-08-31 PROBLEM — N18.9 ACUTE KIDNEY INJURY SUPERIMPOSED ON CKD: Status: ACTIVE | Noted: 2020-08-31

## 2020-08-31 PROBLEM — M54.59 INTRACTABLE LOW BACK PAIN: Status: ACTIVE | Noted: 2020-08-31

## 2020-09-08 ENCOUNTER — LAB VISIT (OUTPATIENT)
Dept: LAB | Facility: OTHER | Age: 85
End: 2020-09-08
Payer: MEDICARE

## 2020-09-08 DIAGNOSIS — Z03.818 ENCOUNTER FOR OBSERVATION FOR SUSPECTED EXPOSURE TO OTHER BIOLOGICAL AGENTS RULED OUT: ICD-10-CM

## 2020-09-08 PROCEDURE — U0003 INFECTIOUS AGENT DETECTION BY NUCLEIC ACID (DNA OR RNA); SEVERE ACUTE RESPIRATORY SYNDROME CORONAVIRUS 2 (SARS-COV-2) (CORONAVIRUS DISEASE [COVID-19]), AMPLIFIED PROBE TECHNIQUE, MAKING USE OF HIGH THROUGHPUT TECHNOLOGIES AS DESCRIBED BY CMS-2020-01-R: HCPCS | Mod: HCNC

## 2020-09-09 LAB — SARS-COV-2 RNA RESP QL NAA+PROBE: NOT DETECTED

## 2020-09-16 ENCOUNTER — LAB VISIT (OUTPATIENT)
Dept: LAB | Facility: OTHER | Age: 85
End: 2020-09-16
Payer: MEDICARE

## 2020-09-16 DIAGNOSIS — Z03.818 ENCOUNTER FOR OBSERVATION FOR SUSPECTED EXPOSURE TO OTHER BIOLOGICAL AGENTS RULED OUT: ICD-10-CM

## 2020-09-16 PROCEDURE — U0003 INFECTIOUS AGENT DETECTION BY NUCLEIC ACID (DNA OR RNA); SEVERE ACUTE RESPIRATORY SYNDROME CORONAVIRUS 2 (SARS-COV-2) (CORONAVIRUS DISEASE [COVID-19]), AMPLIFIED PROBE TECHNIQUE, MAKING USE OF HIGH THROUGHPUT TECHNOLOGIES AS DESCRIBED BY CMS-2020-01-R: HCPCS | Mod: HCNC

## 2020-09-17 LAB — SARS-COV-2 RNA RESP QL NAA+PROBE: NOT DETECTED

## 2020-09-22 ENCOUNTER — LAB VISIT (OUTPATIENT)
Dept: LAB | Facility: OTHER | Age: 85
End: 2020-09-22
Payer: MEDICARE

## 2020-09-22 ENCOUNTER — OFFICE VISIT (OUTPATIENT)
Dept: PAIN MEDICINE | Facility: CLINIC | Age: 85
End: 2020-09-22
Payer: MEDICARE

## 2020-09-22 VITALS
OXYGEN SATURATION: 90 % | DIASTOLIC BLOOD PRESSURE: 62 MMHG | HEART RATE: 73 BPM | SYSTOLIC BLOOD PRESSURE: 129 MMHG | TEMPERATURE: 98 F | RESPIRATION RATE: 20 BRPM

## 2020-09-22 DIAGNOSIS — G89.4 CHRONIC PAIN DISORDER: ICD-10-CM

## 2020-09-22 DIAGNOSIS — Z03.818 ENCOUNTER FOR OBSERVATION FOR SUSPECTED EXPOSURE TO OTHER BIOLOGICAL AGENTS RULED OUT: ICD-10-CM

## 2020-09-22 DIAGNOSIS — G89.29 CHRONIC BILATERAL THORACIC BACK PAIN: ICD-10-CM
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
## 2020-09-22 DIAGNOSIS — M54.6 PAIN IN THORACIC SPINE: Primary | ICD-10-CM

## 2020-09-22 DIAGNOSIS — M54.6 CHRONIC BILATERAL THORACIC BACK PAIN: ICD-10-CM

## 2020-09-22 PROCEDURE — 99213 OFFICE O/P EST LOW 20 MIN: CPT | Mod: HCNC,S$GLB,, | Performed by: NURSE PRACTITIONER

## 2020-09-22 PROCEDURE — 99999 PR PBB SHADOW E&M-EST. PATIENT-LVL V: ICD-10-PCS | Mod: PBBFAC,HCNC,, | Performed by: NURSE PRACTITIONER

## 2020-09-22 PROCEDURE — 99999 PR PBB SHADOW E&M-EST. PATIENT-LVL V: CPT | Mod: PBBFAC,HCNC,, | Performed by: NURSE PRACTITIONER

## 2020-09-22 PROCEDURE — 99213 PR OFFICE/OUTPT VISIT, EST, LEVL III, 20-29 MIN: ICD-10-PCS | Mod: HCNC,S$GLB,, | Performed by: NURSE PRACTITIONER

## 2020-09-22 PROCEDURE — U0003 INFECTIOUS AGENT DETECTION BY NUCLEIC ACID (DNA OR RNA); SEVERE ACUTE RESPIRATORY SYNDROME CORONAVIRUS 2 (SARS-COV-2) (CORONAVIRUS DISEASE [COVID-19]), AMPLIFIED PROBE TECHNIQUE, MAKING USE OF HIGH THROUGHPUT TECHNOLOGIES AS DESCRIBED BY CMS-2020-01-R: HCPCS | Mod: HCNC

## 2020-09-22 NOTE — PROGRESS NOTES
[facility] Follow Up Evaluation    Referred by: Marychuy Alves NP  Reason for referral: neck pain    CC:   Chief Complaint   Patient presents with    Fall    Muscle Pain    Low-back Pain    Hip Pain    Mid-back Pain      Last 3 PDI Scores 9/22/2020 6/23/2020   Pain Disability Index (PDI) 19 18     Interval HPI 9/22/20:  Mrs Obando returns to clinic today for follow up .  She complains today of mid thoracic pain, 8-9/10, sharp and stabbing, radiating anteriorly to the breast line.  The pain worsens with certain movements and is alleviated with position changes and sitting still.  She denies any numbness or weakness, no bowel or bladder dysfunction.  She complains the pain has been going on for some time but worsened after a fall 6 weeks ago.  She was seen in [facility] ER, x-rays were negative for fracture, she was admitted for 2 weeks, and then transferred to [facility] for rehabilitation.  She is in formal physical therapy 5 days a removed.  She takes gabapentin, cyclobenzaprine BID.  She is prescribed tramadol 50 mg prn but tries to avoid taking it, she takes Tylenol prn.      HPI:   Miesha Obando is a 84 y.o. female who complains of neck pain    Onset: 5 months  Inciting Event: fell in december  Progression: since onset, pain is gradually worsening  Current Pain Score: 7/10  Timing: constant  Quality: sharp and buring  Radiation: yes, down to left shoulder  Associated numbness or weakness: yes numbness, weakness of left hand  Exacerbated by: standing, walking, coughing/sneezing  Allievated by: rest, medications  Is Pain Level Acceptable?: No    Previous Therapies:  PT/OT:   HEP:   Interventions:   Surgery:  Medications:   - NSAIDS:   - MSK Relaxants:   - TCAs:   - SNRIs:   - Topicals:   - Anticonvulsants: gabapentin  - Opioids:     History:    Current Outpatient Medications:     ACCU-CHEK FASTCLIX Misc, TEST TWICE DAILY, Disp: 200 each, Rfl: 11    acetaminophen (TYLENOL) 325  MG tablet, Take 2 tablets (650 mg total) by mouth every 6 (six) hours as needed for Pain or Temperature greater than. (Patient not taking: Reported on 8/31/2020), Disp: , Rfl: 0    albuterol (PROVENTIL/VENTOLIN HFA) 90 mcg/actuation inhaler, INHALE 1 PUFF EVERY 4 HOURS AS NEEDED (Patient taking differently: Inhale 1 puff into the lungs every 4 (four) hours as needed for Shortness of Breath. ), Disp: 54 g, Rfl: 0    alendronate (FOSAMAX) 70 MG tablet, Take 1 tablet (70 mg total) by mouth every 7 days., Disp: 12 tablet, Rfl: 1    azelastine (ASTELIN) 137 mcg (0.1 %) nasal spray, 1 spray (137 mcg total) by Nasal route 2 (two) times daily., Disp: 30 mL, Rfl: 0    BD ALCOHOL SWABS PadM, USE TWICE DAILY, Disp: 200 each, Rfl: 11    blood sugar diagnostic (ACCU-CHEK MARLON PLUS TEST STRP) Strp, 1 each by Misc.(Non-Drug; Combo Route) route 2 (two) times daily., Disp: 200 each, Rfl: 3    clopidogreL (PLAVIX) 75 mg tablet, TAKE 1 TABLET(75 MG) BY MOUTH EVERY DAY (Patient taking differently: Take 75 mg by mouth once daily. ), Disp: 90 tablet, Rfl: 0    diclofenac sodium (VOLTAREN) 1 % Gel, Apply to 2 gm to right shoulder and sacral areas as needed 2x a day., Disp: 100 g, Rfl: 1    diphenhydramine-acetaminophen (TYLENOL PM)  mg Tab, Take 2 tablets by mouth nightly. , Disp: , Rfl:     ergocalciferol (ERGOCALCIFEROL) 50,000 unit Cap, TAKE 1 CAPSULE BY MOUTH EVERY 7 DAYS (Patient taking differently: Take 50,000 Units by mouth Every Friday. ), Disp: 12 capsule, Rfl: 0    famotidine (PEPCID) 40 MG tablet, Take 1 tablet (40 mg total) by mouth once daily. (Patient taking differently: Take 40 mg by mouth nightly as needed for Heartburn. ), Disp: 30 tablet, Rfl: 11    fish oil-omega-3 fatty acids 300-1,000 mg capsule, Take 2 g by mouth once daily., Disp: , Rfl:     gabapentin (NEURONTIN) 300 MG capsule, TAKE 1 CAPSULE(300 MG) BY MOUTH TWICE DAILY (Patient taking differently: Take 600 mg by mouth every evening. ), Disp:  60 capsule, Rfl: 2    lancets Mis, To check BG 2 (two) times daily, to use with insurance preferred meter, Disp: 200 each, Rfl: 3    levocetirizine (XYZAL) 5 MG tablet, TAKE 1 TABLET(5 MG) BY MOUTH EVERY EVENING (Patient taking differently: Take 5 mg by mouth every evening. ), Disp: 90 tablet, Rfl: 0    levothyroxine (SYNTHROID) 100 MCG tablet, TAKE 1 TABLET EVERY DAY, Disp: 90 tablet, Rfl: 2    mecobal/levomefolat Ca/B6 phos (METANX ORAL), Take 1 tablet by mouth once daily., Disp: , Rfl:     melatonin (MELATIN) 3 mg tablet, Take 2 tablets (6 mg total) by mouth nightly as needed for Insomnia., Disp:  , Rfl: 0    metoprolol succinate (TOPROL-XL) 25 MG 24 hr tablet, Take 1 tablet (25 mg total) by mouth once daily., Disp: 90 tablet, Rfl: 3    nitroGLYCERIN (NITROSTAT) 0.4 MG SL tablet, Place 1 tablet (0.4 mg total) under the tongue every 5 (five) minutes as needed for Chest pain., Disp: 20 tablet, Rfl: 0    nystatin (MYCOSTATIN) powder, Apply to affect external areas to skin QID PRN rash., Disp: 15 g, Rfl: 0    spironolactone (ALDACTONE) 25 MG tablet, TAKE 1 TABLET EVERY DAY (Patient taking differently: Take 25 mg by mouth once daily. ), Disp: 90 tablet, Rfl: 3    torsemide (DEMADEX) 20 MG Tab, Take 1 tablet (20 mg total) by mouth once daily., Disp: 90 tablet, Rfl: 1    warfarin (COUMADIN) 2.5 MG tablet, TAKE 1 TABLET EVERY DAY (Patient taking differently: Take 2.5 mg by mouth Daily. ), Disp: 90 tablet, Rfl: 0    Past Medical History:   Diagnosis Date    Aortic valve stenosis     Chronic atrial fibrillation     Chronic combined systolic and diastolic CHF (congestive heart failure) 05/23/2016    CKD (chronic kidney disease), stage III     COPD (chronic obstructive pulmonary disease)     Coronary artery disease 08/26/2015    Depression     Diverticulosis     GERD (gastroesophageal reflux disease)     Hiatal hernia     History of breast cancer 1994    breast , left    History of diverticulitis      Hypertension     Hypothyroidism     Irritable bowel syndrome     Ischemic cardiomyopathy 05/11/2016    Obesity     Obstructive sleep apnea     Osteoarthritis     Schatzki's ring     Type II diabetes mellitus     Urinary incontinence        Past Surgical History:   Procedure Laterality Date    ADENOIDECTOMY      APPENDECTOMY      BACK SURGERY      Discectomy, lumbar    CARDIAC PACEMAKER PLACEMENT  08/2014    CATARACT EXTRACTION W/  INTRAOCULAR LENS IMPLANT Right     CHOLECYSTECTOMY      COLONOSCOPY  2/2014    repeat in 10 years for screening    CORONARY ANGIOGRAPHY Bilateral 6/18/2019    Procedure: ANGIOGRAM, CORONARY ARTERY;  Surgeon: Dayron Macias MD;  Location: [facility] CATH;  Service: Cardiology;  Laterality: Bilateral;    CORONARY STENT PLACEMENT      x2    ESOPHAGOGASTRODUODENOSCOPY  2012    HYSTERECTOMY      ovaries spared    LEFT HEART CATHETERIZATION Right 6/18/2019    Procedure: LHC/ Coronary Angio;  Surgeon: Dayron Macias MD;  Location: [facility] CATH;  Service: Cardiology;  Laterality: Right;    LOOP RECORDER      MASTECTOMY Left 1994    TOE SURGERY Right     TONSILLECTOMY      TRANSCATHETER AORTIC VALVE REPLACEMENT (TAVR)  09/2015       Family History   Problem Relation Age of Onset    Parkinsonism Mother     Emphysema Father     Heart disease Brother     Heart attack Brother     Heart failure Brother     Heart disease Brother     Heart failure Brother     Heart disease Brother     Heart failure Brother     Arrhythmia Brother     Anesthesia problems Neg Hx     Clotting disorder Neg Hx        Social History     Socioeconomic History    Marital status:      Spouse name: Not on file    Number of children: Not on file    Years of education: Not on file    Highest education level: Not on file   Occupational History    Not on file   Social Needs    Financial resource strain: Not on file    Food insecurity     Worry: Not on file     Inability: Not on file     "Transportation needs     Medical: Not on file     Non-medical: Not on file   Tobacco Use    Smoking status: Former Smoker     Packs/day: 1.00     Years: 30.00     Pack years: 30.00     Start date: 1958     Quit date: 1988     Years since quittin.7    Smokeless tobacco: Never Used   Substance and Sexual Activity    Alcohol use: Yes     Comment: 0-2 glasses of wine per month    Drug use: No    Sexual activity: Not on file   Lifestyle    Physical activity     Days per week: Not on file     Minutes per session: Not on file    Stress: Not on file   Relationships    Social connections     Talks on phone: Not on file     Gets together: Not on file     Attends [employer] service: Not on file     Active member of club or organization: Not on file     Attends meetings of clubs or organizations: Not on file     Relationship status: Not on file   Other Topics Concern    Not on file   Social History Narrative    Not on file       Review of patient's allergies indicates:   Allergen Reactions    Statins-hmg-coa reductase inhibitors      "bad leg cramps"    Sulfa (sulfonamide antibiotics) Hives    Doxycycline      Stomach issues    Adhesive Rash     Use paper tape    Amoxicillin-pot clavula (bulk) Nausea And Vomiting       Review of Systems:  General ROS: negative for - fever  Psychological ROS: negative for - hostility  Hematological and Lymphatic ROS: positive for - bruising  Endocrine ROS: negative for - unexpected weight changes  Respiratory ROS: no cough, shortness of breath, or wheezing  Cardiovascular ROS: no chest pain or dyspnea on exertion  Gastrointestinal ROS: no abdominal pain, change in bowel habits, or black or bloody stools  Musculoskeletal ROS: positive for - muscular weakness  Neurological ROS: positive for - numbness/tingling  Dermatological ROS: negative for rash    Physical Exam:  Vitals:    20 1416   BP: 129/62   Pulse: 73   Resp: 20   Temp: 98.4 °F (36.9 °C)   TempSrc: " Temporal   SpO2: (!) 90%   PainSc:   8   PainLoc: Back     There is no height or weight on file to calculate BMI.     Gen: NAD  Gait: in wheelchair today  Psych: mood appropriate for given condition  CV: 2+ radial pulse  HEENT: anicteric   Respiratory: non labored  Abd: soft nt, nd  Skin: intact  Sensation: intact to lt touch bilaterally in c4-t1, mildly decreased left C5 and C7   Reflexes: 0 left 2+ right Bicep, 0 b/l tricep, BR and patella Vargas negative  ROM: Cervical ROM full, shoulder, elbow and wrist ROM full  Tone:  Normal at elbow, wrist and shoulder   Inspection: no atrophy of bicep, FDI or APB noted  Palpation: tender midline cervical/thoracic    Motor:    Right Left   C4 Shoulder Abduction  5  5   C5 Elbow Flexion    5  5   C6 Wrist Extension  5  5   C7 Elbow Extension   5  5   C8/T1 Hand Intrinsics   5  5   C8 First Dorsal Interosseus  5  5   C8 Abductor Pollicus Brevis  5  5       ROM: limited AROM of the L spine in all planes, full ROM at ankles, knees and hips  Lumbar flexion 60 degrees, extension 30 degrees, side bending 30 degrees.    Sensation: intact to light touch in all dermatomes tested from L2-S1 bilaterally  Reflexes: 2+ b/l patella and achilles, biceps and triceps, plantar response down going   Palpation: Diffusely tender over mid-thoracic paraspinals, non tender to percussion of thoracic spine    Tone: normal in the b/l knees and hips   Skin: intact  Extremities: No edema in b/l ankles or hands         Right Left   L2/3 Iliacus Hip flexion  5  5   L3/4 Qudratus Femoris Knee Extension  5  5   L4/5 Tib Anterior Ankle Dorsiflexion   5  5   L5/S1 Extensor Hallicus Longus Great toe extension  5  5   L4/5 Tib Anterior/Posterior Inversion  5  5   L5/S1 Extensor Digitorum Longus, Peronues Eversion  5  5   S1/S2 Gastroc/Soleus Plantar Flexion  5  5         Imaging:  CT cervical spine 5/21/20  FINDINGS:  There is redemonstration of fracture of the right aspect of the bifid C3 spinous process.   There is similar displacement of the fracture fragment.  There is mild superior endplate height loss at the T2 vertebral body which does appear new from the previous examination.  There is multilevel disc space narrowing and marginal osteophytosis..  There is multilevel uncovertebral hypertrophy.  There is facet arthropathy throughout the cervical spine.  There is partially calcified disc bulge/protrusion at the C3-C4 level.  There is no significant osseous spinal canal narrowing identified.  There is osseous neural foraminal narrowing noted at C3-C4 bilaterally on the left at C4-C5, bilaterally at C6-C7.  The atlantoaxial articulation demonstrates degenerative change but otherwise appears intact.  The prevertebral soft tissues appear within normal limits.  The visualized lung apices demonstrate no acute process.  Atherosclerotic calcifications are noted.    Cervical xray 1/27/20  FINDINGS:  Vertebral body alignment is normal without change with flexion or extension.    Labs:  BMP  Lab Results   Component Value Date     (L) 09/04/2020    K 5.3 (H) 09/04/2020    CL 98 09/04/2020    CO2 31 09/04/2020    BUN 60 (H) 09/04/2020    CREATININE 1.09 09/04/2020    CALCIUM 9.5 09/04/2020    ANIONGAP 2 (L) 09/04/2020    ESTGFRAFRICA 55 (A) 09/04/2020    EGFRNONAA 47 (A) 09/04/2020     Lab Results   Component Value Date    ALT 40 (H) 08/31/2020    AST 84 (H) 08/31/2020    ALKPHOS 118 08/31/2020    BILITOT 0.7 08/31/2020     Lab Results   Component Value Date    WBC 10.16 09/04/2020    HGB 11.4 (L) 09/04/2020    HCT 34.9 (L) 09/04/2020    MCV 96 09/04/2020     09/04/2020         Assessment:   Problem List Items Addressed This Visit     None      Visit Diagnoses     Pain in thoracic spine    -  Primary    Relevant Orders    MRI Thoracic Spine Without Contrast    Fall, subsequent encounter        Chronic bilateral thoracic back pain        Chronic pain disorder              84 y.o. year old female with PMH HTN, CAD  s/p LILLIAM placement 6/2019 on coumdain and plavix, CKD, DM II presents to the office with neck pain.      9/22/20 -  Mrs Obando returns to clinic today for follow up .  She complains today of mid thoracic pain, 8-9/10, sharp and stabbing, radiating anteriorly to the breast line.  The pain worsens with certain movements and is alleviated with position changes and sitting still.  She denies any numbness or weakness, no bowel or bladder dysfunction.  She complains the pain has been going on for some time but worsened after a fall 6 weeks ago.  She was seen in [facility] ER, x-rays were negative for fracture, she was admitted for 2 weeks, and then transferred to [facility] for rehabilitation.  She is in formal physical therapy 5 days a removed.  She takes gabapentin, cyclobenzaprine BID.  She is prescribed tramadol 50 mg prn but tries to avoid taking it, she takes Tylenol prn.  On exam, she is +TTP to mid thoracic paraspinals, nontender to percussion of thoracic spine.  We reviewed her thoracic spine xray together today, no findings of vertebral fracture.  Her pain is limiting her mobility and interfering with ADLs.  She continues to have severe pain despite conservative treatment options.  I would like to order a thoracic spine MRI to rule out vertebral fracture vs evaluate her neuroanatomy for consideration of RAFAEL as she is describing radicular pain.  Will call with results and further treatment options.      Treatment Plan:   Procedures: none at this time  Medications: continue gabapentin and tylenol. Continue tramadol 50mg po bid prn for severe pain, last refill 9/9/20 #60  Labs: Reviewed and medications are appropriately dosed for current hepatorenal function.  Imaging: Thoracic spine MRI ordered today    : Reviewed and consistent with medication use as prescribed.    Attending Physician:  Fausto Ho M.D.  Interventional Pain Medicine / Anesthesiology

## 2020-09-24 LAB — SARS-COV-2 RNA RESP QL NAA+PROBE: NOT DETECTED

## 2020-09-29 ENCOUNTER — PATIENT MESSAGE (OUTPATIENT)
Dept: OTHER | Facility: OTHER | Age: 85
End: 2020-09-29

## 2020-09-29 ENCOUNTER — LAB VISIT (OUTPATIENT)
Dept: LAB | Facility: OTHER | Age: 85
End: 2020-09-29
Payer: MEDICARE

## 2020-09-29 DIAGNOSIS — Z03.818 ENCOUNTER FOR OBSERVATION FOR SUSPECTED EXPOSURE TO OTHER BIOLOGICAL AGENTS RULED OUT: ICD-10-CM

## 2020-09-29 PROCEDURE — U0003 INFECTIOUS AGENT DETECTION BY NUCLEIC ACID (DNA OR RNA); SEVERE ACUTE RESPIRATORY SYNDROME CORONAVIRUS 2 (SARS-COV-2) (CORONAVIRUS DISEASE [COVID-19]), AMPLIFIED PROBE TECHNIQUE, MAKING USE OF HIGH THROUGHPUT TECHNOLOGIES AS DESCRIBED BY CMS-2020-01-R: HCPCS | Mod: HCNC

## 2020-09-30 LAB — SARS-COV-2 RNA RESP QL NAA+PROBE: NOT DETECTED

## 2020-10-06 ENCOUNTER — LAB VISIT (OUTPATIENT)
Dept: LAB | Facility: OTHER | Age: 85
End: 2020-10-06
Payer: MEDICARE

## 2020-10-06 DIAGNOSIS — Z03.818 ENCOUNTER FOR OBSERVATION FOR SUSPECTED EXPOSURE TO OTHER BIOLOGICAL AGENTS RULED OUT: ICD-10-CM

## 2020-10-06 LAB — SARS-COV-2 RNA RESP QL NAA+PROBE: NOT DETECTED

## 2020-10-06 PROCEDURE — U0003 INFECTIOUS AGENT DETECTION BY NUCLEIC ACID (DNA OR RNA); SEVERE ACUTE RESPIRATORY SYNDROME CORONAVIRUS 2 (SARS-COV-2) (CORONAVIRUS DISEASE [COVID-19]), AMPLIFIED PROBE TECHNIQUE, MAKING USE OF HIGH THROUGHPUT TECHNOLOGIES AS DESCRIBED BY CMS-2020-01-R: HCPCS | Mod: HCNC

## 2020-10-20 ENCOUNTER — LAB VISIT (OUTPATIENT)
Dept: LAB | Facility: OTHER | Age: 85
End: 2020-10-20
Payer: MEDICARE

## 2020-10-20 DIAGNOSIS — Z03.818 ENCOUNTER FOR OBSERVATION FOR SUSPECTED EXPOSURE TO OTHER BIOLOGICAL AGENTS RULED OUT: ICD-10-CM

## 2020-10-20 PROCEDURE — U0003 INFECTIOUS AGENT DETECTION BY NUCLEIC ACID (DNA OR RNA); SEVERE ACUTE RESPIRATORY SYNDROME CORONAVIRUS 2 (SARS-COV-2) (CORONAVIRUS DISEASE [COVID-19]), AMPLIFIED PROBE TECHNIQUE, MAKING USE OF HIGH THROUGHPUT TECHNOLOGIES AS DESCRIBED BY CMS-2020-01-R: HCPCS | Mod: HCNC

## 2020-10-21 ENCOUNTER — TELEPHONE (OUTPATIENT)
Dept: CARDIOLOGY | Facility: CLINIC | Age: 85
End: 2020-10-21

## 2020-10-21 LAB — SARS-COV-2 RNA RESP QL NAA+PROBE: NOT DETECTED

## 2020-10-23 ENCOUNTER — TELEPHONE (OUTPATIENT)
Dept: CARDIOLOGY | Facility: CLINIC | Age: 85
End: 2020-10-23

## 2020-10-27 ENCOUNTER — LAB VISIT (OUTPATIENT)
Dept: LAB | Facility: OTHER | Age: 85
End: 2020-10-27
Payer: MEDICARE

## 2020-10-27 DIAGNOSIS — Z03.818 ENCOUNTER FOR OBSERVATION FOR SUSPECTED EXPOSURE TO OTHER BIOLOGICAL AGENTS RULED OUT: ICD-10-CM

## 2020-10-27 PROCEDURE — U0003 INFECTIOUS AGENT DETECTION BY NUCLEIC ACID (DNA OR RNA); SEVERE ACUTE RESPIRATORY SYNDROME CORONAVIRUS 2 (SARS-COV-2) (CORONAVIRUS DISEASE [COVID-19]), AMPLIFIED PROBE TECHNIQUE, MAKING USE OF HIGH THROUGHPUT TECHNOLOGIES AS DESCRIBED BY CMS-2020-01-R: HCPCS | Mod: HCNC

## 2020-10-28 LAB — SARS-COV-2 RNA RESP QL NAA+PROBE: NOT DETECTED

## 2020-11-05 ENCOUNTER — PATIENT OUTREACH (OUTPATIENT)
Dept: ADMINISTRATIVE | Facility: OTHER | Age: 85
End: 2020-11-05

## 2020-11-05 NOTE — LETTER
AUTHORIZATION FOR RELEASE OF   CONFIDENTIAL INFORMATION    Dear Iam Jenkins MD,    We are seeing Miesha Obando, date of birth 1935, in the clinic at [facility]. KEON España MD is the patient's PCP. Miesha Obando has an outstanding lab/procedure at the time we reviewed her chart. In order to help keep her health information updated, she has authorized us to request the following medical record(s):        (  )  MAMMOGRAM                                      (  )  COLONOSCOPY      (  )  PAP SMEAR                                          (  )  OUTSIDE LAB RESULTS     (  )  DEXA SCAN                                          ( x )  EYE EXAM for 2020             (  )  FOOT EXAM                                          (  )  ENTIRE RECORD     (  )  OUTSIDE IMMUNIZATIONS                 (  )  _______________         Please fax records to [facility], W Michael Ellerbe, MD, [phone]     If you have any questions, please contact Dayana Judy at [phone].           Patient Name: Miesha Obando  : 1935  Patient Phone #: [phone]

## 2020-11-05 NOTE — PROGRESS NOTES
Care Everywhere: updated  Immunization: updated  Health Maintenance: updated  Media Review: review for outside eye exam report   Legacy Review:   Order placed:   Upcoming appts:  efax sent to Dr. Brown office to obtain eye exam report

## 2020-11-06 ENCOUNTER — OFFICE VISIT (OUTPATIENT)
Dept: NEUROLOGY | Facility: CLINIC | Age: 85
End: 2020-11-06
Payer: MEDICARE

## 2020-11-06 VITALS
DIASTOLIC BLOOD PRESSURE: 74 MMHG | HEART RATE: 70 BPM | HEIGHT: 61 IN | BODY MASS INDEX: 41.07 KG/M2 | SYSTOLIC BLOOD PRESSURE: 145 MMHG

## 2020-11-06 DIAGNOSIS — G25.2 INTENTION TREMOR: Primary | ICD-10-CM

## 2020-11-06 DIAGNOSIS — R26.9 IMPAIRED GAIT: ICD-10-CM

## 2020-11-06 DIAGNOSIS — I63.9 INFARCTION OF LEFT BASAL GANGLIA: ICD-10-CM

## 2020-11-06 PROCEDURE — 99214 OFFICE O/P EST MOD 30 MIN: CPT | Mod: HCNC,S$GLB,, | Performed by: PSYCHIATRY & NEUROLOGY

## 2020-11-06 PROCEDURE — 99999 PR PBB SHADOW E&M-EST. PATIENT-LVL V: CPT | Mod: PBBFAC,HCNC,, | Performed by: PSYCHIATRY & NEUROLOGY

## 2020-11-06 PROCEDURE — 1159F PR MEDICATION LIST DOCUMENTED IN MEDICAL RECORD: ICD-10-PCS | Mod: HCNC,S$GLB,, | Performed by: PSYCHIATRY & NEUROLOGY

## 2020-11-06 PROCEDURE — 1101F PR PT FALLS ASSESS DOC 0-1 FALLS W/OUT INJ PAST YR: ICD-10-PCS | Mod: HCNC,CPTII,S$GLB, | Performed by: PSYCHIATRY & NEUROLOGY

## 2020-11-06 PROCEDURE — 3078F PR MOST RECENT DIASTOLIC BLOOD PRESSURE < 80 MM HG: ICD-10-PCS | Mod: HCNC,CPTII,S$GLB, | Performed by: PSYCHIATRY & NEUROLOGY

## 2020-11-06 PROCEDURE — 3077F SYST BP >= 140 MM HG: CPT | Mod: HCNC,CPTII,S$GLB, | Performed by: PSYCHIATRY & NEUROLOGY

## 2020-11-06 PROCEDURE — 3077F PR MOST RECENT SYSTOLIC BLOOD PRESSURE >= 140 MM HG: ICD-10-PCS | Mod: HCNC,CPTII,S$GLB, | Performed by: PSYCHIATRY & NEUROLOGY

## 2020-11-06 PROCEDURE — 1101F PT FALLS ASSESS-DOCD LE1/YR: CPT | Mod: HCNC,CPTII,S$GLB, | Performed by: PSYCHIATRY & NEUROLOGY

## 2020-11-06 PROCEDURE — 1159F MED LIST DOCD IN RCRD: CPT | Mod: HCNC,S$GLB,, | Performed by: PSYCHIATRY & NEUROLOGY

## 2020-11-06 PROCEDURE — 99999 PR PBB SHADOW E&M-EST. PATIENT-LVL V: ICD-10-PCS | Mod: PBBFAC,HCNC,, | Performed by: PSYCHIATRY & NEUROLOGY

## 2020-11-06 PROCEDURE — 1126F AMNT PAIN NOTED NONE PRSNT: CPT | Mod: HCNC,S$GLB,, | Performed by: PSYCHIATRY & NEUROLOGY

## 2020-11-06 PROCEDURE — 99214 PR OFFICE/OUTPT VISIT, EST, LEVL IV, 30-39 MIN: ICD-10-PCS | Mod: HCNC,S$GLB,, | Performed by: PSYCHIATRY & NEUROLOGY

## 2020-11-06 PROCEDURE — 3078F DIAST BP <80 MM HG: CPT | Mod: HCNC,CPTII,S$GLB, | Performed by: PSYCHIATRY & NEUROLOGY

## 2020-11-06 PROCEDURE — 1126F PR PAIN SEVERITY QUANTIFIED, NO PAIN PRESENT: ICD-10-PCS | Mod: HCNC,S$GLB,, | Performed by: PSYCHIATRY & NEUROLOGY

## 2020-11-06 NOTE — PROGRESS NOTES
Subjective:       Patient ID: Miesha Obando is a 85 y.o. female. With medical issues of Chronic atrial fibrillation, CKD-3. COPD, MDD, GERD, HTN, Hypothyroidism, T2DM, coronary artery disease drug-eluting stent placement x2, aortic stenosis status post TAVR, ischemic cardiomyopathy status post AICD implantation, and chronic combined systolic and diastolic CHF    Chief Complaint:  Intension Tremor    History of Present Illness    Patient reports last se walked normally 5 years ago since she has been walking with assistance (walker). She was discharged from [facility] (9/4/2020) after low back pain status post fall to SNF. Patient with extensive history of back pain with frequent falls and multiple surgeries last surgery was 10 years ago with resultant pain and gait problems.     Last Imaging Review:     CT head without contrast 10/16/2020: Moderate generalized involutional changes are seen.  No evidence of acute intracranial hemorrhage, mass effect, midline deviation, hydrocephalus, or abnormal extra-axial fluid collection is visualized.  Remote appearing lacunar infarct in the left basal ganglia and left corona radiata are noted.  There appears to be  periventricular and deep white matter hypoattenuation which is nonspecific, but is most commonly associated with chronic microvascular ischemic changes.  No evidence of acute large vessel territory ischemia/infarction is appreciated.  MRI with diffusion-weighted imaging is more sensitive in the assessment of acute ischemia/infarction.  The basilar cisterns are preserved. Small mucous retention cyst versus polyp in the medial right maxillary sinus is seen.  The remaining visualized paranasal sinuses and mastoid air cells appear to be grossly clear.  No acute displaced calvarial fracture is visualized.    CT cervical spine without contrast 10/16/2020: There is chronic appearing inferior endplate depression at the C4 level which could reflect a  prominent Schmorl's node, similar to the previous study.  There is chronic appearing superior endplate depression/Schmorl's node at the T2 level which is similar to the previous study.  The static anterior-posterior cervical vertebral body alignment appears to be within normal limits.  There is straightening of the normal cervical lordosis which could be related to muscular spasm and/or positioning.  The visualized osseous structures appear demineralized.  No evidence of acute displaced cervical spine fracture is appreciated by CT.  There is limited assessment of the neural/soft tissue structures by CT. There is limited assessment of the central spinal canal by CT without intrathecal contrast.  Multilevel cervical spondylosis is seen including multilevel disc space narrowing, disc osteophyte complexes, uncovertebral joint hypertrophy, and facet arthrosis.  No severe osseous central spinal canal stenosis is appreciated.  Atherosclerotic vascular calcifications are seen.  Retropharyngeal course of the bilateral carotid arteries is again seen.  Biapical pleural-parenchymal scarring is seen with respiratory motion artifact.  Scattered nonspecific subcentimeter in short axis dimension lymph nodes are noted.    Medications Review:  Patient on warfarin and clopidogrel for Afib with ICMP  She is on levothyroxine for hypothyroidism. Last TSH wnl               Tremor  She complains of tremor. Tremor primarily involves the bilateral hand and bilateral fingers.  Onset of symptoms was sudden, starting about 4 years ago. Symptoms are currently of severe and off and on severity. Tremor exacerbated by activity, stress, fatigue, hunger, cool ambient temperatures and if she is conscious of the tremor. Tremor is alleviated by sleep. Symptoms occur in the afternoon and in the evening and last minutes/hours. She also describes symptoms of bilateral hand tremor, voice change, sleep disturbance, rigidity, difficulty in initiating  movement, change in handwriting, difficulty with walking and balance problems. She denies drooling during sleep (wet pillows) and difficulty with swallowing. Positive family history of PD (her mother) on her 80s      Review of Systems  Review of Systems   Constitutional: Negative for fever.   HENT: Negative for trouble swallowing.    Eyes: Positive for visual disturbance (double vision).   Respiratory: Negative for cough and shortness of breath.    Gastrointestinal: Positive for nausea. Negative for diarrhea and vomiting.   Genitourinary: Positive for dysuria. Negative for hematuria.        Concentrated urine   Musculoskeletal: Positive for back pain (lower back pain radiated down to her right leg), gait problem and neck pain. Negative for neck stiffness.   Neurological: Positive for dizziness, tremors, light-headedness and numbness (mostly in her hands). Negative for seizures and syncope.        Denies change in smell   Psychiatric/Behavioral: Positive for decreased concentration and sleep disturbance.       Objective:      Neurologic Exam     Mental Status   Oriented to person, place, and time.   Oriented to person.   Oriented to place.   Attention: normal.   Speech: speech is normal   No hypophonia  No hypomimia     Cranial Nerves     CN II   Right visual field deficit: none  Left visual field deficit: none     CN III, IV, VI   Extraocular motions are normal.   Diplopia: none  Ophthalmoparesis: none    Motor Exam   Right leg tone: decreased (4/5 with hyper-reflexia)  Left leg tone: decreased (4/5 with hyper-reflexia)No rigidity     Gait, Coordination, and Reflexes     Coordination   Finger to nose coordination: abnormal    Tremor   Resting tremor: present  Intention tremor: present      Physical Exam  Constitutional:       General: She is not in acute distress.     Appearance: She is obese. She is not ill-appearing or toxic-appearing.   HENT:      Head: Normocephalic and atraumatic.   Eyes:      Extraocular  Movements: EOM normal.   Neck:      Musculoskeletal: Neck supple. No neck rigidity or muscular tenderness.   Cardiovascular:      Rate and Rhythm: Normal rate.      Heart sounds: No murmur. No friction rub. No gallop.    Pulmonary:      Effort: Pulmonary effort is normal. No respiratory distress.      Breath sounds: No stridor. No wheezing or rales.   Abdominal:      Tenderness: There is no abdominal tenderness.   Neurological:      Mental Status: She is alert and oriented to person, place, and time. Mental status is at baseline.      Coordination: Finger-Nose-Finger Test abnormal.   Psychiatric:         Mood and Affect: Mood normal.         Speech: Speech normal.         Behavior: Behavior normal.           Assessment:        1. Intention tremor    2. Infarction of left basal ganglia    3. Impaired gait        Plan:     1. Related to vascular etiology. Will discuss with cardiology changing metoprolol to propranolol (for treating both tremor and Afib)  2. And 3. Ambulatory referral to PT    RTC  5 months and PRN

## 2020-11-06 NOTE — LETTER
November 6, 2020      Barb Mendoza PA-C  [address] [facility] [address] [place] [address]           [facility] - Neurology 7th Fl  [address]  [facility] [address]  Phone: [phone]  Fax: [phone]          Patient: Miesha Obando   MR Number: [id number]   YOB: 1935   Date of Visit: 11/6/2020       Dear Barb Mendoza:    Thank you for referring Miesha Obando to me for evaluation. Attached you will find relevant portions of my assessment and plan of care.    If you have questions, please do not hesitate to call me. I look forward to following Miesah Obando along with you.    Sincerely,    Virginia Tatum MD    Enclosure  CC:  No Recipients    If you would like to receive this communication electronically, please contact [email or web address] or [phone] to request more information on [facility] Link access.    For providers and/or their staff who would like to refer a patient to [facility], please contact us through our one-stop-shop provider referral line, [facility], at [phone].    If you feel you have received this communication in error or would no longer like to receive these types of communications, please e-mail [email or web address]

## 2020-11-11 ENCOUNTER — TELEPHONE (OUTPATIENT)
Dept: CARDIOLOGY | Facility: CLINIC | Age: 85
End: 2020-11-11

## 2020-11-11 ENCOUNTER — PATIENT MESSAGE (OUTPATIENT)
Dept: NEUROLOGY | Facility: CLINIC | Age: 85
End: 2020-11-11

## 2020-11-11 RX ORDER — PROPRANOLOL HYDROCHLORIDE 10 MG/1
10 TABLET ORAL 3 TIMES DAILY
Qty: 90 TABLET | Refills: 11 | Status: SHIPPED | OUTPATIENT
Start: 2020-11-11 | End: 2021-12-04 | Stop reason: CLARIF

## 2020-11-11 NOTE — TELEPHONE ENCOUNTER
KAILEE @[phone] ([facility]) to see if pt's HM is at home.  Pt told Nurse at [facility] it was at her house still

## 2020-11-12 ENCOUNTER — CLINICAL SUPPORT (OUTPATIENT)
Dept: CARDIOLOGY | Facility: CLINIC | Age: 85
End: 2020-11-12
Attending: INTERNAL MEDICINE
Payer: MEDICARE

## 2020-11-12 DIAGNOSIS — Z95.0 PACEMAKER: ICD-10-CM

## 2020-11-12 DIAGNOSIS — I48.20 CHRONIC A-FIB: ICD-10-CM

## 2020-11-18 ENCOUNTER — TELEPHONE (OUTPATIENT)
Dept: CARDIOLOGY | Facility: CLINIC | Age: 85
End: 2020-11-18

## 2020-11-18 NOTE — TELEPHONE ENCOUNTER
LM @ [phone] ([facility]) to return call to pacemaker clinic...    Pt's HM was dropped off but need to see if there is a cell adapter.  Apparently she was using land line at her home.  If not will order one for pt.  Pt needs to send a transmission

## 2020-11-19 ENCOUNTER — TELEPHONE (OUTPATIENT)
Dept: CARDIOLOGY | Facility: CLINIC | Age: 85
End: 2020-11-19

## 2020-11-19 NOTE — TELEPHONE ENCOUNTER
"Spoke with Rosy at "[facility].  She said pt's HM does not have cell adaptor.  Ordered one from [facility] 11/19/20.    "

## 2020-11-24 ENCOUNTER — LAB VISIT (OUTPATIENT)
Dept: LAB | Facility: OTHER | Age: 85
End: 2020-11-24
Payer: MEDICARE

## 2020-11-24 DIAGNOSIS — Z03.818 ENCOUNTER FOR OBSERVATION FOR SUSPECTED EXPOSURE TO OTHER BIOLOGICAL AGENTS RULED OUT: ICD-10-CM

## 2020-11-24 PROCEDURE — U0003 INFECTIOUS AGENT DETECTION BY NUCLEIC ACID (DNA OR RNA); SEVERE ACUTE RESPIRATORY SYNDROME CORONAVIRUS 2 (SARS-COV-2) (CORONAVIRUS DISEASE [COVID-19]), AMPLIFIED PROBE TECHNIQUE, MAKING USE OF HIGH THROUGHPUT TECHNOLOGIES AS DESCRIBED BY CMS-2020-01-R: HCPCS | Mod: HCNC

## 2020-11-25 LAB — SARS-COV-2 RNA RESP QL NAA+PROBE: NOT DETECTED

## 2020-12-01 ENCOUNTER — LAB VISIT (OUTPATIENT)
Dept: LAB | Facility: OTHER | Age: 85
End: 2020-12-01
Payer: MEDICARE

## 2020-12-01 DIAGNOSIS — Z03.818 ENCOUNTER FOR OBSERVATION FOR SUSPECTED EXPOSURE TO OTHER BIOLOGICAL AGENTS RULED OUT: ICD-10-CM

## 2020-12-01 PROCEDURE — U0003 INFECTIOUS AGENT DETECTION BY NUCLEIC ACID (DNA OR RNA); SEVERE ACUTE RESPIRATORY SYNDROME CORONAVIRUS 2 (SARS-COV-2) (CORONAVIRUS DISEASE [COVID-19]), AMPLIFIED PROBE TECHNIQUE, MAKING USE OF HIGH THROUGHPUT TECHNOLOGIES AS DESCRIBED BY CMS-2020-01-R: HCPCS | Mod: HCNC

## 2020-12-02 LAB — SARS-COV-2 RNA RESP QL NAA+PROBE: NOT DETECTED

## 2020-12-08 ENCOUNTER — LAB VISIT (OUTPATIENT)
Dept: LAB | Facility: OTHER | Age: 85
End: 2020-12-08
Payer: MEDICARE

## 2020-12-08 DIAGNOSIS — Z03.818 ENCOUNTER FOR OBSERVATION FOR SUSPECTED EXPOSURE TO OTHER BIOLOGICAL AGENTS RULED OUT: ICD-10-CM

## 2020-12-08 PROCEDURE — U0003 INFECTIOUS AGENT DETECTION BY NUCLEIC ACID (DNA OR RNA); SEVERE ACUTE RESPIRATORY SYNDROME CORONAVIRUS 2 (SARS-COV-2) (CORONAVIRUS DISEASE [COVID-19]), AMPLIFIED PROBE TECHNIQUE, MAKING USE OF HIGH THROUGHPUT TECHNOLOGIES AS DESCRIBED BY CMS-2020-01-R: HCPCS | Mod: HCNC

## 2020-12-09 LAB — SARS-COV-2 RNA RESP QL NAA+PROBE: NOT DETECTED

## 2020-12-11 ENCOUNTER — CLINICAL SUPPORT (OUTPATIENT)
Dept: CARDIOLOGY | Facility: CLINIC | Age: 85
End: 2020-12-11
Payer: MEDICARE

## 2020-12-11 ENCOUNTER — LAB VISIT (OUTPATIENT)
Dept: LAB | Facility: OTHER | Age: 85
End: 2020-12-11
Payer: MEDICARE

## 2020-12-11 ENCOUNTER — PATIENT MESSAGE (OUTPATIENT)
Dept: OTHER | Facility: OTHER | Age: 85
End: 2020-12-11

## 2020-12-11 DIAGNOSIS — Z03.818 ENCOUNTER FOR OBSERVATION FOR SUSPECTED EXPOSURE TO OTHER BIOLOGICAL AGENTS RULED OUT: ICD-10-CM

## 2020-12-11 DIAGNOSIS — Z95.0 PRESENCE OF CARDIAC PACEMAKER: ICD-10-CM

## 2020-12-11 PROCEDURE — 93294 REM INTERROG EVL PM/LDLS PM: CPT | Mod: HCNC,,, | Performed by: INTERNAL MEDICINE

## 2020-12-11 PROCEDURE — U0003 INFECTIOUS AGENT DETECTION BY NUCLEIC ACID (DNA OR RNA); SEVERE ACUTE RESPIRATORY SYNDROME CORONAVIRUS 2 (SARS-COV-2) (CORONAVIRUS DISEASE [COVID-19]), AMPLIFIED PROBE TECHNIQUE, MAKING USE OF HIGH THROUGHPUT TECHNOLOGIES AS DESCRIBED BY CMS-2020-01-R: HCPCS | Mod: HCNC

## 2020-12-11 PROCEDURE — 93294 CARDIAC DEVICE CHECK - REMOTE: ICD-10-PCS | Mod: HCNC,,, | Performed by: INTERNAL MEDICINE

## 2020-12-12 ENCOUNTER — PATIENT OUTREACH (OUTPATIENT)
Dept: ADMINISTRATIVE | Facility: OTHER | Age: 85
End: 2020-12-12

## 2020-12-13 NOTE — PROGRESS NOTES
LINKS immunization registry updated  Care Everywhere updated  Health Maintenance updated  Chart reviewed for overdue Proactive [facility] ([facility]) health maintenance testing (CRS, Breast Ca, Diabetic Eye Exam)   Orders entered:N/A

## 2020-12-14 LAB — SARS-COV-2 RNA RESP QL NAA+PROBE: NOT DETECTED

## 2020-12-15 ENCOUNTER — OFFICE VISIT (OUTPATIENT)
Dept: CARDIOLOGY | Facility: CLINIC | Age: 85
End: 2020-12-15
Payer: MEDICARE

## 2020-12-15 VITALS
DIASTOLIC BLOOD PRESSURE: 72 MMHG | HEIGHT: 61 IN | SYSTOLIC BLOOD PRESSURE: 136 MMHG | WEIGHT: 217 LBS | HEART RATE: 69 BPM | BODY MASS INDEX: 40.97 KG/M2

## 2020-12-15 DIAGNOSIS — I42.0 DILATED CARDIOMYOPATHY: Primary | ICD-10-CM

## 2020-12-15 DIAGNOSIS — I48.20 CHRONIC ATRIAL FIBRILLATION: ICD-10-CM

## 2020-12-15 DIAGNOSIS — Z95.2 S/P TAVR (TRANSCATHETER AORTIC VALVE REPLACEMENT): ICD-10-CM

## 2020-12-15 DIAGNOSIS — I25.10 CORONARY ARTERY DISEASE INVOLVING NATIVE CORONARY ARTERY OF NATIVE HEART WITHOUT ANGINA PECTORIS: ICD-10-CM

## 2020-12-15 DIAGNOSIS — Z95.5 S/P DRUG ELUTING CORONARY STENT PLACEMENT: ICD-10-CM

## 2020-12-15 DIAGNOSIS — I50.42 CHRONIC COMBINED SYSTOLIC (CONGESTIVE) AND DIASTOLIC (CONGESTIVE) HEART FAILURE: ICD-10-CM

## 2020-12-15 DIAGNOSIS — Z95.810 AICD (AUTOMATIC CARDIOVERTER/DEFIBRILLATOR) PRESENT: ICD-10-CM

## 2020-12-15 DIAGNOSIS — Z79.01 LONG TERM CURRENT USE OF ANTICOAGULANT THERAPY: ICD-10-CM

## 2020-12-15 PROCEDURE — 99214 OFFICE O/P EST MOD 30 MIN: CPT | Mod: HCNC,S$GLB,, | Performed by: INTERNAL MEDICINE

## 2020-12-15 PROCEDURE — 1159F PR MEDICATION LIST DOCUMENTED IN MEDICAL RECORD: ICD-10-PCS | Mod: HCNC,S$GLB,, | Performed by: INTERNAL MEDICINE

## 2020-12-15 PROCEDURE — 3288F PR FALLS RISK ASSESSMENT DOCUMENTED: ICD-10-PCS | Mod: HCNC,CPTII,S$GLB, | Performed by: INTERNAL MEDICINE

## 2020-12-15 PROCEDURE — 1126F PR PAIN SEVERITY QUANTIFIED, NO PAIN PRESENT: ICD-10-PCS | Mod: HCNC,S$GLB,, | Performed by: INTERNAL MEDICINE

## 2020-12-15 PROCEDURE — 99499 RISK ADDL DX/OHS AUDIT: ICD-10-PCS | Mod: S$GLB,,, | Performed by: INTERNAL MEDICINE

## 2020-12-15 PROCEDURE — 1101F PR PT FALLS ASSESS DOC 0-1 FALLS W/OUT INJ PAST YR: ICD-10-PCS | Mod: HCNC,CPTII,S$GLB, | Performed by: INTERNAL MEDICINE

## 2020-12-15 PROCEDURE — 99214 PR OFFICE/OUTPT VISIT, EST, LEVL IV, 30-39 MIN: ICD-10-PCS | Mod: HCNC,S$GLB,, | Performed by: INTERNAL MEDICINE

## 2020-12-15 PROCEDURE — 1101F PT FALLS ASSESS-DOCD LE1/YR: CPT | Mod: HCNC,CPTII,S$GLB, | Performed by: INTERNAL MEDICINE

## 2020-12-15 PROCEDURE — 1159F MED LIST DOCD IN RCRD: CPT | Mod: HCNC,S$GLB,, | Performed by: INTERNAL MEDICINE

## 2020-12-15 PROCEDURE — 99999 PR PBB SHADOW E&M-EST. PATIENT-LVL V: CPT | Mod: PBBFAC,HCNC,, | Performed by: INTERNAL MEDICINE

## 2020-12-15 PROCEDURE — 3288F FALL RISK ASSESSMENT DOCD: CPT | Mod: HCNC,CPTII,S$GLB, | Performed by: INTERNAL MEDICINE

## 2020-12-15 PROCEDURE — 3078F PR MOST RECENT DIASTOLIC BLOOD PRESSURE < 80 MM HG: ICD-10-PCS | Mod: HCNC,CPTII,S$GLB, | Performed by: INTERNAL MEDICINE

## 2020-12-15 PROCEDURE — 99499 UNLISTED E&M SERVICE: CPT | Mod: S$GLB,,, | Performed by: INTERNAL MEDICINE

## 2020-12-15 PROCEDURE — 3075F PR MOST RECENT SYSTOLIC BLOOD PRESS GE 130-139MM HG: ICD-10-PCS | Mod: HCNC,CPTII,S$GLB, | Performed by: INTERNAL MEDICINE

## 2020-12-15 PROCEDURE — 3075F SYST BP GE 130 - 139MM HG: CPT | Mod: HCNC,CPTII,S$GLB, | Performed by: INTERNAL MEDICINE

## 2020-12-15 PROCEDURE — 1126F AMNT PAIN NOTED NONE PRSNT: CPT | Mod: HCNC,S$GLB,, | Performed by: INTERNAL MEDICINE

## 2020-12-15 PROCEDURE — 99999 PR PBB SHADOW E&M-EST. PATIENT-LVL V: ICD-10-PCS | Mod: PBBFAC,HCNC,, | Performed by: INTERNAL MEDICINE

## 2020-12-15 PROCEDURE — 3078F DIAST BP <80 MM HG: CPT | Mod: HCNC,CPTII,S$GLB, | Performed by: INTERNAL MEDICINE

## 2020-12-15 NOTE — PROGRESS NOTES
Subjective:    Patient ID:  Miesha Obando is a 85 y.o. female who presents for follow-up of Follow-up      Pt here for f/u. After several falls, she is now in a long term nursing facility. Her falls have not resulted in any significant trauma or injury. She had a recent ER visit with chest wall pain which she continues to have with upper body movement and palpation. Reports no anginal pain and says breathing ok.       Review of Systems   Constitution: Negative for weight gain and weight loss.   HENT: Negative.    Eyes: Negative.    Cardiovascular: Negative for claudication, cyanosis, irregular heartbeat, leg swelling, near-syncope, orthopnea (no PND), palpitations and syncope.   Respiratory: Negative for cough, hemoptysis, shortness of breath and snoring.    Endocrine: Negative.    Skin: Negative.    Musculoskeletal: Positive for arthritis, back pain and falls. Negative for joint pain, muscle cramps, muscle weakness and myalgias.   Gastrointestinal: Negative for diarrhea, hematemesis, nausea and vomiting.   Genitourinary: Negative.    Neurological: Negative for dizziness, focal weakness, light-headedness, loss of balance, numbness, paresthesias and seizures.   Psychiatric/Behavioral: Negative.         Objective:    Physical Exam   Constitutional: She is oriented to person, place, and time. She appears well-developed and well-nourished.   Eyes: Pupils are equal, round, and reactive to light.   Neck: Normal range of motion. No thyromegaly present.   Cardiovascular: Normal rate, regular rhythm, S1 normal, S2 normal, intact distal pulses and normal pulses.  No extrasystoles are present. PMI is not displaced. Exam reveals no friction rub.   Murmur heard.   Medium-pitched systolic murmur is present with a grade of 1/6 at the upper right sternal border.  Pulmonary/Chest: Effort normal and breath sounds normal. She has no wheezes. She has no rales. She exhibits no tenderness.   Abdominal: Soft. Bowel sounds are normal.  She exhibits no distension and no mass. There is no abdominal tenderness.   Musculoskeletal: Normal range of motion.         General: Edema (trace) present.   Neurological: She is alert and oriented to person, place, and time.   Skin: Skin is warm and dry.   Vitals reviewed.      Test(s) Reviewed  I have reviewed the following in detail:  [] Stress test   [] Angiography   [x] Echocardiogram - 09/2020   [] Labs   [x] Other:  Device interrogation; hospital records       Assessment:       1. Cardiomyopathy - EF 30-35%    2. Chronic combined systolic (congestive) and diastolic (congestive) heart failure    3. Coronary artery disease involving native coronary artery of native heart without angina pectoris    4. S/P drug eluting coronary stent placement - RCA 2015; LCX 06/2019    5. Chronic atrial fibrillation    6. 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 AICD (automatic cardioverter/defibrillator) present - Bi-V    8. Long term current use of anticoagulant therapy         Plan:       Pt stable from a cardiac perspective  Will continue present medications   F/u in 6 months

## 2020-12-16 ENCOUNTER — TELEPHONE (OUTPATIENT)
Dept: CARDIOLOGY | Facility: CLINIC | Age: 85
End: 2020-12-16

## 2020-12-16 NOTE — TELEPHONE ENCOUNTER
Spoke with Rupinder at [facility].  She stated they don't know where the cell adapter might be when delivered.  I ordered a new cell adapter and have it attention to [facility]

## 2020-12-18 ENCOUNTER — LAB VISIT (OUTPATIENT)
Dept: LAB | Facility: OTHER | Age: 85
End: 2020-12-18
Payer: MEDICARE

## 2020-12-18 DIAGNOSIS — Z03.818 ENCOUNTER FOR OBSERVATION FOR SUSPECTED EXPOSURE TO OTHER BIOLOGICAL AGENTS RULED OUT: ICD-10-CM

## 2020-12-18 PROCEDURE — U0003 INFECTIOUS AGENT DETECTION BY NUCLEIC ACID (DNA OR RNA); SEVERE ACUTE RESPIRATORY SYNDROME CORONAVIRUS 2 (SARS-COV-2) (CORONAVIRUS DISEASE [COVID-19]), AMPLIFIED PROBE TECHNIQUE, MAKING USE OF HIGH THROUGHPUT TECHNOLOGIES AS DESCRIBED BY CMS-2020-01-R: HCPCS | Mod: HCNC

## 2020-12-20 LAB — SARS-COV-2 RNA RESP QL NAA+PROBE: NOT DETECTED

## 2020-12-22 ENCOUNTER — LAB VISIT (OUTPATIENT)
Dept: LAB | Facility: OTHER | Age: 85
End: 2020-12-22
Payer: MEDICARE

## 2020-12-22 DIAGNOSIS — Z03.818 ENCOUNTER FOR OBSERVATION FOR SUSPECTED EXPOSURE TO OTHER BIOLOGICAL AGENTS RULED OUT: ICD-10-CM

## 2020-12-22 PROCEDURE — U0003 INFECTIOUS AGENT DETECTION BY NUCLEIC ACID (DNA OR RNA); SEVERE ACUTE RESPIRATORY SYNDROME CORONAVIRUS 2 (SARS-COV-2) (CORONAVIRUS DISEASE [COVID-19]), AMPLIFIED PROBE TECHNIQUE, MAKING USE OF HIGH THROUGHPUT TECHNOLOGIES AS DESCRIBED BY CMS-2020-01-R: HCPCS | Mod: HCNC

## 2020-12-23 LAB — SARS-COV-2 RNA RESP QL NAA+PROBE: NOT DETECTED

## 2020-12-29 ENCOUNTER — LAB VISIT (OUTPATIENT)
Dept: LAB | Facility: OTHER | Age: 85
End: 2020-12-29
Payer: MEDICARE

## 2020-12-29 ENCOUNTER — PATIENT OUTREACH (OUTPATIENT)
Dept: ADMINISTRATIVE | Facility: OTHER | Age: 85
End: 2020-12-29

## 2020-12-29 DIAGNOSIS — Z03.818 ENCOUNTER FOR OBSERVATION FOR SUSPECTED EXPOSURE TO OTHER BIOLOGICAL AGENTS RULED OUT: ICD-10-CM

## 2020-12-29 PROCEDURE — U0003 INFECTIOUS AGENT DETECTION BY NUCLEIC ACID (DNA OR RNA); SEVERE ACUTE RESPIRATORY SYNDROME CORONAVIRUS 2 (SARS-COV-2) (CORONAVIRUS DISEASE [COVID-19]), AMPLIFIED PROBE TECHNIQUE, MAKING USE OF HIGH THROUGHPUT TECHNOLOGIES AS DESCRIBED BY CMS-2020-01-R: HCPCS | Mod: HCNC

## 2020-12-31 LAB — SARS-COV-2 RNA RESP QL NAA+PROBE: NOT DETECTED

## 2021-01-04 ENCOUNTER — TELEPHONE (OUTPATIENT)
Dept: CARDIOLOGY | Facility: CLINIC | Age: 86
End: 2021-01-04

## 2021-01-05 ENCOUNTER — LAB VISIT (OUTPATIENT)
Dept: LAB | Facility: OTHER | Age: 86
End: 2021-01-05
Payer: MEDICARE

## 2021-01-05 DIAGNOSIS — Z03.818 ENCOUNTER FOR OBSERVATION FOR SUSPECTED EXPOSURE TO OTHER BIOLOGICAL AGENTS RULED OUT: ICD-10-CM

## 2021-01-05 PROCEDURE — U0003 INFECTIOUS AGENT DETECTION BY NUCLEIC ACID (DNA OR RNA); SEVERE ACUTE RESPIRATORY SYNDROME CORONAVIRUS 2 (SARS-COV-2) (CORONAVIRUS DISEASE [COVID-19]), AMPLIFIED PROBE TECHNIQUE, MAKING USE OF HIGH THROUGHPUT TECHNOLOGIES AS DESCRIBED BY CMS-2020-01-R: HCPCS | Mod: HCNC

## 2021-01-06 LAB — SARS-COV-2 RNA RESP QL NAA+PROBE: NOT DETECTED

## 2021-01-12 ENCOUNTER — LAB VISIT (OUTPATIENT)
Dept: LAB | Facility: OTHER | Age: 86
End: 2021-01-12
Payer: MEDICARE

## 2021-01-12 DIAGNOSIS — Z20.822 ENCOUNTER FOR LABORATORY TESTING FOR COVID-19 VIRUS: ICD-10-CM

## 2021-01-12 PROCEDURE — U0003 INFECTIOUS AGENT DETECTION BY NUCLEIC ACID (DNA OR RNA); SEVERE ACUTE RESPIRATORY SYNDROME CORONAVIRUS 2 (SARS-COV-2) (CORONAVIRUS DISEASE [COVID-19]), AMPLIFIED PROBE TECHNIQUE, MAKING USE OF HIGH THROUGHPUT TECHNOLOGIES AS DESCRIBED BY CMS-2020-01-R: HCPCS | Mod: HCNC

## 2021-01-14 LAB — SARS-COV-2 RNA RESP QL NAA+PROBE: NOT DETECTED

## 2021-01-19 ENCOUNTER — LAB VISIT (OUTPATIENT)
Dept: LAB | Facility: OTHER | Age: 86
End: 2021-01-19
Payer: MEDICARE

## 2021-01-19 DIAGNOSIS — Z20.822 ENCOUNTER FOR LABORATORY TESTING FOR COVID-19 VIRUS: ICD-10-CM

## 2021-01-19 PROCEDURE — U0003 INFECTIOUS AGENT DETECTION BY NUCLEIC ACID (DNA OR RNA); SEVERE ACUTE RESPIRATORY SYNDROME CORONAVIRUS 2 (SARS-COV-2) (CORONAVIRUS DISEASE [COVID-19]), AMPLIFIED PROBE TECHNIQUE, MAKING USE OF HIGH THROUGHPUT TECHNOLOGIES AS DESCRIBED BY CMS-2020-01-R: HCPCS | Mod: HCNC

## 2021-01-21 LAB — SARS-COV-2 RNA RESP QL NAA+PROBE: NOT DETECTED

## 2021-02-02 ENCOUNTER — LAB VISIT (OUTPATIENT)
Dept: LAB | Facility: OTHER | Age: 86
End: 2021-02-02
Payer: MEDICARE

## 2021-02-02 DIAGNOSIS — Z20.822 ENCOUNTER FOR LABORATORY TESTING FOR COVID-19 VIRUS: ICD-10-CM

## 2021-02-02 PROCEDURE — U0003 INFECTIOUS AGENT DETECTION BY NUCLEIC ACID (DNA OR RNA); SEVERE ACUTE RESPIRATORY SYNDROME CORONAVIRUS 2 (SARS-COV-2) (CORONAVIRUS DISEASE [COVID-19]), AMPLIFIED PROBE TECHNIQUE, MAKING USE OF HIGH THROUGHPUT TECHNOLOGIES AS DESCRIBED BY CMS-2020-01-R: HCPCS

## 2021-02-04 LAB — SARS-COV-2 RNA RESP QL NAA+PROBE: NOT DETECTED

## 2021-02-09 ENCOUNTER — LAB VISIT (OUTPATIENT)
Dept: LAB | Facility: OTHER | Age: 86
End: 2021-02-09
Payer: MEDICARE

## 2021-02-09 DIAGNOSIS — Z20.822 ENCOUNTER FOR LABORATORY TESTING FOR COVID-19 VIRUS: ICD-10-CM

## 2021-02-09 PROCEDURE — U0003 INFECTIOUS AGENT DETECTION BY NUCLEIC ACID (DNA OR RNA); SEVERE ACUTE RESPIRATORY SYNDROME CORONAVIRUS 2 (SARS-COV-2) (CORONAVIRUS DISEASE [COVID-19]), AMPLIFIED PROBE TECHNIQUE, MAKING USE OF HIGH THROUGHPUT TECHNOLOGIES AS DESCRIBED BY CMS-2020-01-R: HCPCS

## 2021-02-10 LAB — SARS-COV-2 RNA RESP QL NAA+PROBE: NOT DETECTED

## 2021-03-02 ENCOUNTER — LAB VISIT (OUTPATIENT)
Dept: LAB | Facility: OTHER | Age: 86
End: 2021-03-02
Payer: MEDICARE

## 2021-03-02 DIAGNOSIS — Z20.822 ENCOUNTER FOR LABORATORY TESTING FOR COVID-19 VIRUS: ICD-10-CM

## 2021-03-02 PROCEDURE — U0003 INFECTIOUS AGENT DETECTION BY NUCLEIC ACID (DNA OR RNA); SEVERE ACUTE RESPIRATORY SYNDROME CORONAVIRUS 2 (SARS-COV-2) (CORONAVIRUS DISEASE [COVID-19]), AMPLIFIED PROBE TECHNIQUE, MAKING USE OF HIGH THROUGHPUT TECHNOLOGIES AS DESCRIBED BY CMS-2020-01-R: HCPCS

## 2021-03-03 LAB — SARS-COV-2 RNA RESP QL NAA+PROBE: NOT DETECTED

## 2021-03-09 ENCOUNTER — LAB VISIT (OUTPATIENT)
Dept: LAB | Facility: OTHER | Age: 86
End: 2021-03-09
Payer: MEDICARE

## 2021-03-09 DIAGNOSIS — Z20.822 ENCOUNTER FOR LABORATORY TESTING FOR COVID-19 VIRUS: ICD-10-CM

## 2021-03-09 PROCEDURE — U0003 INFECTIOUS AGENT DETECTION BY NUCLEIC ACID (DNA OR RNA); SEVERE ACUTE RESPIRATORY SYNDROME CORONAVIRUS 2 (SARS-COV-2) (CORONAVIRUS DISEASE [COVID-19]), AMPLIFIED PROBE TECHNIQUE, MAKING USE OF HIGH THROUGHPUT TECHNOLOGIES AS DESCRIBED BY CMS-2020-01-R: HCPCS | Performed by: NURSE PRACTITIONER

## 2021-03-10 LAB — SARS-COV-2 RNA RESP QL NAA+PROBE: NOT DETECTED

## 2021-03-11 ENCOUNTER — CLINICAL SUPPORT (OUTPATIENT)
Dept: CARDIOLOGY | Facility: CLINIC | Age: 86
End: 2021-03-11
Payer: MEDICARE

## 2021-03-11 DIAGNOSIS — Z95.0 PRESENCE OF CARDIAC PACEMAKER: ICD-10-CM

## 2021-03-11 PROCEDURE — 93294 CARDIAC DEVICE CHECK - REMOTE: ICD-10-PCS | Mod: S$GLB,,, | Performed by: INTERNAL MEDICINE

## 2021-03-11 PROCEDURE — 93296 REM INTERROG EVL PM/IDS: CPT | Mod: S$GLB,,, | Performed by: INTERNAL MEDICINE

## 2021-03-11 PROCEDURE — 93296 CARDIAC DEVICE CHECK - REMOTE: ICD-10-PCS | Mod: S$GLB,,, | Performed by: INTERNAL MEDICINE

## 2021-03-11 PROCEDURE — 93294 REM INTERROG EVL PM/LDLS PM: CPT | Mod: S$GLB,,, | Performed by: INTERNAL MEDICINE

## 2021-03-16 ENCOUNTER — LAB VISIT (OUTPATIENT)
Dept: LAB | Facility: OTHER | Age: 86
End: 2021-03-16
Payer: MEDICARE

## 2021-03-16 DIAGNOSIS — Z20.822 ENCOUNTER FOR LABORATORY TESTING FOR COVID-19 VIRUS: ICD-10-CM

## 2021-03-16 PROCEDURE — U0003 INFECTIOUS AGENT DETECTION BY NUCLEIC ACID (DNA OR RNA); SEVERE ACUTE RESPIRATORY SYNDROME CORONAVIRUS 2 (SARS-COV-2) (CORONAVIRUS DISEASE [COVID-19]), AMPLIFIED PROBE TECHNIQUE, MAKING USE OF HIGH THROUGHPUT TECHNOLOGIES AS DESCRIBED BY CMS-2020-01-R: HCPCS | Performed by: NURSE PRACTITIONER

## 2021-03-17 LAB — SARS-COV-2 RNA RESP QL NAA+PROBE: NOT DETECTED

## 2021-03-23 DIAGNOSIS — Z20.822 ENCOUNTER FOR LABORATORY TESTING FOR COVID-19 VIRUS: ICD-10-CM

## 2021-03-25 ENCOUNTER — TELEPHONE (OUTPATIENT)
Dept: CARDIOLOGY | Facility: CLINIC | Age: 86
End: 2021-03-25

## 2021-03-25 RX ORDER — METOPROLOL SUCCINATE 25 MG/1
25 TABLET, EXTENDED RELEASE ORAL NIGHTLY
COMMUNITY

## 2021-03-25 RX ORDER — ONDANSETRON 4 MG/1
4 TABLET, FILM COATED ORAL EVERY 6 HOURS PRN
COMMUNITY

## 2021-03-25 RX ORDER — MINERAL OIL, PETROLATUM, PHENYLEPHRINE HCL 2.5; 140; 749 MG/G; MG/G; MG/G
1 OINTMENT TOPICAL 4 TIMES DAILY PRN
COMMUNITY

## 2021-03-25 RX ORDER — CYCLOBENZAPRINE HCL 5 MG
5 TABLET ORAL NIGHTLY PRN
COMMUNITY
End: 2021-12-04 | Stop reason: CLARIF

## 2021-03-25 RX ORDER — SUCRALFATE 1 G/1
1 TABLET ORAL
COMMUNITY

## 2021-03-25 RX ORDER — ADHESIVE BANDAGE
30 BANDAGE TOPICAL DAILY PRN
COMMUNITY

## 2021-03-25 RX ORDER — DOCUSATE SODIUM 100 MG/1
100 CAPSULE, LIQUID FILLED ORAL DAILY
COMMUNITY

## 2021-03-25 RX ORDER — TRAMADOL HYDROCHLORIDE 50 MG/1
50 TABLET ORAL EVERY 12 HOURS PRN
COMMUNITY

## 2021-03-25 RX ORDER — TITANIUM DIOXIDE, OCTINOXATE, ZINC OXIDE 4.61; 1.6; .78 G/40ML; G/40ML; G/40ML
800 CREAM TOPICAL 2 TIMES DAILY
COMMUNITY

## 2021-03-30 ENCOUNTER — LAB VISIT (OUTPATIENT)
Dept: LAB | Facility: OTHER | Age: 86
End: 2021-03-30
Payer: MEDICARE

## 2021-03-30 DIAGNOSIS — Z20.822 ENCOUNTER FOR LABORATORY TESTING FOR COVID-19 VIRUS: ICD-10-CM

## 2021-03-30 LAB — SARS-COV-2 RNA RESP QL NAA+PROBE: NOT DETECTED

## 2021-03-30 PROCEDURE — U0003 INFECTIOUS AGENT DETECTION BY NUCLEIC ACID (DNA OR RNA); SEVERE ACUTE RESPIRATORY SYNDROME CORONAVIRUS 2 (SARS-COV-2) (CORONAVIRUS DISEASE [COVID-19]), AMPLIFIED PROBE TECHNIQUE, MAKING USE OF HIGH THROUGHPUT TECHNOLOGIES AS DESCRIBED BY CMS-2020-01-R: HCPCS | Performed by: NURSE PRACTITIONER

## 2021-04-20 ENCOUNTER — LAB VISIT (OUTPATIENT)
Dept: LAB | Facility: OTHER | Age: 86
End: 2021-04-20
Payer: MEDICARE

## 2021-04-20 DIAGNOSIS — Z20.822 ENCOUNTER FOR LABORATORY TESTING FOR COVID-19 VIRUS: ICD-10-CM

## 2021-04-20 PROCEDURE — U0003 INFECTIOUS AGENT DETECTION BY NUCLEIC ACID (DNA OR RNA); SEVERE ACUTE RESPIRATORY SYNDROME CORONAVIRUS 2 (SARS-COV-2) (CORONAVIRUS DISEASE [COVID-19]), AMPLIFIED PROBE TECHNIQUE, MAKING USE OF HIGH THROUGHPUT TECHNOLOGIES AS DESCRIBED BY CMS-2020-01-R: HCPCS | Performed by: NURSE PRACTITIONER

## 2021-04-21 LAB — SARS-COV-2 RNA RESP QL NAA+PROBE: NOT DETECTED

## 2021-05-04 ENCOUNTER — LAB VISIT (OUTPATIENT)
Dept: LAB | Facility: OTHER | Age: 86
End: 2021-05-04
Payer: MEDICARE

## 2021-05-04 DIAGNOSIS — Z20.822 ENCOUNTER FOR LABORATORY TESTING FOR COVID-19 VIRUS: ICD-10-CM

## 2021-05-04 PROCEDURE — U0003 INFECTIOUS AGENT DETECTION BY NUCLEIC ACID (DNA OR RNA); SEVERE ACUTE RESPIRATORY SYNDROME CORONAVIRUS 2 (SARS-COV-2) (CORONAVIRUS DISEASE [COVID-19]), AMPLIFIED PROBE TECHNIQUE, MAKING USE OF HIGH THROUGHPUT TECHNOLOGIES AS DESCRIBED BY CMS-2020-01-R: HCPCS | Performed by: NURSE PRACTITIONER

## 2021-05-05 LAB — SARS-COV-2 RNA RESP QL NAA+PROBE: NOT DETECTED

## 2021-05-06 ENCOUNTER — PATIENT MESSAGE (OUTPATIENT)
Dept: RESEARCH | Facility: HOSPITAL | Age: 86
End: 2021-05-06

## 2021-06-25 ENCOUNTER — CLINICAL SUPPORT (OUTPATIENT)
Dept: CARDIOLOGY | Facility: HOSPITAL | Age: 86
End: 2021-06-25
Payer: MEDICARE

## 2021-06-25 ENCOUNTER — HOSPITAL ENCOUNTER (OUTPATIENT)
Dept: CARDIOLOGY | Facility: HOSPITAL | Age: 86
Discharge: HOME | End: 2021-06-25
Payer: MEDICARE

## 2021-06-25 DIAGNOSIS — Z95.0 PRESENCE OF CARDIAC PACEMAKER: ICD-10-CM

## 2021-06-25 PROCEDURE — 93294 CARDIAC DEVICE CHECK - REMOTE: ICD-10-PCS | Mod: ,,, | Performed by: INTERNAL MEDICINE

## 2021-06-25 PROCEDURE — 93294 REM INTERROG EVL PM/LDLS PM: CPT | Mod: ,,, | Performed by: INTERNAL MEDICINE

## 2021-06-25 PROCEDURE — 93296 REM INTERROG EVL PM/IDS: CPT | Mod: PO | Performed by: INTERNAL MEDICINE

## 2021-07-13 ENCOUNTER — LAB VISIT (OUTPATIENT)
Dept: LAB | Facility: OTHER | Age: 86
End: 2021-07-13
Payer: MEDICARE

## 2021-07-13 DIAGNOSIS — Z20.822 ENCOUNTER FOR LABORATORY TESTING FOR COVID-19 VIRUS: ICD-10-CM

## 2021-07-13 PROCEDURE — U0003 INFECTIOUS AGENT DETECTION BY NUCLEIC ACID (DNA OR RNA); SEVERE ACUTE RESPIRATORY SYNDROME CORONAVIRUS 2 (SARS-COV-2) (CORONAVIRUS DISEASE [COVID-19]), AMPLIFIED PROBE TECHNIQUE, MAKING USE OF HIGH THROUGHPUT TECHNOLOGIES AS DESCRIBED BY CMS-2020-01-R: HCPCS | Performed by: NURSE PRACTITIONER

## 2021-07-14 LAB
SARS-COV-2 RNA RESP QL NAA+PROBE: NOT DETECTED
SARS-COV-2- CYCLE NUMBER: -1

## 2021-07-20 ENCOUNTER — LAB VISIT (OUTPATIENT)
Dept: LAB | Facility: OTHER | Age: 86
End: 2021-07-20
Payer: MEDICARE

## 2021-07-20 DIAGNOSIS — Z20.822 ENCOUNTER FOR LABORATORY TESTING FOR COVID-19 VIRUS: ICD-10-CM

## 2021-07-20 PROCEDURE — U0003 INFECTIOUS AGENT DETECTION BY NUCLEIC ACID (DNA OR RNA); SEVERE ACUTE RESPIRATORY SYNDROME CORONAVIRUS 2 (SARS-COV-2) (CORONAVIRUS DISEASE [COVID-19]), AMPLIFIED PROBE TECHNIQUE, MAKING USE OF HIGH THROUGHPUT TECHNOLOGIES AS DESCRIBED BY CMS-2020-01-R: HCPCS | Performed by: NURSE PRACTITIONER

## 2021-07-21 LAB
SARS-COV-2 RNA RESP QL NAA+PROBE: NOT DETECTED
SARS-COV-2- CYCLE NUMBER: -1

## 2021-07-27 ENCOUNTER — LAB VISIT (OUTPATIENT)
Dept: LAB | Facility: OTHER | Age: 86
End: 2021-07-27
Payer: MEDICARE

## 2021-07-27 DIAGNOSIS — Z20.822 ENCOUNTER FOR LABORATORY TESTING FOR COVID-19 VIRUS: ICD-10-CM

## 2021-07-27 PROCEDURE — U0003 INFECTIOUS AGENT DETECTION BY NUCLEIC ACID (DNA OR RNA); SEVERE ACUTE RESPIRATORY SYNDROME CORONAVIRUS 2 (SARS-COV-2) (CORONAVIRUS DISEASE [COVID-19]), AMPLIFIED PROBE TECHNIQUE, MAKING USE OF HIGH THROUGHPUT TECHNOLOGIES AS DESCRIBED BY CMS-2020-01-R: HCPCS | Performed by: NURSE PRACTITIONER

## 2021-07-29 LAB
SARS-COV-2 RNA RESP QL NAA+PROBE: NOT DETECTED
SARS-COV-2- CYCLE NUMBER: -1

## 2021-08-03 ENCOUNTER — LAB VISIT (OUTPATIENT)
Dept: LAB | Facility: OTHER | Age: 86
End: 2021-08-03
Payer: MEDICARE

## 2021-08-03 DIAGNOSIS — Z20.822 ENCOUNTER FOR LABORATORY TESTING FOR COVID-19 VIRUS: ICD-10-CM

## 2021-08-03 PROCEDURE — U0003 INFECTIOUS AGENT DETECTION BY NUCLEIC ACID (DNA OR RNA); SEVERE ACUTE RESPIRATORY SYNDROME CORONAVIRUS 2 (SARS-COV-2) (CORONAVIRUS DISEASE [COVID-19]), AMPLIFIED PROBE TECHNIQUE, MAKING USE OF HIGH THROUGHPUT TECHNOLOGIES AS DESCRIBED BY CMS-2020-01-R: HCPCS | Performed by: NURSE PRACTITIONER

## 2021-08-04 ENCOUNTER — PATIENT MESSAGE (OUTPATIENT)
Dept: ADMINISTRATIVE | Facility: HOSPITAL | Age: 86
End: 2021-08-04

## 2021-08-05 LAB
SARS-COV-2 RNA RESP QL NAA+PROBE: NOT DETECTED
SARS-COV-2- CYCLE NUMBER: -1

## 2021-08-10 ENCOUNTER — LAB VISIT (OUTPATIENT)
Dept: LAB | Facility: OTHER | Age: 86
End: 2021-08-10
Payer: MEDICARE

## 2021-08-10 DIAGNOSIS — Z20.822 ENCOUNTER FOR LABORATORY TESTING FOR COVID-19 VIRUS: ICD-10-CM

## 2021-08-10 PROCEDURE — U0003 INFECTIOUS AGENT DETECTION BY NUCLEIC ACID (DNA OR RNA); SEVERE ACUTE RESPIRATORY SYNDROME CORONAVIRUS 2 (SARS-COV-2) (CORONAVIRUS DISEASE [COVID-19]), AMPLIFIED PROBE TECHNIQUE, MAKING USE OF HIGH THROUGHPUT TECHNOLOGIES AS DESCRIBED BY CMS-2020-01-R: HCPCS | Performed by: NURSE PRACTITIONER

## 2021-08-11 LAB
SARS-COV-2 RNA RESP QL NAA+PROBE: NOT DETECTED
SARS-COV-2- CYCLE NUMBER: -1

## 2021-08-17 ENCOUNTER — LAB VISIT (OUTPATIENT)
Dept: LAB | Facility: OTHER | Age: 86
End: 2021-08-17
Payer: MEDICARE

## 2021-08-17 DIAGNOSIS — Z20.822 ENCOUNTER FOR LABORATORY TESTING FOR COVID-19 VIRUS: ICD-10-CM

## 2021-08-17 PROCEDURE — U0003 INFECTIOUS AGENT DETECTION BY NUCLEIC ACID (DNA OR RNA); SEVERE ACUTE RESPIRATORY SYNDROME CORONAVIRUS 2 (SARS-COV-2) (CORONAVIRUS DISEASE [COVID-19]), AMPLIFIED PROBE TECHNIQUE, MAKING USE OF HIGH THROUGHPUT TECHNOLOGIES AS DESCRIBED BY CMS-2020-01-R: HCPCS | Performed by: NURSE PRACTITIONER

## 2021-08-18 LAB
SARS-COV-2 RNA RESP QL NAA+PROBE: NOT DETECTED
SARS-COV-2- CYCLE NUMBER: -1

## 2021-08-19 ENCOUNTER — PATIENT OUTREACH (OUTPATIENT)
Dept: ADMINISTRATIVE | Facility: OTHER | Age: 86
End: 2021-08-19

## 2021-08-24 ENCOUNTER — OFFICE VISIT (OUTPATIENT)
Dept: CARDIOLOGY | Facility: CLINIC | Age: 86
End: 2021-08-24
Payer: MEDICARE

## 2021-08-24 VITALS
WEIGHT: 217 LBS | SYSTOLIC BLOOD PRESSURE: 112 MMHG | HEIGHT: 61 IN | DIASTOLIC BLOOD PRESSURE: 56 MMHG | BODY MASS INDEX: 40.97 KG/M2 | HEART RATE: 69 BPM

## 2021-08-24 DIAGNOSIS — I48.20 CHRONIC ATRIAL FIBRILLATION: ICD-10-CM

## 2021-08-24 DIAGNOSIS — I50.42 CHRONIC COMBINED SYSTOLIC (CONGESTIVE) AND DIASTOLIC (CONGESTIVE) HEART FAILURE: ICD-10-CM

## 2021-08-24 DIAGNOSIS — I25.10 CORONARY ARTERY DISEASE INVOLVING NATIVE CORONARY ARTERY OF NATIVE HEART WITHOUT ANGINA PECTORIS: ICD-10-CM

## 2021-08-24 DIAGNOSIS — I10 ESSENTIAL HYPERTENSION: ICD-10-CM

## 2021-08-24 DIAGNOSIS — Z95.5 S/P DRUG ELUTING CORONARY STENT PLACEMENT: ICD-10-CM

## 2021-08-24 DIAGNOSIS — Z20.822 ENCOUNTER FOR LABORATORY TESTING FOR COVID-19 VIRUS: ICD-10-CM

## 2021-08-24 DIAGNOSIS — I35.0 NONRHEUMATIC AORTIC VALVE STENOSIS: ICD-10-CM

## 2021-08-24 DIAGNOSIS — Z95.2 S/P TAVR (TRANSCATHETER AORTIC VALVE REPLACEMENT): ICD-10-CM

## 2021-08-24 DIAGNOSIS — Z95.810 AICD (AUTOMATIC CARDIOVERTER/DEFIBRILLATOR) PRESENT: ICD-10-CM

## 2021-08-24 DIAGNOSIS — I42.0 DILATED CARDIOMYOPATHY: Primary | ICD-10-CM

## 2021-08-24 PROCEDURE — 3288F PR FALLS RISK ASSESSMENT DOCUMENTED: ICD-10-PCS | Mod: CPTII,S$GLB,, | Performed by: INTERNAL MEDICINE

## 2021-08-24 PROCEDURE — 99214 OFFICE O/P EST MOD 30 MIN: CPT | Mod: S$GLB,,, | Performed by: INTERNAL MEDICINE

## 2021-08-24 PROCEDURE — 99214 PR OFFICE/OUTPT VISIT, EST, LEVL IV, 30-39 MIN: ICD-10-PCS | Mod: S$GLB,,, | Performed by: INTERNAL MEDICINE

## 2021-08-24 PROCEDURE — 3074F PR MOST RECENT SYSTOLIC BLOOD PRESSURE < 130 MM HG: ICD-10-PCS | Mod: CPTII,S$GLB,, | Performed by: INTERNAL MEDICINE

## 2021-08-24 PROCEDURE — 99999 PR PBB SHADOW E&M-EST. PATIENT-LVL III: CPT | Mod: PBBFAC,,, | Performed by: INTERNAL MEDICINE

## 2021-08-24 PROCEDURE — 99499 RISK ADDL DX/OHS AUDIT: ICD-10-PCS | Mod: HCNC,S$GLB,, | Performed by: INTERNAL MEDICINE

## 2021-08-24 PROCEDURE — 1101F PR PT FALLS ASSESS DOC 0-1 FALLS W/OUT INJ PAST YR: ICD-10-PCS | Mod: CPTII,S$GLB,, | Performed by: INTERNAL MEDICINE

## 2021-08-24 PROCEDURE — 1159F MED LIST DOCD IN RCRD: CPT | Mod: CPTII,S$GLB,, | Performed by: INTERNAL MEDICINE

## 2021-08-24 PROCEDURE — 3078F PR MOST RECENT DIASTOLIC BLOOD PRESSURE < 80 MM HG: ICD-10-PCS | Mod: CPTII,S$GLB,, | Performed by: INTERNAL MEDICINE

## 2021-08-24 PROCEDURE — 1126F PR PAIN SEVERITY QUANTIFIED, NO PAIN PRESENT: ICD-10-PCS | Mod: CPTII,S$GLB,, | Performed by: INTERNAL MEDICINE

## 2021-08-24 PROCEDURE — 99999 PR PBB SHADOW E&M-EST. PATIENT-LVL III: ICD-10-PCS | Mod: PBBFAC,,, | Performed by: INTERNAL MEDICINE

## 2021-08-24 PROCEDURE — 3288F FALL RISK ASSESSMENT DOCD: CPT | Mod: CPTII,S$GLB,, | Performed by: INTERNAL MEDICINE

## 2021-08-24 PROCEDURE — 1159F PR MEDICATION LIST DOCUMENTED IN MEDICAL RECORD: ICD-10-PCS | Mod: CPTII,S$GLB,, | Performed by: INTERNAL MEDICINE

## 2021-08-24 PROCEDURE — 1160F PR REVIEW ALL MEDS BY PRESCRIBER/CLIN PHARMACIST DOCUMENTED: ICD-10-PCS | Mod: CPTII,S$GLB,, | Performed by: INTERNAL MEDICINE

## 2021-08-24 PROCEDURE — 1160F RVW MEDS BY RX/DR IN RCRD: CPT | Mod: CPTII,S$GLB,, | Performed by: INTERNAL MEDICINE

## 2021-08-24 PROCEDURE — 3078F DIAST BP <80 MM HG: CPT | Mod: CPTII,S$GLB,, | Performed by: INTERNAL MEDICINE

## 2021-08-24 PROCEDURE — 99499 UNLISTED E&M SERVICE: CPT | Mod: HCNC,S$GLB,, | Performed by: INTERNAL MEDICINE

## 2021-08-24 PROCEDURE — 3074F SYST BP LT 130 MM HG: CPT | Mod: CPTII,S$GLB,, | Performed by: INTERNAL MEDICINE

## 2021-08-24 PROCEDURE — 1101F PT FALLS ASSESS-DOCD LE1/YR: CPT | Mod: CPTII,S$GLB,, | Performed by: INTERNAL MEDICINE

## 2021-08-24 PROCEDURE — 1126F AMNT PAIN NOTED NONE PRSNT: CPT | Mod: CPTII,S$GLB,, | Performed by: INTERNAL MEDICINE

## 2021-08-24 RX ORDER — PREDNISONE 20 MG/1
TABLET ORAL
COMMUNITY
Start: 2021-07-26 | End: 2021-12-04 | Stop reason: CLARIF

## 2021-08-24 RX ORDER — LOSARTAN POTASSIUM 25 MG/1
TABLET ORAL
COMMUNITY
Start: 2021-08-02 | End: 2021-12-04 | Stop reason: CLARIF

## 2021-08-24 RX ORDER — COLCHICINE 0.6 MG/1
TABLET ORAL
COMMUNITY
Start: 2021-06-17 | End: 2021-12-04 | Stop reason: CLARIF

## 2021-08-24 RX ORDER — IPRATROPIUM BROMIDE AND ALBUTEROL SULFATE 2.5; .5 MG/3ML; MG/3ML
3 SOLUTION RESPIRATORY (INHALATION) EVERY 6 HOURS PRN
COMMUNITY
Start: 2021-08-02

## 2021-08-24 RX ORDER — FLUTICASONE PROPIONATE 50 MCG
SPRAY, SUSPENSION (ML) NASAL
COMMUNITY
Start: 2021-07-26 | End: 2021-12-04 | Stop reason: CLARIF

## 2021-08-24 RX ORDER — METHYLPREDNISOLONE 4 MG/1
TABLET ORAL
COMMUNITY
Start: 2021-08-02 | End: 2021-12-04 | Stop reason: CLARIF

## 2021-09-07 ENCOUNTER — LAB VISIT (OUTPATIENT)
Dept: LAB | Facility: OTHER | Age: 86
End: 2021-09-07
Payer: MEDICARE

## 2021-09-07 DIAGNOSIS — Z20.822 ENCOUNTER FOR LABORATORY TESTING FOR COVID-19 VIRUS: ICD-10-CM

## 2021-09-07 PROCEDURE — U0003 INFECTIOUS AGENT DETECTION BY NUCLEIC ACID (DNA OR RNA); SEVERE ACUTE RESPIRATORY SYNDROME CORONAVIRUS 2 (SARS-COV-2) (CORONAVIRUS DISEASE [COVID-19]), AMPLIFIED PROBE TECHNIQUE, MAKING USE OF HIGH THROUGHPUT TECHNOLOGIES AS DESCRIBED BY CMS-2020-01-R: HCPCS | Performed by: NURSE PRACTITIONER

## 2021-09-08 LAB
SARS-COV-2 RNA RESP QL NAA+PROBE: NOT DETECTED
SARS-COV-2- CYCLE NUMBER: NORMAL

## 2021-09-14 ENCOUNTER — LAB VISIT (OUTPATIENT)
Dept: LAB | Facility: OTHER | Age: 86
End: 2021-09-14
Payer: MEDICARE

## 2021-09-14 DIAGNOSIS — Z20.822 ENCOUNTER FOR LABORATORY TESTING FOR COVID-19 VIRUS: ICD-10-CM

## 2021-09-14 PROCEDURE — U0003 INFECTIOUS AGENT DETECTION BY NUCLEIC ACID (DNA OR RNA); SEVERE ACUTE RESPIRATORY SYNDROME CORONAVIRUS 2 (SARS-COV-2) (CORONAVIRUS DISEASE [COVID-19]), AMPLIFIED PROBE TECHNIQUE, MAKING USE OF HIGH THROUGHPUT TECHNOLOGIES AS DESCRIBED BY CMS-2020-01-R: HCPCS | Mod: HCNC | Performed by: NURSE PRACTITIONER

## 2021-09-15 LAB
SARS-COV-2 RNA RESP QL NAA+PROBE: NOT DETECTED
SARS-COV-2- CYCLE NUMBER: NORMAL

## 2021-09-21 ENCOUNTER — LAB VISIT (OUTPATIENT)
Dept: LAB | Facility: OTHER | Age: 86
End: 2021-09-21
Payer: MEDICARE

## 2021-09-21 DIAGNOSIS — Z20.822 ENCOUNTER FOR LABORATORY TESTING FOR COVID-19 VIRUS: ICD-10-CM

## 2021-09-21 PROCEDURE — U0003 INFECTIOUS AGENT DETECTION BY NUCLEIC ACID (DNA OR RNA); SEVERE ACUTE RESPIRATORY SYNDROME CORONAVIRUS 2 (SARS-COV-2) (CORONAVIRUS DISEASE [COVID-19]), AMPLIFIED PROBE TECHNIQUE, MAKING USE OF HIGH THROUGHPUT TECHNOLOGIES AS DESCRIBED BY CMS-2020-01-R: HCPCS | Mod: HCNC | Performed by: NURSE PRACTITIONER

## 2021-09-22 LAB
SARS-COV-2 RNA RESP QL NAA+PROBE: NOT DETECTED
SARS-COV-2- CYCLE NUMBER: NORMAL

## 2021-09-23 ENCOUNTER — CLINICAL SUPPORT (OUTPATIENT)
Dept: CARDIOLOGY | Facility: HOSPITAL | Age: 86
End: 2021-09-23
Payer: MEDICARE

## 2021-09-23 DIAGNOSIS — Z95.0 PRESENCE OF CARDIAC PACEMAKER: ICD-10-CM

## 2021-09-23 PROCEDURE — 93294 REM INTERROG EVL PM/LDLS PM: CPT | Mod: HCNC,,, | Performed by: INTERNAL MEDICINE

## 2021-09-23 PROCEDURE — 93294 CARDIAC DEVICE CHECK - REMOTE: ICD-10-PCS | Mod: HCNC,,, | Performed by: INTERNAL MEDICINE

## 2021-09-23 PROCEDURE — 93296 REM INTERROG EVL PM/IDS: CPT | Mod: HCNC,PO | Performed by: INTERNAL MEDICINE

## 2021-09-24 DIAGNOSIS — M25.512 LEFT SHOULDER PAIN, UNSPECIFIED CHRONICITY: Primary | ICD-10-CM

## 2021-09-27 ENCOUNTER — OFFICE VISIT (OUTPATIENT)
Dept: ORTHOPEDICS | Facility: CLINIC | Age: 86
End: 2021-09-27
Payer: MEDICARE

## 2021-09-27 ENCOUNTER — HOSPITAL ENCOUNTER (OUTPATIENT)
Dept: RADIOLOGY | Facility: HOSPITAL | Age: 86
Discharge: HOME | End: 2021-09-27
Attending: ORTHOPAEDIC SURGERY
Payer: MEDICARE

## 2021-09-27 VITALS — WEIGHT: 217 LBS | BODY MASS INDEX: 40.97 KG/M2 | HEIGHT: 61 IN

## 2021-09-27 DIAGNOSIS — M25.512 LEFT SHOULDER PAIN, UNSPECIFIED CHRONICITY: ICD-10-CM

## 2021-09-27 DIAGNOSIS — M25.512 LEFT SHOULDER PAIN, UNSPECIFIED CHRONICITY: Primary | ICD-10-CM

## 2021-09-27 PROCEDURE — 3288F PR FALLS RISK ASSESSMENT DOCUMENTED: ICD-10-PCS | Mod: HCNC,CPTII,S$GLB, | Performed by: ORTHOPAEDIC SURGERY

## 2021-09-27 PROCEDURE — 73030 X-RAY EXAM OF SHOULDER: CPT | Mod: TC,HCNC,PO,LT

## 2021-09-27 PROCEDURE — 99213 PR OFFICE/OUTPT VISIT, EST, LEVL III, 20-29 MIN: ICD-10-PCS | Mod: HCNC,S$GLB,, | Performed by: ORTHOPAEDIC SURGERY

## 2021-09-27 PROCEDURE — 1125F AMNT PAIN NOTED PAIN PRSNT: CPT | Mod: HCNC,CPTII,S$GLB, | Performed by: ORTHOPAEDIC SURGERY

## 2021-09-27 PROCEDURE — 99499 RISK ADDL DX/OHS AUDIT: ICD-10-PCS | Mod: HCNC,S$GLB,, | Performed by: ORTHOPAEDIC SURGERY

## 2021-09-27 PROCEDURE — 99999 PR PBB SHADOW E&M-EST. PATIENT-LVL IV: ICD-10-PCS | Mod: PBBFAC,HCNC,, | Performed by: ORTHOPAEDIC SURGERY

## 2021-09-27 PROCEDURE — 73030 X-RAY EXAM OF SHOULDER: CPT | Mod: 26,HCNC,LT, | Performed by: RADIOLOGY

## 2021-09-27 PROCEDURE — 1160F RVW MEDS BY RX/DR IN RCRD: CPT | Mod: HCNC,CPTII,S$GLB, | Performed by: ORTHOPAEDIC SURGERY

## 2021-09-27 PROCEDURE — 1100F PTFALLS ASSESS-DOCD GE2>/YR: CPT | Mod: HCNC,CPTII,S$GLB, | Performed by: ORTHOPAEDIC SURGERY

## 2021-09-27 PROCEDURE — 73030 XR SHOULDER TRAUMA 3 VIEW LEFT: ICD-10-PCS | Mod: 26,HCNC,LT, | Performed by: RADIOLOGY

## 2021-09-27 PROCEDURE — 1100F PR PT FALLS ASSESS DOC 2+ FALLS/FALL W/INJURY/YR: ICD-10-PCS | Mod: HCNC,CPTII,S$GLB, | Performed by: ORTHOPAEDIC SURGERY

## 2021-09-27 PROCEDURE — 1160F PR REVIEW ALL MEDS BY PRESCRIBER/CLIN PHARMACIST DOCUMENTED: ICD-10-PCS | Mod: HCNC,CPTII,S$GLB, | Performed by: ORTHOPAEDIC SURGERY

## 2021-09-27 PROCEDURE — 1125F PR PAIN SEVERITY QUANTIFIED, PAIN PRESENT: ICD-10-PCS | Mod: HCNC,CPTII,S$GLB, | Performed by: ORTHOPAEDIC SURGERY

## 2021-09-27 PROCEDURE — 99999 PR PBB SHADOW E&M-EST. PATIENT-LVL IV: CPT | Mod: PBBFAC,HCNC,, | Performed by: ORTHOPAEDIC SURGERY

## 2021-09-27 PROCEDURE — 1159F MED LIST DOCD IN RCRD: CPT | Mod: HCNC,CPTII,S$GLB, | Performed by: ORTHOPAEDIC SURGERY

## 2021-09-27 PROCEDURE — 99213 OFFICE O/P EST LOW 20 MIN: CPT | Mod: HCNC,S$GLB,, | Performed by: ORTHOPAEDIC SURGERY

## 2021-09-27 PROCEDURE — 99499 UNLISTED E&M SERVICE: CPT | Mod: HCNC,S$GLB,, | Performed by: ORTHOPAEDIC SURGERY

## 2021-09-27 PROCEDURE — 1159F PR MEDICATION LIST DOCUMENTED IN MEDICAL RECORD: ICD-10-PCS | Mod: HCNC,CPTII,S$GLB, | Performed by: ORTHOPAEDIC SURGERY

## 2021-09-27 PROCEDURE — 3288F FALL RISK ASSESSMENT DOCD: CPT | Mod: HCNC,CPTII,S$GLB, | Performed by: ORTHOPAEDIC SURGERY

## 2021-09-28 ENCOUNTER — LAB VISIT (OUTPATIENT)
Dept: LAB | Facility: OTHER | Age: 86
End: 2021-09-28
Payer: MEDICARE

## 2021-09-28 DIAGNOSIS — Z20.822 ENCOUNTER FOR LABORATORY TESTING FOR COVID-19 VIRUS: ICD-10-CM

## 2021-09-28 LAB
SARS-COV-2 RNA RESP QL NAA+PROBE: NOT DETECTED
SARS-COV-2- CYCLE NUMBER: NORMAL

## 2021-09-28 PROCEDURE — U0003 INFECTIOUS AGENT DETECTION BY NUCLEIC ACID (DNA OR RNA); SEVERE ACUTE RESPIRATORY SYNDROME CORONAVIRUS 2 (SARS-COV-2) (CORONAVIRUS DISEASE [COVID-19]), AMPLIFIED PROBE TECHNIQUE, MAKING USE OF HIGH THROUGHPUT TECHNOLOGIES AS DESCRIBED BY CMS-2020-01-R: HCPCS | Mod: HCNC | Performed by: NURSE PRACTITIONER

## 2021-10-12 ENCOUNTER — LAB VISIT (OUTPATIENT)
Dept: LAB | Facility: OTHER | Age: 86
End: 2021-10-12
Payer: MEDICARE

## 2021-10-12 DIAGNOSIS — Z20.822 ENCOUNTER FOR LABORATORY TESTING FOR COVID-19 VIRUS: ICD-10-CM

## 2021-10-12 LAB
SARS-COV-2 RNA RESP QL NAA+PROBE: NOT DETECTED
SARS-COV-2- CYCLE NUMBER: NORMAL

## 2021-10-12 PROCEDURE — U0003 INFECTIOUS AGENT DETECTION BY NUCLEIC ACID (DNA OR RNA); SEVERE ACUTE RESPIRATORY SYNDROME CORONAVIRUS 2 (SARS-COV-2) (CORONAVIRUS DISEASE [COVID-19]), AMPLIFIED PROBE TECHNIQUE, MAKING USE OF HIGH THROUGHPUT TECHNOLOGIES AS DESCRIBED BY CMS-2020-01-R: HCPCS | Mod: HCNC | Performed by: NURSE PRACTITIONER

## 2021-10-27 ENCOUNTER — TELEPHONE (OUTPATIENT)
Dept: CARDIOLOGY | Facility: HOSPITAL | Age: 86
End: 2021-10-27
Payer: MEDICARE

## 2021-10-27 DIAGNOSIS — Z95.0 PACEMAKER: Primary | ICD-10-CM

## 2021-10-27 DIAGNOSIS — I50.42 CHRONIC COMBINED SYSTOLIC (CONGESTIVE) AND DIASTOLIC (CONGESTIVE) HEART FAILURE: ICD-10-CM

## 2021-11-02 ENCOUNTER — TELEPHONE (OUTPATIENT)
Dept: CARDIOLOGY | Facility: HOSPITAL | Age: 86
End: 2021-11-02
Payer: MEDICARE

## 2021-11-04 ENCOUNTER — TELEPHONE (OUTPATIENT)
Dept: CARDIOLOGY | Facility: HOSPITAL | Age: 86
End: 2021-11-04
Payer: MEDICARE

## 2021-11-19 ENCOUNTER — TELEPHONE (OUTPATIENT)
Dept: GASTROENTEROLOGY | Facility: CLINIC | Age: 86
End: 2021-11-19
Payer: MEDICARE

## 2021-11-22 NOTE — PROGRESS NOTES
Referral faxed to [phone] along with office notes and labs   pt informed of dosing and next inr chk date

## 2021-12-04 PROBLEM — J96.21 ACUTE ON CHRONIC RESPIRATORY FAILURE WITH HYPOXIA: Status: ACTIVE | Noted: 2021-12-04

## 2021-12-07 ENCOUNTER — PATIENT OUTREACH (OUTPATIENT)
Dept: ADMINISTRATIVE | Facility: OTHER | Age: 86
End: 2021-12-07
Payer: MEDICARE

## 2021-12-10 PROBLEM — G93.41 ACUTE METABOLIC ENCEPHALOPATHY: Status: ACTIVE | Noted: 2021-12-10

## 2021-12-13 PROBLEM — J44.9 CHRONIC OBSTRUCTIVE PULMONARY DISEASE: Status: ACTIVE | Noted: 2018-01-03

## 2021-12-13 PROBLEM — E83.52 HYPERCALCEMIA: Status: ACTIVE | Noted: 2021-12-13

## 2021-12-13 PROBLEM — J96.22 ACUTE ON CHRONIC RESPIRATORY FAILURE WITH HYPOXIA AND HYPERCAPNIA: Status: ACTIVE | Noted: 2021-12-04

## 2021-12-16 PROBLEM — J18.9 PNEUMONIA DUE TO SUSPECTED GRAM-NEGATIVE BACTERIA: Status: ACTIVE | Noted: 2021-12-16

## 2021-12-27 ENCOUNTER — TELEPHONE (OUTPATIENT)
Dept: CARDIOLOGY | Facility: HOSPITAL | Age: 86
End: 2021-12-27
Payer: MEDICARE

## 2023-08-11 NOTE — PROGRESS NOTES
Pt is past due for INR, was due 10/18.  Received the following message today:  Fern Chavez, NP  Danica Thao, PharmD   Cc: Toña An, PharmD             Pt has been having a lot of trouble learning self-testing.  Can you set up a time when she can bring in her machine and you show her in person?  Also, I put her on prednisone today.   Thanks!   Janel      predniSONE (DELTASONE) 10 MG tablet     --   Sig: 3 tabs daily for 2 days, 2 tabs daily for 2 days and 1 tab daily for 2 days.       
lvm to hold coumadin on Saturday and call for appt  
lvm to verify pt held on saturday and to set up appt  
Alert and oriented to person, place and time

## (undated) DEVICE — MARKER SKIN STND TIP BLUE BARR

## (undated) DEVICE — SEE MEDLINE ITEM 152622

## (undated) DEVICE — NDL HYPO REG 25G X 1 1/2

## (undated) DEVICE — GLOVE SURGICAL LATEX SZ 7

## (undated) DEVICE — TRAY NERVE BLOCK

## (undated) DEVICE — APPLICATOR CHLORAPREP CLR 10.5